# Patient Record
Sex: FEMALE | Race: WHITE | NOT HISPANIC OR LATINO | Employment: OTHER
[De-identification: names, ages, dates, MRNs, and addresses within clinical notes are randomized per-mention and may not be internally consistent; named-entity substitution may affect disease eponyms.]

---

## 2017-01-04 ENCOUNTER — RECORDS - HEALTHEAST (OUTPATIENT)
Dept: ADMINISTRATIVE | Facility: OTHER | Age: 67
End: 2017-01-04

## 2017-01-06 ENCOUNTER — COMMUNICATION - HEALTHEAST (OUTPATIENT)
Dept: INTERNAL MEDICINE | Facility: CLINIC | Age: 67
End: 2017-01-06

## 2017-01-12 ENCOUNTER — OFFICE VISIT - HEALTHEAST (OUTPATIENT)
Dept: SLEEP MEDICINE | Facility: CLINIC | Age: 67
End: 2017-01-12

## 2017-01-12 DIAGNOSIS — R29.818 SUSPECTED SLEEP APNEA: ICD-10-CM

## 2017-01-12 DIAGNOSIS — R06.83 SNORING: ICD-10-CM

## 2017-01-12 DIAGNOSIS — G47.10 HYPERSOMNIA, UNSPECIFIED: ICD-10-CM

## 2017-01-12 DIAGNOSIS — R53.83 FATIGUE, UNSPECIFIED TYPE: ICD-10-CM

## 2017-01-12 ASSESSMENT — MIFFLIN-ST. JEOR: SCORE: 1717.91

## 2017-01-17 ENCOUNTER — COMMUNICATION - HEALTHEAST (OUTPATIENT)
Dept: INTERNAL MEDICINE | Facility: CLINIC | Age: 67
End: 2017-01-17

## 2017-01-24 ENCOUNTER — RECORDS - HEALTHEAST (OUTPATIENT)
Dept: ADMINISTRATIVE | Facility: OTHER | Age: 67
End: 2017-01-24

## 2017-02-10 ENCOUNTER — RECORDS - HEALTHEAST (OUTPATIENT)
Dept: SLEEP MEDICINE | Facility: CLINIC | Age: 67
End: 2017-02-10

## 2017-02-10 DIAGNOSIS — G47.10 HYPERSOMNIA, UNSPECIFIED: ICD-10-CM

## 2017-02-10 DIAGNOSIS — R06.83 SNORING: ICD-10-CM

## 2017-02-10 DIAGNOSIS — G47.30 SLEEP APNEA, UNSPECIFIED: ICD-10-CM

## 2017-02-10 DIAGNOSIS — R53.83 OTHER FATIGUE: ICD-10-CM

## 2017-02-16 ENCOUNTER — COMMUNICATION - HEALTHEAST (OUTPATIENT)
Dept: SLEEP MEDICINE | Facility: CLINIC | Age: 67
End: 2017-02-16

## 2017-03-15 ENCOUNTER — COMMUNICATION - HEALTHEAST (OUTPATIENT)
Dept: INTERNAL MEDICINE | Facility: CLINIC | Age: 67
End: 2017-03-15

## 2017-03-15 DIAGNOSIS — F41.9 ANXIETY: ICD-10-CM

## 2017-03-20 ENCOUNTER — COMMUNICATION - HEALTHEAST (OUTPATIENT)
Dept: INTERNAL MEDICINE | Facility: CLINIC | Age: 67
End: 2017-03-20

## 2017-03-20 DIAGNOSIS — F41.9 ANXIETY: ICD-10-CM

## 2017-03-31 ENCOUNTER — OFFICE VISIT - HEALTHEAST (OUTPATIENT)
Dept: SLEEP MEDICINE | Facility: CLINIC | Age: 67
End: 2017-03-31

## 2017-03-31 ENCOUNTER — AMBULATORY - HEALTHEAST (OUTPATIENT)
Dept: SLEEP MEDICINE | Facility: CLINIC | Age: 67
End: 2017-03-31

## 2017-03-31 DIAGNOSIS — G47.33 OSA ON CPAP: ICD-10-CM

## 2017-03-31 DIAGNOSIS — G47.10 HYPERSOMNIA, UNSPECIFIED: ICD-10-CM

## 2017-03-31 DIAGNOSIS — G47.8 SLEEP DYSFUNCTION WITH SLEEP STAGE DISTURBANCE: ICD-10-CM

## 2017-03-31 ASSESSMENT — MIFFLIN-ST. JEOR: SCORE: 1718.82

## 2017-04-06 ENCOUNTER — RECORDS - HEALTHEAST (OUTPATIENT)
Dept: ADMINISTRATIVE | Facility: OTHER | Age: 67
End: 2017-04-06

## 2017-04-14 ENCOUNTER — OFFICE VISIT - HEALTHEAST (OUTPATIENT)
Dept: INTERNAL MEDICINE | Facility: CLINIC | Age: 67
End: 2017-04-14

## 2017-04-14 DIAGNOSIS — G47.33 OBSTRUCTIVE SLEEP APNEA SYNDROME: ICD-10-CM

## 2017-04-14 DIAGNOSIS — E11.9 TYPE 2 DIABETES MELLITUS WITHOUT COMPLICATION, WITH LONG-TERM CURRENT USE OF INSULIN (H): ICD-10-CM

## 2017-04-14 DIAGNOSIS — Z79.4 TYPE 2 DIABETES MELLITUS WITHOUT COMPLICATION, WITH LONG-TERM CURRENT USE OF INSULIN (H): ICD-10-CM

## 2017-04-14 DIAGNOSIS — Z01.818 PREOP EXAM FOR INTERNAL MEDICINE: ICD-10-CM

## 2017-04-14 ASSESSMENT — MIFFLIN-ST. JEOR: SCORE: 1726.99

## 2017-04-24 ENCOUNTER — COMMUNICATION - HEALTHEAST (OUTPATIENT)
Dept: SLEEP MEDICINE | Facility: CLINIC | Age: 67
End: 2017-04-24

## 2017-06-13 ENCOUNTER — COMMUNICATION - HEALTHEAST (OUTPATIENT)
Dept: INTERNAL MEDICINE | Facility: CLINIC | Age: 67
End: 2017-06-13

## 2017-06-13 ENCOUNTER — COMMUNICATION - HEALTHEAST (OUTPATIENT)
Dept: SCHEDULING | Facility: CLINIC | Age: 67
End: 2017-06-13

## 2017-06-13 DIAGNOSIS — F41.9 ANXIETY: ICD-10-CM

## 2017-06-20 ENCOUNTER — RECORDS - HEALTHEAST (OUTPATIENT)
Dept: ADMINISTRATIVE | Facility: OTHER | Age: 67
End: 2017-06-20

## 2017-06-22 ENCOUNTER — COMMUNICATION - HEALTHEAST (OUTPATIENT)
Dept: INTERNAL MEDICINE | Facility: CLINIC | Age: 67
End: 2017-06-22

## 2017-06-27 ENCOUNTER — OFFICE VISIT - HEALTHEAST (OUTPATIENT)
Dept: SLEEP MEDICINE | Facility: CLINIC | Age: 67
End: 2017-06-27

## 2017-06-27 DIAGNOSIS — G47.8 SLEEP DYSFUNCTION WITH SLEEP STAGE DISTURBANCE: ICD-10-CM

## 2017-06-27 DIAGNOSIS — G47.33 OSA ON CPAP: ICD-10-CM

## 2017-06-27 DIAGNOSIS — G47.10 HYPERSOMNIA, UNSPECIFIED: ICD-10-CM

## 2017-06-28 ENCOUNTER — MEDICAL CORRESPONDENCE (OUTPATIENT)
Dept: HEALTH INFORMATION MANAGEMENT | Facility: CLINIC | Age: 67
End: 2017-06-28

## 2017-06-28 ENCOUNTER — RECORDS - HEALTHEAST (OUTPATIENT)
Dept: ADMINISTRATIVE | Facility: OTHER | Age: 67
End: 2017-06-28

## 2017-06-29 ENCOUNTER — COMMUNICATION - HEALTHEAST (OUTPATIENT)
Dept: INTERNAL MEDICINE | Facility: CLINIC | Age: 67
End: 2017-06-29

## 2017-06-29 DIAGNOSIS — Z12.31 ENCOUNTER FOR SCREENING MAMMOGRAM FOR MALIGNANT NEOPLASM OF BREAST: ICD-10-CM

## 2017-06-29 DIAGNOSIS — Z00.00 HEALTH CARE MAINTENANCE: ICD-10-CM

## 2017-06-30 ENCOUNTER — RADIANT APPOINTMENT (OUTPATIENT)
Dept: MAMMOGRAPHY | Facility: CLINIC | Age: 67
End: 2017-06-30
Attending: INTERNAL MEDICINE
Payer: MEDICARE

## 2017-06-30 ENCOUNTER — RADIANT APPOINTMENT (OUTPATIENT)
Dept: ULTRASOUND IMAGING | Facility: CLINIC | Age: 67
End: 2017-06-30
Attending: INTERNAL MEDICINE
Payer: MEDICARE

## 2017-06-30 ENCOUNTER — RECORDS - HEALTHEAST (OUTPATIENT)
Dept: ADMINISTRATIVE | Facility: OTHER | Age: 67
End: 2017-06-30

## 2017-06-30 DIAGNOSIS — R92.8 ABNORMAL MAMMOGRAM OF LEFT BREAST: ICD-10-CM

## 2017-06-30 PROCEDURE — 76642 ULTRASOUND BREAST LIMITED: CPT | Mod: LT | Performed by: STUDENT IN AN ORGANIZED HEALTH CARE EDUCATION/TRAINING PROGRAM

## 2017-06-30 PROCEDURE — G0206 DX MAMMO INCL CAD UNI: HCPCS | Mod: LT | Performed by: STUDENT IN AN ORGANIZED HEALTH CARE EDUCATION/TRAINING PROGRAM

## 2017-07-01 ENCOUNTER — COMMUNICATION - HEALTHEAST (OUTPATIENT)
Dept: INTERNAL MEDICINE | Facility: CLINIC | Age: 67
End: 2017-07-01

## 2017-07-24 ENCOUNTER — COMMUNICATION - HEALTHEAST (OUTPATIENT)
Dept: INTERNAL MEDICINE | Facility: CLINIC | Age: 67
End: 2017-07-24

## 2017-07-24 DIAGNOSIS — F41.9 ANXIETY: ICD-10-CM

## 2017-08-03 ENCOUNTER — COMMUNICATION - HEALTHEAST (OUTPATIENT)
Dept: SLEEP MEDICINE | Facility: CLINIC | Age: 67
End: 2017-08-03

## 2017-09-02 ENCOUNTER — OFFICE VISIT - HEALTHEAST (OUTPATIENT)
Dept: FAMILY MEDICINE | Facility: CLINIC | Age: 67
End: 2017-09-02

## 2017-09-02 DIAGNOSIS — T63.441A BEE STING REACTION: ICD-10-CM

## 2017-09-30 ENCOUNTER — COMMUNICATION - HEALTHEAST (OUTPATIENT)
Dept: INTERNAL MEDICINE | Facility: CLINIC | Age: 67
End: 2017-09-30

## 2017-09-30 DIAGNOSIS — I10 HYPERTENSION: ICD-10-CM

## 2017-09-30 DIAGNOSIS — M62.838 MUSCLE SPASMS OF BOTH LOWER EXTREMITIES: ICD-10-CM

## 2017-09-30 DIAGNOSIS — E78.5 HYPERLIPIDEMIA: ICD-10-CM

## 2017-09-30 DIAGNOSIS — F41.9 ANXIETY: ICD-10-CM

## 2017-10-02 ENCOUNTER — COMMUNICATION - HEALTHEAST (OUTPATIENT)
Dept: INTERNAL MEDICINE | Facility: CLINIC | Age: 67
End: 2017-10-02

## 2017-10-03 ENCOUNTER — COMMUNICATION - HEALTHEAST (OUTPATIENT)
Dept: SCHEDULING | Facility: CLINIC | Age: 67
End: 2017-10-03

## 2017-10-03 DIAGNOSIS — E11.9 TYPE 2 DIABETES MELLITUS WITHOUT COMPLICATION, WITH LONG-TERM CURRENT USE OF INSULIN (H): ICD-10-CM

## 2017-10-03 DIAGNOSIS — Z79.4 TYPE 2 DIABETES MELLITUS WITHOUT COMPLICATION, WITH LONG-TERM CURRENT USE OF INSULIN (H): ICD-10-CM

## 2017-10-03 DIAGNOSIS — F41.9 ANXIETY: ICD-10-CM

## 2017-10-25 ENCOUNTER — COMMUNICATION - HEALTHEAST (OUTPATIENT)
Dept: SLEEP MEDICINE | Facility: CLINIC | Age: 67
End: 2017-10-25

## 2017-11-08 ENCOUNTER — COMMUNICATION - HEALTHEAST (OUTPATIENT)
Dept: INTERNAL MEDICINE | Facility: CLINIC | Age: 67
End: 2017-11-08

## 2017-11-11 ENCOUNTER — COMMUNICATION - HEALTHEAST (OUTPATIENT)
Dept: INTERNAL MEDICINE | Facility: CLINIC | Age: 67
End: 2017-11-11

## 2017-11-11 DIAGNOSIS — E11.9 DIABETES (H): ICD-10-CM

## 2017-11-11 DIAGNOSIS — I10 HYPERTENSION: ICD-10-CM

## 2017-11-11 DIAGNOSIS — J44.89 ASTHMATIC BRONCHITIS , CHRONIC (H): ICD-10-CM

## 2017-11-11 DIAGNOSIS — E11.9 TYPE 2 DIABETES MELLITUS (H): ICD-10-CM

## 2017-11-13 ENCOUNTER — COMMUNICATION - HEALTHEAST (OUTPATIENT)
Dept: INTERNAL MEDICINE | Facility: CLINIC | Age: 67
End: 2017-11-13

## 2017-12-17 ENCOUNTER — COMMUNICATION - HEALTHEAST (OUTPATIENT)
Dept: SCHEDULING | Facility: CLINIC | Age: 67
End: 2017-12-17

## 2017-12-17 DIAGNOSIS — I10 HTN (HYPERTENSION): ICD-10-CM

## 2018-01-03 ENCOUNTER — OFFICE VISIT - HEALTHEAST (OUTPATIENT)
Dept: INTERNAL MEDICINE | Facility: CLINIC | Age: 68
End: 2018-01-03

## 2018-01-03 ENCOUNTER — RECORDS - HEALTHEAST (OUTPATIENT)
Dept: ADMINISTRATIVE | Facility: OTHER | Age: 68
End: 2018-01-03

## 2018-01-03 ENCOUNTER — COMMUNICATION - HEALTHEAST (OUTPATIENT)
Dept: INTERNAL MEDICINE | Facility: CLINIC | Age: 68
End: 2018-01-03

## 2018-01-03 DIAGNOSIS — E11.9 TYPE 2 DIABETES MELLITUS WITHOUT COMPLICATION, WITH LONG-TERM CURRENT USE OF INSULIN (H): ICD-10-CM

## 2018-01-03 DIAGNOSIS — E06.3 HYPOTHYROIDISM DUE TO HASHIMOTO'S THYROIDITIS: ICD-10-CM

## 2018-01-03 DIAGNOSIS — Z78.0 MENOPAUSE: ICD-10-CM

## 2018-01-03 DIAGNOSIS — I10 ESSENTIAL HYPERTENSION: ICD-10-CM

## 2018-01-03 DIAGNOSIS — Z79.4 TYPE 2 DIABETES MELLITUS WITHOUT COMPLICATION, WITH LONG-TERM CURRENT USE OF INSULIN (H): ICD-10-CM

## 2018-01-03 DIAGNOSIS — E78.5 HYPERLIPIDEMIA, UNSPECIFIED HYPERLIPIDEMIA TYPE: ICD-10-CM

## 2018-01-03 DIAGNOSIS — E11.9 TYPE 2 DIABETES MELLITUS (H): ICD-10-CM

## 2018-01-03 LAB
ALBUMIN SERPL-MCNC: 3.6 G/DL (ref 3.5–5)
ALP SERPL-CCNC: 163 U/L (ref 45–120)
ALT SERPL W P-5'-P-CCNC: 24 U/L (ref 0–45)
ANION GAP SERPL CALCULATED.3IONS-SCNC: 11 MMOL/L (ref 5–18)
AST SERPL W P-5'-P-CCNC: 23 U/L (ref 0–40)
BILIRUB DIRECT SERPL-MCNC: 0.3 MG/DL
BILIRUB SERPL-MCNC: 1 MG/DL (ref 0–1)
BUN SERPL-MCNC: 27 MG/DL (ref 8–22)
CALCIUM SERPL-MCNC: 9.6 MG/DL (ref 8.5–10.5)
CHLORIDE BLD-SCNC: 105 MMOL/L (ref 98–107)
CHOLEST SERPL-MCNC: 160 MG/DL
CO2 SERPL-SCNC: 26 MMOL/L (ref 22–31)
CREAT SERPL-MCNC: 1.25 MG/DL (ref 0.6–1.1)
ERYTHROCYTE [DISTWIDTH] IN BLOOD BY AUTOMATED COUNT: 12.1 % (ref 11–14.5)
FASTING STATUS PATIENT QL REPORTED: YES
GFR SERPL CREATININE-BSD FRML MDRD: 43 ML/MIN/1.73M2
GLUCOSE BLD-MCNC: 194 MG/DL (ref 70–125)
HBA1C MFR BLD: 8.6 % (ref 3.5–6)
HCT VFR BLD AUTO: 39.3 % (ref 35–47)
HDLC SERPL-MCNC: 49 MG/DL
HGB BLD-MCNC: 13 G/DL (ref 12–16)
LDLC SERPL CALC-MCNC: 90 MG/DL
MCH RBC QN AUTO: 28.8 PG (ref 27–34)
MCHC RBC AUTO-ENTMCNC: 33.1 G/DL (ref 32–36)
MCV RBC AUTO: 87 FL (ref 80–100)
PLATELET # BLD AUTO: 240 THOU/UL (ref 140–440)
PMV BLD AUTO: 7.8 FL (ref 7–10)
POTASSIUM BLD-SCNC: 4.4 MMOL/L (ref 3.5–5)
PROT SERPL-MCNC: 7.1 G/DL (ref 6–8)
RBC # BLD AUTO: 4.52 MILL/UL (ref 3.8–5.4)
SODIUM SERPL-SCNC: 142 MMOL/L (ref 136–145)
TRIGL SERPL-MCNC: 105 MG/DL
TSH SERPL DL<=0.005 MIU/L-ACNC: 0.48 UIU/ML (ref 0.3–5)
WBC: 6.6 THOU/UL (ref 4–11)

## 2018-01-04 ENCOUNTER — AMBULATORY - HEALTHEAST (OUTPATIENT)
Dept: SCHEDULING | Facility: CLINIC | Age: 68
End: 2018-01-04

## 2018-01-04 DIAGNOSIS — Z78.0 MENOPAUSE: ICD-10-CM

## 2018-01-09 ENCOUNTER — COMMUNICATION - HEALTHEAST (OUTPATIENT)
Dept: INTERNAL MEDICINE | Facility: CLINIC | Age: 68
End: 2018-01-09

## 2018-01-09 DIAGNOSIS — F41.9 ANXIETY: ICD-10-CM

## 2018-01-11 ENCOUNTER — COMMUNICATION - HEALTHEAST (OUTPATIENT)
Dept: INTERNAL MEDICINE | Facility: CLINIC | Age: 68
End: 2018-01-11

## 2018-01-11 DIAGNOSIS — R05.9 COUGH: ICD-10-CM

## 2018-01-11 DIAGNOSIS — J32.9 SINUSITIS: ICD-10-CM

## 2018-01-12 ENCOUNTER — COMMUNICATION - HEALTHEAST (OUTPATIENT)
Dept: INTERNAL MEDICINE | Facility: CLINIC | Age: 68
End: 2018-01-12

## 2018-01-12 ENCOUNTER — RECORDS - HEALTHEAST (OUTPATIENT)
Dept: ADMINISTRATIVE | Facility: OTHER | Age: 68
End: 2018-01-12

## 2018-01-19 ENCOUNTER — OFFICE VISIT - HEALTHEAST (OUTPATIENT)
Dept: INTERNAL MEDICINE | Facility: CLINIC | Age: 68
End: 2018-01-19

## 2018-01-19 DIAGNOSIS — I45.10 RBBB: ICD-10-CM

## 2018-01-19 DIAGNOSIS — J10.1 INFLUENZA A: ICD-10-CM

## 2018-01-19 DIAGNOSIS — Z79.4 TYPE 2 DIABETES MELLITUS WITHOUT COMPLICATION, WITH LONG-TERM CURRENT USE OF INSULIN (H): ICD-10-CM

## 2018-01-19 DIAGNOSIS — R94.31 ABNORMAL EKG: ICD-10-CM

## 2018-01-19 DIAGNOSIS — E11.9 TYPE 2 DIABETES MELLITUS WITHOUT COMPLICATION, WITH LONG-TERM CURRENT USE OF INSULIN (H): ICD-10-CM

## 2018-01-19 LAB
ATRIAL RATE - MUSE: 80 BPM
DIASTOLIC BLOOD PRESSURE - MUSE: NORMAL MMHG
INTERPRETATION ECG - MUSE: NORMAL
P AXIS - MUSE: 1 DEGREES
PR INTERVAL - MUSE: 124 MS
QRS DURATION - MUSE: 116 MS
QT - MUSE: 420 MS
QTC - MUSE: 484 MS
R AXIS - MUSE: 60 DEGREES
SYSTOLIC BLOOD PRESSURE - MUSE: NORMAL MMHG
T AXIS - MUSE: 18 DEGREES
VENTRICULAR RATE- MUSE: 80 BPM

## 2018-01-22 ENCOUNTER — COMMUNICATION - HEALTHEAST (OUTPATIENT)
Dept: INTERNAL MEDICINE | Facility: CLINIC | Age: 68
End: 2018-01-22

## 2018-01-24 ENCOUNTER — RECORDS - HEALTHEAST (OUTPATIENT)
Dept: ADMINISTRATIVE | Facility: OTHER | Age: 68
End: 2018-01-24

## 2018-01-31 ENCOUNTER — COMMUNICATION - HEALTHEAST (OUTPATIENT)
Dept: INTERNAL MEDICINE | Facility: CLINIC | Age: 68
End: 2018-01-31

## 2018-01-31 DIAGNOSIS — I10 HYPERTENSION: ICD-10-CM

## 2018-01-31 DIAGNOSIS — R60.9 EDEMA: ICD-10-CM

## 2018-01-31 DIAGNOSIS — M10.9 GOUT: ICD-10-CM

## 2018-02-23 ENCOUNTER — COMMUNICATION - HEALTHEAST (OUTPATIENT)
Dept: INTERNAL MEDICINE | Facility: CLINIC | Age: 68
End: 2018-02-23

## 2018-02-23 DIAGNOSIS — E78.5 HYPERLIPIDEMIA: ICD-10-CM

## 2018-02-23 DIAGNOSIS — R60.9 EDEMA: ICD-10-CM

## 2018-04-03 ENCOUNTER — COMMUNICATION - HEALTHEAST (OUTPATIENT)
Dept: INTERNAL MEDICINE | Facility: CLINIC | Age: 68
End: 2018-04-03

## 2018-04-03 DIAGNOSIS — F41.9 ANXIETY: ICD-10-CM

## 2018-04-11 ENCOUNTER — COMMUNICATION - HEALTHEAST (OUTPATIENT)
Dept: INTERNAL MEDICINE | Facility: CLINIC | Age: 68
End: 2018-04-11

## 2018-04-11 DIAGNOSIS — F41.9 ANXIETY: ICD-10-CM

## 2018-04-23 ENCOUNTER — COMMUNICATION - HEALTHEAST (OUTPATIENT)
Dept: INTERNAL MEDICINE | Facility: CLINIC | Age: 68
End: 2018-04-23

## 2018-04-23 DIAGNOSIS — F41.9 ANXIETY: ICD-10-CM

## 2018-05-04 ENCOUNTER — COMMUNICATION - HEALTHEAST (OUTPATIENT)
Dept: INTERNAL MEDICINE | Facility: CLINIC | Age: 68
End: 2018-05-04

## 2018-05-04 DIAGNOSIS — F41.9 ANXIETY: ICD-10-CM

## 2018-05-24 ENCOUNTER — COMMUNICATION - HEALTHEAST (OUTPATIENT)
Dept: INTERNAL MEDICINE | Facility: CLINIC | Age: 68
End: 2018-05-24

## 2018-07-02 ENCOUNTER — RECORDS - HEALTHEAST (OUTPATIENT)
Dept: ADMINISTRATIVE | Facility: OTHER | Age: 68
End: 2018-07-02

## 2018-07-02 ENCOUNTER — RADIANT APPOINTMENT (OUTPATIENT)
Dept: MAMMOGRAPHY | Facility: CLINIC | Age: 68
End: 2018-07-02
Payer: MEDICARE

## 2018-07-02 DIAGNOSIS — Z12.31 VISIT FOR SCREENING MAMMOGRAM: ICD-10-CM

## 2018-07-16 ENCOUNTER — COMMUNICATION - HEALTHEAST (OUTPATIENT)
Dept: INTERNAL MEDICINE | Facility: CLINIC | Age: 68
End: 2018-07-16

## 2018-07-16 DIAGNOSIS — F41.9 ANXIETY: ICD-10-CM

## 2018-07-23 ENCOUNTER — COMMUNICATION - HEALTHEAST (OUTPATIENT)
Dept: SCHEDULING | Facility: CLINIC | Age: 68
End: 2018-07-23

## 2018-07-23 ENCOUNTER — RECORDS - HEALTHEAST (OUTPATIENT)
Dept: ADMINISTRATIVE | Facility: OTHER | Age: 68
End: 2018-07-23

## 2018-08-14 ENCOUNTER — MEDICAL CORRESPONDENCE (OUTPATIENT)
Dept: HEALTH INFORMATION MANAGEMENT | Facility: CLINIC | Age: 68
End: 2018-08-14

## 2018-08-14 ENCOUNTER — COMMUNICATION - HEALTHEAST (OUTPATIENT)
Dept: INTERNAL MEDICINE | Facility: CLINIC | Age: 68
End: 2018-08-14

## 2018-08-14 DIAGNOSIS — M10.9 GOUT FLARE: ICD-10-CM

## 2018-08-16 ENCOUNTER — COMMUNICATION - HEALTHEAST (OUTPATIENT)
Dept: INTERNAL MEDICINE | Facility: CLINIC | Age: 68
End: 2018-08-16

## 2018-08-16 DIAGNOSIS — M62.838 MUSCLE SPASMS OF BOTH LOWER EXTREMITIES: ICD-10-CM

## 2018-08-16 DIAGNOSIS — E11.9 TYPE 2 DIABETES MELLITUS WITHOUT COMPLICATION, WITH LONG-TERM CURRENT USE OF INSULIN (H): ICD-10-CM

## 2018-08-16 DIAGNOSIS — Z79.4 TYPE 2 DIABETES MELLITUS WITHOUT COMPLICATION, WITH LONG-TERM CURRENT USE OF INSULIN (H): ICD-10-CM

## 2018-08-26 ENCOUNTER — COMMUNICATION - HEALTHEAST (OUTPATIENT)
Dept: INTERNAL MEDICINE | Facility: CLINIC | Age: 68
End: 2018-08-26

## 2018-09-03 ENCOUNTER — COMMUNICATION - HEALTHEAST (OUTPATIENT)
Dept: INTERNAL MEDICINE | Facility: CLINIC | Age: 68
End: 2018-09-03

## 2018-09-03 DIAGNOSIS — E78.5 HYPERLIPIDEMIA: ICD-10-CM

## 2018-09-03 DIAGNOSIS — E11.9 TYPE 2 DIABETES MELLITUS (H): ICD-10-CM

## 2018-09-04 ENCOUNTER — COMMUNICATION - HEALTHEAST (OUTPATIENT)
Dept: INTERNAL MEDICINE | Facility: CLINIC | Age: 68
End: 2018-09-04

## 2018-09-04 DIAGNOSIS — E11.9 DIABETES MELLITUS, TYPE 2 (H): ICD-10-CM

## 2018-09-25 ENCOUNTER — RECORDS - HEALTHEAST (OUTPATIENT)
Dept: ADMINISTRATIVE | Facility: OTHER | Age: 68
End: 2018-09-25

## 2018-09-27 ENCOUNTER — COMMUNICATION - HEALTHEAST (OUTPATIENT)
Dept: INTERNAL MEDICINE | Facility: CLINIC | Age: 68
End: 2018-09-27

## 2018-10-03 ENCOUNTER — AMBULATORY - HEALTHEAST (OUTPATIENT)
Dept: LAB | Facility: CLINIC | Age: 68
End: 2018-10-03

## 2018-10-03 DIAGNOSIS — E11.9 DIABETES MELLITUS, TYPE 2 (H): ICD-10-CM

## 2018-10-03 LAB — HBA1C MFR BLD: 7.9 % (ref 3.5–6)

## 2018-10-12 ENCOUNTER — OFFICE VISIT - HEALTHEAST (OUTPATIENT)
Dept: INTERNAL MEDICINE | Facility: CLINIC | Age: 68
End: 2018-10-12

## 2018-10-12 DIAGNOSIS — Z79.4 TYPE 2 DIABETES MELLITUS WITHOUT COMPLICATION, WITH LONG-TERM CURRENT USE OF INSULIN (H): ICD-10-CM

## 2018-10-12 DIAGNOSIS — J44.89 ASTHMATIC BRONCHITIS , CHRONIC (H): ICD-10-CM

## 2018-10-12 DIAGNOSIS — M10.9 GOUT: ICD-10-CM

## 2018-10-12 DIAGNOSIS — Z23 FLU VACCINE NEED: ICD-10-CM

## 2018-10-12 DIAGNOSIS — I10 HYPERTENSION: ICD-10-CM

## 2018-10-12 DIAGNOSIS — F41.9 ANXIETY: ICD-10-CM

## 2018-10-12 DIAGNOSIS — E11.9 TYPE 2 DIABETES MELLITUS WITHOUT COMPLICATION, WITH LONG-TERM CURRENT USE OF INSULIN (H): ICD-10-CM

## 2018-10-12 DIAGNOSIS — R60.9 EDEMA: ICD-10-CM

## 2018-10-12 LAB
ALBUMIN SERPL-MCNC: 4 G/DL (ref 3.5–5)
ALP SERPL-CCNC: 152 U/L (ref 45–120)
ALT SERPL W P-5'-P-CCNC: 26 U/L (ref 0–45)
ANION GAP SERPL CALCULATED.3IONS-SCNC: 13 MMOL/L (ref 5–18)
AST SERPL W P-5'-P-CCNC: 26 U/L (ref 0–40)
BILIRUB DIRECT SERPL-MCNC: 0.2 MG/DL
BILIRUB SERPL-MCNC: 0.7 MG/DL (ref 0–1)
BUN SERPL-MCNC: 35 MG/DL (ref 8–22)
CALCIUM SERPL-MCNC: 10.2 MG/DL (ref 8.5–10.5)
CHLORIDE BLD-SCNC: 105 MMOL/L (ref 98–107)
CHOLEST SERPL-MCNC: 141 MG/DL
CO2 SERPL-SCNC: 23 MMOL/L (ref 22–31)
CREAT SERPL-MCNC: 1.13 MG/DL (ref 0.6–1.1)
CREAT UR-MCNC: 26.5 MG/DL
ERYTHROCYTE [DISTWIDTH] IN BLOOD BY AUTOMATED COUNT: 12.1 % (ref 11–14.5)
FASTING STATUS PATIENT QL REPORTED: NO
GFR SERPL CREATININE-BSD FRML MDRD: 48 ML/MIN/1.73M2
GLUCOSE BLD-MCNC: 97 MG/DL (ref 70–125)
HCT VFR BLD AUTO: 38.9 % (ref 35–47)
HDLC SERPL-MCNC: 48 MG/DL
HGB BLD-MCNC: 13.1 G/DL (ref 12–16)
LDLC SERPL CALC-MCNC: 73 MG/DL
MCH RBC QN AUTO: 28.7 PG (ref 27–34)
MCHC RBC AUTO-ENTMCNC: 33.7 G/DL (ref 32–36)
MCV RBC AUTO: 85 FL (ref 80–100)
MICROALBUMIN UR-MCNC: <0.5 MG/DL (ref 0–1.99)
MICROALBUMIN/CREAT UR: NORMAL MG/G
PLATELET # BLD AUTO: 273 THOU/UL (ref 140–440)
PMV BLD AUTO: 8.3 FL (ref 7–10)
POTASSIUM BLD-SCNC: 4.6 MMOL/L (ref 3.5–5)
PROT SERPL-MCNC: 7.1 G/DL (ref 6–8)
RBC # BLD AUTO: 4.57 MILL/UL (ref 3.8–5.4)
SODIUM SERPL-SCNC: 141 MMOL/L (ref 136–145)
TRIGL SERPL-MCNC: 101 MG/DL
TSH SERPL DL<=0.005 MIU/L-ACNC: 0.4 UIU/ML (ref 0.3–5)
URATE SERPL-MCNC: 9.3 MG/DL (ref 2–7.5)
WBC: 6.7 THOU/UL (ref 4–11)

## 2018-10-15 ENCOUNTER — COMMUNICATION - HEALTHEAST (OUTPATIENT)
Dept: INTERNAL MEDICINE | Facility: CLINIC | Age: 68
End: 2018-10-15

## 2018-10-22 ENCOUNTER — OFFICE VISIT - HEALTHEAST (OUTPATIENT)
Dept: PHARMACY | Facility: CLINIC | Age: 68
End: 2018-10-22

## 2018-10-22 ENCOUNTER — COMMUNICATION - HEALTHEAST (OUTPATIENT)
Dept: SCHEDULING | Facility: CLINIC | Age: 68
End: 2018-10-22

## 2018-10-22 DIAGNOSIS — E79.0 HYPERURICEMIA: ICD-10-CM

## 2018-10-22 DIAGNOSIS — Z79.4 TYPE 2 DIABETES MELLITUS WITHOUT COMPLICATION, WITH LONG-TERM CURRENT USE OF INSULIN (H): ICD-10-CM

## 2018-10-22 DIAGNOSIS — E06.3 HYPOTHYROIDISM DUE TO HASHIMOTO'S THYROIDITIS: ICD-10-CM

## 2018-10-22 DIAGNOSIS — E78.00 HYPERCHOLESTEROLEMIA: ICD-10-CM

## 2018-10-22 DIAGNOSIS — I10 ESSENTIAL HYPERTENSION: ICD-10-CM

## 2018-10-22 DIAGNOSIS — M81.0 SENILE OSTEOPOROSIS: ICD-10-CM

## 2018-10-22 DIAGNOSIS — G43.909 MIGRAINE WITHOUT STATUS MIGRAINOSUS, NOT INTRACTABLE, UNSPECIFIED MIGRAINE TYPE: ICD-10-CM

## 2018-10-22 DIAGNOSIS — J45.901 EXACERBATION OF ASTHMA, UNSPECIFIED ASTHMA SEVERITY, UNSPECIFIED WHETHER PERSISTENT: ICD-10-CM

## 2018-10-22 DIAGNOSIS — E11.9 TYPE 2 DIABETES MELLITUS WITHOUT COMPLICATION, WITH LONG-TERM CURRENT USE OF INSULIN (H): ICD-10-CM

## 2018-10-22 DIAGNOSIS — M19.90 OSTEOARTHRITIS, UNSPECIFIED OSTEOARTHRITIS TYPE, UNSPECIFIED SITE: ICD-10-CM

## 2018-10-29 ENCOUNTER — COMMUNICATION - HEALTHEAST (OUTPATIENT)
Dept: INTERNAL MEDICINE | Facility: CLINIC | Age: 68
End: 2018-10-29

## 2018-10-29 DIAGNOSIS — F41.9 ANXIETY: ICD-10-CM

## 2018-10-30 ENCOUNTER — OFFICE VISIT (OUTPATIENT)
Dept: RHEUMATOLOGY | Facility: CLINIC | Age: 68
End: 2018-10-30
Payer: MEDICARE

## 2018-10-30 ENCOUNTER — RADIANT APPOINTMENT (OUTPATIENT)
Dept: GENERAL RADIOLOGY | Facility: CLINIC | Age: 68
End: 2018-10-30
Attending: STUDENT IN AN ORGANIZED HEALTH CARE EDUCATION/TRAINING PROGRAM
Payer: MEDICARE

## 2018-10-30 ENCOUNTER — COMMUNICATION - HEALTHEAST (OUTPATIENT)
Dept: NURSING | Facility: CLINIC | Age: 68
End: 2018-10-30

## 2018-10-30 VITALS
HEART RATE: 82 BPM | WEIGHT: 283.1 LBS | HEIGHT: 63 IN | OXYGEN SATURATION: 96 % | SYSTOLIC BLOOD PRESSURE: 142 MMHG | BODY MASS INDEX: 50.16 KG/M2 | DIASTOLIC BLOOD PRESSURE: 74 MMHG

## 2018-10-30 DIAGNOSIS — M81.0 AGE-RELATED OSTEOPOROSIS WITHOUT CURRENT PATHOLOGICAL FRACTURE: ICD-10-CM

## 2018-10-30 DIAGNOSIS — E66.01 MORBID OBESITY (H): ICD-10-CM

## 2018-10-30 DIAGNOSIS — M25.50 ARTHRALGIA, UNSPECIFIED JOINT: Primary | ICD-10-CM

## 2018-10-30 DIAGNOSIS — M1A.09X0 CHRONIC GOUT OF MULTIPLE SITES, UNSPECIFIED CAUSE: ICD-10-CM

## 2018-10-30 LAB — URATE SERPL-MCNC: 9.3 MG/DL (ref 2.6–6)

## 2018-10-30 PROCEDURE — 84550 ASSAY OF BLOOD/URIC ACID: CPT | Performed by: STUDENT IN AN ORGANIZED HEALTH CARE EDUCATION/TRAINING PROGRAM

## 2018-10-30 PROCEDURE — 99204 OFFICE O/P NEW MOD 45 MIN: CPT | Performed by: STUDENT IN AN ORGANIZED HEALTH CARE EDUCATION/TRAINING PROGRAM

## 2018-10-30 PROCEDURE — 86200 CCP ANTIBODY: CPT | Performed by: STUDENT IN AN ORGANIZED HEALTH CARE EDUCATION/TRAINING PROGRAM

## 2018-10-30 PROCEDURE — 73130 X-RAY EXAM OF HAND: CPT | Mod: LT | Performed by: RADIOLOGY

## 2018-10-30 PROCEDURE — 36415 COLL VENOUS BLD VENIPUNCTURE: CPT | Performed by: STUDENT IN AN ORGANIZED HEALTH CARE EDUCATION/TRAINING PROGRAM

## 2018-10-30 PROCEDURE — 86431 RHEUMATOID FACTOR QUANT: CPT | Performed by: STUDENT IN AN ORGANIZED HEALTH CARE EDUCATION/TRAINING PROGRAM

## 2018-10-30 RX ORDER — COLCHICINE 0.6 MG/1
0.6 TABLET ORAL DAILY
Qty: 30 TABLET | Refills: 3 | Status: SHIPPED | OUTPATIENT
Start: 2018-10-30 | End: 2019-01-24

## 2018-10-30 RX ORDER — ALLOPURINOL 100 MG/1
100 TABLET ORAL DAILY
Qty: 30 TABLET | Refills: 3 | Status: SHIPPED | OUTPATIENT
Start: 2018-10-30 | End: 2018-11-07

## 2018-10-30 ASSESSMENT — PAIN SCALES - GENERAL: PAINLEVEL: MODERATE PAIN (4)

## 2018-10-30 NOTE — PROGRESS NOTES
Rheumatology Clinic Visit     Meggan Everett MRN# [id number]   YOB: 1950 Age: 68 year old     Date of Visit: Oct 30, 2018  Primary care provider: Stacey Kelly          Assessment and Plan:   Assessment     -- Chronic gout   -- Arthritis  -- Type 2 DM, HTN, HLD  -- CKD stage III  -- Morbid obesity   -- Osteoporosis     Ms Everett is 68-year-old female seen in clinic for evaluation of gout.    Chronic gout: She reports history of gout for the last 4 years. Happened once or twice a year.  Lately in the last few months it has become more frequent.  Having flareups in her fingers, knuckles, toes.  Lately she has some pain in her elbows.  Every time she gets a flareup it is treated with prednisone taper.  She was recommended to start urate lowering therapy by her PCP but she refused. Uric acid level has been running in the range of 8.5-9.7 mg/dL. She has occasionally noted tophaceous lesions over her fingertips which break open and a chalky white deposit comes out.    I will get serum uric acid level today.  We will get x-rays of bilateral hands to look for gouty arthritis and erosive changes.  Due to more frequent gout flareups she is a candidate for urate lowering therapy.  I recommend her to start allopurinol at a low dose of 100 mg daily.  She will also be at increased risk of gout flareups at the start of Allopurinol hence colchicine 0.6 mg tablet daily can be used for prophylaxis.    I will get repeat uric acid level along with the CBC and liver and kidney test in 2-3 weeks, based on labs will increase the dose of allopurinol further.  Her GFR is 40, she can use allopurinol higher dose of allopurinol as well.       Osteoporosis: She had DEXA scan in January 2018 which showed osteoporosis with high fracture risk.  I will give her endocrine referral for further management of osteoporosis.    Plan    1.  Blood tests: Serum uric acid level, rheumatoid factor, anti-CCP antibody.    2.  X-rays of  bilateral hands to look for gouty erosions    3.  Start Allopurinol 100 mg daily    5.  To prevent gout flareups will start colchicine 0.6 mg daily as a prophylactic agent    6.  We will repeat uric acid level, CBC and liver and kidney test in 2 weeks and based on that will increase the allopurinol dose    7.  Return to clinic in 2.5 months            Active Problem List:     Patient Active Problem List    Diagnosis Date Noted     Fibroadenoma of LEFT breast, with fibrocystic changes 05/14/2012     Priority: Medium            History of Present Illness:   Meggan Everett is 68 year old female with PMH of DM, HTN, hyperlipidemia, gout, hypothyroidism seen in the clinic in consultation at request of PCP Leticia April for management of chronic gout.     She was diagnosed with gout 4 years ago when it happened mostly in her feet once or twice a year. Now it has become more frequent and happen every couple weeks in her hands and toes. She has pain in her elbows. Even after acute gout attack settles down she can not bend her fingers. She has lumps on her fingers tips which break open and chalky white deposit comes out. Acute gout attack is treated with prednisone taper. Her PCP did discuss with her regarding the allopurinol use which she denied it due to potential side effects.  She has never been on Urate lowering treatment.     She has OA in her knees, does swimming to help with it.  She does not think the knee pain is related to gout.     No history of psoriasis, ulcerative colitis or chron's disease. No h/o iritis, enthesitis, finger or toe swelling like dactylitis, plantar fascitis or heel pain. Denies any raynauds, malar rash, photosensitivity, recurrent mouth/genital ulcers, sicca symptoms, pleuritic chest pains, recurrent sinusitis/rhinitis, swallowing difficulty, hearing or visual changes recently. No h/o arterial/venous thrombosis in the past. Denies any tick bite, recent GI/ infection.             Review  of Systems:   Review Of Systems  Constitutional: denies fever, chills, night sweats and weight loss.  Skin: No skin rash.  Eyes: No dryness or irritation in eyes. No episode of eye inflammation or redness.   Ears/Nose/Throat: no recurrent sinus infections.  Respiratory: No shortness of breath, dyspnea on exertion, cough, or hemoptysis  Cardiovascular: no chest pain or palpitations.  Gastrointestinal: no nausea, vomiting, abdominal pain.  Normal bowel movements.  Genitourinary: no dysuria, frequency  or hematuria.  Musculoskeletal: as in HPI  Neurologic: no numbness, tingling.  Psychiatric: no mood disorders.  Hematologic/Lymphatic/Immunologic: no history of easy bruising, petechia or purpura.  No abnormal bleeding.   Endocrine: + h/o thyroid disease or Diabetes.                  Past Medical History:     Past Medical History:   Diagnosis Date     Diabetes mellitus      Fibroadenoma of LEFT breast, with fibrocystic changes 5/14/2012     Hypothyroid      Mammographic microcalcification 5/2012    Left     Past Surgical History:   Procedure Laterality Date     D & C  2000, 2002     DILATE CERVIX, ABLATE ENDOMETRIUM, COMBINED  2005      BIOPSY OF BREAST, OPEN INCISIONAL  1972    Left      LAPAROSCOPY, SURGICAL; CHOLECYSTECTOMY  1998     Microdiscectomy  1998            Social History:     Social History     Occupational History     Retired      Social History Main Topics     Smoking status: Never Smoker     Smokeless tobacco: Never Used     Alcohol use No     Drug use: No     Sexual activity: Not on file            Family History:   No family history on file.         Allergies:     Allergies   Allergen Reactions     Codeine Sulfate Nausea     Tape [Adhesive Tape] Blisters     Tetanus Toxoid             Medications:     Current Outpatient Prescriptions   Medication Sig Dispense Refill     Celecoxib (CELEBREX PO) Take 200 mg by mouth       Cholecalciferol (VITAMIN D) 2000 UNIT tablet Take 2 tablets by mouth daily     "    furosemide (LASIX) 40 MG tablet Take 40 mg by mouth daily.       GLUCOSAMINE SULFATE PO Take 1,500 mg by mouth daily       IRBESARTAN PO Take 150 mg by mouth At Bedtime       LANTUS VIAL 100 UNITS/ML SC SOLN Inject 15 Units Subcutaneous 2 times daily        Levothyroxine Sodium (SYNTHROID PO) Take 125 mcg by mouth daily        LORAZEPAM PO Take 0.5 mg by mouth At Bedtime       metoprolol (TOPROL XL) 100 MG 24 hr tablet Take 100 mg by mouth daily.       NovoLOG VIAL 100 UNITS/ML SC SOLN Inject  Subcutaneous. Per sliding scale       rosuvastatin (CRESTOR) 10 MG tablet Take 10 mg by mouth daily.       tiZANidine (ZANAFLEX) 4 MG capsule Take 4 mg by mouth daily.       UNABLE TO FIND 2 tablets daily MEDICATION NAME: Uric Acid Support   For Kidney health supplement       UNABLE TO FIND 2 times daily MEDICATION NAME: Hemp Cream  Applied to knees PRN       Aliskiren-Hydrochlorothiazide 150-12.5 MG TABS Take 1 tablet by mouth daily.       ascorbic acid (VITAMIN C) 500 MG tablet Take 500 mg by mouth daily.       calcium carbonate-vitamin D (CALCIUM + D) 600-200 MG-UNIT TABS Take 1 tablet by mouth 3 times daily.              Physical Exam:   Blood pressure 142/74, pulse 82, height 1.6 m (5' 3\"), weight 128.4 kg (283 lb 1.6 oz), SpO2 96 %.  Wt Readings from Last 4 Encounters:   10/30/18 128.4 kg (283 lb 1.6 oz)       Constitutional: obese, appearing stated age; cooperative  Eyes: nl EOM, PERRLA, vision, conjunctiva, sclera  ENT: nl external ears, nose, hearing, lips, teeth, gums, throat  No mucous membrane lesions, normal saliva pool  Neck: no mass or thyroid enlargement  Resp: lungs clear to auscultation, nl to palpation  CV: RRR, no murmurs, rubs or gallops, no edema  GI: no ABD mass or tenderness, no HSM  : not tested  Lymph: no cervical, supraclavicular, inguinal or epitrochlear nodes    MS: All TMJ, neck, shoulder, elbow, wrist, MCP/PIP/DIP, spine, hip, knee, ankle, and foot MTP/IP joints were examined.     -- No " synovitis and tenderness present over MCP, PIP, DIP joints, wrists, elbows, shoulders, ankles and MTP joints.     -- She has tiny tophaceous lesions over left index finger tip, right middle finger tip. Small nodule present over right 4th PIP joints.     -- Right 2nd toe is red, swollen with possible tophaceous lesion.     -- No dactylitis,  tenosynovitis, enthesopathy.    Skin: no nail pitting, alopecia, rash, nodules or lesions  Neuro: nl cranial nerves, strength, sensation, DTRs.   Psych: nl judgement, orientation, memory, affect.         Data:     Results for orders placed or performed in visit on 07/02/18   MA Screen Bilateral w/Darian    Narrative    Examination: Bilateral digital screening mammography and bilateral  digital tomosynthesis with computer aided detection.    Comparison: Diagnostic mammogram dated 6/30/2017. Screening mammograms  dated 6/20/2017, 6/6/2016 and 5/29/2015, 10/7/2011    History/family history: No symptoms, routine screening.    TECHNIQUE:  Tomosynthesis    BREAST DENSITY: Scattered fibroglandular densities.    COMMENTS: There has been no significant change.       Impression    IMPRESSION: BI-RADS CATEGORY: 1 -  NEGATIVE.    RECOMMENDED FOLLOW-UP: Annual Mammography    Results to be sent to the patient.     I have personally reviewed the examination and initial interpretation  and I agree with the findings.    HILARIO KEN MD       Reviewed Rheumatology lab flowsheet    Adin Valerio MD  [facility]  Department of Rheumatology & Autoimmune Disorders  [facility]: [phone]   Pager - [phone]

## 2018-10-30 NOTE — NURSING NOTE
"Meggan Everett's goals for this visit include:   She requests these members of her care team be copied on today's visit information:    PCP: Stacey Kelly    Referring Provider:  Stacey Kelly  [facility]  [address] [place] [address]    /74  Pulse 82  Ht 1.6 m (5' 3\")  Wt 128.4 kg (283 lb 1.6 oz)  SpO2 96%  BMI 50.15 kg/m2    "

## 2018-10-30 NOTE — PATIENT INSTRUCTIONS
-- Will start you on Allopurinol 100 mg daily     -- Colchicine 0.6 mg daily as prophylaxis     -- Repeat Uric acid level in 2 weeks     -- Endocrine referral for Osteoporosis     -- RTC in 2.5 months   Allopurinol tablets  Brand Name: Zyloprim  What is this medicine?  ALLOPURINOL (al oh PURE i nole) reduces the amount of uric acid the body makes. It is used to treat the symptoms of gout. It is also used to treat or prevent high uric acid levels that occur as a result of certain types of chemotherapy. This medicine may also help patients who frequently have kidney stones.  How should I use this medicine?  Take this medicine by mouth with a glass of water. Follow the directions on the prescription label. If this medicine upsets your stomach, take it with food or milk. Take your doses at regular intervals. Do not take your medicine more often than directed.  Talk to your pediatrician regarding the use of this medicine in children. Special care may be needed. While this drug may be prescribed for children as young as 6 years for selected conditions, precautions do apply.  What side effects may I notice from receiving this medicine?  Side effects that you should report to your doctor or health care professional as soon as possible:    allergic reactions like skin rash, itching or hives, swelling of the face, lips, or tongue    breathing problems    muscle aches or pains    redness, blistering, peeling or loosening of the skin, including inside the mouth  Side effects that usually do not require medical attention (report to your doctor or health care professional if they continue or are bothersome):    changes in taste    diarrhea    indigestion    stomach pain or cramps  What may interact with this medicine?  Do not take this medicine with the following medication:    didanosine, ddI  This medicine may also interact with the following medications:    amoxicillin or ampicillin    azathioprine    certain medicines used to  treat gout    certain types of diuretics    chlorpropamide    cyclosporine    dicumarol    mercaptopurine    tolbutamide    warfarin  What if I miss a dose?  If you miss a dose, take it as soon as you can. If it is almost time for your next dose, take only that dose. Do not take double or extra doses.  Where should I keep my medicine?  Keep out of the reach of children.  Store at room temperature between 15 and 25 degrees C (59 and 77 degrees F). Protect from light and moisture. Throw away any unused medicine after the expiration date.  What should I tell my health care provider before I take this medicine?  They need to know if you have any of these conditions:    kidney or liver disease    an unusual or allergic reaction to allopurinol, other medicines, foods, dyes, or preservatives    pregnant or trying to get pregnant    breast feeding  What should I watch for while using this medicine?  Visit your doctor or health care professional for regular checks on your progress. If you are taking this medicine to treat gout, you may not have less frequent attacks at first. Keep taking your medicine regularly and the attacks should get better within 2 to 6 weeks. Drink plenty of water (10 to 12 full glasses a day) while you are taking this medicine. This will help to reduce stomach upset and reduce the risk of getting gout or kidney stones.  Call your doctor or health care professional at once if you get a skin rash together with chills, fever, sore throat, or nausea and vomiting, if you have blood in your urine, or difficulty passing urine.  Do not take vitamin C without asking your doctor or health care professional. Too much vitamin C can increase the chance of getting kidney stones.  You may get drowsy or dizzy. Do not drive, use machinery, or do anything that needs mental alertness until you know how this drug affects you. Do not stand or sit up quickly, especially if you are an older patient. This reduces the risk of  dizzy or fainting spells. Alcohol can make you more drowsy and dizzy. Alcohol can also increase the chance of stomach problems and increase the amount of uric acid in your blood. Avoid alcoholic drinks.  NOTE:This sheet is a summary. It may not cover all possible information. If you have questions about this medicine, talk to your doctor, pharmacist, or health care provider. Copyright  2018 [employer]        Gout    Gout is an inflammation of a joint due to a build-up of gout crystals in the joint fluid. This occurs when there is an excess of uric acid (a normal waste product) in the body. Uric acid builds up in the body when the kidneys are unable to filter enough of it from the blood. This may occur with age. It is also associated with kidney disease. Gout occurs more often in people with obesity, diabetes, high blood pressure, or high levels of fats in the blood. It may run in families. Gout tends to come and go. A flare up of gout is called an attack. Drinking alcohol or eating certain foods (such as shellfish or foods with additives such as high-fructose corn syrup) may increase uric acid levels in the blood and cause a gout attack.  During a gout attack, the affected joint may become a hot, red, swollen and painful. If you have had one attack of gout, you are likely to have another. An attack of gout can be treated with medicine. If these attacks become frequent, a daily medicine may be prescribed to help the kidneys remove uric acid from the body.  Home care  During a gout attack:    Rest painful joints. If gout affects the joints of your foot or leg, you may want to use crutches for the first few days to keep from bearing weight on the affected joint.    When sitting or lying down, raise the painful joint to a level higher than your heart.    Apply an ice pack (ice cubes in a plastic bag wrapped in a thin towel) over the injured area for 20 minutes every 1 to 2 hours the first day for pain relief. Continue  this 3 to 4 times a day for swelling and pain.    Avoid alcohol and foods listed below (see Preventing attacks) during a gout attack. Drink extra fluid to help flush the uric acid through your kidneys.    If you were prescribed a medicine to treat gout, take it as your healthcare provider has instructed. Don't skip doses.    Take anti-inflammatory medicine as directed.     If pain medicines have been prescribed, take them exactly as directed.    Preventing attacks    Minimize or avoid alcohol use. Excess alcohol intake can cause a gout attack.    Limit these foods and beverages:  ? Organ meats, such as kidneys and liver  ? Certain seafoods (anchovies, sardines, shrimp, scallops, herring, mackerel)  ? Wild game, meat extracts and meat gravies  ? Foods and beverages sweetened with high-fructose corn syrup, such as sodas    Eat a healthy diet including low-fat and nonfat dairy, whole grains, and vegetables.    If you are overweight, talk to your healthcare provider about a weight reduction plan. Avoid fasting or extreme low calorie diets (less than 900 calories per day). This will increase uric acid levels in the body.    If you have diabetes or high blood pressure, work with your doctor to manage these conditions.    Protect the joint from injury. Trauma can trigger a gout attack.  Follow-up care  Follow up with your healthcare provider, or as advised.   When to seek medical advice  Call your healthcare provider if you have any of the following:    Fever over 100.4 F (38. C) with worsening joint pain    Increasing redness around the joint    Pain developing in another joint    Repeated vomiting, abdominal pain, or blood in the vomit or stool (black or red color)  Date Last Reviewed: 3/1/2017    9859-7847 [employer]. [address] [place] [id number]. All rights reserved. This information is not intended as a substitute for professional medical care. Always follow your healthcare professional's  instructions.        Gout Diet  Gout is a painful condition caused by an excess of uric acid, a waste product made by the body. Uric acid forms crystals that collect in the joints. The immune response to these crystals brings on symptoms of joint pain and swelling. This is called a gout attack. Often, medications and diet changes are combined to manage gout. Below are some guidelines for changing your diet to help you manage gout and prevent attacks. Your health care provider will help you determine the best eating plan for you.     Eating to manage gout  Weight loss for those who are overweight may help reduce gout attacks.  Eat less of these foods  Eating too many foods containing purines may raise the levels of uric acid in your body. This raises your risk for a gout attack. Try to limit these foods and drinks:    Alcohol, such as beer and red wine. You may be told to avoid alcohol completely.    Soft drinks that contain sugar or high fructose corn syrup    Certain fish, including anchovies, sardines, fish eggs, and herring    Shellfish    Certain meats, such as red meat, hot dogs, luncheon meats, and turkey    Organ meats, such as liver, kidneys, and sweetbreads    Legumes, such as dried beans and peas    Other high fat foods such as gravy, whole milk, and high fat cheeses    Vegetables such as asparagus, cauliflower, spinach, and mushrooms used to be thought to contribute to an increased risk for a gout attack, but recent studies show that high purine vegetables don't increase the risk for a gout attack.  Eat more of these foods  Other foods may be helpful for people with gout. Add some of these foods to your diet:    Cherries contain chemicals that may lower uric acid.    Omega fatty acids. These are found in some fatty fish such as salmon, certain oils (flax, olive, or nut), and nuts themselves. Omega fatty acids may help prevent inflammation due to gout.    Dairy products that are low-fat or fat-free, such  as cheese and yogurt    Complex carbohydrate foods, including whole grains, brown rice, oats, and beans    Coffee, in moderation    Water, approximately 64 ounces per day  Follow-up care  Follow up with your healthcare provider as advised.  When to seek medical advice  Call your healthcare provider right away if any of these occur:    Return of gout symptoms, usually at night:    Severe pain, swelling, and heat in a joint, especially the base of the big toe    Affected joint is hard to move    Skin of the affected joint is purple or red    Fever of 100.4 F (38 C) or higher    Pain that doesn't get better even with prescribed medicine   Date Last Reviewed: 1/12/2016 2000-2017 [employer]. [address] [place] [address], [place] [address]. All rights reserved. This information is not intended as a substitute for professional medical care. Always follow your healthcare professional's instructions.        Treating Gout Attacks     Raising the joint above the level of your heart can help reduce gout symptoms.     Gout is a disease that affects the joints. It is caused by excess uric acid in your blood stream that may lead to crystals forming in your joints. Left untreated, it can lead to painful foot and joint deformities and even kidney problems. But, by treating gout early, you can relieve pain and help prevent future problems. Gout can usually be treated with medication and proper diet. In severe cases, surgery may be needed.  Gout attacks are painful and often happen more than once. Taking medications may reduce pain and prevent attacks in the future. There are also some things you can do at home to relieve symptoms.  Medications for gout  Your healthcare provider may prescribe a daily medication to reduce levels of uric acid. Reducing your uric acid levels may help prevent gout attacks. Allopurinol is one commonly used medication taken daily to reduce uric acid levels. Other medications can help relieve  pain and swelling during an acute attack. Medicines such as NSAIDs (nonsteroidal anti-inflammatory medicines), steroids, and colchicine may be prescribed for intermittent use to relieve an acute gout attack. Be sure to take your medication as directed.  What you can do  Below are some things you can do at home to relieve gout symptoms. Your healthcare provider may have other tips.    Rest the painful joint as much as you can.    Raise the painful joint so it is at a level higher than your heart.    Use ice for 10 minutes every 1-2 hours as possible.  How can I prevent gout?  With a little effort, you may be able to prevent gout attacks in the future. Here are some things you can do:    Avoid foods high in purines  ? Certain meats (red meat, processed meat, turkey)  ? Organ meats (kidney, liver, sweetbread)  ? Shellfish (lobster, crab, shrimp, scallop, mussel)  ? Certain fish (anchovy, sardine, herring, mackerel)    Take any medications prescribed by your healthcare provider.    Lose weight if you need to.    Reduce high fructose corn syrup in meals and drinks.    Reduce or eliminate consumption of alcohol, particularly beer, but also red wine and spirits.    Control blood pressure, diabetes, and cholesterol.    Drink plenty of water to help flush uric acid from your body.  Date Last Reviewed: 2/1/2016 2000-2017 [employer]. [address] [place] [address], [place] [id number]. All rights reserved. This information is not intended as a substitute for professional medical care. Always follow your healthcare professional's instructions.        Uric Acid (Blood)  Does this test have other names?  Serum uric acid  What is this test?  This test measures the amount of uric acid in your blood.  Uric acid is a normal bodily waste product. It forms when chemicals called purines break down. Purines are a natural substance found in the body and are also found in many foods such as liver, shellfish, and alcohol. They can  also be formed in the body when DNA is broken down.   When purines are broken down to uric acid in the blood, the body gets rid of it when you urinate or have a bowel movement. But if your body makes too much uric acid, or if your kidneys aren't working properly, uric acid can build up in the blood. Uric acid levels can also increase when you eat too many high-purine foods or take certain medicines like diuretics, aspirin, and niacin. Then crystals of uric acid can form and collect in the joints, causing painful inflammation. This condition is called gout.  Why do I need this test?  You might need this test if your healthcare provider wants to see whether you have high levels of uric acid in your blood. Your provider may recommend this test if you have symptoms of gout, although most people with hyperuricemia don't develop gout. Symptoms of gout include:    Joint pain or tenderness    Swelling in a joint or reddened skin around a joint    Swelling and pain in the big toe, ankle, or knee    Joints that are hot to the touch    Swelling and pain that affects only one joint in the body    Skin that looks shiny and is red or purple  You may also need this test if you have symptoms of kidney stones. Symptoms include:    Severe pain along your lower back. This may repeatedly get worse and then ease up. The pain may also travel to your genitals.    Nausea    Vomiting    Urgent need to urinate    Blood in your urine  What other tests might I have along with this test?  Your healthcare provider may also order other tests to diagnose gout, include looking at a sample of joint fluid drawn out with a needle.  Your provider may also order a urinalysis if he or she suspects that you have a kidney stone. The urinalysis looks for blood, white blood cells, and crystals.  Your provider may also order tests of your blood and urine to find out what's causing the high levels uric acid.  What do my test results mean?  Many things may  affect your lab test results. These include the method each lab uses to do the test. Even if your test results are different from the normal value, you may not have a problem. To learn what the results mean for you, talk with your healthcare provider.  Results are given in milligrams per deciliter (mg/dL). Here are results that may mean you have hyperuricemia:    For females: higher than 6 mg/dL    For males: higher than 7 mg/dL  Many health conditions can cause high levels of uric acid. These include cancer, kidney disease, hypothyroidism, hyperparathyroidism, and sarcoidosis.  Your uric acid levels may be high if you eat foods high in purines, such as organ meats, dried beans and peas, and certain fish - anchovies, herring, sardines, and mackerel. High levels can also be caused by a low-salt diet.  How is this test done?  The test requires a blood sample, which is drawn through a needle from a vein in your arm.  Does this test pose any risks?  Taking a blood sample with a needle carries risks that include bleeding, infection, bruising, or feeling dizzy. When the needle pricks your arm, you may feel a slight stinging sensation or pain. Afterward, the site may be slightly sore.  What might affect my test results?  Certain medicines may affect your test results. These include:    Aspirin and other medicines that contain salicylate    Cyclosporine, a medicine sometimes used for autoimmune diseases    Levodopa, a medicine used to treat Parkinson disease    Certain diuretic medicines such as hydrochlorothiazide    Vitamin B-3 (niacin)  Other things that may affect your test results include:    Vigorous exercise    Chemotherapy or radiation therapy to treat cancer    Foods high in purines, including organ meats, mushrooms, some types of fish and seafood, and dried peas and beans   How do I get ready for this test?  Ask your healthcare provider if you should avoid any foods, beverages, or medications before the test.  Be  sure your provider knows about all medicines, herbs, vitamins, and supplements you are taking. This includes medicines that don't need a prescription and any illicit drugs you may use.        5390-2212 [employer]. [address] [place] [address], [place] [address]. All rights reserved. This information is not intended as a substitute for professional medical care. Always follow your healthcare professional's instructions.        What Is Gout?  Gout is a disease that affects the joints. Left untreated, it can lead to painful foot and joint deformities and even kidney problems. But, by treating gout early, you can relieve pain and help prevent future problems. Gout can usually be treated with medication and proper diet. In severe cases, surgery may be needed.  What causes gout?  Gout is caused by an excess of uric acid (a waste product made by the body). Uric acid is excreted by the kidneys. If the uric acid level in your blood rises too high, the uric acid may form crystals that collect in the joints, bringing on a gout attack. If you have many gout attacks, crystals may form large deposits called tophi. Tophi can damage joints and cause deformity.  Who is at risk for gout?  Men are more likely to have gout than women. But women can also be affected, mostly after menopause. Some health problems, such as obesity and high cholesterol, make gout more likely. And some medications, such as diuretics ( water pills ), can trigger a gout attack. People who drink a lot of alcohol are at high risk for gout. Certain foods can also trigger a gout attack.  Substances that may trigger a gout attack  To help prevent a gout attack, avoid these foods:    Alcohol (particularly beer, but also red wine and spirits)    Certain meats (red meat, processed meat, turkey)    Organ meats (kidney, liver, sweetbread)    Shellfish (lobster, crab, shrimp, scallop, mussel)    Certain fish (anchovy, sardine, herring,  "mackerel)   Treatment    Lifestyle changes, including weight loss, exercise, and quitting tobacco use    Reducing consumption of the food groups above as well as high fructose corn syrup, found in many foods including sodas and energy drinks    Changing non-essential medications that may contribute to gout (such as thiazide diuretics--\"water pills\")    Medications to reduce the amount of uric acid in the blood, such as allopurinol or others    Medications to treat acute gout attacks, including NSAIDs (nonsteroidal anti-inflammatory medicines), steroids, and colchicine  Date Last Reviewed: 2/1/2016 2000-2017 [employer]. [address] [place] [id number]. All rights reserved. This information is not intended as a substitute for professional medical care. Always follow your healthcare professional's instructions.        "

## 2018-10-30 NOTE — MR AVS SNAPSHOT
After Visit Summary   10/30/2018    Meggan Everett    MRN: [id number]           Patient Information     Date Of Birth          1950        Visit Information        Provider Department      10/30/2018 11:00 AM Adin Valerio MD [facility]        Today's Diagnoses     Arthralgia, unspecified joint    -  1    Morbid obesity (H)        Chronic gout of multiple sites, unspecified cause        Age-related osteoporosis without current pathological fracture          Care Instructions    -- Will start you on Allopurinol 100 mg daily     -- Colchicine 0.6 mg daily as prophylaxis     -- Repeat Uric acid level in 2 weeks     -- Endocrine referral for Osteoporosis     -- RTC in 2.5 months   Allopurinol tablets  Brand Name: Zyloprim  What is this medicine?  ALLOPURINOL (al oh PURE i nole) reduces the amount of uric acid the body makes. It is used to treat the symptoms of gout. It is also used to treat or prevent high uric acid levels that occur as a result of certain types of chemotherapy. This medicine may also help patients who frequently have kidney stones.  How should I use this medicine?  Take this medicine by mouth with a glass of water. Follow the directions on the prescription label. If this medicine upsets your stomach, take it with food or milk. Take your doses at regular intervals. Do not take your medicine more often than directed.  Talk to your pediatrician regarding the use of this medicine in children. Special care may be needed. While this drug may be prescribed for children as young as 6 years for selected conditions, precautions do apply.  What side effects may I notice from receiving this medicine?  Side effects that you should report to your doctor or health care professional as soon as possible:    allergic reactions like skin rash, itching or hives, swelling of the face, lips, or tongue    breathing problems    muscle aches or pains    redness, blistering, peeling or  loosening of the skin, including inside the mouth  Side effects that usually do not require medical attention (report to your doctor or health care professional if they continue or are bothersome):    changes in taste    diarrhea    indigestion    stomach pain or cramps  What may interact with this medicine?  Do not take this medicine with the following medication:    didanosine, ddI  This medicine may also interact with the following medications:    amoxicillin or ampicillin    azathioprine    certain medicines used to treat gout    certain types of diuretics    chlorpropamide    cyclosporine    dicumarol    mercaptopurine    tolbutamide    warfarin  What if I miss a dose?  If you miss a dose, take it as soon as you can. If it is almost time for your next dose, take only that dose. Do not take double or extra doses.  Where should I keep my medicine?  Keep out of the reach of children.  Store at room temperature between 15 and 25 degrees C (59 and 77 degrees F). Protect from light and moisture. Throw away any unused medicine after the expiration date.  What should I tell my health care provider before I take this medicine?  They need to know if you have any of these conditions:    kidney or liver disease    an unusual or allergic reaction to allopurinol, other medicines, foods, dyes, or preservatives    pregnant or trying to get pregnant    breast feeding  What should I watch for while using this medicine?  Visit your doctor or health care professional for regular checks on your progress. If you are taking this medicine to treat gout, you may not have less frequent attacks at first. Keep taking your medicine regularly and the attacks should get better within 2 to 6 weeks. Drink plenty of water (10 to 12 full glasses a day) while you are taking this medicine. This will help to reduce stomach upset and reduce the risk of getting gout or kidney stones.  Call your doctor or health care professional at once if you get a  skin rash together with chills, fever, sore throat, or nausea and vomiting, if you have blood in your urine, or difficulty passing urine.  Do not take vitamin C without asking your doctor or health care professional. Too much vitamin C can increase the chance of getting kidney stones.  You may get drowsy or dizzy. Do not drive, use machinery, or do anything that needs mental alertness until you know how this drug affects you. Do not stand or sit up quickly, especially if you are an older patient. This reduces the risk of dizzy or fainting spells. Alcohol can make you more drowsy and dizzy. Alcohol can also increase the chance of stomach problems and increase the amount of uric acid in your blood. Avoid alcoholic drinks.  NOTE:This sheet is a summary. It may not cover all possible information. If you have questions about this medicine, talk to your doctor, pharmacist, or health care provider. Copyright  2018 [employer]        Gout    Gout is an inflammation of a joint due to a build-up of gout crystals in the joint fluid. This occurs when there is an excess of uric acid (a normal waste product) in the body. Uric acid builds up in the body when the kidneys are unable to filter enough of it from the blood. This may occur with age. It is also associated with kidney disease. Gout occurs more often in people with obesity, diabetes, high blood pressure, or high levels of fats in the blood. It may run in families. Gout tends to come and go. A flare up of gout is called an attack. Drinking alcohol or eating certain foods (such as shellfish or foods with additives such as high-fructose corn syrup) may increase uric acid levels in the blood and cause a gout attack.  During a gout attack, the affected joint may become a hot, red, swollen and painful. If you have had one attack of gout, you are likely to have another. An attack of gout can be treated with medicine. If these attacks become frequent, a daily medicine may be  prescribed to help the kidneys remove uric acid from the body.  Home care  During a gout attack:    Rest painful joints. If gout affects the joints of your foot or leg, you may want to use crutches for the first few days to keep from bearing weight on the affected joint.    When sitting or lying down, raise the painful joint to a level higher than your heart.    Apply an ice pack (ice cubes in a plastic bag wrapped in a thin towel) over the injured area for 20 minutes every 1 to 2 hours the first day for pain relief. Continue this 3 to 4 times a day for swelling and pain.    Avoid alcohol and foods listed below (see Preventing attacks) during a gout attack. Drink extra fluid to help flush the uric acid through your kidneys.    If you were prescribed a medicine to treat gout, take it as your healthcare provider has instructed. Don't skip doses.    Take anti-inflammatory medicine as directed.     If pain medicines have been prescribed, take them exactly as directed.    Preventing attacks    Minimize or avoid alcohol use. Excess alcohol intake can cause a gout attack.    Limit these foods and beverages:  ? Organ meats, such as kidneys and liver  ? Certain seafoods (anchovies, sardines, shrimp, scallops, herring, mackerel)  ? Wild game, meat extracts and meat gravies  ? Foods and beverages sweetened with high-fructose corn syrup, such as sodas    Eat a healthy diet including low-fat and nonfat dairy, whole grains, and vegetables.    If you are overweight, talk to your healthcare provider about a weight reduction plan. Avoid fasting or extreme low calorie diets (less than 900 calories per day). This will increase uric acid levels in the body.    If you have diabetes or high blood pressure, work with your doctor to manage these conditions.    Protect the joint from injury. Trauma can trigger a gout attack.  Follow-up care  Follow up with your healthcare provider, or as advised.   When to seek medical advice  Call your  healthcare provider if you have any of the following:    Fever over 100.4 F (38. C) with worsening joint pain    Increasing redness around the joint    Pain developing in another joint    Repeated vomiting, abdominal pain, or blood in the vomit or stool (black or red color)  Date Last Reviewed: 3/1/2017    5151-5278 [employer]. [address] [place] [address]. All rights reserved. This information is not intended as a substitute for professional medical care. Always follow your healthcare professional's instructions.        Gout Diet  Gout is a painful condition caused by an excess of uric acid, a waste product made by the body. Uric acid forms crystals that collect in the joints. The immune response to these crystals brings on symptoms of joint pain and swelling. This is called a gout attack. Often, medications and diet changes are combined to manage gout. Below are some guidelines for changing your diet to help you manage gout and prevent attacks. Your health care provider will help you determine the best eating plan for you.     Eating to manage gout  Weight loss for those who are overweight may help reduce gout attacks.  Eat less of these foods  Eating too many foods containing purines may raise the levels of uric acid in your body. This raises your risk for a gout attack. Try to limit these foods and drinks:    Alcohol, such as beer and red wine. You may be told to avoid alcohol completely.    Soft drinks that contain sugar or high fructose corn syrup    Certain fish, including anchovies, sardines, fish eggs, and herring    Shellfish    Certain meats, such as red meat, hot dogs, luncheon meats, and turkey    Organ meats, such as liver, kidneys, and sweetbreads    Legumes, such as dried beans and peas    Other high fat foods such as gravy, whole milk, and high fat cheeses    Vegetables such as asparagus, cauliflower, spinach, and mushrooms used to be thought to contribute to an increased  risk for a gout attack, but recent studies show that high purine vegetables don't increase the risk for a gout attack.  Eat more of these foods  Other foods may be helpful for people with gout. Add some of these foods to your diet:    Cherries contain chemicals that may lower uric acid.    Omega fatty acids. These are found in some fatty fish such as salmon, certain oils (flax, olive, or nut), and nuts themselves. Omega fatty acids may help prevent inflammation due to gout.    Dairy products that are low-fat or fat-free, such as cheese and yogurt    Complex carbohydrate foods, including whole grains, brown rice, oats, and beans    Coffee, in moderation    Water, approximately 64 ounces per day  Follow-up care  Follow up with your healthcare provider as advised.  When to seek medical advice  Call your healthcare provider right away if any of these occur:    Return of gout symptoms, usually at night:    Severe pain, swelling, and heat in a joint, especially the base of the big toe    Affected joint is hard to move    Skin of the affected joint is purple or red    Fever of 100.4 F (38 C) or higher    Pain that doesn't get better even with prescribed medicine   Date Last Reviewed: 1/12/2016 2000-2017 [employer]. [address] [place] [address]. All rights reserved. This information is not intended as a substitute for professional medical care. Always follow your healthcare professional's instructions.        Treating Gout Attacks     Raising the joint above the level of your heart can help reduce gout symptoms.     Gout is a disease that affects the joints. It is caused by excess uric acid in your blood stream that may lead to crystals forming in your joints. Left untreated, it can lead to painful foot and joint deformities and even kidney problems. But, by treating gout early, you can relieve pain and help prevent future problems. Gout can usually be treated with medication and proper diet.  In severe cases, surgery may be needed.  Gout attacks are painful and often happen more than once. Taking medications may reduce pain and prevent attacks in the future. There are also some things you can do at home to relieve symptoms.  Medications for gout  Your healthcare provider may prescribe a daily medication to reduce levels of uric acid. Reducing your uric acid levels may help prevent gout attacks. Allopurinol is one commonly used medication taken daily to reduce uric acid levels. Other medications can help relieve pain and swelling during an acute attack. Medicines such as NSAIDs (nonsteroidal anti-inflammatory medicines), steroids, and colchicine may be prescribed for intermittent use to relieve an acute gout attack. Be sure to take your medication as directed.  What you can do  Below are some things you can do at home to relieve gout symptoms. Your healthcare provider may have other tips.    Rest the painful joint as much as you can.    Raise the painful joint so it is at a level higher than your heart.    Use ice for 10 minutes every 1-2 hours as possible.  How can I prevent gout?  With a little effort, you may be able to prevent gout attacks in the future. Here are some things you can do:    Avoid foods high in purines  ? Certain meats (red meat, processed meat, turkey)  ? Organ meats (kidney, liver, sweetbread)  ? Shellfish (lobster, crab, shrimp, scallop, mussel)  ? Certain fish (anchovy, sardine, herring, mackerel)    Take any medications prescribed by your healthcare provider.    Lose weight if you need to.    Reduce high fructose corn syrup in meals and drinks.    Reduce or eliminate consumption of alcohol, particularly beer, but also red wine and spirits.    Control blood pressure, diabetes, and cholesterol.    Drink plenty of water to help flush uric acid from your body.  Date Last Reviewed: 2/1/2016 2000-2017 [employer]. [address] [place] [address], [place] [address]. All rights  reserved. This information is not intended as a substitute for professional medical care. Always follow your healthcare professional's instructions.        Uric Acid (Blood)  Does this test have other names?  Serum uric acid  What is this test?  This test measures the amount of uric acid in your blood.  Uric acid is a normal bodily waste product. It forms when chemicals called purines break down. Purines are a natural substance found in the body and are also found in many foods such as liver, shellfish, and alcohol. They can also be formed in the body when DNA is broken down.   When purines are broken down to uric acid in the blood, the body gets rid of it when you urinate or have a bowel movement. But if your body makes too much uric acid, or if your kidneys aren't working properly, uric acid can build up in the blood. Uric acid levels can also increase when you eat too many high-purine foods or take certain medicines like diuretics, aspirin, and niacin. Then crystals of uric acid can form and collect in the joints, causing painful inflammation. This condition is called gout.  Why do I need this test?  You might need this test if your healthcare provider wants to see whether you have high levels of uric acid in your blood. Your provider may recommend this test if you have symptoms of gout, although most people with hyperuricemia don't develop gout. Symptoms of gout include:    Joint pain or tenderness    Swelling in a joint or reddened skin around a joint    Swelling and pain in the big toe, ankle, or knee    Joints that are hot to the touch    Swelling and pain that affects only one joint in the body    Skin that looks shiny and is red or purple  You may also need this test if you have symptoms of kidney stones. Symptoms include:    Severe pain along your lower back. This may repeatedly get worse and then ease up. The pain may also travel to your genitals.    Nausea    Vomiting    Urgent need to urinate    Blood  in your urine  What other tests might I have along with this test?  Your healthcare provider may also order other tests to diagnose gout, include looking at a sample of joint fluid drawn out with a needle.  Your provider may also order a urinalysis if he or she suspects that you have a kidney stone. The urinalysis looks for blood, white blood cells, and crystals.  Your provider may also order tests of your blood and urine to find out what's causing the high levels uric acid.  What do my test results mean?  Many things may affect your lab test results. These include the method each lab uses to do the test. Even if your test results are different from the normal value, you may not have a problem. To learn what the results mean for you, talk with your healthcare provider.  Results are given in milligrams per deciliter (mg/dL). Here are results that may mean you have hyperuricemia:    For females: higher than 6 mg/dL    For males: higher than 7 mg/dL  Many health conditions can cause high levels of uric acid. These include cancer, kidney disease, hypothyroidism, hyperparathyroidism, and sarcoidosis.  Your uric acid levels may be high if you eat foods high in purines, such as organ meats, dried beans and peas, and certain fish - anchovies, herring, sardines, and mackerel. High levels can also be caused by a low-salt diet.  How is this test done?  The test requires a blood sample, which is drawn through a needle from a vein in your arm.  Does this test pose any risks?  Taking a blood sample with a needle carries risks that include bleeding, infection, bruising, or feeling dizzy. When the needle pricks your arm, you may feel a slight stinging sensation or pain. Afterward, the site may be slightly sore.  What might affect my test results?  Certain medicines may affect your test results. These include:    Aspirin and other medicines that contain salicylate    Cyclosporine, a medicine sometimes used for autoimmune  diseases    Levodopa, a medicine used to treat Parkinson disease    Certain diuretic medicines such as hydrochlorothiazide    Vitamin B-3 (niacin)  Other things that may affect your test results include:    Vigorous exercise    Chemotherapy or radiation therapy to treat cancer    Foods high in purines, including organ meats, mushrooms, some types of fish and seafood, and dried peas and beans   How do I get ready for this test?  Ask your healthcare provider if you should avoid any foods, beverages, or medications before the test.  Be sure your provider knows about all medicines, herbs, vitamins, and supplements you are taking. This includes medicines that don't need a prescription and any illicit drugs you may use.        6114-8315 [employer]. [address] [place] [address], [place] [address]. All rights reserved. This information is not intended as a substitute for professional medical care. Always follow your healthcare professional's instructions.        What Is Gout?  Gout is a disease that affects the joints. Left untreated, it can lead to painful foot and joint deformities and even kidney problems. But, by treating gout early, you can relieve pain and help prevent future problems. Gout can usually be treated with medication and proper diet. In severe cases, surgery may be needed.  What causes gout?  Gout is caused by an excess of uric acid (a waste product made by the body). Uric acid is excreted by the kidneys. If the uric acid level in your blood rises too high, the uric acid may form crystals that collect in the joints, bringing on a gout attack. If you have many gout attacks, crystals may form large deposits called tophi. Tophi can damage joints and cause deformity.  Who is at risk for gout?  Men are more likely to have gout than women. But women can also be affected, mostly after menopause. Some health problems, such as obesity and high cholesterol, make gout more likely. And some medications, such  "as diuretics ( water pills ), can trigger a gout attack. People who drink a lot of alcohol are at high risk for gout. Certain foods can also trigger a gout attack.  Substances that may trigger a gout attack  To help prevent a gout attack, avoid these foods:    Alcohol (particularly beer, but also red wine and spirits)    Certain meats (red meat, processed meat, turkey)    Organ meats (kidney, liver, sweetbread)    Shellfish (lobster, crab, shrimp, scallop, mussel)    Certain fish (anchovy, sardine, herring, mackerel)   Treatment    Lifestyle changes, including weight loss, exercise, and quitting tobacco use    Reducing consumption of the food groups above as well as high fructose corn syrup, found in many foods including sodas and energy drinks    Changing non-essential medications that may contribute to gout (such as thiazide diuretics--\"water pills\")    Medications to reduce the amount of uric acid in the blood, such as allopurinol or others    Medications to treat acute gout attacks, including NSAIDs (nonsteroidal anti-inflammatory medicines), steroids, and colchicine  Date Last Reviewed: 2/1/2016 2000-2017 [employer]. [address] [place] [address]. All rights reserved. This information is not intended as a substitute for professional medical care. Always follow your healthcare professional's instructions.                Follow-ups after your visit        Additional Services     ENDOCRINOLOGY ADULT REFERRAL       Your provider has referred you to: [facility]: [facility] [place] [phone]   [email or web address]    She has Osteoporosis.       Please be aware that coverage of these services is subject to the terms and limitations of your health insurance plan.  Call member services at your health plan with any benefit or coverage questions.      Please bring the following to your appointment:    >>   Any x-rays, CTs " or MRIs which have been performed.  Contact the facility where they were done to arrange for  prior to your scheduled appointment.    >>   List of current medications   >>   This referral request   >>   Any documents/labs given to you for this referral                  Your next 10 appointments already scheduled     Jorge 15, 2019 11:00 AM CST   Return Visit with Adin Valerio MD   [facility]    [address] [place] [address] [id number] [phone]              Future tests that were ordered for you today     Open Future Orders        Priority Expected Expires Ordered    Uric acid Routine 11/13/2018 10/30/2019 10/30/2018    CBC with platelets Routine 11/13/2018 10/30/2019 10/30/2018    Creatinine Routine 11/13/2018 10/30/2019 10/30/2018    AST Routine 11/13/2018 10/30/2019 10/30/2018    ALT Routine 11/13/2018 10/30/2019 10/30/2018            Who to contact     If you have questions or need follow up information about today's clinic visit or your schedule please contact [facility] directly at [phone].  Normal or non-critical lab and imaging results will be communicated to you by MyChart, letter or phone within 4 business days after the clinic has received the results. If you do not hear from us within 7 days, please contact the clinic through [employer] or phone. If you have a critical or abnormal lab result, we will notify you by phone as soon as possible.  Submit refill requests through [employer] or call your pharmacy and they will forward the refill request to us. Please allow 3 business days for your refill to be completed.          Additional Information About Your Visit        [employer] Information     [employer] gives you secure access to your electronic health record. If you see a primary care provider, you can also send messages to your care team and make appointments. If you have questions, please call your primary care clinic.   "If you do not have a primary care provider, please call [phone] and they will assist you.      [employer] is an electronic gateway that provides easy, online access to your medical records. With [employer], you can request a clinic appointment, read your test results, renew a prescription or communicate with your care team.     To access your existing account, please contact your [facility] or call [phone] for assistance.        Care EveryWhere ID     This is your Care EveryWhere ID. This could be used by other organizations to access your [place] medical records  [id number]        Your Vitals Were     Pulse Height Pulse Oximetry BMI (Body Mass Index)          82 1.6 m (5' 3\") 96% 50.15 kg/m2         Blood Pressure from Last 3 Encounters:   10/30/18 142/74    Weight from Last 3 Encounters:   10/30/18 128.4 kg (283 lb 1.6 oz)              We Performed the Following     Cyclic Citrullinated Peptide Antibody IgG     ENDOCRINOLOGY ADULT REFERRAL     Rheumatoid factor     Uric acid     XR Hand Bilateral G/E 3 Views          Today's Medication Changes          These changes are accurate as of 10/30/18 12:04 PM.  If you have any questions, ask your nurse or doctor.               Start taking these medicines.        Dose/Directions    allopurinol 100 MG tablet   Commonly known as:  ZYLOPRIM   Used for:  Chronic gout of multiple sites, unspecified cause, Arthralgia, unspecified joint, Morbid obesity (H), Age-related osteoporosis without current pathological fracture   Started by:  Adin Valerio MD        Dose:  100 mg   Take 1 tablet (100 mg) by mouth daily   Quantity:  30 tablet   Refills:  3       colchicine 0.6 MG tablet   Commonly known as:  COLCYRS   Used for:  Arthralgia, unspecified joint, Chronic gout of multiple sites, unspecified cause   Started by:  Adin Valerio MD        Dose:  0.6 mg   Take 1 tablet (0.6 mg) by mouth daily   Quantity:  30 tablet   Refills:  3    "         Where to get your medicines      These medications were sent to [id number] - [place], [place] - [address]  [address], [place] [address]     Phone:  [phone]     allopurinol 100 MG tablet    colchicine 0.6 MG tablet                Primary Care Provider Office Phone # Fax #    April Leticia [phone]       [address] [address]        Equal Access to Services     ADRIANNA MADSEN : Hadii aad ku hadasho Soomaali, waaxda luqadaha, qaybta kaalmada adeegyada, waxay idiin hayaan adeeg khsandra sheehan . So [facility] [phone].    ATENCIÓN: Si habla español, tiene a ayala disposición servicios gratuitos de asistencia lingüística. [facility] [phone].    We comply with applicable federal civil rights laws and [place] laws. We do not discriminate on the basis of race, color, national origin, age, disability, sex, sexual orientation, or gender identity.            Thank you!     Thank you for choosing [facility]  for your care. Our goal is always to provide you with excellent care. Hearing back from our patients is one way we can continue to improve our services. Please take a few minutes to complete the written survey that you may receive in the mail after your visit with us. Thank you!             Your Updated Medication List - Protect others around you: Learn how to safely use, store and throw away your medicines at www.disposemymeds.org.          This list is accurate as of 10/30/18 12:04 PM.  Always use your most recent med list.                   Brand Name Dispense Instructions for use Diagnosis    Aliskiren-Hydrochlorothiazide 150-12.5 MG Tabs      Take 1 tablet by mouth daily.        allopurinol 100 MG tablet    ZYLOPRIM    30 tablet    Take 1 tablet (100 mg) by mouth daily    Chronic gout of multiple sites, unspecified cause, Arthralgia, unspecified joint, Morbid obesity  (H), Age-related osteoporosis without current pathological fracture       ascorbic acid 500 MG tablet    VITAMIN C     Take 500 mg by mouth daily.        calcium + D 600-200 MG-UNIT Tabs   Generic drug:  calcium carbonate-vitamin D      Take 1 tablet by mouth 3 times daily.        CELEBREX PO      Take 200 mg by mouth    Arthralgia, unspecified joint, Morbid obesity (H), Chronic gout of multiple sites, unspecified cause, Age-related osteoporosis without current pathological fracture       colchicine 0.6 MG tablet    COLCYRS    30 tablet    Take 1 tablet (0.6 mg) by mouth daily    Arthralgia, unspecified joint, Chronic gout of multiple sites, unspecified cause       CRESTOR 10 MG tablet   Generic drug:  rosuvastatin      Take 10 mg by mouth daily.        GLUCOSAMINE SULFATE PO      Take 1,500 mg by mouth daily    Arthralgia, unspecified joint, Morbid obesity (H), Chronic gout of multiple sites, unspecified cause, Age-related osteoporosis without current pathological fracture       IRBESARTAN PO      Take 150 mg by mouth At Bedtime    Arthralgia, unspecified joint, Morbid obesity (H), Chronic gout of multiple sites, unspecified cause, Age-related osteoporosis without current pathological fracture       LANTUS VIAL 100 UNIT/ML injection   Generic drug:  insulin glargine      Inject 15 Units Subcutaneous 2 times daily        LASIX 40 MG tablet   Generic drug:  furosemide      Take 40 mg by mouth daily.        LORAZEPAM PO      Take 0.5 mg by mouth At Bedtime    Arthralgia, unspecified joint, Morbid obesity (H), Chronic gout of multiple sites, unspecified cause, Age-related osteoporosis without current pathological fracture       NovoLOG VIAL 100 UNITS/ML injection   Generic drug:  insulin aspart      Inject  Subcutaneous. Per sliding scale        SYNTHROID PO      Take 125 mcg by mouth daily        TOPROL  MG 24 hr tablet   Generic drug:  metoprolol succinate      Take 100 mg by mouth daily.        UNABLE TO FIND       2 tablets daily MEDICATION NAME: Uric Acid Support  For Kidney health supplement    Arthralgia, unspecified joint, Morbid obesity (H), Chronic gout of multiple sites, unspecified cause, Age-related osteoporosis without current pathological fracture       UNABLE TO FIND      2 times daily MEDICATION NAME: Hemp Cream Applied to knees PRN    Arthralgia, unspecified joint, Morbid obesity (H), Chronic gout of multiple sites, unspecified cause, Age-related osteoporosis without current pathological fracture       vitamin D 2000 units tablet      Take 2 tablets by mouth daily        ZANAFLEX 4 MG capsule   Generic drug:  tiZANidine      Take 4 mg by mouth daily.

## 2018-10-31 ENCOUNTER — TELEPHONE (OUTPATIENT)
Dept: RHEUMATOLOGY | Facility: CLINIC | Age: 68
End: 2018-10-31

## 2018-10-31 ENCOUNTER — COMMUNICATION - HEALTHEAST (OUTPATIENT)
Dept: NURSING | Facility: CLINIC | Age: 68
End: 2018-10-31

## 2018-10-31 ENCOUNTER — COMMUNICATION - HEALTHEAST (OUTPATIENT)
Dept: PHARMACY | Facility: CLINIC | Age: 68
End: 2018-10-31

## 2018-10-31 DIAGNOSIS — F41.9 ANXIETY: ICD-10-CM

## 2018-10-31 DIAGNOSIS — M1A.9XX1 CHRONIC TOPHACEOUS GOUT: Primary | ICD-10-CM

## 2018-10-31 LAB
CCP AB SER IA-ACNC: 2 U/ML
RHEUMATOID FACT SER NEPH-ACNC: <20 IU/ML (ref 0–20)

## 2018-10-31 NOTE — TELEPHONE ENCOUNTER
"Received fax from [facility] in [facility] stating: \"patients Colchicine is too expensive and would like it changed to Prednisone.\"    Routing message to Dr. Valerio for approval / denial of Prednisone request.    Neelam Davison LPN    Rheumatology / Pulmonology  [facility]    "

## 2018-11-05 ENCOUNTER — MYC MEDICAL ADVICE (OUTPATIENT)
Dept: RHEUMATOLOGY | Facility: CLINIC | Age: 68
End: 2018-11-05

## 2018-11-05 DIAGNOSIS — M25.50 ARTHRALGIA, UNSPECIFIED JOINT: ICD-10-CM

## 2018-11-05 DIAGNOSIS — M1A.09X0 CHRONIC GOUT OF MULTIPLE SITES, UNSPECIFIED CAUSE: ICD-10-CM

## 2018-11-05 DIAGNOSIS — E66.01 MORBID OBESITY (H): ICD-10-CM

## 2018-11-05 DIAGNOSIS — M81.0 AGE-RELATED OSTEOPOROSIS WITHOUT CURRENT PATHOLOGICAL FRACTURE: ICD-10-CM

## 2018-11-07 RX ORDER — ALLOPURINOL 100 MG/1
100 TABLET ORAL DAILY
Qty: 90 TABLET | Refills: 3 | Status: SHIPPED | OUTPATIENT
Start: 2018-11-07 | End: 2018-11-12 | Stop reason: DRUGHIGH

## 2018-11-12 RX ORDER — PREDNISONE 5 MG/1
5 TABLET ORAL DAILY
Qty: 30 TABLET | Refills: 2 | Status: SHIPPED | OUTPATIENT
Start: 2018-11-12 | End: 2019-01-11

## 2018-11-12 RX ORDER — ALLOPURINOL 100 MG/1
200 TABLET ORAL DAILY
Qty: 180 TABLET | Refills: 1 | Status: SHIPPED | OUTPATIENT
Start: 2018-11-12 | End: 2019-01-11

## 2018-11-12 NOTE — TELEPHONE ENCOUNTER
X-rays of your hands has shown tiny bone damage seen in right index finger small joint. It can be related to gout deposits. I will CT right hand to look for gout deposits and confirm the bone damage seen on the Xray. Blood tests have shown negative test results for rheumatoid arthritis but high uric acid level.     If colchicine is expensive then you can use low dose prednisone. Once you uric acid level is below 6 mg/dl then you will not need daily prednisone. You can increase Allopurinol to 2 tab daily.     If you get another flare up then will increase the dose of prednisone for the flare up.

## 2018-11-12 NOTE — TELEPHONE ENCOUNTER
Called and spoke to patient. Given Dr Valerio's message about the xray results and medications. Informed her that the prednisone was sent in today by Dr Valerio. Will send new Allopurinol directions to pharmacy. She would like to get CT done closer to home, will either schedule at a [facility] facility or call if she needs order faxed elsewhere.   ELLA Hernandez

## 2018-11-13 ENCOUNTER — HOSPITAL ENCOUNTER (OUTPATIENT)
Dept: CT IMAGING | Facility: CLINIC | Age: 68
Discharge: HOME | End: 2018-11-13
Attending: STUDENT IN AN ORGANIZED HEALTH CARE EDUCATION/TRAINING PROGRAM | Admitting: STUDENT IN AN ORGANIZED HEALTH CARE EDUCATION/TRAINING PROGRAM
Payer: MEDICARE

## 2018-11-13 DIAGNOSIS — M1A.9XX1 CHRONIC TOPHACEOUS GOUT: ICD-10-CM

## 2018-11-13 DIAGNOSIS — E66.01 MORBID OBESITY (H): ICD-10-CM

## 2018-11-13 DIAGNOSIS — M1A.09X0 CHRONIC GOUT OF MULTIPLE SITES, UNSPECIFIED CAUSE: ICD-10-CM

## 2018-11-13 DIAGNOSIS — M81.0 AGE-RELATED OSTEOPOROSIS WITHOUT CURRENT PATHOLOGICAL FRACTURE: ICD-10-CM

## 2018-11-13 DIAGNOSIS — M25.50 ARTHRALGIA, UNSPECIFIED JOINT: ICD-10-CM

## 2018-11-13 LAB
ALT SERPL W P-5'-P-CCNC: 48 U/L (ref 0–50)
AST SERPL W P-5'-P-CCNC: 36 U/L (ref 0–45)
CREAT SERPL-MCNC: 1.21 MG/DL (ref 0.52–1.04)
ERYTHROCYTE [DISTWIDTH] IN BLOOD BY AUTOMATED COUNT: 14 % (ref 10–15)
GFR SERPL CREATININE-BSD FRML MDRD: 44 ML/MIN/1.7M2
HCT VFR BLD AUTO: 40.6 % (ref 35–47)
HGB BLD-MCNC: 12.9 G/DL (ref 11.7–15.7)
MCH RBC QN AUTO: 28.6 PG (ref 26.5–33)
MCHC RBC AUTO-ENTMCNC: 31.8 G/DL (ref 31.5–36.5)
MCV RBC AUTO: 90 FL (ref 78–100)
PLATELET # BLD AUTO: 237 10E9/L (ref 150–450)
RBC # BLD AUTO: 4.51 10E12/L (ref 3.8–5.2)
URATE SERPL-MCNC: 6.9 MG/DL (ref 2.6–6)
WBC # BLD AUTO: 6.5 10E9/L (ref 4–11)

## 2018-11-13 PROCEDURE — 85027 COMPLETE CBC AUTOMATED: CPT | Performed by: STUDENT IN AN ORGANIZED HEALTH CARE EDUCATION/TRAINING PROGRAM

## 2018-11-13 PROCEDURE — 82565 ASSAY OF CREATININE: CPT | Performed by: STUDENT IN AN ORGANIZED HEALTH CARE EDUCATION/TRAINING PROGRAM

## 2018-11-13 PROCEDURE — 84550 ASSAY OF BLOOD/URIC ACID: CPT | Performed by: STUDENT IN AN ORGANIZED HEALTH CARE EDUCATION/TRAINING PROGRAM

## 2018-11-13 PROCEDURE — 73200 CT UPPER EXTREMITY W/O DYE: CPT | Mod: RT

## 2018-11-13 PROCEDURE — 84460 ALANINE AMINO (ALT) (SGPT): CPT | Performed by: STUDENT IN AN ORGANIZED HEALTH CARE EDUCATION/TRAINING PROGRAM

## 2018-11-13 PROCEDURE — 84450 TRANSFERASE (AST) (SGOT): CPT | Performed by: STUDENT IN AN ORGANIZED HEALTH CARE EDUCATION/TRAINING PROGRAM

## 2018-11-13 PROCEDURE — 36415 COLL VENOUS BLD VENIPUNCTURE: CPT | Performed by: STUDENT IN AN ORGANIZED HEALTH CARE EDUCATION/TRAINING PROGRAM

## 2018-11-15 ENCOUNTER — COMMUNICATION - HEALTHEAST (OUTPATIENT)
Dept: NURSING | Facility: CLINIC | Age: 68
End: 2018-11-15

## 2018-11-15 ENCOUNTER — COMMUNICATION - HEALTHEAST (OUTPATIENT)
Dept: PHARMACY | Facility: CLINIC | Age: 68
End: 2018-11-15

## 2018-11-15 DIAGNOSIS — I10 HYPERTENSION: ICD-10-CM

## 2018-11-16 ENCOUNTER — MYC MEDICAL ADVICE (OUTPATIENT)
Dept: RHEUMATOLOGY | Facility: CLINIC | Age: 68
End: 2018-11-16

## 2018-11-19 DIAGNOSIS — M81.0 AGE-RELATED OSTEOPOROSIS WITHOUT CURRENT PATHOLOGICAL FRACTURE: ICD-10-CM

## 2018-11-19 DIAGNOSIS — E66.01 MORBID OBESITY (H): ICD-10-CM

## 2018-11-19 DIAGNOSIS — M25.50 ARTHRALGIA, UNSPECIFIED JOINT: ICD-10-CM

## 2018-11-19 DIAGNOSIS — M1A.09X0 CHRONIC GOUT OF MULTIPLE SITES, UNSPECIFIED CAUSE: ICD-10-CM

## 2018-11-19 RX ORDER — ALLOPURINOL 100 MG/1
TABLET ORAL
Qty: 90 TABLET | Refills: 1 | OUTPATIENT
Start: 2018-11-19

## 2018-11-19 NOTE — TELEPHONE ENCOUNTER
Called and left message that Allupurinol prescription was received by [facility] on 11/12/2018 and being processed with an expected arrival of 5 -7 days from now. Left instructions to call MG Sadler if she prefer the prescription be routed elsewhere.

## 2018-11-20 DIAGNOSIS — M1A.9XX1 CHRONIC TOPHACEOUS GOUT: Primary | ICD-10-CM

## 2018-11-20 RX ORDER — ALLOPURINOL 100 MG/1
200 TABLET ORAL DAILY
Qty: 60 TABLET | Refills: 1 | Status: SHIPPED | OUTPATIENT
Start: 2018-11-20 | End: 2019-01-18

## 2018-11-20 NOTE — TELEPHONE ENCOUNTER
Allupurinol prescription sent to [facility] on 11/12, confirmed in process yesterday. Called left  for patient with confirmation.

## 2018-11-20 NOTE — TELEPHONE ENCOUNTER
Patient called to state that she is out of her allopurinol, it was ordered from the mail order pharmacy but she will not receive it for 5-7 days. She needs a 30 day prescription sent to [facility] in [place] today. Patient would like to be on 200mg tablets if possible.     Patient is also wondering about her lab work and CT scan from last week. She has not heard results.     Will route refill request to Dr. Taveras and inquire about test results as well.     Ariana Rowan RN   Pulmonary/Rheumatology Care Coordinator  [facility]

## 2018-11-20 NOTE — TELEPHONE ENCOUNTER
Per Dr. Iman santiago to fill 30 day supply of Allopurinol. Dr. Valerio will review her lab work and CT results. Notified patient of Allopurinol prescription.     Ariana Rowan RN   Pulmonary/Rheumatology Care Coordinator  [facility]

## 2018-11-20 NOTE — TELEPHONE ENCOUNTER
Meggan called and said she will be out of allopurinol (ZYLOPRIM) 100 MG tablet today. She said the [facility] pharmacy is where she wants the prescription sent.  Preferred local pharmacy is [address] ([place]).  Please advise.  Thank you.

## 2018-11-27 ENCOUNTER — TELEPHONE (OUTPATIENT)
Dept: RHEUMATOLOGY | Facility: CLINIC | Age: 68
End: 2018-11-27

## 2018-11-27 DIAGNOSIS — Z79.899 HIGH RISK MEDICATION USE: Primary | ICD-10-CM

## 2018-11-27 NOTE — TELEPHONE ENCOUNTER
Called patient to reschedule 1/18/19 appointment with Dr Santiago as she is not able to be in clinic that day.  Patient is rescheduled for 1/11/19 but states that she still has not received her results from her CT scan that was done on 11/13/18.  Please call patient as soon as possible with these results.     Carol Westholter

## 2018-11-29 ENCOUNTER — COMMUNICATION - HEALTHEAST (OUTPATIENT)
Dept: NURSING | Facility: CLINIC | Age: 68
End: 2018-11-29

## 2018-11-29 DIAGNOSIS — G43.909 MIGRAINE WITHOUT STATUS MIGRAINOSUS, NOT INTRACTABLE, UNSPECIFIED MIGRAINE TYPE: ICD-10-CM

## 2018-11-29 NOTE — TELEPHONE ENCOUNTER
Talked to Meggan and informed her about CT results. She has erosive changes over her right hand which could be related to gout.     She is taking 200 mg Allopurinol. Will continue that. Will repeat her labs in a month and then on follow up.

## 2018-11-30 ENCOUNTER — COMMUNICATION - HEALTHEAST (OUTPATIENT)
Dept: INTERNAL MEDICINE | Facility: CLINIC | Age: 68
End: 2018-11-30

## 2018-11-30 DIAGNOSIS — F41.9 ANXIETY: ICD-10-CM

## 2018-12-06 ENCOUNTER — COMMUNICATION - HEALTHEAST (OUTPATIENT)
Dept: INTERNAL MEDICINE | Facility: CLINIC | Age: 68
End: 2018-12-06

## 2018-12-14 ENCOUNTER — OFFICE VISIT - HEALTHEAST (OUTPATIENT)
Dept: INTERNAL MEDICINE | Facility: CLINIC | Age: 68
End: 2018-12-14

## 2018-12-14 DIAGNOSIS — I10 ESSENTIAL HYPERTENSION: ICD-10-CM

## 2018-12-14 DIAGNOSIS — F41.9 ANXIETY: ICD-10-CM

## 2018-12-14 DIAGNOSIS — H53.8 FLASHING LIGHTS: ICD-10-CM

## 2018-12-14 DIAGNOSIS — G43.109 MIGRAINE WITH AURA AND WITHOUT STATUS MIGRAINOSUS, NOT INTRACTABLE: ICD-10-CM

## 2018-12-14 DIAGNOSIS — Z79.899 CONTROLLED SUBSTANCE AGREEMENT SIGNED: ICD-10-CM

## 2018-12-14 LAB
AMPHETAMINES UR QL SCN: NORMAL
BARBITURATES UR QL: NORMAL
BENZODIAZ UR QL: NORMAL
CANNABINOIDS UR QL SCN: NORMAL
COCAINE UR QL: NORMAL
CREAT UR-MCNC: 22.2 MG/DL
OPIATES UR QL SCN: NORMAL
OXYCODONE UR QL: NORMAL
PCP UR QL SCN: NORMAL

## 2018-12-18 ENCOUNTER — HOSPITAL ENCOUNTER (OUTPATIENT)
Dept: ULTRASOUND IMAGING | Facility: HOSPITAL | Age: 68
Discharge: HOME | End: 2018-12-18
Attending: INTERNAL MEDICINE

## 2018-12-18 DIAGNOSIS — H53.8 FLASHING LIGHTS: ICD-10-CM

## 2018-12-21 ENCOUNTER — COMMUNICATION - HEALTHEAST (OUTPATIENT)
Dept: INTERNAL MEDICINE | Facility: CLINIC | Age: 68
End: 2018-12-21

## 2018-12-21 DIAGNOSIS — I10 HYPERTENSION: ICD-10-CM

## 2019-01-02 ENCOUNTER — COMMUNICATION - HEALTHEAST (OUTPATIENT)
Dept: PHARMACY | Facility: CLINIC | Age: 69
End: 2019-01-02

## 2019-01-10 ENCOUNTER — OFFICE VISIT - HEALTHEAST (OUTPATIENT)
Dept: PHARMACY | Facility: CLINIC | Age: 69
End: 2019-01-10

## 2019-01-10 DIAGNOSIS — Z79.4 TYPE 2 DIABETES MELLITUS WITHOUT COMPLICATION, WITH LONG-TERM CURRENT USE OF INSULIN (H): ICD-10-CM

## 2019-01-10 DIAGNOSIS — E11.9 TYPE 2 DIABETES MELLITUS WITHOUT COMPLICATION, WITH LONG-TERM CURRENT USE OF INSULIN (H): ICD-10-CM

## 2019-01-10 DIAGNOSIS — M1A.9XX1 CHRONIC GOUT WITH TOPHUS, UNSPECIFIED CAUSE, UNSPECIFIED SITE: ICD-10-CM

## 2019-01-11 ENCOUNTER — OFFICE VISIT (OUTPATIENT)
Dept: RHEUMATOLOGY | Facility: CLINIC | Age: 69
End: 2019-01-11
Payer: MEDICARE

## 2019-01-11 VITALS
WEIGHT: 269 LBS | SYSTOLIC BLOOD PRESSURE: 131 MMHG | OXYGEN SATURATION: 99 % | HEART RATE: 67 BPM | BODY MASS INDEX: 47.65 KG/M2 | DIASTOLIC BLOOD PRESSURE: 78 MMHG

## 2019-01-11 DIAGNOSIS — M1A.9XX1 CHRONIC TOPHACEOUS GOUT: Primary | ICD-10-CM

## 2019-01-11 LAB
ALBUMIN SERPL-MCNC: 3.7 G/DL (ref 3.4–5)
ALT SERPL W P-5'-P-CCNC: 54 U/L (ref 0–50)
AST SERPL W P-5'-P-CCNC: 38 U/L (ref 0–45)
CREAT SERPL-MCNC: 1.37 MG/DL (ref 0.52–1.04)
ERYTHROCYTE [DISTWIDTH] IN BLOOD BY AUTOMATED COUNT: 14.6 % (ref 10–15)
GFR SERPL CREATININE-BSD FRML MDRD: 39 ML/MIN/{1.73_M2}
HCT VFR BLD AUTO: 40.7 % (ref 35–47)
HGB BLD-MCNC: 12.8 G/DL (ref 11.7–15.7)
MCH RBC QN AUTO: 28.5 PG (ref 26.5–33)
MCHC RBC AUTO-ENTMCNC: 31.4 G/DL (ref 31.5–36.5)
MCV RBC AUTO: 91 FL (ref 78–100)
PLATELET # BLD AUTO: 261 10E9/L (ref 150–450)
RBC # BLD AUTO: 4.49 10E12/L (ref 3.8–5.2)
URATE SERPL-MCNC: 6.2 MG/DL (ref 2.6–6)
WBC # BLD AUTO: 6.5 10E9/L (ref 4–11)

## 2019-01-11 PROCEDURE — 84450 TRANSFERASE (AST) (SGOT): CPT | Performed by: STUDENT IN AN ORGANIZED HEALTH CARE EDUCATION/TRAINING PROGRAM

## 2019-01-11 PROCEDURE — 36415 COLL VENOUS BLD VENIPUNCTURE: CPT | Performed by: STUDENT IN AN ORGANIZED HEALTH CARE EDUCATION/TRAINING PROGRAM

## 2019-01-11 PROCEDURE — 99213 OFFICE O/P EST LOW 20 MIN: CPT | Performed by: STUDENT IN AN ORGANIZED HEALTH CARE EDUCATION/TRAINING PROGRAM

## 2019-01-11 PROCEDURE — 82565 ASSAY OF CREATININE: CPT | Performed by: STUDENT IN AN ORGANIZED HEALTH CARE EDUCATION/TRAINING PROGRAM

## 2019-01-11 PROCEDURE — 82040 ASSAY OF SERUM ALBUMIN: CPT | Performed by: STUDENT IN AN ORGANIZED HEALTH CARE EDUCATION/TRAINING PROGRAM

## 2019-01-11 PROCEDURE — 84460 ALANINE AMINO (ALT) (SGPT): CPT | Performed by: STUDENT IN AN ORGANIZED HEALTH CARE EDUCATION/TRAINING PROGRAM

## 2019-01-11 PROCEDURE — 84550 ASSAY OF BLOOD/URIC ACID: CPT | Performed by: STUDENT IN AN ORGANIZED HEALTH CARE EDUCATION/TRAINING PROGRAM

## 2019-01-11 PROCEDURE — 85027 COMPLETE CBC AUTOMATED: CPT | Performed by: STUDENT IN AN ORGANIZED HEALTH CARE EDUCATION/TRAINING PROGRAM

## 2019-01-11 RX ORDER — METFORMIN HCL 500 MG
500 TABLET, EXTENDED RELEASE 24 HR ORAL DAILY
COMMUNITY
Start: 2019-01-10 | End: 2021-07-22

## 2019-01-11 RX ORDER — ALLOPURINOL 100 MG/1
200 TABLET ORAL DAILY
Qty: 180 TABLET | Refills: 1 | Status: SHIPPED | OUTPATIENT
Start: 2019-01-11 | End: 2019-05-14

## 2019-01-11 ASSESSMENT — PAIN SCALES - GENERAL: PAINLEVEL: MODERATE PAIN (4)

## 2019-01-11 NOTE — PROGRESS NOTES
Rheumatology Clinic Visit     Meggan Everett MRN# [id number]   YOB: 1950 Age: 68 year old     Date of Visit: January 11, 2019  Primary care provider: Stacey Kelly          Assessment and Plan:   Assessment     -- Chronic tophaceous gout   -- Arthritis  -- Type 2 DM, HTN, HLD  -- CKD stage III  -- Morbid obesity   -- Osteoporosis     Ms Everett is 68-year-old female seen in clinic for evaluation of gout.    Chronic gout: She reports history of gout for the last 4 years. Happened once or twice a year.  Lately in the last few months it has become more frequent.  Having flareups in her fingers, knuckles, toes.   Every time she gets a flareup it is treated with prednisone taper.  She was recommended to start urate lowering therapy by her PCP but she refused. Uric acid level has been running in the range of 8.5-9.7 mg/dL. She has occasionally noted tophaceous lesions over her fingertips which break open and a chalky white deposit comes out.    X - rays of bilateral hands have shown possible erosive disease over the radial aspect of the right  second finger middle phalanx at the base. She was started on Allopurinol 100 mg which was increased to 200 mg later. Her uric acid level has decreased from 9.3 to 6.9 mg/dl. Also on Colchicine 0.6 mg tablet daily for prophylaxis.    I will get repeat uric acid level along with the CBC and liver and kidney test today. Continue Allopurinol and will discontinue colchicine once uric acid remains below 6 mg/dl.     Osteoporosis: She had DEXA scan in January 2018 which showed osteoporosis with high fracture risk.  I will give her endocrine referral for further management of osteoporosis.    Plan    1.  Blood tests: Serum uric acid level, CBC, AST,ALT, cr    2.  Continue  Allopurinol 200 mg daily    3.   Continue colchicine 0.6 mg daily as a prophylactic agent    4. RTC in 4 months with repeat labs.             Active Problem List:     Patient Active Problem List     Diagnosis Date Noted     Morbid obesity (H) 10/30/2018     Priority: Medium     Fibroadenoma of LEFT breast, with fibrocystic changes 05/14/2012     Priority: Medium            History of Present Illness:   Meggan Everett is 68 year old female with PMH of DM, HTN, hyperlipidemia, gout, hypothyroidism seen in the clinic in consultation at request of PCP Stacey Kelly for management of chronic gout.     January 11, 2019 - She is doing better, no gout flares. Denies any side effects of Allopurinol and colchicine. Uric acid level has improved.     History from initial visit : She was diagnosed with gout 4 years ago when it happened mostly in her feet once or twice a year. Now it has become more frequent and happen every couple weeks in her hands and toes. She has pain in her elbows. Even after acute gout attack settles down she can not bend her fingers. She has lumps on her fingers tips which break open and chalky white deposit comes out. Acute gout attack is treated with prednisone taper. Her PCP did discuss with her regarding the allopurinol use which she denied it due to potential side effects.  She has never been on Urate lowering treatment.     She has OA in her knees, does swimming to help with it.  She does not think the knee pain is related to gout.     No history of psoriasis, ulcerative colitis or chron's disease. No h/o iritis, enthesitis, finger or toe swelling like dactylitis, plantar fascitis or heel pain. Denies any raynauds, malar rash, photosensitivity, recurrent mouth/genital ulcers, sicca symptoms, pleuritic chest pains, recurrent sinusitis/rhinitis, swallowing difficulty, hearing or visual changes recently. No h/o arterial/venous thrombosis in the past. Denies any tick bite, recent GI/ infection.             Review of Systems:   Review Of Systems  Constitutional: denies fever, chills, night sweats and weight loss.  Skin: No skin rash.  Eyes: No dryness or irritation in eyes. No episode of eye  inflammation or redness.   Ears/Nose/Throat: no recurrent sinus infections.  Respiratory: No shortness of breath, dyspnea on exertion, cough, or hemoptysis  Cardiovascular: no chest pain or palpitations.  Gastrointestinal: no nausea, vomiting, abdominal pain.  Normal bowel movements.  Genitourinary: no dysuria, frequency  or hematuria.  Musculoskeletal: as in HPI  Neurologic: no numbness, tingling.  Psychiatric: no mood disorders.  Hematologic/Lymphatic/Immunologic: no history of easy bruising, petechia or purpura.  No abnormal bleeding.   Endocrine: + h/o thyroid disease or Diabetes.                  Past Medical History:     Past Medical History:   Diagnosis Date     Diabetes mellitus      Fibroadenoma of LEFT breast, with fibrocystic changes 5/14/2012     Hypothyroid      Mammographic microcalcification 5/2012    Left     Past Surgical History:   Procedure Laterality Date     D & C  2000, 2002     DILATE CERVIX, ABLATE ENDOMETRIUM, COMBINED  2005     HC BIOPSY OF BREAST, OPEN INCISIONAL  1972    Left     HC LAPAROSCOPY, SURGICAL; CHOLECYSTECTOMY  1998     Microdiscectomy  1998            Social History:     Social History     Occupational History     Occupation: Retired   Tobacco Use     Smoking status: Never Smoker     Smokeless tobacco: Never Used   Substance and Sexual Activity     Alcohol use: No     Drug use: No     Sexual activity: Not on file            Family History:   No family history on file.         Allergies:     Allergies   Allergen Reactions     Codeine Sulfate Nausea     Tape [Adhesive Tape] Blisters     Tetanus Toxoid             Medications:     Current Outpatient Medications   Medication Sig Dispense Refill     Aliskiren-Hydrochlorothiazide 150-12.5 MG TABS Take 1 tablet by mouth daily.       allopurinol (ZYLOPRIM) 100 MG tablet Take 2 tablets (200 mg) by mouth daily 180 tablet 1     ascorbic acid (VITAMIN C) 500 MG tablet Take 500 mg by mouth daily.       calcium carbonate-vitamin D (CALCIUM  + D) 600-200 MG-UNIT TABS Take 1 tablet by mouth 3 times daily.       Celecoxib (CELEBREX PO) Take 200 mg by mouth       Cholecalciferol (VITAMIN D) 2000 UNIT tablet Take 2 tablets by mouth daily        colchicine (COLCYRS) 0.6 MG tablet Take 1 tablet (0.6 mg) by mouth daily 30 tablet 3     furosemide (LASIX) 40 MG tablet Take 40 mg by mouth daily.       IRBESARTAN PO Take 150 mg by mouth At Bedtime       LANTUS VIAL 100 UNITS/ML SC SOLN Inject 15 Units Subcutaneous 2 times daily        Levothyroxine Sodium (SYNTHROID PO) Take 125 mcg by mouth daily        LORAZEPAM PO Take 0.5 mg by mouth At Bedtime       metFORMIN (GLUCOPHAGE XR) 500 MG 24 hr tablet Take 500 mg by mouth daily       metoprolol (TOPROL XL) 100 MG 24 hr tablet Take 100 mg by mouth daily.       NovoLOG VIAL 100 UNITS/ML SC SOLN Inject  Subcutaneous. Per sliding scale       rosuvastatin (CRESTOR) 10 MG tablet Take 10 mg by mouth daily.       tiZANidine (ZANAFLEX) 4 MG capsule Take 4 mg by mouth daily.       UNABLE TO FIND 2 times daily MEDICATION NAME: Hemp Cream  Applied to knees PRN       allopurinol (ZYLOPRIM) 100 MG tablet Take 2 tablets (200 mg) by mouth daily (Patient not taking: Reported on 1/11/2019) 60 tablet 1     GLUCOSAMINE SULFATE PO Take 1,500 mg by mouth daily       predniSONE (DELTASONE) 5 MG tablet Take 1 tablet (5 mg) by mouth daily (Patient not taking: Reported on 1/11/2019.) 30 tablet 2     UNABLE TO FIND 2 tablets daily MEDICATION NAME: Uric Acid Support   For Kidney health supplement              Physical Exam:   Blood pressure 131/78, pulse 67, weight 122 kg (269 lb), SpO2 99 %.  Wt Readings from Last 4 Encounters:   01/11/19 122 kg (269 lb)   10/30/18 128.4 kg (283 lb 1.6 oz)       Constitutional: obese, appearing stated age; cooperative  Eyes: nl EOM, PERRLA, vision, conjunctiva, sclera  ENT: nl external ears, nose, hearing, lips, teeth, gums, throat  No mucous membrane lesions, normal saliva pool  Neck: no mass or thyroid  enlargement  Resp: lungs clear to auscultation, nl to palpation  CV: RRR, no murmurs, rubs or gallops, no edema  GI: no ABD mass or tenderness, no HSM  : not tested  Lymph: no cervical, supraclavicular, inguinal or epitrochlear nodes    MS: All TMJ, neck, shoulder, elbow, wrist, MCP/PIP/DIP, spine, hip, knee, ankle, and foot MTP/IP joints were examined.     -- No synovitis and tenderness present over MCP, PIP, DIP joints, wrists, elbows, shoulders, ankles and MTP joints.     -- She has tiny tophaceous lesions over left index finger tip, right middle finger tip. Small nodule present over right 4th PIP joints.     -- Right 2nd toe is red, swollen with possible tophaceous lesion.     -- No dactylitis,  tenosynovitis, enthesopathy.    Skin: no nail pitting, alopecia, rash, nodules or lesions  Neuro: nl cranial nerves, strength, sensation, DTRs.   Psych: nl judgement, orientation, memory, affect.         Data:     Results for orders placed or performed during the hospital encounter of 11/13/18   CT Hand Right w/o Contrast- [employer]    Narrative    CT RIGHT HAND WITHOUT CONTRAST  11/13/2018 12:46 PM    HISTORY:  Chronic tophaceous gout. Erosive lesion present over right  second PIP joint.    TECHNIQUE: Helical axial scans with sagittal and coronal  reconstructions. Radiation dose for this scan was reduced using  automated exposure control, adjustment of the mA and/or kV according  to patient size, or iterative reconstruction technique.    COMPARISON: Plain film 10/30/2018.    FINDINGS: No acute bony abnormality and alignment is normal. There is  a 2.5 mm periarticular erosive change at the radial aspect of the base  of the middle phalanx of the second finger correlating with the  lucency seen on plain film. There is question of a very early much  smaller erosive change in a similar location involving the middle  phalanx of the third finger also. The findings are nonspecific but  could be related to synovial-based or  crystalline arthritis. There is  some mild capsular swelling about the second proximal interphalangeal  joint. No periarticular soft tissue mass. Incidental note of a 5 mm  benign cystic change in the third metacarpal head. A similar-sized  benign cystic change is seen in the subchondral proximal ulnar-sided  lunate. The remainder the bony and soft tissue structures are  unremarkable.      Impression    IMPRESSION:  1. One and possibly two small periarticular erosions at the base of  the second and third middle phalanges could indicate synovial-based  arthritis or crystalline arthropathy, and clinical correlation is  recommended. Mild capsular swelling may be present.  2. No other significant abnormality.    TRI AVILA MD       Reviewed Rheumatology lab flowsheet    Adin Valerio MD  [facility]  Department of Rheumatology & Autoimmune Disorders  [facility]: [phone]   Pager - [phone]

## 2019-01-11 NOTE — NURSING NOTE
Meggan Everett's goals for this visit include: return  She requests these members of her care team be copied on today's visit information:     PCP: Stacey Kelly    Referring Provider:  No referring provider defined for this encounter.    /78   Pulse 67   Wt 122 kg (269 lb)   SpO2 99%   BMI 47.65 kg/m      Do you need any medication refills at today's visit? y

## 2019-01-17 ENCOUNTER — COMMUNICATION - HEALTHEAST (OUTPATIENT)
Dept: PHARMACY | Facility: CLINIC | Age: 69
End: 2019-01-17

## 2019-01-18 ENCOUNTER — OFFICE VISIT (OUTPATIENT)
Dept: ENDOCRINOLOGY | Facility: CLINIC | Age: 69
End: 2019-01-18
Attending: STUDENT IN AN ORGANIZED HEALTH CARE EDUCATION/TRAINING PROGRAM
Payer: MEDICARE

## 2019-01-18 VITALS
SYSTOLIC BLOOD PRESSURE: 124 MMHG | WEIGHT: 268.74 LBS | BODY MASS INDEX: 50.74 KG/M2 | HEIGHT: 61 IN | OXYGEN SATURATION: 100 % | DIASTOLIC BLOOD PRESSURE: 81 MMHG | HEART RATE: 70 BPM

## 2019-01-18 DIAGNOSIS — M81.0 SENILE OSTEOPOROSIS: ICD-10-CM

## 2019-01-18 DIAGNOSIS — Z79.4 TYPE 2 DIABETES MELLITUS WITHOUT COMPLICATION, WITH LONG-TERM CURRENT USE OF INSULIN (H): ICD-10-CM

## 2019-01-18 DIAGNOSIS — Z79.899 HIGH RISK MEDICATION USE: Primary | ICD-10-CM

## 2019-01-18 DIAGNOSIS — R79.89 ELEVATED PARATHYROID HORMONE: ICD-10-CM

## 2019-01-18 DIAGNOSIS — E11.9 TYPE 2 DIABETES MELLITUS WITHOUT COMPLICATION, WITH LONG-TERM CURRENT USE OF INSULIN (H): ICD-10-CM

## 2019-01-18 DIAGNOSIS — E06.3 HASHIMOTO'S THYROIDITIS: ICD-10-CM

## 2019-01-18 LAB
HBA1C MFR BLD: 7.4 % (ref 0–5.6)
WBC # BLD AUTO: 7.6 10E9/L (ref 4–11)

## 2019-01-18 PROCEDURE — 83036 HEMOGLOBIN GLYCOSYLATED A1C: CPT | Performed by: INTERNAL MEDICINE

## 2019-01-18 PROCEDURE — 85048 AUTOMATED LEUKOCYTE COUNT: CPT | Performed by: INTERNAL MEDICINE

## 2019-01-18 PROCEDURE — 99204 OFFICE O/P NEW MOD 45 MIN: CPT | Performed by: INTERNAL MEDICINE

## 2019-01-18 PROCEDURE — 36415 COLL VENOUS BLD VENIPUNCTURE: CPT | Performed by: INTERNAL MEDICINE

## 2019-01-18 RX ORDER — GLIMEPIRIDE 2 MG/1
2 TABLET ORAL
Qty: 90 TABLET | Refills: 3 | Status: SHIPPED | OUTPATIENT
Start: 2019-01-18 | End: 2021-07-22

## 2019-01-18 ASSESSMENT — MIFFLIN-ST. JEOR: SCORE: 1685.34
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
## 2019-01-18 NOTE — PATIENT INSTRUCTIONS
Please call and schedule your dexa at [facility] in [place] at your earliest convenience.      If you are taking Calcium (including Tums), Multivitamin, or Vitamin D; Please discontinue 1 day prior and day of DEXA Scan. It is ok to resume these medications after the scan is complete unless indicated by your doctor.
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
## 2019-01-21 ENCOUNTER — TELEPHONE (OUTPATIENT)
Dept: ENDOCRINOLOGY | Facility: CLINIC | Age: 69
End: 2019-01-21

## 2019-01-21 NOTE — TELEPHONE ENCOUNTER
Patient notified of recommendation.    She will schedule lab appt at [facility] in the coming weeks.    Stefanie Valentin LPN  Clinic Coordinator   Adult Endocrinology and Pediatric Specialty Clinics  [facility]

## 2019-01-21 NOTE — TELEPHONE ENCOUNTER
Called patient, no answer. Left message for patient to call back.     Yoly Martini, [facility]  Adult Endocrinology  [facility]

## 2019-01-21 NOTE — TELEPHONE ENCOUNTER
----- Message from Stefanie Valentin LPN sent at 1/21/2019  9:38 AM CST -----  Regarding: FW: blood work      ----- Message -----  From: Ignacia De León MD  Sent: 1/18/2019   9:47 PM  To: [facility] Endocrinology Adult [place]  Subject: blood work                                       Hi    Could you tell her she needs blood work (non urgent, in a few weeks at any fairwive)    Ignacia

## 2019-01-22 ENCOUNTER — TELEPHONE (OUTPATIENT)
Dept: ENDOCRINOLOGY | Facility: CLINIC | Age: 69
End: 2019-01-22

## 2019-01-22 DIAGNOSIS — N18.30 CHRONIC KIDNEY DISEASE, STAGE III (MODERATE) (H): Primary | ICD-10-CM

## 2019-01-22 NOTE — TELEPHONE ENCOUNTER
Received message from Dr. De León as follows:    N18.3 Chronic kidney disease, stage 3 (moderate)   Is her Dx.       Could you assist?     Thank you     Ignacia Elizalde updated.       Zenaida Schwarz RN  Endocrine Care Coordinator  [facility]

## 2019-01-24 ENCOUNTER — TELEPHONE (OUTPATIENT)
Dept: RHEUMATOLOGY | Facility: CLINIC | Age: 69
End: 2019-01-24

## 2019-01-24 ENCOUNTER — TELEPHONE (OUTPATIENT)
Dept: ENDOCRINOLOGY | Facility: CLINIC | Age: 69
End: 2019-01-24

## 2019-01-24 DIAGNOSIS — M1A.09X0 CHRONIC GOUT OF MULTIPLE SITES, UNSPECIFIED CAUSE: ICD-10-CM

## 2019-01-24 DIAGNOSIS — M25.50 ARTHRALGIA, UNSPECIFIED JOINT: ICD-10-CM

## 2019-01-24 RX ORDER — COLCHICINE 0.6 MG/1
0.6 TABLET ORAL DAILY
Qty: 90 TABLET | Refills: 1 | Status: SHIPPED | OUTPATIENT
Start: 2019-01-24 | End: 2019-05-14

## 2019-01-24 NOTE — TELEPHONE ENCOUNTER
Left message for pt to call back to clinic,  Prolia injections are approved.  Please help pt schedule with infusion when she calls back.  Rosanne Enrique, CMA

## 2019-01-24 NOTE — TELEPHONE ENCOUNTER
[facility] Call Center    Phone Message    May a detailed message be left on voicemail: yes    Reason for Call: Patient requesting a call for labs done on 01/18/19 for the medication colchicine (COLCYRS) 0.6 MG tablet.  Please advise.  Thank you.     Action Taken: Message routed to:  Adult Clinics: Rheumatology p [phone]

## 2019-01-24 NOTE — TELEPHONE ENCOUNTER
Patient returned call.  Per message below patient was routed to the Infusion schedulers.  Thank you.

## 2019-01-24 NOTE — TELEPHONE ENCOUNTER
Reviewed labs, uric acid > 6. Patient will continue colchicine and requests it be sent to mail order. Re-order sent as requested.

## 2019-01-25 ENCOUNTER — TELEPHONE (OUTPATIENT)
Dept: ENDOCRINOLOGY | Facility: CLINIC | Age: 69
End: 2019-01-25

## 2019-01-25 ENCOUNTER — OFFICE VISIT - HEALTHEAST (OUTPATIENT)
Dept: INTERNAL MEDICINE | Facility: CLINIC | Age: 69
End: 2019-01-25

## 2019-01-25 DIAGNOSIS — M10.9 ACUTE GOUT, UNSPECIFIED CAUSE, UNSPECIFIED SITE: ICD-10-CM

## 2019-01-25 DIAGNOSIS — H57.9 ITCHY EYES: ICD-10-CM

## 2019-01-25 DIAGNOSIS — I10 HYPERTENSION: ICD-10-CM

## 2019-01-25 DIAGNOSIS — M81.0 AGE-RELATED OSTEOPOROSIS WITHOUT CURRENT PATHOLOGICAL FRACTURE: ICD-10-CM

## 2019-01-25 DIAGNOSIS — E11.9 TYPE 2 DIABETES MELLITUS WITHOUT COMPLICATION, WITH LONG-TERM CURRENT USE OF INSULIN (H): ICD-10-CM

## 2019-01-25 DIAGNOSIS — Z23 NEED FOR PNEUMOCOCCAL VACCINE: ICD-10-CM

## 2019-01-25 DIAGNOSIS — Z79.4 TYPE 2 DIABETES MELLITUS WITHOUT COMPLICATION, WITH LONG-TERM CURRENT USE OF INSULIN (H): ICD-10-CM

## 2019-01-25 ASSESSMENT — MIFFLIN-ST. JEOR: SCORE: 1713.38

## 2019-01-25 NOTE — TELEPHONE ENCOUNTER
Left message for pt to call back to clinic.  Please help pt schedule prolia injections - [phone].  Rosanne Enrique CMA

## 2019-02-12 ENCOUNTER — COMMUNICATION - HEALTHEAST (OUTPATIENT)
Dept: INTERNAL MEDICINE | Facility: CLINIC | Age: 69
End: 2019-02-12

## 2019-02-12 DIAGNOSIS — F41.9 ANXIETY: ICD-10-CM

## 2019-02-18 ENCOUNTER — COMMUNICATION - HEALTHEAST (OUTPATIENT)
Dept: INTERNAL MEDICINE | Facility: CLINIC | Age: 69
End: 2019-02-18

## 2019-02-18 DIAGNOSIS — F41.9 ANXIETY: ICD-10-CM

## 2019-02-19 ENCOUNTER — INFUSION THERAPY VISIT (OUTPATIENT)
Dept: INFUSION THERAPY | Facility: CLINIC | Age: 69
End: 2019-02-19
Payer: MEDICARE

## 2019-02-19 VITALS
HEART RATE: 56 BPM | OXYGEN SATURATION: 100 % | SYSTOLIC BLOOD PRESSURE: 138 MMHG | WEIGHT: 268.4 LBS | DIASTOLIC BLOOD PRESSURE: 83 MMHG | RESPIRATION RATE: 16 BRPM | TEMPERATURE: 97.6 F | BODY MASS INDEX: 50.3 KG/M2

## 2019-02-19 DIAGNOSIS — N18.30 CHRONIC KIDNEY DISEASE, STAGE III (MODERATE) (H): Primary | ICD-10-CM

## 2019-02-19 DIAGNOSIS — E11.9 TYPE 2 DIABETES MELLITUS WITHOUT COMPLICATION, WITH LONG-TERM CURRENT USE OF INSULIN (H): ICD-10-CM

## 2019-02-19 DIAGNOSIS — M81.0 SENILE OSTEOPOROSIS: ICD-10-CM

## 2019-02-19 DIAGNOSIS — M81.0 SENILE OSTEOPOROSIS: Primary | ICD-10-CM

## 2019-02-19 DIAGNOSIS — R79.89 ELEVATED PARATHYROID HORMONE: ICD-10-CM

## 2019-02-19 DIAGNOSIS — E06.3 HASHIMOTO'S THYROIDITIS: ICD-10-CM

## 2019-02-19 DIAGNOSIS — Z79.4 TYPE 2 DIABETES MELLITUS WITHOUT COMPLICATION, WITH LONG-TERM CURRENT USE OF INSULIN (H): ICD-10-CM

## 2019-02-19 DIAGNOSIS — N18.30 CHRONIC KIDNEY DISEASE, STAGE III (MODERATE) (H): ICD-10-CM

## 2019-02-19 LAB
ALBUMIN SERPL-MCNC: 3.6 G/DL (ref 3.4–5)
ANION GAP SERPL CALCULATED.3IONS-SCNC: 7 MMOL/L (ref 3–14)
BUN SERPL-MCNC: 36 MG/DL (ref 7–30)
CALCIUM SERPL-MCNC: 9.9 MG/DL (ref 8.5–10.1)
CHLORIDE SERPL-SCNC: 108 MMOL/L (ref 94–109)
CO2 SERPL-SCNC: 26 MMOL/L (ref 20–32)
CREAT SERPL-MCNC: 1.24 MG/DL (ref 0.52–1.04)
GFR SERPL CREATININE-BSD FRML MDRD: 44 ML/MIN/{1.73_M2}
GLUCOSE SERPL-MCNC: 146 MG/DL (ref 70–99)
PHOSPHATE SERPL-MCNC: 2.5 MG/DL (ref 2.5–4.5)
POTASSIUM SERPL-SCNC: 4.2 MMOL/L (ref 3.4–5.3)
PTH-INTACT SERPL-MCNC: 111 PG/ML (ref 18–80)
SODIUM SERPL-SCNC: 141 MMOL/L (ref 133–144)
T4 FREE SERPL-MCNC: 1.02 NG/DL (ref 0.76–1.46)
TSH SERPL DL<=0.005 MIU/L-ACNC: 2.12 MU/L (ref 0.4–4)

## 2019-02-19 PROCEDURE — 82306 VITAMIN D 25 HYDROXY: CPT | Performed by: INTERNAL MEDICINE

## 2019-02-19 PROCEDURE — 84443 ASSAY THYROID STIM HORMONE: CPT | Performed by: INTERNAL MEDICINE

## 2019-02-19 PROCEDURE — 96372 THER/PROPH/DIAG INJ SC/IM: CPT | Performed by: NURSE PRACTITIONER

## 2019-02-19 PROCEDURE — 80048 BASIC METABOLIC PNL TOTAL CA: CPT | Performed by: INTERNAL MEDICINE

## 2019-02-19 PROCEDURE — 84100 ASSAY OF PHOSPHORUS: CPT | Performed by: INTERNAL MEDICINE

## 2019-02-19 PROCEDURE — 83970 ASSAY OF PARATHORMONE: CPT | Performed by: INTERNAL MEDICINE

## 2019-02-19 PROCEDURE — 84439 ASSAY OF FREE THYROXINE: CPT | Performed by: INTERNAL MEDICINE

## 2019-02-19 PROCEDURE — 99207 ZZC NO CHARGE LOS: CPT

## 2019-02-19 PROCEDURE — 82040 ASSAY OF SERUM ALBUMIN: CPT | Performed by: INTERNAL MEDICINE

## 2019-02-19 PROCEDURE — 36415 COLL VENOUS BLD VENIPUNCTURE: CPT | Performed by: INTERNAL MEDICINE

## 2019-02-19 ASSESSMENT — PAIN SCALES - GENERAL: PAINLEVEL: MILD PAIN (3)

## 2019-02-19 NOTE — PROGRESS NOTES
Infusion Nursing Note:  Meggan Everett presents today for Prolia.    Patient seen by provider today: No   present during visit today: Not Applicable.    Note: N/A.    Intravenous Access:  No Intravenous access/labs at this visit.    Treatment Conditions:  Lab Results   Component Value Date     02/19/2019                   Lab Results   Component Value Date    POTASSIUM 4.2 02/19/2019           No results found for: MAG         Lab Results   Component Value Date    CR 1.24 02/19/2019                   Lab Results   Component Value Date    HIEN 9.9 02/19/2019                No results found for: BILITOTAL        Lab Results   Component Value Date    ALBUMIN 3.6 02/19/2019                    Lab Results   Component Value Date    ALT 54 01/11/2019           Lab Results   Component Value Date    AST 38 01/11/2019       Results reviewed, labs MET treatment parameters, ok to proceed with treatment.      Post Infusion Assessment:  Patient tolerated injection without incident.    Discharge Plan:   Patient will return in 6 months for next appointment.   Patient discharged in stable condition accompanied by: self.  Departure Mode: Ambulatory.    Esperanza Soto RN-BSN, PHN, OCN  [facility]

## 2019-02-20 LAB — DEPRECATED CALCIDIOL+CALCIFEROL SERPL-MC: 64 UG/L (ref 20–75)

## 2019-02-23 ENCOUNTER — COMMUNICATION - HEALTHEAST (OUTPATIENT)
Dept: NURSING | Facility: CLINIC | Age: 69
End: 2019-02-23

## 2019-02-26 ENCOUNTER — COMMUNICATION - HEALTHEAST (OUTPATIENT)
Dept: PHARMACY | Facility: CLINIC | Age: 69
End: 2019-02-26

## 2019-02-26 DIAGNOSIS — F41.9 ANXIETY: ICD-10-CM

## 2019-03-03 NOTE — RESULT ENCOUNTER NOTE
Dear Meggan     Here is your results. All normal except parathyroid hormone, which is elevated because of chronic kidney condition, no need to do for PTH hormone at this point.   Please continue the plan we discussed.  Please call nursing line at [phone] if you have any questions.    Take care  Ignacia De León MD

## 2019-03-19 ENCOUNTER — RECORDS - HEALTHEAST (OUTPATIENT)
Dept: HEALTH INFORMATION MANAGEMENT | Facility: CLINIC | Age: 69
End: 2019-03-19

## 2019-03-25 ENCOUNTER — COMMUNICATION - HEALTHEAST (OUTPATIENT)
Dept: INTERNAL MEDICINE | Facility: CLINIC | Age: 69
End: 2019-03-25

## 2019-04-30 ENCOUNTER — OFFICE VISIT - HEALTHEAST (OUTPATIENT)
Dept: INTERNAL MEDICINE | Facility: CLINIC | Age: 69
End: 2019-04-30

## 2019-04-30 ENCOUNTER — COMMUNICATION - HEALTHEAST (OUTPATIENT)
Dept: INTERNAL MEDICINE | Facility: CLINIC | Age: 69
End: 2019-04-30

## 2019-04-30 DIAGNOSIS — E06.3 CHRONIC LYMPHOCYTIC THYROIDITIS: ICD-10-CM

## 2019-04-30 DIAGNOSIS — E78.5 HYPERLIPIDEMIA: ICD-10-CM

## 2019-04-30 DIAGNOSIS — Z12.31 VISIT FOR SCREENING MAMMOGRAM: ICD-10-CM

## 2019-04-30 DIAGNOSIS — Z79.4 TYPE 2 DIABETES MELLITUS WITHOUT COMPLICATION, WITH LONG-TERM CURRENT USE OF INSULIN (H): ICD-10-CM

## 2019-04-30 DIAGNOSIS — E11.9 TYPE 2 DIABETES MELLITUS WITHOUT COMPLICATION, WITH LONG-TERM CURRENT USE OF INSULIN (H): ICD-10-CM

## 2019-04-30 DIAGNOSIS — I10 ESSENTIAL HYPERTENSION: ICD-10-CM

## 2019-04-30 DIAGNOSIS — M79.10 MYALGIA: ICD-10-CM

## 2019-04-30 DIAGNOSIS — M81.0 AGE-RELATED OSTEOPOROSIS WITHOUT CURRENT PATHOLOGICAL FRACTURE: ICD-10-CM

## 2019-04-30 LAB
ALBUMIN SERPL-MCNC: 4 G/DL (ref 3.5–5)
ALP SERPL-CCNC: 248 U/L (ref 45–120)
ALT SERPL W P-5'-P-CCNC: 67 U/L (ref 0–45)
AST SERPL W P-5'-P-CCNC: 38 U/L (ref 0–40)
BILIRUB DIRECT SERPL-MCNC: 0.3 MG/DL
BILIRUB SERPL-MCNC: 0.7 MG/DL (ref 0–1)
CK SERPL-CCNC: 74 U/L (ref 30–190)
HBA1C MFR BLD: 8.1 % (ref 3.5–6)
PROT SERPL-MCNC: 7.1 G/DL (ref 6–8)
TSH SERPL DL<=0.005 MIU/L-ACNC: 1.51 UIU/ML (ref 0.3–5)

## 2019-04-30 ASSESSMENT — MIFFLIN-ST. JEOR: SCORE: 1702.95

## 2019-05-03 ENCOUNTER — COMMUNICATION - HEALTHEAST (OUTPATIENT)
Dept: INTERNAL MEDICINE | Facility: CLINIC | Age: 69
End: 2019-05-03

## 2019-05-06 ENCOUNTER — COMMUNICATION - HEALTHEAST (OUTPATIENT)
Dept: INTERNAL MEDICINE | Facility: CLINIC | Age: 69
End: 2019-05-06

## 2019-05-14 ENCOUNTER — OFFICE VISIT (OUTPATIENT)
Dept: RHEUMATOLOGY | Facility: CLINIC | Age: 69
End: 2019-05-14
Payer: MEDICARE

## 2019-05-14 VITALS
HEIGHT: 61 IN | DIASTOLIC BLOOD PRESSURE: 77 MMHG | SYSTOLIC BLOOD PRESSURE: 141 MMHG | WEIGHT: 268 LBS | OXYGEN SATURATION: 96 % | BODY MASS INDEX: 50.6 KG/M2 | HEART RATE: 70 BPM

## 2019-05-14 DIAGNOSIS — M1A.9XX1 CHRONIC TOPHACEOUS GOUT: ICD-10-CM

## 2019-05-14 DIAGNOSIS — Z79.899 HIGH RISK MEDICATION USE: ICD-10-CM

## 2019-05-14 LAB
ALT SERPL W P-5'-P-CCNC: 58 U/L (ref 0–50)
AST SERPL W P-5'-P-CCNC: 38 U/L (ref 0–45)
CREAT SERPL-MCNC: 1.16 MG/DL (ref 0.52–1.04)
GFR SERPL CREATININE-BSD FRML MDRD: 48 ML/MIN/{1.73_M2}
URATE SERPL-MCNC: 5.5 MG/DL (ref 2.6–6)
WBC # BLD AUTO: 5.5 10E9/L (ref 4–11)

## 2019-05-14 PROCEDURE — 84550 ASSAY OF BLOOD/URIC ACID: CPT | Performed by: STUDENT IN AN ORGANIZED HEALTH CARE EDUCATION/TRAINING PROGRAM

## 2019-05-14 PROCEDURE — 99214 OFFICE O/P EST MOD 30 MIN: CPT | Performed by: STUDENT IN AN ORGANIZED HEALTH CARE EDUCATION/TRAINING PROGRAM

## 2019-05-14 PROCEDURE — 36415 COLL VENOUS BLD VENIPUNCTURE: CPT | Performed by: STUDENT IN AN ORGANIZED HEALTH CARE EDUCATION/TRAINING PROGRAM

## 2019-05-14 PROCEDURE — 84460 ALANINE AMINO (ALT) (SGPT): CPT | Performed by: STUDENT IN AN ORGANIZED HEALTH CARE EDUCATION/TRAINING PROGRAM

## 2019-05-14 PROCEDURE — 85048 AUTOMATED LEUKOCYTE COUNT: CPT | Performed by: STUDENT IN AN ORGANIZED HEALTH CARE EDUCATION/TRAINING PROGRAM

## 2019-05-14 PROCEDURE — 84450 TRANSFERASE (AST) (SGOT): CPT | Performed by: STUDENT IN AN ORGANIZED HEALTH CARE EDUCATION/TRAINING PROGRAM

## 2019-05-14 PROCEDURE — 82565 ASSAY OF CREATININE: CPT | Performed by: STUDENT IN AN ORGANIZED HEALTH CARE EDUCATION/TRAINING PROGRAM

## 2019-05-14 RX ORDER — ALLOPURINOL 100 MG/1
200 TABLET ORAL DAILY
Qty: 180 TABLET | Refills: 1 | Status: SHIPPED | OUTPATIENT
Start: 2019-05-14 | End: 2021-09-29

## 2019-05-14 ASSESSMENT — MIFFLIN-ST. JEOR: SCORE: 1681.98

## 2019-05-14 ASSESSMENT — ROUTINE ASSESSMENT OF PATIENT INDEX DATA (RAPID3)
TOTAL RAPID3 SCORE: 10.3
RAPID3 INTERPRETATION: MODERATE 6.1-12.0

## 2019-05-14 ASSESSMENT — PATIENT HEALTH QUESTIONNAIRE - PHQ9: SUM OF ALL RESPONSES TO PHQ QUESTIONS 1-9: 5

## 2019-05-14 ASSESSMENT — PAIN SCALES - GENERAL: PAINLEVEL: MODERATE PAIN (4)

## 2019-05-14 NOTE — PROGRESS NOTES
Rheumatology Clinic Visit     Meggan Everett MRN# [id number]   YOB: 1950 Age: 68 year old     Date of Visit: May 14, 2019  Primary care provider: Stacey Kelly          Assessment and Plan:   Assessment     -- Chronic tophaceous gout   -- Arthritis  -- Type 2 DM, HTN, HLD  -- CKD stage III  -- Morbid obesity   -- Osteoporosis on Prolia  -- Elevated ALT - 58     Ms Everett is 68-year-old female seen in clinic for management of gout.    Chronic gout: She reports history of gout for the last 4 years. Happened once or twice a year.  and then became more frequent.  Having flareups in her fingers, knuckles, toes.   Every time she gets a flareup it is treated with prednisone taper.  She was recommended to start urate lowering therapy by her PCP but she refused. Uric acid level has been running in the range of 8.5-9.7 mg/dL. She has occasionally noted tophaceous lesions over her fingertips which break open and a chalky white deposit comes out.    X - rays of bilateral hands have shown possible erosive disease over the radial aspect of the right  second finger middle phalanx at the base. She was started on Allopurinol 100 mg in 10/2018 which was increased to 200 mg later. Her uric acid level has decreased from 9.3 to 5.5 mg/dl. Also on Colchicine 0.6 mg tablet daily for prophylaxis.      Elevated ALT : Her uric acid level along with the CBC and liver and kidney test done today. Her labs are normal except for ALT of 58. Continue Allopurinol and will discontinue colchicine now since uric acid is below 5.5 mg/dl and has no recent flares. Hepatotoxicity can be due to Allopurinol but would like to rule out other possibilities like colchicine use, statins etc. colchicine will be discontinued.  Atorvastatin dose was reduced by primary care provider.  We will repeat liver function tests in 6 weeks.  If remains elevated or significant worsening consider decreasing the dose of allopurinol to 150 mg  daily.      Osteoporosis: She had DEXA scan in January 2018 which showed osteoporosis with high fracture risk.  She follows with endocrinology and was started on Prolia infusions.     Plan    1.  Blood tests: Serum uric acid level, CBC, AST,ALT, creatinine today.    2.  Continue  Allopurinol 200 mg daily    3.  Discontinue colchicine    4.  Repeat liver function test in 6 weeks.    5.  Return to clinic in 4 months            Active Problem List:     Patient Active Problem List    Diagnosis Date Noted     Chronic kidney disease, stage III (moderate) (H) 01/22/2019     Priority: Medium     Diabetes mellitus, type 2 (H) 01/18/2019     Priority: Medium     Senile osteoporosis 01/18/2019     Priority: Medium     Morbid obesity (H) 10/30/2018     Priority: Medium     Fibroadenoma of LEFT breast, with fibrocystic changes 05/14/2012     Priority: Medium            History of Present Illness:   Meggan Everett is 68 year old female with PMH of DM, HTN, hyperlipidemia, gout, hypothyroidism seen in the clinic in consultation at request of PCP Stacey Kelly for management of chronic gout.     May 14, 2019 - she denies any recent gout flares.  She is taking 200 mg allopurinol daily along with 1 tablet of colchicine.  Her uric acid level done today is 5.5 mg/dL.  Her last liver function tests show elevated ALT.  She is following up with her primary care and will be getting a abdominal ultrasound to look at other possibilities including fatty liver.    January 11, 2019 - She is doing better, no gout flares. Denies any side effects of Allopurinol and colchicine. Uric acid level has improved.     History from initial visit : She was diagnosed with gout 4 years ago when it happened mostly in her feet once or twice a year. Now it has become more frequent and happen every couple weeks in her hands and toes. She has pain in her elbows. Even after acute gout attack settles down she can not bend her fingers. She has lumps on her  fingers tips which break open and chalky white deposit comes out. Acute gout attack is treated with prednisone taper. Her PCP did discuss with her regarding the allopurinol use which she denied it due to potential side effects.  She has never been on Urate lowering treatment.     She has OA in her knees, does swimming to help with it.  She does not think the knee pain is related to gout.     No history of psoriasis, ulcerative colitis or chron's disease. No h/o iritis, enthesitis, finger or toe swelling like dactylitis, plantar fascitis or heel pain. Denies any raynauds, malar rash, photosensitivity, recurrent mouth/genital ulcers, sicca symptoms, pleuritic chest pains, recurrent sinusitis/rhinitis, swallowing difficulty, hearing or visual changes recently. No h/o arterial/venous thrombosis in the past. Denies any tick bite, recent GI/ infection.             Review of Systems:   Review Of Systems  Constitutional: denies fever, chills, night sweats and weight loss.  Skin: No skin rash.  Eyes: No dryness or irritation in eyes. No episode of eye inflammation or redness.   Ears/Nose/Throat: no recurrent sinus infections.  Respiratory: No shortness of breath, dyspnea on exertion, cough, or hemoptysis  Cardiovascular: no chest pain or palpitations.  Gastrointestinal: no nausea, vomiting, abdominal pain.  Normal bowel movements.  Genitourinary: no dysuria, frequency  or hematuria.  Musculoskeletal: as in HPI  Neurologic: no numbness, tingling.  Psychiatric: no mood disorders.  Hematologic/Lymphatic/Immunologic: no history of easy bruising, petechia or purpura.  No abnormal bleeding.   Endocrine: + h/o thyroid disease or Diabetes.                  Past Medical History:     Past Medical History:   Diagnosis Date     Diabetes mellitus      Fibroadenoma of LEFT breast, with fibrocystic changes 5/14/2012     Hypothyroid      Mammographic microcalcification 5/2012    Left     Past Surgical History:   Procedure Laterality Date      D & C  2000, 2002     DILATE CERVIX, ABLATE ENDOMETRIUM, COMBINED  2005     HC BIOPSY OF BREAST, OPEN INCISIONAL  1972    Left     HC LAPAROSCOPY, SURGICAL; CHOLECYSTECTOMY  1998     Microdiscectomy  1998            Social History:     Social History     Occupational History     Occupation: Retired   Tobacco Use     Smoking status: Never Smoker     Smokeless tobacco: Never Used   Substance and Sexual Activity     Alcohol use: No     Drug use: No     Sexual activity: Not on file            Family History:     Family History   Problem Relation Age of Onset     Autoimmune Disease No family hx of             Allergies:     Allergies   Allergen Reactions     Codeine Sulfate Nausea     Tape [Adhesive Tape] Blisters     Tetanus Toxoid             Medications:     Current Outpatient Medications   Medication Sig Dispense Refill     allopurinol (ZYLOPRIM) 100 MG tablet Take 2 tablets (200 mg) by mouth daily 180 tablet 1     Celecoxib (CELEBREX PO) Take 200 mg by mouth       Cholecalciferol (VITAMIN D) 2000 UNIT tablet Take 2 tablets by mouth daily        furosemide (LASIX) 40 MG tablet Take 40 mg by mouth daily.       IRBESARTAN PO Take 150 mg by mouth At Bedtime       LANTUS VIAL 100 UNITS/ML SC SOLN Inject 15 Units Subcutaneous 2 times daily        Levothyroxine Sodium (SYNTHROID PO) Take 125 mcg by mouth daily        LORAZEPAM PO Take 0.5 mg by mouth At Bedtime       metoprolol (TOPROL XL) 100 MG 24 hr tablet Take 100 mg by mouth daily.       NovoLOG VIAL 100 UNITS/ML SC SOLN Inject  Subcutaneous. Per sliding scale       rosuvastatin (CRESTOR) 10 MG tablet Take 5 mg by mouth daily        tiZANidine (ZANAFLEX) 4 MG capsule Take 4 mg by mouth daily.       UNABLE TO FIND 2 times daily MEDICATION NAME: Hemp Cream  Applied to knees PRN       Aliskiren-Hydrochlorothiazide 150-12.5 MG TABS Take 1 tablet by mouth daily.       glimepiride (AMARYL) 2 MG tablet Take 1 tablet (2 mg) by mouth every morning (before breakfast)  "(Patient not taking: Reported on 2/19/2019) 90 tablet 3     metFORMIN (GLUCOPHAGE XR) 500 MG 24 hr tablet Take 500 mg by mouth daily              Physical Exam:   Blood pressure 141/77, pulse 70, height 1.556 m (5' 1.25\"), weight 121.6 kg (268 lb), SpO2 96 %.  Wt Readings from Last 4 Encounters:   05/14/19 121.6 kg (268 lb)   02/19/19 121.7 kg (268 lb 6.4 oz)   01/18/19 121.9 kg (268 lb 11.9 oz)   01/11/19 122 kg (269 lb)       Constitutional: obese, appearing stated age; cooperative  Eyes: nl EOM, PERRLA, vision, conjunctiva, sclera  ENT: nl external ears, nose, hearing, lips, teeth, gums, throat  No mucous membrane lesions, normal saliva pool  Neck: no mass or thyroid enlargement  Resp: lungs clear to auscultation, nl to palpation  CV: RRR, no murmurs, rubs or gallops, no edema  GI: no ABD mass or tenderness, no HSM  : not tested  Lymph: no cervical, supraclavicular, inguinal or epitrochlear nodes    MS: All TMJ, neck, shoulder, elbow, wrist, MCP/PIP/DIP, spine, hip, knee, ankle, and foot MTP/IP joints were examined.     -- No synovitis and tenderness present over MCP, PIP, DIP joints, wrists, elbows, shoulders, ankles and MTP joints.     -- She has tiny tophaceous lesions over left index finger tip, right middle finger tip. Small nodule present over right 4th PIP joints.     -- Right 2nd toe is red, swollen with possible tophaceous lesion.     -- No dactylitis,  tenosynovitis, enthesopathy.    Skin: no nail pitting, alopecia, rash, nodules or lesions  Neuro: nl cranial nerves, strength, sensation, DTRs.   Psych: nl judgement, orientation, memory, affect.         Data:     Results for orders placed or performed in visit on 05/14/19   WBC count   Result Value Ref Range    WBC 5.5 4.0 - 11.0 10e9/L   Uric acid   Result Value Ref Range    Uric Acid 5.5 2.6 - 6.0 mg/dL   Creatinine   Result Value Ref Range    Creatinine 1.16 (H) 0.52 - 1.04 mg/dL    GFR Estimate 48 (L) >60 mL/min/[1.73_m2]    GFR Estimate If Black " 55 (L) >60 mL/min/[1.73_m2]   ALT   Result Value Ref Range    ALT 58 (H) 0 - 50 U/L   AST   Result Value Ref Range    AST 38 0 - 45 U/L       Reviewed Rheumatology lab flowsheet    Adin Valerio MD  [facility]  Department of Rheumatology & Autoimmune Disorders  [facility]: [phone]   Pager - [phone]

## 2019-05-14 NOTE — NURSING NOTE
"Meggan Everett's goals for this visit include:   She requests these members of her care team be copied on today's visit information:     PCP: Leticia April    Referring Provider:  No referring provider defined for this encounter.    /77   Pulse 70   Ht 1.556 m (5' 1.25\")   Wt 121.6 kg (268 lb)   SpO2 96%   BMI 50.23 kg/m     "

## 2019-05-14 NOTE — PATIENT INSTRUCTIONS
-- Will continue with Allopurinol 200 mg daily     -- Will discontinue Colchicine    -- Her Primary care will do liver Ultrasound.     -- RTC in 4 months

## 2019-05-24 ENCOUNTER — MYC MEDICAL ADVICE (OUTPATIENT)
Dept: RHEUMATOLOGY | Facility: CLINIC | Age: 69
End: 2019-05-24

## 2019-05-24 DIAGNOSIS — R79.89 ABNORMAL LIVER FUNCTION TESTS: ICD-10-CM

## 2019-05-24 DIAGNOSIS — M1A.9XX1 CHRONIC TOPHACEOUS GOUT: Primary | ICD-10-CM

## 2019-05-24 NOTE — TELEPHONE ENCOUNTER
"Called patient to follow up on her kaufDA message about her pain that she's experiencing.     She states that she is \"Hurting\". Her knees been hurting the last 3 or 4 days but she isn't sure if that is because of her osteoarthritis, On her left foot the pain and redness are in the area where she has previously had gout attacks as well as her left hand is sore.  Went off of colchicine a week and a half ago and states she is noticing a difference. She stated this is not a full blown attack, but definitely is worse than when she was on Colchicine .     Will follow up with provider for recommendations and then follow up with patient.     DYLAN MoraN, RN   Rheumatology Care Coordinator   [facility]         "

## 2019-05-28 RX ORDER — COLCHICINE 0.6 MG/1
0.6 TABLET ORAL DAILY
Qty: 30 TABLET | Refills: 0 | Status: SHIPPED | OUTPATIENT
Start: 2019-05-28 | End: 2019-07-31

## 2019-05-28 RX ORDER — PREDNISONE 5 MG/1
TABLET ORAL
Qty: 60 TABLET | Refills: 0 | Status: SHIPPED | OUTPATIENT
Start: 2019-05-28 | End: 2021-07-22

## 2019-05-28 NOTE — TELEPHONE ENCOUNTER
She can take short prednisone taper starting with 20 mg x 2 days then 15 mg x 2 days, 10 mg x 2 days , 5 mg x 2 days then off. Can restart colchicine whatever dose she was on before, she mentioned before that she takes 1 tab daily.

## 2019-05-28 NOTE — TELEPHONE ENCOUNTER
Called patient to let her know Dr. Valerio  Recommendations and medication changes. She expressed understanding and had no further questions.     DYLAN MoraN, RN   Rheumatology Care Coordinator   [facility]

## 2019-07-16 ENCOUNTER — COMMUNICATION - HEALTHEAST (OUTPATIENT)
Dept: INTERNAL MEDICINE | Facility: CLINIC | Age: 69
End: 2019-07-16

## 2019-07-16 DIAGNOSIS — Z79.4 TYPE 2 DIABETES MELLITUS WITHOUT COMPLICATION, WITH LONG-TERM CURRENT USE OF INSULIN (H): ICD-10-CM

## 2019-07-16 DIAGNOSIS — E11.9 TYPE 2 DIABETES MELLITUS WITHOUT COMPLICATION, WITH LONG-TERM CURRENT USE OF INSULIN (H): ICD-10-CM

## 2019-07-27 ENCOUNTER — MYC REFILL (OUTPATIENT)
Dept: RHEUMATOLOGY | Facility: CLINIC | Age: 69
End: 2019-07-27

## 2019-07-27 DIAGNOSIS — M1A.9XX1 CHRONIC TOPHACEOUS GOUT: ICD-10-CM

## 2019-07-29 NOTE — TELEPHONE ENCOUNTER
Medication/Dose:     colchicine (COLCYRS) 0.6 MG tablet 30 tablet 0 5/28/2019  --   Sig - Route: Take 1 tablet (0.6 mg) by mouth daily - Oral     Last Written Prescription Date: 05/28/2019  Last Fill Quantity: 30, # refills: 0  Last Office Visit:  5/14/2019  Next Enc: 09/10/2019    Next 5 appointments (look out 90 days)    Sep 10, 2019 11:30 AM CDT  Return Visit with Adin Valerio MD  [facility] ([facility] [address] [address] [place] [id number] [phone]          Standing lab orders every month.    WBC   Date Value Ref Range Status   05/14/2019 5.5 4.0 - 11.0 10e9/L Final     RBC Count   Date Value Ref Range Status   01/11/2019 4.49 3.8 - 5.2 10e12/L Final     Hemoglobin   Date Value Ref Range Status   01/11/2019 12.8 11.7 - 15.7 g/dL Final     Hematocrit   Date Value Ref Range Status   01/11/2019 40.7 35.0 - 47.0 % Final     MCV   Date Value Ref Range Status   01/11/2019 91 78 - 100 fl Final     MCH   Date Value Ref Range Status   01/11/2019 28.5 26.5 - 33.0 pg Final     MCHC   Date Value Ref Range Status   01/11/2019 31.4 (L) 31.5 - 36.5 g/dL Final     RDW   Date Value Ref Range Status   01/11/2019 14.6 10.0 - 15.0 % Final     Platelet Count   Date Value Ref Range Status   01/11/2019 261 150 - 450 10e9/L Final     AST   Date Value Ref Range Status   05/14/2019 38 0 - 45 U/L Final     ALT   Date Value Ref Range Status   05/14/2019 58 (H) 0 - 50 U/L Final     Creatinine   Date Value Ref Range Status   05/14/2019 1.16 (H) 0.52 - 1.04 mg/dL Final     Albumin   Date Value Ref Range Status   02/19/2019 3.6 3.4 - 5.0 g/dL Final     No results found for: CKTOTAL  No results found for: CRP  No results found for: SED  No results found for: COLOR, APPEARANCE, URINEGLC, URINEBILI, URINEKETONE, SG, URINEPH, PROTEIN, UROBILINOGEN, NITRITE, LEUKEST

## 2019-07-30 ENCOUNTER — RECORDS - HEALTHEAST (OUTPATIENT)
Dept: ADMINISTRATIVE | Facility: OTHER | Age: 69
End: 2019-07-30

## 2019-07-30 ENCOUNTER — ANCILLARY PROCEDURE (OUTPATIENT)
Dept: MAMMOGRAPHY | Facility: CLINIC | Age: 69
End: 2019-07-30
Attending: INTERNAL MEDICINE
Payer: MEDICARE

## 2019-07-30 DIAGNOSIS — R79.89 ABNORMAL LIVER FUNCTION TESTS: ICD-10-CM

## 2019-07-30 DIAGNOSIS — Z12.31 SCREENING MAMMOGRAM, ENCOUNTER FOR: ICD-10-CM

## 2019-07-30 DIAGNOSIS — M1A.9XX1 CHRONIC TOPHACEOUS GOUT: ICD-10-CM

## 2019-07-30 DIAGNOSIS — Z79.899 HIGH RISK MEDICATION USE: ICD-10-CM

## 2019-07-30 LAB
ALT SERPL W P-5'-P-CCNC: 145 U/L (ref 0–50)
AST SERPL W P-5'-P-CCNC: 63 U/L (ref 0–45)
CK SERPL-CCNC: 79 U/L (ref 30–225)
CREAT SERPL-MCNC: 1.14 MG/DL (ref 0.52–1.04)
GFR SERPL CREATININE-BSD FRML MDRD: 49 ML/MIN/{1.73_M2}
URATE SERPL-MCNC: 5.3 MG/DL (ref 2.6–6)

## 2019-07-30 PROCEDURE — 36415 COLL VENOUS BLD VENIPUNCTURE: CPT | Performed by: STUDENT IN AN ORGANIZED HEALTH CARE EDUCATION/TRAINING PROGRAM

## 2019-07-30 PROCEDURE — 82565 ASSAY OF CREATININE: CPT | Performed by: STUDENT IN AN ORGANIZED HEALTH CARE EDUCATION/TRAINING PROGRAM

## 2019-07-30 PROCEDURE — 82550 ASSAY OF CK (CPK): CPT | Performed by: STUDENT IN AN ORGANIZED HEALTH CARE EDUCATION/TRAINING PROGRAM

## 2019-07-30 PROCEDURE — 84460 ALANINE AMINO (ALT) (SGPT): CPT | Performed by: STUDENT IN AN ORGANIZED HEALTH CARE EDUCATION/TRAINING PROGRAM

## 2019-07-30 PROCEDURE — 84550 ASSAY OF BLOOD/URIC ACID: CPT | Performed by: STUDENT IN AN ORGANIZED HEALTH CARE EDUCATION/TRAINING PROGRAM

## 2019-07-30 PROCEDURE — 77063 BREAST TOMOSYNTHESIS BI: CPT

## 2019-07-30 PROCEDURE — 77067 SCR MAMMO BI INCL CAD: CPT

## 2019-07-30 PROCEDURE — 84450 TRANSFERASE (AST) (SGOT): CPT | Performed by: STUDENT IN AN ORGANIZED HEALTH CARE EDUCATION/TRAINING PROGRAM

## 2019-07-31 ENCOUNTER — TELEPHONE (OUTPATIENT)
Dept: RHEUMATOLOGY | Facility: CLINIC | Age: 69
End: 2019-07-31

## 2019-07-31 DIAGNOSIS — Z11.59 ENCOUNTER FOR SCREENING FOR OTHER VIRAL DISEASES: ICD-10-CM

## 2019-07-31 DIAGNOSIS — Z79.899 HIGH RISK MEDICATION USE: ICD-10-CM

## 2019-07-31 DIAGNOSIS — R79.89 ABNORMAL LIVER FUNCTION TESTS: Primary | ICD-10-CM

## 2019-07-31 RX ORDER — COLCHICINE 0.6 MG/1
0.6 TABLET ORAL DAILY
Qty: 90 TABLET | Refills: 1 | OUTPATIENT
Start: 2019-07-31

## 2019-07-31 NOTE — TELEPHONE ENCOUNTER
Called Meggan. Reviewed the below results with her. She expressed understanding and had no further questions. She will notify us if she has a flare.     DYLAN MoraN, RN   Rheumatology Care Coordinator   [facility]

## 2019-07-31 NOTE — TELEPHONE ENCOUNTER
----- Message from Adin Valerio MD sent at 7/31/2019  1:06 PM CDT -----  Dear Ms. Everett,    Your liver tests are getting worse. It might be related to Allopurinol use. I recommend to discontinue it, will repeat the labs in 2 weeks and if better will consider another medication Uloric. You do not need Colchicine. If gout flare happens in 2 weeks will treat with prednisone.     Adin Valerio MD  7/31/2019  1:00 PM

## 2019-08-04 ENCOUNTER — OFFICE VISIT - HEALTHEAST (OUTPATIENT)
Dept: FAMILY MEDICINE | Facility: CLINIC | Age: 69
End: 2019-08-04

## 2019-08-04 DIAGNOSIS — E11.9 TYPE 2 DIABETES MELLITUS WITHOUT COMPLICATION, WITH LONG-TERM CURRENT USE OF INSULIN (H): ICD-10-CM

## 2019-08-04 DIAGNOSIS — Z79.4 TYPE 2 DIABETES MELLITUS WITHOUT COMPLICATION, WITH LONG-TERM CURRENT USE OF INSULIN (H): ICD-10-CM

## 2019-08-04 DIAGNOSIS — J45.901 MODERATE ASTHMA WITH ACUTE EXACERBATION, UNSPECIFIED WHETHER PERSISTENT: ICD-10-CM

## 2019-08-09 ENCOUNTER — COMMUNICATION - HEALTHEAST (OUTPATIENT)
Dept: INTERNAL MEDICINE | Facility: CLINIC | Age: 69
End: 2019-08-09

## 2019-08-10 ENCOUNTER — COMMUNICATION - HEALTHEAST (OUTPATIENT)
Dept: SCHEDULING | Facility: CLINIC | Age: 69
End: 2019-08-10

## 2019-08-10 ENCOUNTER — OFFICE VISIT - HEALTHEAST (OUTPATIENT)
Dept: FAMILY MEDICINE | Facility: CLINIC | Age: 69
End: 2019-08-10

## 2019-08-10 DIAGNOSIS — J45.31 MILD PERSISTENT ASTHMA WITH EXACERBATION: ICD-10-CM

## 2019-08-10 DIAGNOSIS — R05.9 COUGH: ICD-10-CM

## 2019-08-10 DIAGNOSIS — R06.02 SHORTNESS OF BREATH: ICD-10-CM

## 2019-08-10 DIAGNOSIS — J01.90 ACUTE NON-RECURRENT SINUSITIS, UNSPECIFIED LOCATION: ICD-10-CM

## 2019-08-12 ENCOUNTER — AMBULATORY - HEALTHEAST (OUTPATIENT)
Dept: INTERNAL MEDICINE | Facility: CLINIC | Age: 69
End: 2019-08-12

## 2019-08-12 DIAGNOSIS — R74.8 ELEVATED ALKALINE PHOSPHATASE LEVEL: ICD-10-CM

## 2019-08-12 DIAGNOSIS — R74.01 ELEVATED ALANINE AMINOTRANSFERASE (ALT) LEVEL: ICD-10-CM

## 2019-08-22 ENCOUNTER — HOSPITAL ENCOUNTER (OUTPATIENT)
Dept: ULTRASOUND IMAGING | Facility: HOSPITAL | Age: 69
Discharge: HOME | End: 2019-08-22
Attending: INTERNAL MEDICINE

## 2019-08-22 DIAGNOSIS — R74.8 ELEVATED ALKALINE PHOSPHATASE LEVEL: ICD-10-CM

## 2019-08-22 DIAGNOSIS — R74.01 ELEVATED ALANINE AMINOTRANSFERASE (ALT) LEVEL: ICD-10-CM

## 2019-08-22 DIAGNOSIS — R74.02 ELEVATION OF LEVEL OF TRANSAMINASE AND LACTIC ACID DEHYDROGENASE (LDH): ICD-10-CM

## 2019-08-22 DIAGNOSIS — R74.01 ELEVATION OF LEVEL OF TRANSAMINASE AND LACTIC ACID DEHYDROGENASE (LDH): ICD-10-CM

## 2019-08-27 ENCOUNTER — AMBULATORY - HEALTHEAST (OUTPATIENT)
Dept: INTERNAL MEDICINE | Facility: CLINIC | Age: 69
End: 2019-08-27

## 2019-08-27 ENCOUNTER — OFFICE VISIT - HEALTHEAST (OUTPATIENT)
Dept: INTERNAL MEDICINE | Facility: CLINIC | Age: 69
End: 2019-08-27

## 2019-08-27 DIAGNOSIS — M62.838 MUSCLE SPASMS OF BOTH LOWER EXTREMITIES: ICD-10-CM

## 2019-08-27 DIAGNOSIS — E06.3 CHRONIC LYMPHOCYTIC THYROIDITIS: ICD-10-CM

## 2019-08-27 DIAGNOSIS — E11.9 TYPE 2 DIABETES MELLITUS WITHOUT COMPLICATION, WITH LONG-TERM CURRENT USE OF INSULIN (H): ICD-10-CM

## 2019-08-27 DIAGNOSIS — Z79.4 TYPE 2 DIABETES MELLITUS WITHOUT COMPLICATION, WITH LONG-TERM CURRENT USE OF INSULIN (H): ICD-10-CM

## 2019-08-27 DIAGNOSIS — Z12.11 SCREEN FOR COLON CANCER: ICD-10-CM

## 2019-08-27 DIAGNOSIS — E78.5 HYPERLIPIDEMIA: ICD-10-CM

## 2019-08-27 DIAGNOSIS — Z00.00 ROUTINE GENERAL MEDICAL EXAMINATION AT A HEALTH CARE FACILITY: ICD-10-CM

## 2019-08-27 DIAGNOSIS — M10.9 GOUT, UNSPECIFIED CAUSE, UNSPECIFIED CHRONICITY, UNSPECIFIED SITE: ICD-10-CM

## 2019-08-27 DIAGNOSIS — R74.01 ELEVATED ALT MEASUREMENT: ICD-10-CM

## 2019-08-27 DIAGNOSIS — J45.41 MODERATE PERSISTENT ASTHMA WITH EXACERBATION: ICD-10-CM

## 2019-08-27 DIAGNOSIS — Z13.220 ENCOUNTER FOR SCREENING FOR LIPOID DISORDERS: ICD-10-CM

## 2019-08-27 LAB
ALBUMIN SERPL-MCNC: 3.9 G/DL (ref 3.5–5)
ALP SERPL-CCNC: 212 U/L (ref 45–120)
ALT SERPL W P-5'-P-CCNC: 51 U/L (ref 0–45)
ANION GAP SERPL CALCULATED.3IONS-SCNC: 13 MMOL/L (ref 5–18)
AST SERPL W P-5'-P-CCNC: 34 U/L (ref 0–40)
BILIRUB DIRECT SERPL-MCNC: 0.3 MG/DL
BILIRUB SERPL-MCNC: 0.9 MG/DL (ref 0–1)
BUN SERPL-MCNC: 37 MG/DL (ref 8–22)
CALCIUM SERPL-MCNC: 10.2 MG/DL (ref 8.5–10.5)
CHLORIDE BLD-SCNC: 103 MMOL/L (ref 98–107)
CHOLEST SERPL-MCNC: 231 MG/DL
CO2 SERPL-SCNC: 25 MMOL/L (ref 22–31)
CREAT SERPL-MCNC: 1.24 MG/DL (ref 0.6–1.1)
ERYTHROCYTE [DISTWIDTH] IN BLOOD BY AUTOMATED COUNT: 11.6 % (ref 11–14.5)
FASTING STATUS PATIENT QL REPORTED: YES
GFR SERPL CREATININE-BSD FRML MDRD: 43 ML/MIN/1.73M2
GLUCOSE BLD-MCNC: 197 MG/DL (ref 70–125)
HBA1C MFR BLD: 9.4 % (ref 3.5–6)
HCT VFR BLD AUTO: 41.2 % (ref 35–47)
HDLC SERPL-MCNC: 72 MG/DL
HGB BLD-MCNC: 13.8 G/DL (ref 12–16)
LDLC SERPL CALC-MCNC: 128 MG/DL
MCH RBC QN AUTO: 30.9 PG (ref 27–34)
MCHC RBC AUTO-ENTMCNC: 33.4 G/DL (ref 32–36)
MCV RBC AUTO: 93 FL (ref 80–100)
PLATELET # BLD AUTO: 275 THOU/UL (ref 140–440)
PMV BLD AUTO: 8.2 FL (ref 7–10)
POTASSIUM BLD-SCNC: 4.5 MMOL/L (ref 3.5–5)
PROT SERPL-MCNC: 7 G/DL (ref 6–8)
RBC # BLD AUTO: 4.45 MILL/UL (ref 3.8–5.4)
SODIUM SERPL-SCNC: 141 MMOL/L (ref 136–145)
TRIGL SERPL-MCNC: 157 MG/DL
URATE SERPL-MCNC: 8.7 MG/DL (ref 2–7.5)
WBC: 7.3 THOU/UL (ref 4–11)

## 2019-08-27 ASSESSMENT — MIFFLIN-ST. JEOR: SCORE: 1744.84

## 2019-08-28 LAB
HAV IGG SER QL IA: NEGATIVE
HBV CORE AB SERPL QL IA: NEGATIVE
HBV SURFACE AG SERPL QL IA: NEGATIVE
HCV AB SERPL QL IA: NEGATIVE
HEPATITIS B SURFACE ANTIBODY LHE- HISTORICAL: NEGATIVE

## 2019-08-29 ENCOUNTER — COMMUNICATION - HEALTHEAST (OUTPATIENT)
Dept: INTERNAL MEDICINE | Facility: CLINIC | Age: 69
End: 2019-08-29

## 2019-10-01 ENCOUNTER — COMMUNICATION - HEALTHEAST (OUTPATIENT)
Dept: INTERNAL MEDICINE | Facility: CLINIC | Age: 69
End: 2019-10-01

## 2019-10-01 DIAGNOSIS — Z79.4 TYPE 2 DIABETES MELLITUS WITHOUT COMPLICATION, WITH LONG-TERM CURRENT USE OF INSULIN (H): ICD-10-CM

## 2019-10-01 DIAGNOSIS — M10.9 ACUTE GOUT, UNSPECIFIED CAUSE, UNSPECIFIED SITE: ICD-10-CM

## 2019-10-01 DIAGNOSIS — E11.9 TYPE 2 DIABETES MELLITUS WITHOUT COMPLICATION, WITH LONG-TERM CURRENT USE OF INSULIN (H): ICD-10-CM

## 2019-10-16 ENCOUNTER — COMMUNICATION - HEALTHEAST (OUTPATIENT)
Dept: INTERNAL MEDICINE | Facility: CLINIC | Age: 69
End: 2019-10-16

## 2019-10-18 ENCOUNTER — COMMUNICATION - HEALTHEAST (OUTPATIENT)
Dept: INTERNAL MEDICINE | Facility: CLINIC | Age: 69
End: 2019-10-18

## 2019-10-18 DIAGNOSIS — F41.9 ANXIETY: ICD-10-CM

## 2019-10-28 ENCOUNTER — AMBULATORY - HEALTHEAST (OUTPATIENT)
Dept: LAB | Facility: CLINIC | Age: 69
End: 2019-10-28

## 2019-10-28 ENCOUNTER — AMBULATORY - HEALTHEAST (OUTPATIENT)
Dept: NURSING | Facility: CLINIC | Age: 69
End: 2019-10-28

## 2019-10-28 DIAGNOSIS — M10.9 ACUTE GOUT, UNSPECIFIED CAUSE, UNSPECIFIED SITE: ICD-10-CM

## 2019-10-28 DIAGNOSIS — Z23 FLU VACCINE NEED: ICD-10-CM

## 2019-10-28 LAB
ALBUMIN SERPL-MCNC: 3.4 G/DL (ref 3.5–5)
ALP SERPL-CCNC: 220 U/L (ref 45–120)
ALT SERPL W P-5'-P-CCNC: 35 U/L (ref 0–45)
ANION GAP SERPL CALCULATED.3IONS-SCNC: 12 MMOL/L (ref 5–18)
AST SERPL W P-5'-P-CCNC: 23 U/L (ref 0–40)
BILIRUB SERPL-MCNC: 0.6 MG/DL (ref 0–1)
BUN SERPL-MCNC: 27 MG/DL (ref 8–28)
CALCIUM SERPL-MCNC: 10.1 MG/DL (ref 8.5–10.5)
CHLORIDE BLD-SCNC: 106 MMOL/L (ref 98–107)
CO2 SERPL-SCNC: 24 MMOL/L (ref 22–31)
CREAT SERPL-MCNC: 1.22 MG/DL (ref 0.6–1.1)
GFR SERPL CREATININE-BSD FRML MDRD: 44 ML/MIN/1.73M2
GLUCOSE BLD-MCNC: 219 MG/DL (ref 70–125)
POTASSIUM BLD-SCNC: 4.5 MMOL/L (ref 3.5–5)
PROT SERPL-MCNC: 7 G/DL (ref 6–8)
SODIUM SERPL-SCNC: 142 MMOL/L (ref 136–145)
URATE SERPL-MCNC: 6.2 MG/DL (ref 2–7.5)

## 2019-11-04 ENCOUNTER — RECORDS - HEALTHEAST (OUTPATIENT)
Dept: ADMINISTRATIVE | Facility: OTHER | Age: 69
End: 2019-11-04

## 2019-11-07 ENCOUNTER — COMMUNICATION - HEALTHEAST (OUTPATIENT)
Dept: INTERNAL MEDICINE | Facility: CLINIC | Age: 69
End: 2019-11-07

## 2019-11-07 DIAGNOSIS — Z79.4 TYPE 2 DIABETES MELLITUS WITHOUT COMPLICATION, WITH LONG-TERM CURRENT USE OF INSULIN (H): ICD-10-CM

## 2019-11-07 DIAGNOSIS — R60.9 EDEMA: ICD-10-CM

## 2019-11-07 DIAGNOSIS — M10.9 GOUT: ICD-10-CM

## 2019-11-07 DIAGNOSIS — E06.3 CHRONIC LYMPHOCYTIC THYROIDITIS: ICD-10-CM

## 2019-11-07 DIAGNOSIS — E11.9 TYPE 2 DIABETES MELLITUS WITHOUT COMPLICATION, WITH LONG-TERM CURRENT USE OF INSULIN (H): ICD-10-CM

## 2019-11-11 ENCOUNTER — RECORDS - HEALTHEAST (OUTPATIENT)
Dept: HEALTH INFORMATION MANAGEMENT | Facility: CLINIC | Age: 69
End: 2019-11-11

## 2019-12-12 ENCOUNTER — COMMUNICATION - HEALTHEAST (OUTPATIENT)
Dept: INTERNAL MEDICINE | Facility: CLINIC | Age: 69
End: 2019-12-12

## 2019-12-12 DIAGNOSIS — I10 HYPERTENSION: ICD-10-CM

## 2019-12-12 DIAGNOSIS — M10.9 GOUT, UNSPECIFIED CAUSE, UNSPECIFIED CHRONICITY, UNSPECIFIED SITE: ICD-10-CM

## 2019-12-27 ENCOUNTER — COMMUNICATION - HEALTHEAST (OUTPATIENT)
Dept: INTERNAL MEDICINE | Facility: CLINIC | Age: 69
End: 2019-12-27

## 2020-01-14 ENCOUNTER — COMMUNICATION - HEALTHEAST (OUTPATIENT)
Dept: INTERNAL MEDICINE | Facility: CLINIC | Age: 70
End: 2020-01-14

## 2020-01-14 DIAGNOSIS — M62.838 MUSCLE SPASMS OF BOTH LOWER EXTREMITIES: ICD-10-CM

## 2020-01-22 NOTE — LETTER
July 31, 2019      Meggan Everett  [address] [place] [id number]        Dear ,    We are writing to inform you of your test results.    Your liver tests are getting worse. It might be related to Allopurinol use. I recommend to discontinue it, will repeat the labs in 2 weeks and if better will consider another medication Uloric. You do not need Colchicine. If gout flare happens in 2 weeks will treat with prednisone.     Adin Valerio MD   7/31/2019   1:00 PM     Resulted Orders   Uric acid   Result Value Ref Range    Uric Acid 5.3 2.6 - 6.0 mg/dL   Creatinine   Result Value Ref Range    Creatinine 1.14 (H) 0.52 - 1.04 mg/dL    GFR Estimate 49 (L) >60 mL/min/[1.73_m2]      Comment:      Non  GFR Calc  Starting 12/18/2018, serum creatinine based estimated GFR (eGFR) will be   calculated using the Chronic Kidney Disease Epidemiology Collaboration   (CKD-EPI) equation.      GFR Estimate If Black 57 (L) >60 mL/min/[1.73_m2]      Comment:       GFR Calc  Starting 12/18/2018, serum creatinine based estimated GFR (eGFR) will be   calculated using the Chronic Kidney Disease Epidemiology Collaboration   (CKD-EPI) equation.     ALT   Result Value Ref Range     (H) 0 - 50 U/L   AST   Result Value Ref Range    AST 63 (H) 0 - 45 U/L   CK total   Result Value Ref Range    CK Total 79 30 - 225 U/L                       
81

## 2020-02-03 ENCOUNTER — RECORDS - HEALTHEAST (OUTPATIENT)
Dept: ADMINISTRATIVE | Facility: OTHER | Age: 70
End: 2020-02-03

## 2020-02-03 LAB — COLOGUARD-ABSTRACT: NEGATIVE

## 2020-02-13 ENCOUNTER — RECORDS - HEALTHEAST (OUTPATIENT)
Dept: HEALTH INFORMATION MANAGEMENT | Facility: CLINIC | Age: 70
End: 2020-02-13

## 2020-02-24 ENCOUNTER — OFFICE VISIT - HEALTHEAST (OUTPATIENT)
Dept: INTERNAL MEDICINE | Facility: CLINIC | Age: 70
End: 2020-02-24

## 2020-02-24 DIAGNOSIS — J44.89 ASTHMATIC BRONCHITIS , CHRONIC (H): ICD-10-CM

## 2020-02-24 DIAGNOSIS — E06.3 CHRONIC LYMPHOCYTIC THYROIDITIS: ICD-10-CM

## 2020-02-24 DIAGNOSIS — R60.9 EDEMA: ICD-10-CM

## 2020-02-24 DIAGNOSIS — I10 HYPERTENSION: ICD-10-CM

## 2020-02-24 DIAGNOSIS — E11.9 TYPE 2 DIABETES MELLITUS WITHOUT COMPLICATION, WITH LONG-TERM CURRENT USE OF INSULIN (H): ICD-10-CM

## 2020-02-24 DIAGNOSIS — M62.838 MUSCLE SPASMS OF BOTH LOWER EXTREMITIES: ICD-10-CM

## 2020-02-24 DIAGNOSIS — I10 ESSENTIAL HYPERTENSION: ICD-10-CM

## 2020-02-24 DIAGNOSIS — M10.9 GOUT: ICD-10-CM

## 2020-02-24 DIAGNOSIS — M17.0 PRIMARY OSTEOARTHRITIS OF BOTH KNEES: ICD-10-CM

## 2020-02-24 DIAGNOSIS — Z79.4 TYPE 2 DIABETES MELLITUS WITHOUT COMPLICATION, WITH LONG-TERM CURRENT USE OF INSULIN (H): ICD-10-CM

## 2020-02-24 LAB
HBA1C MFR BLD: 7.7 % (ref 3.5–6)
TSH SERPL DL<=0.005 MIU/L-ACNC: 0.89 UIU/ML (ref 0.3–5)

## 2020-02-24 ASSESSMENT — MIFFLIN-ST. JEOR: SCORE: 1757.88

## 2020-03-02 ENCOUNTER — HEALTH MAINTENANCE LETTER (OUTPATIENT)
Age: 70
End: 2020-03-02

## 2020-03-14 ENCOUNTER — COMMUNICATION - HEALTHEAST (OUTPATIENT)
Dept: INTERNAL MEDICINE | Facility: CLINIC | Age: 70
End: 2020-03-14

## 2020-03-14 DIAGNOSIS — F41.9 ANXIETY: ICD-10-CM

## 2020-03-20 ENCOUNTER — COMMUNICATION - HEALTHEAST (OUTPATIENT)
Dept: INTERNAL MEDICINE | Facility: CLINIC | Age: 70
End: 2020-03-20

## 2020-03-20 DIAGNOSIS — M62.838 MUSCLE SPASMS OF BOTH LOWER EXTREMITIES: ICD-10-CM

## 2020-05-08 ENCOUNTER — COMMUNICATION - HEALTHEAST (OUTPATIENT)
Dept: INTERNAL MEDICINE | Facility: CLINIC | Age: 70
End: 2020-05-08

## 2020-05-08 DIAGNOSIS — B37.9 CANDIDA INFECTION: ICD-10-CM

## 2020-06-12 ENCOUNTER — COMMUNICATION - HEALTHEAST (OUTPATIENT)
Dept: INTERNAL MEDICINE | Facility: CLINIC | Age: 70
End: 2020-06-12

## 2020-06-12 DIAGNOSIS — F41.9 ANXIETY: ICD-10-CM

## 2020-07-24 ENCOUNTER — COMMUNICATION - HEALTHEAST (OUTPATIENT)
Dept: INTERNAL MEDICINE | Facility: CLINIC | Age: 70
End: 2020-07-24

## 2020-07-24 DIAGNOSIS — E78.5 HYPERLIPIDEMIA: ICD-10-CM

## 2020-07-24 DIAGNOSIS — Z79.4 TYPE 2 DIABETES MELLITUS WITHOUT COMPLICATION, WITH LONG-TERM CURRENT USE OF INSULIN (H): ICD-10-CM

## 2020-07-24 DIAGNOSIS — E11.9 TYPE 2 DIABETES MELLITUS WITHOUT COMPLICATION, WITH LONG-TERM CURRENT USE OF INSULIN (H): ICD-10-CM

## 2020-07-27 ENCOUNTER — COMMUNICATION - HEALTHEAST (OUTPATIENT)
Dept: INTERNAL MEDICINE | Facility: CLINIC | Age: 70
End: 2020-07-27

## 2020-07-27 DIAGNOSIS — E11.9 TYPE 2 DIABETES MELLITUS WITHOUT COMPLICATION, WITH LONG-TERM CURRENT USE OF INSULIN (H): ICD-10-CM

## 2020-07-27 DIAGNOSIS — E78.5 HYPERLIPIDEMIA: ICD-10-CM

## 2020-07-27 DIAGNOSIS — Z79.4 TYPE 2 DIABETES MELLITUS WITHOUT COMPLICATION, WITH LONG-TERM CURRENT USE OF INSULIN (H): ICD-10-CM

## 2020-09-28 ENCOUNTER — OFFICE VISIT - HEALTHEAST (OUTPATIENT)
Dept: INTERNAL MEDICINE | Facility: CLINIC | Age: 70
End: 2020-09-28

## 2020-09-28 DIAGNOSIS — M17.0 PRIMARY OSTEOARTHRITIS OF BOTH KNEES: ICD-10-CM

## 2020-09-28 ASSESSMENT — PATIENT HEALTH QUESTIONNAIRE - PHQ9: SUM OF ALL RESPONSES TO PHQ QUESTIONS 1-9: 11

## 2020-10-18 ENCOUNTER — COMMUNICATION - HEALTHEAST (OUTPATIENT)
Dept: INTERNAL MEDICINE | Facility: CLINIC | Age: 70
End: 2020-10-18

## 2020-10-18 DIAGNOSIS — F41.9 ANXIETY: ICD-10-CM

## 2020-11-11 ENCOUNTER — COMMUNICATION - HEALTHEAST (OUTPATIENT)
Dept: INTERNAL MEDICINE | Facility: CLINIC | Age: 70
End: 2020-11-11

## 2020-11-11 ENCOUNTER — OFFICE VISIT - HEALTHEAST (OUTPATIENT)
Dept: INTERNAL MEDICINE | Facility: CLINIC | Age: 70
End: 2020-11-11

## 2020-11-11 DIAGNOSIS — G47.33 OSA (OBSTRUCTIVE SLEEP APNEA): ICD-10-CM

## 2020-11-11 DIAGNOSIS — E11.65 UNCONTROLLED TYPE 2 DIABETES MELLITUS WITH HYPERGLYCEMIA (H): ICD-10-CM

## 2020-11-11 DIAGNOSIS — L08.9 LOCAL INFECTION OF SKIN AND SUBCUTANEOUS TISSUE: ICD-10-CM

## 2020-11-16 ENCOUNTER — COMMUNICATION - HEALTHEAST (OUTPATIENT)
Dept: INTERNAL MEDICINE | Facility: CLINIC | Age: 70
End: 2020-11-16

## 2020-11-16 DIAGNOSIS — Z79.4 TYPE 2 DIABETES MELLITUS WITHOUT COMPLICATION, WITH LONG-TERM CURRENT USE OF INSULIN (H): ICD-10-CM

## 2020-11-16 DIAGNOSIS — E11.9 TYPE 2 DIABETES MELLITUS WITHOUT COMPLICATION, WITH LONG-TERM CURRENT USE OF INSULIN (H): ICD-10-CM

## 2020-11-20 ENCOUNTER — HOSPITAL ENCOUNTER (OUTPATIENT)
Dept: MAMMOGRAPHY | Facility: CLINIC | Age: 70
Discharge: HOME | End: 2020-11-20
Attending: INTERNAL MEDICINE | Admitting: INTERNAL MEDICINE
Payer: MEDICARE

## 2020-11-20 DIAGNOSIS — Z12.31 VISIT FOR SCREENING MAMMOGRAM: ICD-10-CM

## 2020-11-20 PROCEDURE — 77067 SCR MAMMO BI INCL CAD: CPT

## 2020-11-23 ENCOUNTER — OFFICE VISIT - HEALTHEAST (OUTPATIENT)
Dept: INTERNAL MEDICINE | Facility: CLINIC | Age: 70
End: 2020-11-23

## 2020-11-23 DIAGNOSIS — E78.5 HYPERLIPIDEMIA: ICD-10-CM

## 2020-11-23 DIAGNOSIS — I10 ESSENTIAL HYPERTENSION: ICD-10-CM

## 2020-11-23 DIAGNOSIS — R60.9 EDEMA: ICD-10-CM

## 2020-11-23 DIAGNOSIS — Z79.4 TYPE 2 DIABETES MELLITUS WITHOUT COMPLICATION, WITH LONG-TERM CURRENT USE OF INSULIN (H): ICD-10-CM

## 2020-11-23 DIAGNOSIS — E11.9 TYPE 2 DIABETES MELLITUS WITHOUT COMPLICATION, WITH LONG-TERM CURRENT USE OF INSULIN (H): ICD-10-CM

## 2020-11-23 DIAGNOSIS — M62.838 MUSCLE SPASMS OF BOTH LOWER EXTREMITIES: ICD-10-CM

## 2020-11-23 DIAGNOSIS — I10 HYPERTENSION: ICD-10-CM

## 2020-11-23 DIAGNOSIS — E06.3 CHRONIC LYMPHOCYTIC THYROIDITIS: ICD-10-CM

## 2020-11-23 DIAGNOSIS — M10.9 GOUT: ICD-10-CM

## 2020-12-10 ENCOUNTER — COMMUNICATION - HEALTHEAST (OUTPATIENT)
Dept: INTERNAL MEDICINE | Facility: CLINIC | Age: 70
End: 2020-12-10

## 2020-12-10 DIAGNOSIS — M17.0 PRIMARY OSTEOARTHRITIS OF BOTH KNEES: ICD-10-CM

## 2020-12-14 ENCOUNTER — HEALTH MAINTENANCE LETTER (OUTPATIENT)
Age: 70
End: 2020-12-14

## 2020-12-27 ENCOUNTER — COMMUNICATION - HEALTHEAST (OUTPATIENT)
Dept: INTERNAL MEDICINE | Facility: CLINIC | Age: 70
End: 2020-12-27

## 2021-04-14 ENCOUNTER — COMMUNICATION - HEALTHEAST (OUTPATIENT)
Dept: INTERNAL MEDICINE | Facility: CLINIC | Age: 71
End: 2021-04-14

## 2021-04-14 DIAGNOSIS — F41.9 ANXIETY: ICD-10-CM

## 2021-04-18 ENCOUNTER — HEALTH MAINTENANCE LETTER (OUTPATIENT)
Age: 71
End: 2021-04-18

## 2021-05-05 ENCOUNTER — NURSE TRIAGE (OUTPATIENT)
Dept: NURSING | Facility: CLINIC | Age: 71
End: 2021-05-05

## 2021-05-05 ENCOUNTER — RECORDS - HEALTHEAST (OUTPATIENT)
Dept: SCHEDULING | Facility: CLINIC | Age: 71
End: 2021-05-05

## 2021-05-17 ENCOUNTER — RECORDS - HEALTHEAST (OUTPATIENT)
Dept: ADMINISTRATIVE | Facility: OTHER | Age: 71
End: 2021-05-17

## 2021-05-17 LAB — RETINOPATHY: POSITIVE

## 2021-05-20 ENCOUNTER — RECORDS - HEALTHEAST (OUTPATIENT)
Dept: HEALTH INFORMATION MANAGEMENT | Facility: CLINIC | Age: 71
End: 2021-05-20

## 2021-05-27 ASSESSMENT — PATIENT HEALTH QUESTIONNAIRE - PHQ9: SUM OF ALL RESPONSES TO PHQ QUESTIONS 1-9: 11

## 2021-05-27 NOTE — TELEPHONE ENCOUNTER
Message           ----- Message -----   From: Meggan Everett   Sent: 3/24/2019  10:24 AM   To: Carla Support Team Pool   Subject: Letter for Federal Jury Duty                       ----- Message from Carla, Bill sent at 3/24/2019 10:24 AM CDT -----      Topic: Procedural Question      Hi Dr. Coon,      Just received a summons for Federal Jury Duty in [place].  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  It is difficult to sit for long periods of time due to the neuropathy.  Thank you!!!      Meggan Everett

## 2021-05-27 NOTE — TELEPHONE ENCOUNTER
Spoke with pt and explained that her letter was written, and that she could access it through her mychart.  Pt understanding.

## 2021-05-28 ASSESSMENT — ASTHMA QUESTIONNAIRES: ACT_TOTALSCORE: 25

## 2021-05-28 NOTE — TELEPHONE ENCOUNTER
Hi Dr. Coon,      A few weeks ago I requsted a letter in regards to federal jury duty.  I submitted your letter to the federal court and they denied my dismissal.  I just received another letter from the federal court telling me that I need to report on May 14th at 7:30 at the court house in [place].  I never drive downtown and can not get myself mobile enough to be their by 7:30 a.m.  With my back and leg problems it's just not do able.  I just checked out public transit and I would have to take the [employer] at a park n ride and then a bus to get there.  This is totally stressing me out.  Plus sitting for more than 15 minutes is impossible.  Is there anything that you can do to help me get a dismissal?  They gave me the number of a jury  that I can call.....it's [phone].   I am also concerned about having a vertigo attack......they seem to happen when I am stressed and have anxiety.  Thank you for your help.      Meggan

## 2021-05-28 NOTE — TELEPHONE ENCOUNTER
I would encourage her to call. I don't know what the medical exceptions for jury duty are, though we can only write a letter to attest to her medical problems and mobility difficulties.  I'm certainly happy to help where I can

## 2021-05-28 NOTE — PROGRESS NOTES
"[facility] Note    Meggan DANYA Everett   68 y.o. female    Date of Visit: 4/30/2019  Chief Complaint   Patient presents with     Diabetes     Generalized Body Aches     worsening over the last 2 weeks       ASSESSMENT/PLAN  1. Type 2 diabetes mellitus without complication, with long-term current use of insulin (H)  Glycosylated Hemoglobin A1c    pen needle, diabetic (COMFORT EZ PEN NEEDLES) 31 gauge x 1/4\" Ndle    insulin glargine (LANTUS; BASAGLAR) 100 unit/mL (3 mL) pen    DISCONTINUED: blood glucose test (ACCU-CHEK YARELIS) strips   2. Essential hypertension  DISCONTINUED: metoprolol succinate (TOPROL-XL) 100 MG 24 hr tablet   3. Visit for screening mammogram  Mammo Screening Bilateral   4. Age-related osteoporosis without current pathological fracture     5. Myalgia  CK Total    Thyroid Stimulating Hormone (TSH)    Hepatic Profile   6. Hashimoto's Thyroiditis     7. BMI 45.0-49.9, adult (H)     8. Hyperlipidemia  rosuvastatin (CRESTOR) 10 MG tablet     ---------------------------------------------    1.  A1c elevated at 8.1, increase Lantus from 12 units twice a day up to 14 units twice a day, return within 3 months or earlier for recheck    2.  Blood pressure at goal, no changes    3.  Update mammogram    4.  She is being managed with Prolia as a first-line agent, has not been on bisphosphonate before.  She should be bridged if ever taken off of Prolia, likely with alendronate.  This is being managed by her rheumatologist.    5.  She has myalgias, unclear cause.  This could be related to rosuvastatin Will reduce the rosuvastatin down to 5 mg daily to see if there is any improvement, could consider 3 times a week dosing up to see if there is any association.    6.  Defer to endocrinology the thyroid management    7.  Weight loss would be beneficial long-term, did not discuss today.    8.  See above    Return in about 3 months (around 7/30/2019) for Recheck, Diabetes.      SUBJECTIVE    Meggan Everett " is a 68-year-old woman with history of obesity, diabetes, gout, and osteoporosis, who is here to follow-up.    She has been working with [facility] as well as endocrinology for gout and osteoporosis/diabetes, respectively.    On February 19, she was administered her first injection of Prolia.  Since then, she has had symptoms of asthenia, myalgias, and wonders if it could be from the injection itself.  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  She also has some lateral foot/leg numbness, but this is pre-existing.    Through the rheumatologist, she was put on allopurinol and most recently when the uric acid was checked, it was around 6, and the dose of 200 mg daily was not changed as of January.  She has history of tophaceous gout, notes that some of the tophi are improving, some in her fingertips are still present.  She is also on colchicine.    She uses Celebrex, has been on it for several years, tends to avoid NSAIDs.    In regards to the management of diabetes, she is on Lantus 12 units twice a day, she thinks that she was on 25 units twice a day in the past.  She takes 6 units with each meal using the short acting insulin.  She checks her blood sugar 3-4 times a day.  It is usually in the 200s in the morning as of the last 2 weeks, but otherwise is in the low 100s before meals.      ROS:   Per HPI, all other systems negative     Medications, allergies, and problem list were reviewed and updated    Patient Active Problem List   Diagnosis     Hypothyroidism     Hyperparathyroidism     Hyperlipidemia     Serum Enzyme Levels - Alkaline Phosphatase Elevated     Hypertension     Hashimoto's Thyroiditis     Type 2 diabetes mellitus without complication, with long-term current use of insulin (H)     BMI 45.0-49.9, adult (H)     Migraine Headache     Esophageal reflux     Gait - Swing Phase Foot Drop Right     Osteopenia      RBBB     Obstructive sleep apnea syndrome     Age-related osteoporosis without current pathological fracture     Acute gout, unspecified cause, unspecified site     Itchy eyes     Past Medical History:   Diagnosis Date     Migraine with aura      Current Outpatient Medications   Medication Sig Dispense Refill     albuterol (PROAIR HFA;PROVENTIL HFA;VENTOLIN HFA) 90 mcg/actuation inhaler Inhale 2 puffs every 6 (six) hours as needed for wheezing. 1 each 0     albuterol (PROVENTIL) 2.5 mg /3 mL (0.083 %) nebulizer solution Use 1 vial 4 times daily 75 mL 3     allopurinol (ZYLOPRIM) 100 MG tablet Take 200 mg by mouth daily.             aspirin 81 MG EC tablet Take 1 tablet (81 mg total) by mouth daily. 90 tablet 3     azelastine (OPTIVAR) 0.05 % ophthalmic solution Administer 1 drop to both eyes 2 (two) times a day. 6 mL 12     celecoxib (CELEBREX) 200 MG capsule Take 1 capsule (200 mg total) by mouth daily. 90 capsule 3     colchicine 0.6 mg tablet Take 0.6 mg by mouth daily.       furosemide (LASIX) 40 MG tablet Take 1 tablet daily as directed 90 tablet 5     insulin aspart (NOVOLOG FLEXPEN U-100 INSULIN SUBQ) Inject 5-6 Units under the skin 3 (three) times a day before meals.       insulin glargine (LANTUS; BASAGLAR) 100 unit/mL (3 mL) pen Inject 14 Units under the skin 2 (two) times a day. 12 adj dose pen 5     irbesartan (AVAPRO) 150 MG tablet Take 1 tablet (150 mg total) by mouth at bedtime. 90 tablet 3     levothyroxine (SYNTHROID, LEVOTHROID) 125 MCG tablet Take 1 tablet (125 mcg total) by mouth daily. 90 tablet 3     LORazepam (ATIVAN) 0.5 MG tablet Take 1-2 tablets (0.5-1 mg total) by mouth at bedtime. 180 tablet 5     rosuvastatin (CRESTOR) 10 MG tablet Take 0.5 tablets (5 mg total) by mouth at bedtime. 90 tablet 3     tiZANidine (ZANAFLEX) 4 MG tablet Take 1 tablet (4 mg total) by mouth at bedtime. 90 tablet 3     blood glucose test (ACCU-CHEK YARELIS) strips Test 4 times daily 300 strip 3     generic lancets  "(FINGERSTIX LANCETS) Use as directed 4 times daily. Dispense brand per patient's insurance at pharmacy discretion. 200 each 3     metoprolol succinate (TOPROL-XL) 100 MG 24 hr tablet Take 1 tablet (100 mg total) by mouth daily. 90 tablet 3     pen needle, diabetic (COMFORT EZ PEN NEEDLES) 31 gauge x 1/4\" Ndle Use 4 times a day with insulin pen 300 each 3     No current facility-administered medications for this visit.      Allergies   Allergen Reactions     Adhesive Tape-Silicones Other (See Comments)     Codeine      Metformin      Stomach pain      Tetanus Toxoid        EXAM  Vitals:    04/30/19 1142   BP: 124/72   Patient Site: Left Arm   Patient Position: Sitting   Cuff Size: Adult Large   Pulse: 72   SpO2: 100%   Weight: (!) 268 lb 11.2 oz (121.9 kg)   Height: 5' 3\" (1.6 m)         General: Alert, no distress  Heart: Regular rate and rhythm, no murmurs  Lungs: Clear to auscultation bilaterally    Results reviewed:     Reviewed last rheumatology note on 1/11/19 by Dr. Valerio, was referred to endocrinology at that visit for management of osteoporosis.  Also noted that without allopurinol, uric acid level was 9.6+.  Rev note by Dr. Howell on 1/18/19 from endocrinology, noted attempt to start glimepiride.  Also noted Prolia chosen as first-line osteoporosis treatment.    Data points: 4    Federico Milian DO  Internal Medicine  [facility]    "

## 2021-05-28 NOTE — TELEPHONE ENCOUNTER
Question following Office Visit  When did you see your provider: Today  What is your question: Patient stated she just got a call from [employer]. Patient stated her prescriptions should be send to the [facility] pharmacy and not [facility]. Please re-send the test strips, lancets, and metoprolol to [facility] by mail [facility].  Okay to leave a detailed message: No

## 2021-05-30 ENCOUNTER — RECORDS - HEALTHEAST (OUTPATIENT)
Dept: ADMINISTRATIVE | Facility: CLINIC | Age: 71
End: 2021-05-30

## 2021-05-30 VITALS — WEIGHT: 274 LBS | BODY MASS INDEX: 48.55 KG/M2 | HEIGHT: 63 IN

## 2021-05-30 VITALS — BODY MASS INDEX: 48.23 KG/M2 | WEIGHT: 272.2 LBS | HEIGHT: 63 IN

## 2021-05-30 VITALS — WEIGHT: 272 LBS | HEIGHT: 63 IN | BODY MASS INDEX: 48.2 KG/M2

## 2021-05-31 ENCOUNTER — RECORDS - HEALTHEAST (OUTPATIENT)
Dept: ADMINISTRATIVE | Facility: CLINIC | Age: 71
End: 2021-05-31

## 2021-05-31 VITALS — WEIGHT: 290.1 LBS | BODY MASS INDEX: 51.39 KG/M2

## 2021-05-31 VITALS — BODY MASS INDEX: 49.4 KG/M2 | WEIGHT: 278.9 LBS

## 2021-05-31 VITALS — WEIGHT: 292 LBS | BODY MASS INDEX: 51.73 KG/M2

## 2021-05-31 VITALS — WEIGHT: 247.2 LBS | BODY MASS INDEX: 43.79 KG/M2

## 2021-05-31 NOTE — TELEPHONE ENCOUNTER
Breathing is still tight    Cough is productive    Doing her nebs about 4 times per day    Coughing is keeping her up during the night    Is shortness of breath     Is feeling fatigued    No fever    Advised that she go in and be seen    Kamille Hawley, RN   [facility] Refill and Triage Nurse  9:26 AM  8/10/2019    Reason for Disposition    [1] MILD asthma attack (e.g., no SOB at rest, mild SOB with walking, speaks normally in sentences, mild wheezing) AND [2]  persists > 24 hours on appropriate treatment    Protocols used: ASTHMA ATTACK-A-AH

## 2021-05-31 NOTE — PROGRESS NOTES
Chief Complaint   Patient presents with     Asthma     last used neb this morning.        HPI:  Meggan Everett is a 68 y.o. female past medical history of DM 2, asthma, hypothyroidism, hyperlipidemia, hypertension who presents today complaining of asthma exacerbation that started 2 days ago.  She was exposed to cigarette smoke the day before the asthma exacerbation began.  She has been using albuterol nebulizers at home, but it is not been helping significantly.  She is been coughing frequently and having some productivity to her cough.  She denies any fevers or chills.  In the past she has tolerated prednisone, but it does increase her blood sugars.  Recently her blood sugars have not been too bad.    History obtained from the patient.    Problem List:  2019-01: Acute gout, unspecified cause, unspecified site  2019-01: Itchy eyes  2019-01: Age-related osteoporosis without current pathological   fracture  2016-12: Obstructive sleep apnea syndrome  2015-11: RBBB  2015-11: Foot pain, bilateral  Hypothyroidism  Hyperparathyroidism  Hyperlipidemia  Serum Enzyme Levels - Alkaline Phosphatase Elevated  Hypertension  Limb Pain  Hashimoto's Thyroiditis  Type 2 diabetes mellitus without complication, with long-term current   use of insulin (H)  Asthma  BMI 45.0-49.9, adult (H)  Migraine Headache  Esophageal reflux  Gait - Swing Phase Foot Drop Right  Osteopenia      Past Medical History:   Diagnosis Date     Migraine with aura        Social History     Tobacco Use     Smoking status: Never Smoker     Smokeless tobacco: Never Used   Substance Use Topics     Alcohol use: No       Review of Systems   Constitutional: Negative for chills and fever.   HENT: Positive for sore throat (Irritated from coughing). Negative for congestion, ear pain and rhinorrhea.    Respiratory: Positive for cough, chest tightness, shortness of breath and wheezing.    Cardiovascular: Negative for chest pain.   Gastrointestinal: Negative for nausea and  vomiting.       Vitals:    08/04/19 1146 08/04/19 1158   BP: 155/89 146/82   Patient Site: Right Arm Right Arm   Patient Position: Sitting Sitting   Cuff Size: Adult Regular Adult Regular   Pulse: 79    Resp: 22    Temp: 97.8  F (36.6  C)    TempSrc: Oral    SpO2: 97%    Weight: (!) 277 lb 3 oz (125.7 kg)        Physical Exam   Constitutional: She appears well-developed and well-nourished. No distress.   HENT:   Head: Normocephalic and atraumatic.   Right Ear: External ear normal.   Left Ear: External ear normal.   Mouth/Throat: Uvula is midline and oropharynx is clear and moist. No oropharyngeal exudate, posterior oropharyngeal edema or posterior oropharyngeal erythema.   Eyes: Conjunctivae are normal. Right eye exhibits no discharge. Left eye exhibits no discharge.   Cardiovascular: Normal rate, regular rhythm and normal heart sounds.   Pulmonary/Chest: Effort normal. No stridor. No respiratory distress. She has wheezes. She has no rhonchi. She has no rales.   Without auscultation of the lungs.  There are coarse lungs throughout, but no areas of crackles or decreased breath sounds.   Skin: She is not diaphoretic.   Psychiatric: She has a normal mood and affect. Her behavior is normal. Judgment and thought content normal.        Clinical Decision Making:  Patient with past medical history is experiencing asthma exacerbation due to exposure to cigarette smoke.  Nebulizers have not been controlling her asthma well enough.  Patient also has a history of diabetes and loses slight control of her blood sugars when she takes prednisone.  Recommend that we start on prednisone even though she will expect an increase in her blood sugars due to the benefits outweighing the risks.  I have a low suspicion for pneumonia as the patient is not experiencing any fevers or chills and there are no crackles or decreased breath sounds on exam today.  At the end of the encounter, I discussed results, diagnosis, medications. Discussed red  flags for immediate return to clinic/ER, as well as indications for follow up if no improvement. Patient understood and agreed to plan. Patient was stable for discharge.    1. Moderate asthma with acute exacerbation, unspecified whether persistent  predniSONE (DELTASONE) 20 MG tablet   2. Type 2 diabetes mellitus without complication, with long-term current use of insulin (H)           Patient Instructions   1.  Begin taking prednisone according to bottle instructions.  Expected uptake in your blood sugars with this medication.  Call if you have concerns by your blood sugars being too high.  2.  Continue with albuterol nebulizers as you have been.  You may take them every 4-6 hours as needed.  3.  Follow-up with your primary care provider if you are not having any improvement in your symptoms over the course the next 2 to 3 days.  4.  Seek emergency medical attention if you develop worsening shortness of breath, chest pain, dizziness/lightheadedness.  5.  I have a low suspicion for pneumonia as your lungs have no crackling sounds, decreased breath sounds, and you have had no fevers.

## 2021-05-31 NOTE — PATIENT INSTRUCTIONS - HE
1.  Begin taking prednisone according to bottle instructions.  Expected uptake in your blood sugars with this medication.  Call if you have concerns by your blood sugars being too high.  2.  Continue with albuterol nebulizers as you have been.  You may take them every 4-6 hours as needed.  3.  Follow-up with your primary care provider if you are not having any improvement in your symptoms over the course the next 2 to 3 days.  4.  Seek emergency medical attention if you develop worsening shortness of breath, chest pain, dizziness/lightheadedness.  5.  I have a low suspicion for pneumonia as your lungs have no crackling sounds, decreased breath sounds, and you have had no fevers.

## 2021-05-31 NOTE — TELEPHONE ENCOUNTER
Discussed-- will start daily colchicine as prophylaxis.  I will look into this to see if allopurinol is likely a culprit or not.  Hepatitis screen was negative.    Addendum: She was started on colchicine and allopurinol 100 mg daily at the end of October 2018, had several lab checks which showed that uric acid level had lowered appropriately, at least after increasing to 200 mg, and ALT was around 54.    There is no good reason for the uric acid to jump up as it did, it is now back to normal.  I think we can cautiously add allopurinol 150 mg daily, which it appears was the plan of the rheumatologist.  After 1 week of being on allopurinol, should be able to stop colchicine.  We will check labs in 3 to 4 weeks

## 2021-05-31 NOTE — TELEPHONE ENCOUNTER
Test Results  Who is calling?:  The patient  Who ordered the test:  Federico Milian DO  Type of test: Lab  Date of test:  8/27/2019  Where was the test performed:  [facility]  What are your questions/concerns?:  The patient would like the test results. Specifically the uric acid test and the recommendations of what to do next to avoid a flare of gout. Additionally, the patient wonders if she should go back on the medications to prevent the flare based on the recent test.    Okay to leave a detailed message?:  Yes

## 2021-05-31 NOTE — PROGRESS NOTES
Assessment and Plan:       1. Routine general medical examination at a health care facility  Meggan Everett is a 68-year-old woman here for annual wellness visit.  Issues below are outlined individually.  Ideally, would advise a substantial weight loss, update of health directive, and other health maintenance items.    2. Screen for colon cancer  She is due for updated Cologuard in January 2020, order placed  - Cologuard    3. Encounter for screening for lipoid disorders  Check fasting labs  - Lipid [place], FASTING    4. Muscle spasms of both lower extremities  Renew medication by request  - tiZANidine (ZANAFLEX) 4 MG tablet; Take 1 tablet (4 mg total) by mouth at bedtime.  Dispense: 90 tablet; Refill: 3    5. Type 2 diabetes mellitus without complication, with long-term current use of insulin (H)  Check A1c, she suspects it may be elevated because of the prednisone, even just being on 5 mg prednisone daily seems to cause the blood sugars to go up to the 300s.  I had increased the Lantus insulin at the past visit.  I might advise her to increase to 20 mg twice a day while on prednisone.  - insulin aspart U-100 (NOVOLOG FLEXPEN U-100 INSULIN) 100 unit/mL (3 mL) injection pen; Inject 5-6 Units under the skin 3 (three) times a day before meals.  Dispense: 5 Pre-filled Pen Syringe; Refill: 3  - HM2(CBC w/o Differential)  - Basic Metabolic Panel  - Glycosylated Hemoglobin A1c    6. Hyperlipidemia  She has not noticed any improvement in pains when decreasing the rosuvastatin to 3 times a week.  - rosuvastatin (CRESTOR) 10 MG tablet; Take 1 tablet (10 mg total) by mouth 3 (three) times a week.  Dispense: 36 tablet; Refill: 3    7. Elevated ALT measurement  Unclear cause of elevated ALT and to the 150s, this may be related to allopurinol.  She is now off of it, we will recheck it since she has been off for 10 days.  Also check hepatitis screening.  Ultrasound was overall negative, aside from mild elevation in size  which I could not appreciate on exam.  --addendum: was on allopurinol and colchicine since Oct 2018, but elevation only came recently (now back to baseline since coming off).  Ok to continue daily colchicine.  Uloric would be >$400 per month.  I wonder if allopurinol could be cautiously tried again?  Will discuss with pharmacy.   - Hepatitis C Antibody (Anti-HCV)  - Hepatitis B Core Antibody (Anti-HBc)  - Hepatitis B Surface Antibody (Anti-HBs)  - Hepatitis B Surface Antigen (HBsAG)  - Hepatitis A Antibody, IgG (Anti-HAV, IgG)  - Hepatic Profile    8. Acute gout, unspecified cause, unspecified site  We will need to determine what the back-up plan is since she is off allopurinol.  Might consider daily colchicine versus Uloric  - Uric Acid    9. Hashimoto's Thyroiditis  TSH previously checked by endocrinology, no need for recheck.    10. Moderate persistent asthma with exacerbation  Recently seen by walk-in clinic, not optimally controlled, start controller steroid, advised her to stop prednisone as soon as possible.  - budesonide-formoterol (SYMBICORT) 160-4.5 mcg/actuation inhaler; Inhale 2 puffs 2 (two) times a day.  Dispense: 3 Inhaler; Refill: 3        The patient's current medical problems were reviewed.    The following health maintenance schedule was reviewed with the patient and provided in printed form in the after visit summary:   Health Maintenance   Topic Date Due     HEPATITIS C SCREENING  1950     ZOSTER VACCINES (1 of 2) 10/17/1999     MEDICARE ANNUAL WELLNESS VISIT  10/17/2014     DIABETES FOLLOW-UP  06/14/2019     INFLUENZA VACCINE RULE BASED (1) 08/01/2019     DIABETES OPHTHALMOLOGY EXAM  09/25/2019     COLOGUARD  01/04/2020     DIABETES FOOT EXAM  10/12/2019     DIABETES URINE MICROALBUMIN  10/12/2019     DIABETES HEMOGLOBIN A1C  10/30/2019     ASTHMA CONTROL TEST  12/14/2019     FALL RISK ASSESSMENT  12/14/2019     DXA SCAN  01/18/2020     MAMMOGRAM  07/30/2020     ADVANCE CARE PLANNING   11/16/2020     PNEUMOCOCCAL POLYSACCHARIDE VACCINE AGE 65 AND OVER  Completed     PNEUMOCOCCAL CONJUGATE VACCINE FOR ADULTS (PCV13 OR PREVNAR)  Completed     TD 18+ HE  Discontinued     FECAL OCCULT BLOOD/FIT ANNUAL COLON CANCER SCREENING  Discontinued        Subjective:   Chief Complaint: Meggan Everett is an 68 y.o. female here for an Annual Wellness visit.   HPI:      Meggan comes for annual wellness visit, also due for several other follow-ups.    Back in April, I saw her for diabetes, A1c was 8.1%, and increase the Lantus from 12 units up to 14 units twice a day, urged her to come back for recheck around now.    I recommended an update on the mammogram, done on July 30, was negative.  This was done through [facility].  She is due for updated Cologuard in January.    She has osteoporosis, is being managed by endocrinology through [facility] with [facility].  They are also managing Hashimoto's thyroiditis.    She has seen rheumatology as well, they checked the uric acid and was found to be 5.3    ALT was found to be 153, abdominal ultrasound was ordered and found to be normal aside from being borderline enlarged.  She was taken off of allopurinol, has been off for about 10 days.  She noticed that when she discontinued the colchicine, she noticed more achiness, so went back on it.  She is now off colchicine again, because the liver number was elevated.    She does not recall any diarrhea, though notably there is an outbreak of hepatitis A in [place].    She was seen in walk-in clinic on August 4 and 10 for asthma exacerbation, started prednisone 20 mg x 5 days, then came back about 6 days later when she was not feeling better, got another burst of prednisone and this time a azithromycin.  Chest x-ray was done and was negative.  Her rheumatologist recommended that she stay on low-dose prednisone 5 mg daily to help prevent gout flare while she is off of allopurinol and colchicine.    She feels better,  but still has some wheezing, not back to 100%, but she is back to her normal routine.    She is only on albuterol handheld inhaler, occasionally does the nebulizer.  About 10 years ago, she was on a controller steroid.  She notes that smoke tends to cause an exacerbation, also the season of late summer, fall seems to make the asthma bad, she is okay in the winter, however.    She was on Advair back at that time, had a sore throat, so this was discontinued.    She mentions that her sister was diagnosed with interstitial lung disease, had some sort of lung nodule outside the lung.  She will keep me informed on updates.    She does CBD oil, rubs this on her legs.  She describes the formulation being called to [employer].    We talked about weight loss    Review of Systems:    Please see above.  The rest of the review of systems are negative for all systems.    Patient Care Team:  Federico Milian DO as PCP - General (Internal Medicine)  Trevor Hastings, PharmD as Pharmacist (Pharmacist)     Patient Active Problem List   Diagnosis     Hyperparathyroidism     Hyperlipidemia     Serum Enzyme Levels - Alkaline Phosphatase Elevated     Hypertension     Hashimoto's Thyroiditis     Type 2 diabetes mellitus without complication, with long-term current use of insulin (H)     BMI 45.0-49.9, adult (H)     Migraine Headache     Esophageal reflux     Gait - Swing Phase Foot Drop Right     Osteopenia     RBBB     Obstructive sleep apnea syndrome     Age-related osteoporosis without current pathological fracture     Acute gout, unspecified cause, unspecified site     Itchy eyes     Past Medical History:   Diagnosis Date     Migraine with aura       Past Surgical History:   Procedure Laterality Date     NH BIOPSY OF BREAST, INCISIONAL      Description: Incisional Breast Biopsy;  Recorded: 11/17/2010;     NH HYSTEROSCOPY,W/ENDOMETRIAL ABLATION       NH JENSEN W/O FACETEC FORADRIAOT/DSKC 1/2 VRT SEG, CERVICAL  1998    Description:  Laminectomy Lumbar     PA LAP,CHOLECYSTECTOMY  1999      Family History   Problem Relation Age of Onset     Hypertension Mother         alive in her 90s     Atrial fibrillation Father         alive in his 90s     Heart disease Sister       Social History     Socioeconomic History     Marital status:      Spouse name: Not on file     Number of children: Not on file     Years of education: Not on file     Highest education level: Not on file   Occupational History     Not on file   Social Needs     Financial resource strain: Not on file     Food insecurity:     Worry: Not on file     Inability: Not on file     Transportation needs:     Medical: Not on file     Non-medical: Not on file   Tobacco Use     Smoking status: Never Smoker     Smokeless tobacco: Never Used   Substance and Sexual Activity     Alcohol use: No     Drug use: No     Sexual activity: Never   Lifestyle     Physical activity:     Days per week: Not on file     Minutes per session: Not on file     Stress: Not on file   Relationships     Social connections:     Talks on phone: Not on file     Gets together: Not on file     Attends Jehovah's witness service: Not on file     Active member of club or organization: Not on file     Attends meetings of clubs or organizations: Not on file     Relationship status: Not on file     Intimate partner violence:     Fear of current or ex partner: Not on file     Emotionally abused: Not on file     Physically abused: Not on file     Forced sexual activity: Not on file   Other Topics Concern     Not on file   Social History Narrative    She is  and is retired. She lives with her son and his family on the lower level of their house.  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  She has 4 grandchildren.  She does not smoke cigarettes or drink alcohol.      Current Outpatient Medications   Medication Sig Dispense Refill     albuterol (PROAIR HFA;PROVENTIL  "HFA;VENTOLIN HFA) 90 mcg/actuation inhaler Inhale 2 puffs every 6 (six) hours as needed for wheezing. 1 each 0     albuterol (PROVENTIL) 2.5 mg /3 mL (0.083 %) nebulizer solution Use 1 vial 4 times daily 75 mL 3     aspirin 81 MG EC tablet Take 1 tablet (81 mg total) by mouth daily. 90 tablet 3     azelastine (OPTIVAR) 0.05 % ophthalmic solution Administer 1 drop to both eyes 2 (two) times a day. 6 mL 12     benzonatate (TESSALON) 200 MG capsule Take 1 capsule (200 mg total) by mouth 3 (three) times a day as needed for cough. 30 capsule 0     blood glucose test (ACCU-CHEK YARELIS) strips Test 4 times daily 300 strip 3     celecoxib (CELEBREX) 200 MG capsule Take 1 capsule (200 mg total) by mouth daily. 90 capsule 3     furosemide (LASIX) 40 MG tablet Take 1 tablet daily as directed 90 tablet 5     generic lancets (FINGERSTIX LANCETS) Use as directed 4 times daily. Dispense brand per patient's insurance at pharmacy discretion. 200 each 3     insulin aspart U-100 (NOVOLOG FLEXPEN U-100 INSULIN) 100 unit/mL (3 mL) injection pen Inject 5-6 Units under the skin 3 (three) times a day before meals. 5 Pre-filled Pen Syringe 3     insulin glargine (LANTUS; BASAGLAR) 100 unit/mL (3 mL) pen Inject 14 Units under the skin 2 (two) times a day. 12 adj dose pen 5     irbesartan (AVAPRO) 150 MG tablet Take 1 tablet (150 mg total) by mouth at bedtime. 90 tablet 3     levothyroxine (SYNTHROID, LEVOTHROID) 125 MCG tablet Take 1 tablet (125 mcg total) by mouth daily. 90 tablet 3     LORazepam (ATIVAN) 0.5 MG tablet Take 1-2 tablets (0.5-1 mg total) by mouth at bedtime. 180 tablet 5     metoprolol succinate (TOPROL-XL) 100 MG 24 hr tablet Take 1 tablet (100 mg total) by mouth daily. 90 tablet 3     pen needle, diabetic (COMFORT EZ PEN NEEDLES) 31 gauge x 1/4\" Ndle Use 4 times a day with insulin pen 300 each 3     predniSONE (DELTASONE) 5 MG tablet Take 5 mg by mouth daily.       [START ON 8/28/2019] rosuvastatin (CRESTOR) 10 MG tablet " "Take 1 tablet (10 mg total) by mouth 3 (three) times a week. 36 tablet 3     tiZANidine (ZANAFLEX) 4 MG tablet Take 1 tablet (4 mg total) by mouth at bedtime. 90 tablet 3     budesonide-formoterol (SYMBICORT) 160-4.5 mcg/actuation inhaler Inhale 2 puffs 2 (two) times a day. 3 Inhaler 3     No current facility-administered medications for this visit.       Objective:   Vital Signs:   Visit Vitals  /86 (Patient Site: Left Arm, Patient Position: Sitting, Cuff Size: Adult Large)   Pulse 68   Ht 5' 1.5\" (1.562 m)   Wt (!) 283 lb 3 oz (128.5 kg)   SpO2 99%   BMI 52.64 kg/m         VisionScreening:  No exam data present     PHYSICAL EXAM    General: alert, no distress  HEENT: sclerae anicteric, moist oral mucosa  Lymphatics: No lymphadenopathy of the neck  Heart: Regular rate and rhythm, no murmurs.  No pretibial edema.  Warm extremities  Lungs: Clear to auscultation bilat  Gastrointestinal: abdomen is soft, non-tender, non-distended.  Liver not enlarged on exam  Skin: warm/dry, no rashes, there are a couple hypopigmented lesions measuring less than 5 mm round on the forearms.  No sign of actinic keratoses.  She has scattered cherry angiomas on her chest, no actinic keratoses on face or neck.  Neuro: no gross abnormalities         Assessment Results 8/27/2019   Activities of Daily Living No help needed   Instrumental Activities of Daily Living No help needed   Get Up and Go Score Less than 12 seconds   Mini Cog Total Score 5   Some recent data might be hidden     A Mini-Cog score of 0-2 suggests the possibility of dementia, score of 3-5 suggests no dementia    Identified Health Risks:     She is at risk for lack of exercise and has been provided with information to increase physical activity for the benefit of her well-being.  She is at risk for falling and has been provided with information to reduce the risk of falling at home.  Information regarding advance directives (living [place]), including where she can " download the appropriate form, was provided to the patient via the AVS.

## 2021-06-01 ENCOUNTER — RECORDS - HEALTHEAST (OUTPATIENT)
Dept: ADMINISTRATIVE | Facility: CLINIC | Age: 71
End: 2021-06-01

## 2021-06-02 VITALS — HEIGHT: 63 IN | WEIGHT: 271 LBS | BODY MASS INDEX: 48.02 KG/M2

## 2021-06-02 VITALS — WEIGHT: 269 LBS | BODY MASS INDEX: 47.65 KG/M2

## 2021-06-02 VITALS — WEIGHT: 287 LBS | BODY MASS INDEX: 50.84 KG/M2

## 2021-06-02 VITALS — WEIGHT: 287.38 LBS | BODY MASS INDEX: 50.91 KG/M2

## 2021-06-02 VITALS — WEIGHT: 270.8 LBS | BODY MASS INDEX: 47.97 KG/M2

## 2021-06-02 NOTE — TELEPHONE ENCOUNTER
Medication: Lorazepam  Last Date Filled 7/16/19   pulled: YES    Only PCP Prescribing? : YES  Taken as prescribed from physician notes? YES    CSA in last year: YES  Random Utox in last year: YES  (if any of the above answer NO - schedule with PCP)     Opioids + benzodiazepines? YES  (if the above answer YES - schedule with PCP every 6 months)     Is patient on the Executive Review Team? no      All responses suggest: Refilling prescription

## 2021-06-02 NOTE — TELEPHONE ENCOUNTER
Controlled Substance Refill Request  Medication:   Requested Prescriptions     Pending Prescriptions Disp Refills     LORazepam (ATIVAN) 0.5 MG tablet [Pharmacy Med Name: LORAZEPAM 0.5MG TABLETS] 180 tablet 0     Sig: TAKE 1 TO 2 TABLETS BY MOUTH EVERY NIGHT AT BEDTIME     Date Last Fill: 2/26/19  [facility]   Submit electronically to pharmacy  Controlled Substance Agreement on File:   Encounter-Level CSA Scan Date - 01/03/2018:    Scan on 1/3/2018: HE           Encounter-Level CSA Scan Date - 01/03/2018:    Scan on 1/8/2018 10:50 AM           Encounter-Level CSA Scan Date - 11/16/2015:    Scan on 11/18/2015  1:05 PM       Last office visit: 4/30/2019 Federico Milian DO Leslie White, RN BSBA [facility] Triage/Med Refill 10/18/2019 4:46 PM

## 2021-06-03 VITALS — BODY MASS INDEX: 51.95 KG/M2 | WEIGHT: 277.19 LBS

## 2021-06-03 VITALS — HEIGHT: 62 IN | WEIGHT: 283.19 LBS | BODY MASS INDEX: 52.11 KG/M2

## 2021-06-03 VITALS — WEIGHT: 283.4 LBS | BODY MASS INDEX: 53.11 KG/M2

## 2021-06-03 VITALS — BODY MASS INDEX: 47.61 KG/M2 | WEIGHT: 268.7 LBS | HEIGHT: 63 IN

## 2021-06-04 VITALS
HEIGHT: 62 IN | SYSTOLIC BLOOD PRESSURE: 128 MMHG | DIASTOLIC BLOOD PRESSURE: 72 MMHG | BODY MASS INDEX: 52.64 KG/M2 | HEART RATE: 72 BPM | OXYGEN SATURATION: 99 % | WEIGHT: 286.06 LBS

## 2021-06-06 NOTE — PROGRESS NOTES
"[facility] Note    Meggan Everett   69 y.o. female    Date of Visit: 2/24/2020  Chief Complaint   Patient presents with     Follow-up     CSA for Lorazepam     Diabetes       ASSESSMENT/PLAN  1. Primary osteoarthritis of both knees  XR Knees Bilateral 1 Or 2 VWS   2. Type 2 diabetes mellitus without complication, with long-term current use of insulin (H)  blood glucose test (ACCU-CHEK YARELIS) strips    insulin aspart U-100 (NOVOLOG FLEXPEN U-100 INSULIN) 100 unit/mL (3 mL) injection pen    insulin glargine (LANTUS SOLOSTAR PEN) 100 unit/mL (3 mL) pen    pen needle, diabetic (COMFORT EZ PEN NEEDLES) 31 gauge x 1/4\" Ndle    Glycosylated Hemoglobin A1c   3. Asthmatic bronchitis , chronic (H)  albuterol (PROAIR HFA;PROVENTIL HFA;VENTOLIN HFA) 90 mcg/actuation inhaler    albuterol (PROVENTIL) 2.5 mg /3 mL (0.083 %) nebulizer solution   4. Gout  allopurinoL (ZYLOPRIM) 100 MG tablet    celecoxib (CELEBREX) 200 MG capsule   5. Edema  furosemide (LASIX) 40 MG tablet   6. Hashimoto's Thyroiditis  levothyroxine (SYNTHROID, LEVOTHROID) 125 MCG tablet    Thyroid Stimulating Hormone (TSH)   7. Hypertension  irbesartan (AVAPRO) 150 MG tablet   8. Essential hypertension  metoprolol succinate (TOPROL-XL) 100 MG 24 hr tablet   9. Muscle spasms of both lower extremities  tiZANidine (ZANAFLEX) 4 MG tablet     ---------------------------------------------    1.  Previously diagnosed with arthritis in both knees over 5 years ago, knee pain has been severe, making it extremely difficult to climb stairs, and she needs to do so at her current residence.  X-rays now show severe right and moderate left arthritis.  I am recommending she see orthopedic surgery.  I recommend weight loss as well, though I think this will be difficult to achieve.    2.  A1c is 7.7%, but despite that she has been having hypoglycemic events down in the 40s at times.  I recommended that we decrease the Lantus from 20 units twice a day down to 16 units " twice a day.  At a time before she was on the prednisone, she was taking around 14 units twice a day.  She does not have any blood sugars to present to me, but I recommended that she bring them next time so we can help her with adjustments.  She might find she needs to take more mealtime insulin with this reduction in basal insulin.      3.  Refill by request, not currently an active issue    4.  Refill allopurinol, she has been tolerating a lower dose in the sense of the ALT and AST.  Alkaline phosphatase level has been persistently elevated in low 200s  Back on July 30, 2019, ALT was 145 and AST was 63.  She was previously on 200 mg daily of allopurinol, now on 150 mg.  Based on uric acid level around 6, I would not increase further.    5.  Refill furosemide prescription.  Not discussed in detail    6.  Update TSH level, refill levothyroxine    7.  Refill irbesartan, blood pressure acceptably controlled    8.  See above    9.  Refill tizanidine for use when needed for muscle spasms    Return in about 3 months (around 5/24/2020) for Recheck, Diabetes.      SUBJECTIVE    Meggan DANYA Everett is here for DM check.      DM: takes lantus 20 u two times a day with SSI novolog (6-10 units).  She does not have a meter with her, reports 120-140 with a couple lows in the 40s.  She is off prednisone as of Dec.    She worries that she is not getting the insulin in her body.      We had a liver work-up which was essentially negative for other causes of transaminase elevation.  She has been tolerating a low dose of allopurinol.    Her knees are giving her a lot of pain.   5y ago was told she had OA.  She is getting a lot of cracking and pulling.  It is challenging with steps (lives with kids).  She has tried tape, pool therappy., otc     ROS:   Per HPI, all other systems negative     Medications, allergies, and problem list were reviewed and updated    Patient Active Problem List   Diagnosis     Hyperparathyroidism      Hyperlipidemia     Serum Enzyme Levels - Alkaline Phosphatase Elevated     Hypertension     Hashimoto's Thyroiditis     Type 2 diabetes mellitus without complication, with long-term current use of insulin (H)     BMI 45.0-49.9, adult (H)     Migraine Headache     Esophageal reflux     Gait - Swing Phase Foot Drop Right     Osteopenia     RBBB     Obstructive sleep apnea syndrome     Age-related osteoporosis without current pathological fracture     Acute gout, unspecified cause, unspecified site     Itchy eyes     Past Medical History:   Diagnosis Date     Migraine with aura      Current Outpatient Medications   Medication Sig Dispense Refill     aspirin 81 MG EC tablet Take 1 tablet (81 mg total) by mouth daily. 90 tablet 3     azelastine (OPTIVAR) 0.05 % ophthalmic solution Administer 1 drop to both eyes 2 (two) times a day. 6 mL 12     benzonatate (TESSALON) 200 MG capsule Take 1 capsule (200 mg total) by mouth 3 (three) times a day as needed for cough. 30 capsule 0     budesonide-formoterol (SYMBICORT) 160-4.5 mcg/actuation inhaler Inhale 2 puffs 2 (two) times a day. 3 Inhaler 3     colchicine 0.6 mg tablet Take 1 tablet (0.6 mg total) by mouth daily. 90 tablet 3     generic lancets (ACCU-CHEK MULTICLIX LANCET) Use 1 each As Directed 4 (four) times a day. Dispense brand per patient's insurance at pharmacy discretion. 400 each 3     LORazepam (ATIVAN) 0.5 MG tablet TAKE 1 TO 2 TABLETS BY MOUTH EVERY NIGHT AT BEDTIME 180 tablet 0     predniSONE (DELTASONE) 5 MG tablet Take 5 mg by mouth daily.       rosuvastatin (CRESTOR) 10 MG tablet Take 1 tablet (10 mg total) by mouth 3 (three) times a week. 36 tablet 3     albuterol (PROAIR HFA;PROVENTIL HFA;VENTOLIN HFA) 90 mcg/actuation inhaler Inhale 2 puffs every 6 (six) hours as needed for wheezing. 1 each 11     albuterol (PROVENTIL) 2.5 mg /3 mL (0.083 %) nebulizer solution Use 1 vial 4 times daily 75 mL 3     allopurinoL (ZYLOPRIM) 100 MG tablet Take 1.5 tablets (150  "mg total) by mouth daily. 135 tablet 3     blood glucose test (ACCU-CHEK YARELIS) strips Test 4 times daily 300 strip 3     celecoxib (CELEBREX) 200 MG capsule Take 1 capsule (200 mg total) by mouth daily. 90 capsule 3     furosemide (LASIX) 40 MG tablet Take 1 tablet daily as directed 90 tablet 5     insulin aspart U-100 (NOVOLOG FLEXPEN U-100 INSULIN) 100 unit/mL (3 mL) injection pen Inject 6-10 Units under the skin 3 (three) times a day before meals. 5 Pre-filled Pen Syringe 3     insulin glargine (LANTUS SOLOSTAR PEN) 100 unit/mL (3 mL) pen Inject 16 Units under the skin 2 (two) times a day. 12 adj dose pen 5     irbesartan (AVAPRO) 150 MG tablet Take 1 tablet (150 mg total) by mouth at bedtime. 90 tablet 3     levothyroxine (SYNTHROID, LEVOTHROID) 125 MCG tablet Take 1 tablet (125 mcg total) by mouth daily. 90 tablet 3     metoprolol succinate (TOPROL-XL) 100 MG 24 hr tablet Take 1 tablet (100 mg total) by mouth daily. 90 tablet 3     pen needle, diabetic (COMFORT EZ PEN NEEDLES) 31 gauge x 1/4\" Ndle Use 4 times a day with insulin pen 300 each 3     tiZANidine (ZANAFLEX) 4 MG tablet Take 1 tablet (4 mg total) by mouth at bedtime. 30 tablet 1     No current facility-administered medications for this visit.      Allergies   Allergen Reactions     Adhesive Tape-Silicones Other (See Comments)     Codeine      Metformin      Stomach pain      Tetanus Toxoid        EXAM  Vitals:    02/24/20 1052   BP: 128/72   Patient Site: Left Arm   Patient Position: Sitting   Cuff Size: Adult Large   Pulse: 72   SpO2: 99%   Weight: (!) 286 lb 1 oz (129.8 kg)   Height: 5' 1.5\" (1.562 m)         General: Alert, pleasant, no distress  Musculoskeletal: No obvious effusion of knees, though surrounding adipose tissue does obscure exam    Results reviewed:     Recent Results (from the past 240 hour(s))   Glycosylated Hemoglobin A1c   Result Value Ref Range    Hemoglobin A1c 7.7 (H) 3.5 - 6.0 %   Thyroid Stimulating Hormone (TSH)   Result " Value Ref Range    TSH 0.89 0.30 - 5.00 uIU/mL        Data points:     Federico Milian DO  Internal Medicine  [facility]

## 2021-06-06 NOTE — TELEPHONE ENCOUNTER
Controlled Substance Refill Request  Medication Name:   Requested Prescriptions     Pending Prescriptions Disp Refills     LORazepam (ATIVAN) 0.5 MG tablet [Pharmacy Med Name: LORAZEPAM 0.5MG TABLETS] 180 tablet 0     Sig: TAKE 1 TO 2 TABLETS BY MOUTH EVERY NIGHT AT BEDTIME     Date Last Fill: 10/21/2019  [facility]  Submit electronically to pharmacy  Controlled Substance Agreement on file:   Encounter-Level CSA Scan Date - 12/14/2018:    Scan on 12/21/2018  2:56 PM           Encounter-Level CSA Scan Date - 01/03/2018:    Scan on 1/3/2018: HE           Encounter-Level CSA Scan Date - 01/03/2018:    Scan on 1/8/2018 10:50 AM           Encounter-Level CSA Scan Date - 11/16/2015:    Scan on 11/18/2015  1:05 PM        Last office visit:  2/24/2020

## 2021-06-08 NOTE — PROGRESS NOTES
Dear Dr. Stacey Kelly Md  [address] [place] [address]    Thank you for the opportunity to participate in the care of Ms. Meggan Everett.    She is a 66 y.o. female who comes to the clinic for the transfer of care of her obstructive sleep apnea.  Meggan states that she was diagnosed with obstructive sleep apnea more than 12 years ago at another sleep Center in [place].  We do not have the original sleep study here in the chart nor is it possible to be of obtain this study after all this time.  She has been using the same CPAP machine for the past 12 years as well as the same nasal pillow mask.  She is complaining that the mask is wearing out and as a result she has not been using her CPAP machine.  She would be interested in upgrading to a new CPAP machine if possible.  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  However her granddaughter has informed her that when she is not using her CPAP she still snores.  Furthermore she still feels a bit tired during the day without using CPAP.  Her review of systems is otherwise unremarkable.       Past Medical History  Past Medical History   Diagnosis Date     Migraine with aura         Past Surgical History  Past Surgical History   Procedure Laterality Date     Pr lap,cholecystectomy       Description: Cholecystectomy Laparoscopic;  Recorded: 08/19/2008;     Pr roman w/o facetec foramot/dskc 1/2 vrt seg, cervical       Description: Laminectomy Lumbar;  Recorded: 08/12/2010;  Comments: ABOUT 1998     Pr hysteroscopy,w/endometrial ablation       Description: Hysteroscopy With Endometrial Ablation;  Recorded: 11/17/2010;     Pr biopsy of breast, incisional       Description: Incisional Breast Biopsy;  Recorded: 11/17/2010;        Meds  Current Outpatient Prescriptions   Medication Sig Dispense Refill     albuterol (PROVENTIL) 2.5 mg /3 mL (0.083 %) nebulizer solution Use 1 vial 4 times daily 75 mL 3      "aspirin 81 MG EC tablet Take 1 tablet (81 mg total) by mouth daily. 90 tablet 3     blood glucose test (ACCU-CHEK YARELIS) strips Test 4 times daily 300 strip 3     celecoxib (CELEBREX) 200 MG capsule Take 1 capsule (200 mg total) by mouth daily. 90 capsule 3     escitalopram oxalate (LEXAPRO) 5 MG tablet Take 1 tablet (5 mg total) by mouth daily. 90 tablet prn     furosemide (LASIX) 40 MG tablet Take 1 tablet daily as directed 90 tablet 3     insulin aspart (NOVOLOG FLEXPEN) 100 unit/mL injection pen Inject 6-10 Units under the skin 3 (three) times a day before meals. 30 mL 3     insulin glargine (LANTUS SOLOSTAR) 100 unit/mL (3 mL) pen Inject 26 units in the evening 6 adj dose pen 5     irbesartan (AVAPRO) 150 MG tablet Take 1 tablet (150 mg total) by mouth bedtime. 90 tablet 3     levothyroxine (SYNTHROID, LEVOTHROID) 125 MCG tablet Take 1 tablet (125 mcg total) by mouth daily. 90 tablet 3     LORazepam (ATIVAN) 0.5 MG tablet Take one tablet at bedtime 90 tablet 0     methylPREDNISolone (MEDROL DOSEPACK) 4 mg tablet Follow the package directions. 21 tablet 2     metoprolol succinate (TOPROL-XL) 100 MG 24 hr tablet Take 1 tablet (100 mg total) by mouth daily. 90 tablet 3     pen needle, diabetic (COMFORT EZ PEN NEEDLES) 31 gauge x 1/4\" Ndle Use 4 times a day with insulin pen 300 each 3     rosuvastatin (CRESTOR) 10 MG tablet Take 1 tablet (10 mg total) by mouth bedtime. 90 tablet 11     tiZANidine (ZANAFLEX) 4 MG tablet Take 1 tablet (4 mg total) by mouth bedtime. 90 tablet 3     traZODone (DESYREL) 50 MG tablet 1-2 po qhs prn for sleep 60 tablet prn     No current facility-administered medications for this visit.         Allergies  Codeine and Tetanus toxoid     Social History  Social History     Social History     Marital status:      Spouse name: N/A     Number of children: N/A     Years of education: N/A     Occupational History     Not on file.     Social History Main Topics     Smoking status: Never " Smoker     Smokeless tobacco: Not on file     Alcohol use No     Drug use: No     Sexual activity: No     Other Topics Concern     Not on file     Social History Narrative    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  She has 4 grandchildren.  She does not smoke cigarettes or drink alcohol.                        Family History  Family History   Problem Relation Age of Onset     Hypertension Mother      alive in her 90s     Atrial fibrillation Father      alive in his 90s           Review of Systems:  Constitutional: Negative except as noted in HPI.   Eyes: Negative except as noted in HPI.   ENT: Negative except as noted in HPI.   Cardiovascular: Negative except as noted in HPI.   Respiratory: Negative except as noted in HPI.   Gastrointestinal: Negative except as noted in HPI.   Genitourinary: Negative except as noted in HPI.   Musculoskeletal: Negative except as noted in HPI.   Integumentary: Negative except as noted in HPI.   Neurological: Negative except as noted in HPI.   Psychiatric: Negative except as noted in HPI.   Endocrine: Negative except as noted in HPI.   Hematologic/Lymphatic: Negative except as noted in HPI.      STOP BANG 1/12/2017   Do you snore loudly (louder than talking or loud enough to be heard through closed doors)? 0   Do you often feel tired, fatigued, or sleepy during daytime? 1   Has anyone observed you stop breathing in your sleep? 1   Do you have or are you being treated for high blood pressure? 1   BMI more than 35 kg/m2 1   Age over 50 years old? 1   Neck circumference greater than 16 inches? 0   Gender male? 0   Total Score 5   Rooming 1/12/2017   Usual bedtime 11p   Sleep Latency 30 min   Awakenings 2-3   Wake Up Time 8a   Weekends 8a   Energy Drinks 0   Coffee 0   Cola 0   Difficulty falling asleep No   Difficulty staying asleep Yes   Excessive daytime tiredness No   Excessive daytime sleepiness No  "  Dozing off while driving No   Shift Worker No   Sleep Walking? No   Sleep Talking? No   Kicking or punching? No   Restless legs symptoms No       Physical Exam:  Visit Vitals     /70     Pulse 87     Resp 12     Ht 5' 3\" (1.6 m)     Wt (!) 272 lb (123.4 kg)     SpO2 98%     BMI 48.18 kg/m2     BMI:Body mass index is 48.18 kg/(m^2).   GEN: NAD, obese  Head: Normocephalic.  EYES: PERRLA, EOMI  ENT: Oropharynx is clear, mallampatti class 4+ airway.   Nasal mucosa is moist without erythema  Neck : Thyroid is within normal limits. Neck circ 15 inch  CV: Regular rate and rhythm, S1 & S2 are normal. No murmurs  LUNGS: Bilateral clear to auscultation bilaterally, no wheezes  ABDOMEN: Positive bowel sounds in all quadrants, soft, no rebound or guarding  MUSCULOSKELETAL: Bilateral trace leg swelling  SKIN: warm, dry, no rashes  Neurological: Alert, oriented to time, place, and person.  Psych: normal mood, normal affect        Labs/Studies:     Lab Results   Component Value Date    WBC 6.0 12/27/2016    HGB 13.5 12/27/2016    HCT 40.6 12/27/2016    MCV 87 12/27/2016     12/27/2016         Chemistry        Component Value Date/Time     12/27/2016 1142    K 4.6 12/27/2016 1142     (H) 12/27/2016 1142    CO2 23 12/27/2016 1142    BUN 27 (H) 12/27/2016 1142    CREATININE 1.02 12/27/2016 1142     (H) 12/27/2016 1142        Component Value Date/Time    CALCIUM 9.7 12/27/2016 1142    ALKPHOS 200 (H) 12/27/2016 1142    AST 35 12/27/2016 1142    ALT 48 (H) 12/27/2016 1142    BILITOT 0.5 12/27/2016 1142            No results found for: FERRITIN  Lab Results   Component Value Date    TSH 1.09 12/27/2016         Assessment and Plan:  In summary Meggan Everett is a 66 y.o. year old female here for transfer of care.  1.  Suspect Sleep Apnea   Ms. Meggan Everett has high risk for obstructive sleep apnea based on the history of being diagnosed with obstructive sleep apnea, snoring and a crowded airway. I " educated the patient on the underlying pathophysiology of obstructive sleep apnea using educational slides. We reviewed the risks associated with sleep apnea, including increased cardiovascular risk and overall death. We talked about treatment options in general. I recommend getting an split-night nocturnal polysomnography. The patient should return to the clinic to discuss results and treatment option in a patient-centered approach.  2.  Hypersomnia  3.  Snoring  4.  Fatigue    Patient verbalized understanding of these issues, agrees with the plan and all questions were answered today. Patient was given an opportuntity to voice any other symptoms or concerns not listed above. Patient did not have any other symptoms or concerns.      Patient told to return in one week after the sleep study is interpreted. Patient instructed to stop at  to schedule appointment before leaving today.       Duong Todd DO  Board Certified in Internal Medicine and Sleep Medicine  [facility].    (Note created with Dragon voice recognition and unintended spelling errors and word substitutions may occur)

## 2021-06-08 NOTE — TELEPHONE ENCOUNTER
Controlled Substance Refill Request  Medication Name:   Requested Prescriptions     Pending Prescriptions Disp Refills     LORazepam (ATIVAN) 0.5 MG tablet 180 tablet 0     Sig: Take 1-2 tablets (0.5-1 mg total) by mouth at bedtime.     Date Last Fill: 3/17/2020  Requested Pharmacy: [facility]  Submit electronically to pharmacy  Controlled Substance Agreement on file:   Encounter-Level CSA Scan Date - 12/14/2018:    Scan on 12/21/2018  2:56 PM           Encounter-Level CSA Scan Date - 01/03/2018:    Scan on 1/3/2018: [facility]           Encounter-Level CSA Scan Date - 01/03/2018:    Scan on 1/8/2018 10:50 AM           Encounter-Level CSA Scan Date - 11/16/2015:    Scan on 11/18/2015  1:05 PM        Last office visit:  2/24/2020

## 2021-06-09 NOTE — PROGRESS NOTES
Order for Durable Medical Equipment was processed and equipment ordered.   DME provider: [facility]  Date Faxed: 03/31/2017  Ordering Provider: Dr. Todd  Equipment ordered: Cpap

## 2021-06-09 NOTE — PROGRESS NOTES
Dear Dr. Stacey Kelly MD  [address] [place] [address],    Thank you for the opportunity to participate in the care of Meggan Everett.     She is a 66 y.o.  female patient who comes to the sleep medicine clinic for review of sleep study. The study was completed on 02/10/17 which showed the the patient had moderate obstructive sleep apnea with an apnea hypopnea index of 15.6 events per hour with the lowest O2 sat of 77%.  The patient also had sleep-related hypoxia.  The patient is eager to get set up with a new CPAP machine since her old CPAP machine is breaking down.  We discussed this the durable medical equipment company available including [employer] versus [employer] and she would prefer to work with [employer].      Past Medical History:   Diagnosis Date     Migraine with aura        Past Surgical History:   Procedure Laterality Date     OR BIOPSY OF BREAST, INCISIONAL      Description: Incisional Breast Biopsy;  Recorded: 11/17/2010;     OR HYSTEROSCOPY,W/ENDOMETRIAL ABLATION      Description: Hysteroscopy With Endometrial Ablation;  Recorded: 11/17/2010;     OR JENSEN W/O FACETEC FORAMOT/DSKC 1/2 VRT SEG, CERVICAL      Description: Laminectomy Lumbar;  Recorded: 08/12/2010;  Comments: ABOUT 1998     OR LAP,CHOLECYSTECTOMY      Description: Cholecystectomy Laparoscopic;  Recorded: 08/19/2008;       Social History     Social History     Marital status:      Spouse name: N/A     Number of children: N/A     Years of education: N/A     Occupational History     Not on file.     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  She has 4 grandchildren.  She does not smoke cigarettes or drink  "alcohol.                         Current Outpatient Prescriptions   Medication Sig Dispense Refill     albuterol (PROVENTIL) 2.5 mg /3 mL (0.083 %) nebulizer solution Use 1 vial 4 times daily 75 mL 3     aspirin 81 MG EC tablet Take 1 tablet (81 mg total) by mouth daily. 90 tablet 3     blood glucose test (ACCU-CHEK YARELIS) strips Test 4 times daily 300 strip 3     celecoxib (CELEBREX) 200 MG capsule Take 1 capsule (200 mg total) by mouth daily. 90 capsule 3     furosemide (LASIX) 40 MG tablet Take 1 tablet daily as directed 90 tablet 3     insulin aspart (NOVOLOG FLEXPEN) 100 unit/mL injection pen Inject 6-10 Units under the skin 3 (three) times a day before meals. 30 mL 3     insulin glargine (LANTUS SOLOSTAR) 100 unit/mL (3 mL) pen Inject 26 units in the evening 6 adj dose pen 5     irbesartan (AVAPRO) 150 MG tablet Take 1 tablet (150 mg total) by mouth bedtime. 90 tablet 3     levothyroxine (SYNTHROID, LEVOTHROID) 125 MCG tablet Take 1 tablet (125 mcg total) by mouth daily. 90 tablet 3     LORazepam (ATIVAN) 0.5 MG tablet Take one tablet at bedtime 90 tablet 0     methylPREDNISolone (MEDROL DOSEPACK) 4 mg tablet Follow the package directions. 21 tablet 2     metoprolol succinate (TOPROL-XL) 100 MG 24 hr tablet Take 1 tablet (100 mg total) by mouth daily. 90 tablet 3     pen needle, diabetic (COMFORT EZ PEN NEEDLES) 31 gauge x 1/4\" Ndle Use 4 times a day with insulin pen 300 each 3     rosuvastatin (CRESTOR) 10 MG tablet Take 1 tablet (10 mg total) by mouth bedtime. 90 tablet 11     tiZANidine (ZANAFLEX) 4 MG tablet Take 1 tablet (4 mg total) by mouth bedtime. 90 tablet 3     No current facility-administered medications for this visit.        Allergies   Allergen Reactions     Codeine      Tetanus Toxoid        Physical Exam:  /64  Pulse 78  Ht 5' 3\" (1.6 m)  Wt (!) 272 lb 3.2 oz (123.5 kg)  SpO2 100%  BMI 48.22 kg/m2  BMI:Body mass index is 48.22 kg/(m^2).   GEN: NAD, obese  Neurological: Alert, " oriented to time, place, and person.  Psych: normal mood, normal affect     Labs/Studies:  - We reviewed the results of the overnight PSG as described on the HPI.     Lab Results   Component Value Date    WBC 6.0 12/27/2016    HGB 13.5 12/27/2016    HCT 40.6 12/27/2016    MCV 87 12/27/2016     12/27/2016         Chemistry        Component Value Date/Time     12/27/2016 1142    K 4.6 12/27/2016 1142     (H) 12/27/2016 1142    CO2 23 12/27/2016 1142    BUN 27 (H) 12/27/2016 1142    CREATININE 1.02 12/27/2016 1142     (H) 12/27/2016 1142        Component Value Date/Time    CALCIUM 9.7 12/27/2016 1142    ALKPHOS 200 (H) 12/27/2016 1142    AST 35 12/27/2016 1142    ALT 48 (H) 12/27/2016 1142    BILITOT 0.5 12/27/2016 1142            No results found for: FERRITIN        Assessment and Plan:  In summary Meggan Everett is a 66 y.o. year old female here for follow up of her sleep study.  1. Obstructive Sleep Apnea  Patient would like to work with [employer] as her durable medical equipment company. I have my staff fax over the prescription to them ASAP.  2.  Hypersomnia  3.  Other sleep disturbance       Patient verbalized understanding of these issues, agrees with the plan and all questions were answered today. Patient was given an opportuntity to voice any other symptoms or concerns not listed above. Patient did not have any other symptoms or concerns.      Patient told to return in 4 weeks. Patient instructed to stop at  to schedule appointment before leaving today.    Duong Todd DO  Board Certified in Internal Medicine and Sleep Medicine  [facility].    We spent a total of 15 minutes of face-to-face encounter and more than 50% of the encounter was used for counseling or coordination of care.    (Note created with Dragon voice recognition and unintended spelling errors and word substitutions may occur)

## 2021-06-10 NOTE — PROGRESS NOTES
Preoperative Consultation    Meggan Everett   66 y.o.  female    Date of visit: 4/14/2017    This is a preoperative consultation requested by Dr. Early in preparation for cataract surgery on April 18 at [facility].    HPI:  Meggan is in today for preoperative consultation prior to cataract surgery.  Overall, she feels like she is doing fairly well.  Her blood sugars have been a bit out of whack recently but she thinks they are getting back on track.  She like to try to lose weight.  Overall, she has been having decrease in her vision and is going to be happy to be getting her cataracts fixed.      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     Hypertension     Hashimoto's Thyroiditis     Type 2 diabetes mellitus without complication, with long-term current use of insulin     Morbid Obesity     Migraine Headache     Esophageal Reflux     Gait - Swing Phase Foot Drop Right     Osteopenia     RBBB     Obstructive sleep apnea syndrome     Past Medical History:   Diagnosis Date     Migraine with aura       Past Surgical History:   Procedure Laterality Date     AK BIOPSY OF BREAST, INCISIONAL      Description: Incisional Breast Biopsy;  Recorded: 11/17/2010;     AK HYSTEROSCOPY,W/ENDOMETRIAL ABLATION       AK JENSEN W/O FACETEC FORAMOT/DSKC 1/2 VRT SEG, CERVICAL  1998    Description: Laminectomy Lumbar     AK LAP,CHOLECYSTECTOMY  1999     Family History   Problem Relation  "Age of Onset     Hypertension Mother      alive in her 90s     Atrial fibrillation Father      alive in his 90s     Social History   Substance Use Topics     Smoking status: Never Smoker     Smokeless tobacco: Not on file     Alcohol use No     Social History     Social History Narrative    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  She has 4 grandchildren.  She does not smoke cigarettes or drink alcohol.                     Current Medications:  Current Outpatient Prescriptions   Medication Sig     albuterol (PROVENTIL) 2.5 mg /3 mL (0.083 %) nebulizer solution Use 1 vial 4 times daily     aspirin 81 MG EC tablet Take 1 tablet (81 mg total) by mouth daily.     blood glucose test (ACCU-CHEK YARELIS) strips Test 4 times daily     celecoxib (CELEBREX) 200 MG capsule Take 1 capsule (200 mg total) by mouth daily.     furosemide (LASIX) 40 MG tablet Take 1 tablet daily as directed     insulin aspart (NOVOLOG FLEXPEN) 100 unit/mL injection pen Inject 6-10 Units under the skin 3 (three) times a day before meals.     insulin glargine (LANTUS; BASAGLAR) 100 unit/mL (3 mL) pen Inject 30 units in the evening     irbesartan (AVAPRO) 150 MG tablet Take 1 tablet (150 mg total) by mouth bedtime.     levothyroxine (SYNTHROID, LEVOTHROID) 125 MCG tablet Take 1 tablet (125 mcg total) by mouth daily.     LORazepam (ATIVAN) 0.5 MG tablet Take one tablet at bedtime     methylPREDNISolone (MEDROL DOSEPACK) 4 mg tablet Follow the package directions.     metoprolol succinate (TOPROL-XL) 100 MG 24 hr tablet Take 1 tablet (100 mg total) by mouth daily.     pen needle, diabetic (COMFORT EZ PEN NEEDLES) 31 gauge x 1/4\" Ndle Use 4 times a day with insulin pen     rosuvastatin (CRESTOR) 10 MG tablet Take 1 tablet (10 mg total) by mouth bedtime.     tiZANidine (ZANAFLEX) 4 MG tablet Take 1 tablet (4 mg total) by mouth bedtime.       Physical Exam:    General " "Appearance:   Pleasant and alert    Vitals:    04/14/17 1031   BP: 112/70   Pulse: 76   Resp: 14   Weight: (!) 274 lb (124.3 kg)   Height: 5' 3\" (1.6 m)     Body mass index is 48.54 kg/(m^2).    EYES: Eyelids, conjunctiva, and sclera were normal. Pupils were normal.   HEAD, EARS, NOSE, MOUTH, AND THROAT: Head is atraumatic, ears and canals are normal with normal tympanic membranes.  Nose mouth and throat are without lesions.  NECK: Neck appearance was normal. There were no neck masses and the thyroid was not enlarged.  RESPIRATORY: Normal respirations.  Lung sounds were equal bilaterally.  CARDIOVASCULAR: Heart rate and rhythm were normal.  S1 and S2 were normal and there were no extra sounds or murmurs. There was no peripheral edema.  GASTROINTESTINAL: The abdomen was soft and nondistended.     MUSCULOSKELETAL: Skeletal configuration was normal and muscle mass was normal for age. Joint appearance was overall normal.  LYMPHATIC: There were no enlarged nodes palpable.  SKIN/HAIR/NAILS: Skin color was normal.  No rashes.  NEUROLOGIC: The patient was alert and oriented.  Speech was normal.  There is no facial asymmetry.   PSYCHIATRIC:  Mood and affect were normal.             Results for orders placed or performed in visit on 04/14/17   HM2(CBC w/o Differential)   Result Value Ref Range    WBC 6.8 4.0 - 11.0 thou/uL    RBC 4.62 3.80 - 5.40 mill/uL    Hemoglobin 13.5 12.0 - 16.0 g/dL    Hematocrit 40.6 35.0 - 47.0 %    MCV 88 80 - 100 fL    MCH 29.2 27.0 - 34.0 pg    MCHC 33.2 32.0 - 36.0 g/dL    RDW 12.1 11.0 - 14.5 %    Platelets 287 140 - 440 thou/uL    MPV 8.5 7.0 - 10.0 fL       Assessment and Plan:  Meggan Everett was seen in preoperative consultation in preparation for direct surgery.  This is a low risk surgery and the patient has no increased risk for major cardiac complications based on exam and history and appears to be medically stable and an acceptable candidate for the proposed surgery/procedure with " appropriate anesthesia.    I have recommended the following medication adjustments preoperatively:    Patient Instructions     Give only 20 units of Lantus the night before surgery.    Take all other medications the same- hold your AM short acting insulin until you eat unless your sugar is very high.      Also discussed during this visit:    1. Preop exam for internal medicine  Labs today, very low risk for any problems.  She will take a reduced dose of insulin the night before and see if she needs any sliding scale the next morning based upon her sugar.  - Basic Metabolic Panel  - HM2(CBC w/o Differential)    2. Obstructive sleep apnea syndrome  Controlled on CPAP.    3. Type 2 diabetes mellitus without complication, with long-term current use of insulin  Insurance is going to switch her insulin.  - insulin glargine (LANTUS; BASAGLAR) 100 unit/mL (3 mL) pen; Inject 30 units in the evening  Dispense: 6 adj dose pen; Refill: 5 April D MD Leticia  Internal Medicine  [facility]  Contact me at [phone]    Much or all of the text in this note was generated through the use of Dragon Dictate voice-to-text software. Errors in spelling or words which seem out of context are unintentional. Sound alike errors, in particular, may have escaped editing.

## 2021-06-11 NOTE — PROGRESS NOTES
Dear Dr. Stacey Kelly MD  [address] [place] [address],    Thank you for the opportunity to participate in the care of Meggan Everett.     She is a 66 y.o. y/o female patient who comes to the sleep medicine clinic for follow up.  This is the patient's first clinical visit since starting CPAP therapy.  Since starting CPAP.  Meggan has noticed an improvement in her sleep quality as well as her energy level.  She also reports remembering more of her dreams compared to before.  She is very happy with her CPAP usage experience overall despite some ill fitting masks.  She will get this straightened out with her durable medical equipment company.    Compliance Download data for 30 days:  Pressure settin-16 CWP  Residual AHI: 0.5 events per hour  Leak: Minimal  Compliance: 100%  Mask Tolerance: Poor  Skin irritation: None      Past Medical History:   Diagnosis Date     Migraine with aura        Past Surgical History:   Procedure Laterality Date     MI BIOPSY OF BREAST, INCISIONAL      Description: Incisional Breast Biopsy;  Recorded: 2010;     MI HYSTEROSCOPY,W/ENDOMETRIAL ABLATION       MI JENSEN W/O FACETEC FORAMOT/DS[facility]  VRT SEG, CERVICAL      Description: Laminectomy Lumbar     MI LAP,CHOLECYSTECTOMY         Social History     Social History     Marital status:      Spouse name: N/A     Number of children: N/A     Years of education: N/A     Occupational History     Not on file.     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  She has 4 grandchildren.  She does not smoke cigarettes or drink alcohol.                         Current Outpatient Prescriptions   Medication  "Sig Dispense Refill     albuterol (PROVENTIL) 2.5 mg /3 mL (0.083 %) nebulizer solution Use 1 vial 4 times daily 75 mL 3     aspirin 81 MG EC tablet Take 1 tablet (81 mg total) by mouth daily. 90 tablet 3     blood glucose test (ACCU-CHEK YARELIS) strips Test 4 times daily 300 strip 3     celecoxib (CELEBREX) 200 MG capsule Take 1 capsule (200 mg total) by mouth daily. 90 capsule 3     furosemide (LASIX) 40 MG tablet Take 1 tablet daily as directed 90 tablet 3     insulin aspart (NOVOLOG FLEXPEN) 100 unit/mL injection pen Inject 6-10 Units under the skin 3 (three) times a day before meals. 30 mL 3     insulin glargine (LANTUS; BASAGLAR) 100 unit/mL (3 mL) pen Inject 30 units in the evening 6 adj dose pen 5     irbesartan (AVAPRO) 150 MG tablet Take 1 tablet (150 mg total) by mouth bedtime. 90 tablet 3     levothyroxine (SYNTHROID, LEVOTHROID) 125 MCG tablet Take 1 tablet (125 mcg total) by mouth daily. 90 tablet 3     LORazepam (ATIVAN) 0.5 MG tablet Take one tablet at bedtime 90 tablet 0     methylPREDNISolone (MEDROL DOSEPACK) 4 mg tablet Follow the package directions. 21 tablet 2     metoprolol succinate (TOPROL-XL) 100 MG 24 hr tablet Take 1 tablet (100 mg total) by mouth daily. 90 tablet 3     pen needle, diabetic (COMFORT EZ PEN NEEDLES) 31 gauge x 1/4\" Ndle Use 4 times a day with insulin pen 300 each 3     rosuvastatin (CRESTOR) 10 MG tablet Take 1 tablet (10 mg total) by mouth bedtime. 90 tablet 11     tiZANidine (ZANAFLEX) 4 MG tablet Take 1 tablet (4 mg total) by mouth bedtime. 90 tablet 3     No current facility-administered medications for this visit.        Allergies   Allergen Reactions     Codeine      Tetanus Toxoid        Physical Exam:  Pulse 92  Wt (!) 278 lb 14.4 oz (126.5 kg)  SpO2 95%  BMI 49.4 kg/m2  BMI:Body mass index is 49.4 kg/(m^2).   GEN: NAD, obese  Psych: normal mood, normal affect    Labs/Studies:     I reviewed the efficacy and compliance report from his device. Data summarized on " the HPI and the CPAP compliance flow sheet.     Lab Results   Component Value Date    WBC 6.8 04/14/2017    HGB 13.5 04/14/2017    HCT 40.6 04/14/2017    MCV 88 04/14/2017     04/14/2017         Chemistry        Component Value Date/Time     04/14/2017 1125    K 4.9 04/14/2017 1125     04/14/2017 1125    CO2 25 04/14/2017 1125    BUN 30 (H) 04/14/2017 1125    CREATININE 1.10 04/14/2017 1125     (H) 04/14/2017 1125        Component Value Date/Time    CALCIUM 9.9 04/14/2017 1125    ALKPHOS 200 (H) 12/27/2016 1142    AST 35 12/27/2016 1142    ALT 48 (H) 12/27/2016 1142    BILITOT 0.5 12/27/2016 1142            No results found for: FERRITIN         Assessment and Plan:  In summary Meggan Everett is a 66 y.o. year old female who is here for review of her compliance download data.    1.  Obstructive sleep apnea on CPAP  I will narrow the patient's CPAP pressure range to 9-13 CWP.  I congratulated her on her excellent CPAP usage.  2.  Hypersomnia  Resolved  3.  Other sleep disturbance      Patient verbalized understanding of these issues, agrees with the plan and all questions were answered today. Patient was given an opportuntity to voice any other symptoms or concerns not listed above. Patient did not have any other symptoms or concerns.      Patient told to return in 12 months. Patient instructed to stop at  to schedule appointment before leaving today.    Duong Todd DO  Board Certified in Internal Medicine and Sleep Medicine  [facility].    I spent a total of 10 minutes of face-to-face encounter and more than 50% of the encounter was used for counseling or coordination of care.    (Note created with Dragon voice recognition and unintended spelling errors and word substitutions may occur)

## 2021-06-11 NOTE — PROGRESS NOTES
Patient would like to be contacted via the following phone number [phone]       ASSESSMENT:    1. Primary osteoarthritis of both knees  Ongoing symptoms. She doesn't tolerate NSAID's well.  Also has CRF, mild, as well.      2. Anxiety  Mild but definite.  With elements of panic.  Will treat with escitalopram 10 mg po daily.     Follow up in 6 weeks with telephone follow up and prn.     CHIEF COMPLAINT:  Chief Complaint   Patient presents with     Establish Care     HISTORY OF PRESENT ILLNESS:  Meggan is a 69 y.o. female contacting the clinic today via phone for establish care. She is stressed by covid isolation.  She has knee osteoarthritis and gets weight bearing pain.  Has some weight gain.  Feels anxiety and has occasional migraine.  Uses prn lorazepam.  No significant depression.  No dyspnea or cough, or fever.  She does some painting and quilting.       REVIEW OF SYSTEMS:   All other systems are negative.    PFSH:  Social History     Social History Narrative    She is  and is retired. She lives with her son and his family on the lower level of their house.  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  She has 4 grandchildren.  She does not smoke cigarettes or drink alcohol.     TOBACCO USE:  Social History     Tobacco Use   Smoking Status Never Smoker   Smokeless Tobacco Never Used     VITALS:  There were no vitals filed for this visit.  Wt Readings from Last 3 Encounters:   02/24/20 (!) 286 lb 1 oz (129.8 kg)   08/27/19 (!) 283 lb 3 oz (128.5 kg)   08/10/19 (!) 283 lb 6.4 oz (128.5 kg)     PHYSICAL EXAM:  (observations via Phone)  Alert and oriented, in NAD.  Thinking is clear.      The visit lasted a total of 14 minutes     CA Intake Time: 5    I have reviewed and updated the patient's Past Medical History, Social History, Family History as appropriate.    MEDICATIONS: Reviewed and updated per CA and MD  Current Outpatient Medications  "  Medication Sig Dispense Refill     albuterol (PROAIR HFA;PROVENTIL HFA;VENTOLIN HFA) 90 mcg/actuation inhaler Inhale 2 puffs every 6 (six) hours as needed for wheezing. 1 each 11     albuterol (PROVENTIL) 2.5 mg /3 mL (0.083 %) nebulizer solution Use 1 vial 4 times daily 75 mL 3     allopurinoL (ZYLOPRIM) 100 MG tablet Take 1.5 tablets (150 mg total) by mouth daily. 135 tablet 3     aspirin 81 MG EC tablet Take 1 tablet (81 mg total) by mouth daily. 90 tablet 3     blood glucose test (ACCU-CHEK YARELIS) strips Test 4 times daily 300 strip 3     celecoxib (CELEBREX) 200 MG capsule Take 1 capsule (200 mg total) by mouth daily. 90 capsule 3     colchicine 0.6 mg tablet Take 1 tablet (0.6 mg total) by mouth daily. 90 tablet 3     furosemide (LASIX) 40 MG tablet Take 1 tablet daily as directed 90 tablet 5     generic lancets (ACCU-CHEK MULTICLIX LANCET) Use 1 each As Directed 4 (four) times a day. Dispense brand per patient's insurance at pharmacy discretion. 400 each 3     insulin aspart U-100 (NOVOLOG FLEXPEN U-100 INSULIN) 100 unit/mL (3 mL) injection pen Inject 6-10 Units under the skin 3 (three) times a day before meals. 15 mL 3     insulin glargine (LANTUS SOLOSTAR PEN) 100 unit/mL (3 mL) pen Inject 16 Units under the skin 2 (two) times a day. 12 adj dose pen 5     irbesartan (AVAPRO) 150 MG tablet Take 1 tablet (150 mg total) by mouth at bedtime. 90 tablet 3     ketoconazole (NIZORAL) 2 % cream Apply topically 2 (two) times a day. 60 g 5     levothyroxine (SYNTHROID, LEVOTHROID) 125 MCG tablet Take 1 tablet (125 mcg total) by mouth daily. 90 tablet 3     LORazepam (ATIVAN) 0.5 MG tablet Take 1-2 tablets (0.5-1 mg total) by mouth at bedtime. 180 tablet 0     metoprolol succinate (TOPROL-XL) 100 MG 24 hr tablet Take 1 tablet (100 mg total) by mouth daily. 90 tablet 3     pen needle, diabetic (COMFORT EZ PEN NEEDLES) 31 gauge x 1/4\" Ndle Use 4 times a day with insulin pen 300 each 3     predniSONE (DELTASONE) 5 MG " "tablet Take 5 mg by mouth daily PRN Gout or Asthma flares.       rosuvastatin (CRESTOR) 10 MG tablet Take 1 tablet (10 mg total) by mouth 3 (three) times a week. 36 tablet 3     tiZANidine (ZANAFLEX) 4 MG tablet Take 1 tablet (4 mg total) by mouth at bedtime. 90 tablet 3     The patient has been notified of following:     \"This telephone visit will be conducted via a call between you and your physician/provider. We have found that certain health care needs can be provided without the need for a physical exam.  This service lets us provide the care you need with a short phone conversation.  If a prescription is necessary we can send it directly to your pharmacy.  If lab work is needed we can place an order for that and you can then stop by our lab to have the test done at a later time.    Telephone visits are billed at different rates depending on your insurance coverage. During this emergency period, for some insurers they may be billed the same as an in-person visit.  Please reach out to your insurance provider with any questions.    If during the course of the call the physician/provider feels a telephone visit is not appropriate, you will not be charged for this service.\"    Patient has given verbal consent to a Telephone visit? Yes    Patient would like to receive their AVS by AVS Preference: [employer]ron.    Jairo Fraga MD       "

## 2021-06-12 NOTE — PROGRESS NOTES
Assessment/Plan:   Bee sting reaction  Bee sting, large local reaction right forearm.  Cannot rule out early cellulitis.  Has happened before.   - predniSONE (DELTASONE) 20 MG tablet; 40mg daily for 3-5 days then 20mg daily for 3-5 days  Dispense: 15 tablet; Refill: 0  - amoxicillin-clavulanate (AUGMENTIN) 875-125 mg per tablet; Take 1 tablet by mouth 2 (two) times a day for 7 days.  Dispense: 14 tablet; Refill: 0    Fluids, rest  Continue elevation and ice  Benadryl as needed  Prednisone as directed  If worse or no better, start antibiotic  Follow up as needed.     Subjective:      Meggan Everett is a 66 y.o. female who presents with a bee sting.  Yesterday she was stung by a bee or wasp on her right forearm and it swelled fairly quickly.  It is itchy and tender and red and hot.  It has gotten more red and swollen and the reaction is moving up her arm.  She has taken benadryl (new pack just bought for this) without much benefit. This has happened before and she was treated with antibiotics for cellulitis and with prednisone. She feels well otherwise, no fever, no URI or cough, no wheeze or throat tightness, no lip or tongue swelling, no shortness of breath.  In addition to her listed edications she also takes a beta blocker. No other rash. The following were reviewed by me:    Allergies   Allergen Reactions     Codeine      Tetanus Toxoid      Current Outpatient Prescriptions on File Prior to Visit   Medication Sig Dispense Refill     albuterol (PROVENTIL) 2.5 mg /3 mL (0.083 %) nebulizer solution Use 1 vial 4 times daily 75 mL 3     aspirin 81 MG EC tablet Take 1 tablet (81 mg total) by mouth daily. 90 tablet 3     blood glucose test (ACCU-CHEK YARELIS) strips Test 4 times daily 300 strip 3     celecoxib (CELEBREX) 200 MG capsule Take 1 capsule (200 mg total) by mouth daily. 90 capsule 3     furosemide (LASIX) 40 MG tablet Take 1 tablet daily as directed 90 tablet 3     insulin aspart (NOVOLOG FLEXPEN) 100 unit/mL  "injection pen Inject 6-10 Units under the skin 3 (three) times a day before meals. 30 mL 3     insulin glargine (LANTUS; BASAGLAR) 100 unit/mL (3 mL) pen Inject 30 units in the evening 6 adj dose pen 5     irbesartan (AVAPRO) 150 MG tablet Take 1 tablet (150 mg total) by mouth bedtime. 90 tablet 3     levothyroxine (SYNTHROID, LEVOTHROID) 125 MCG tablet Take 1 tablet (125 mcg total) by mouth daily. 90 tablet 3     LORazepam (ATIVAN) 0.5 MG tablet Take one tablet at bedtime 90 tablet 0     pen needle, diabetic (COMFORT EZ PEN NEEDLES) 31 gauge x 1/4\" Ndle Use 4 times a day with insulin pen 300 each 3     rosuvastatin (CRESTOR) 10 MG tablet Take 1 tablet (10 mg total) by mouth bedtime. 90 tablet 11     tiZANidine (ZANAFLEX) 4 MG tablet Take 1 tablet (4 mg total) by mouth bedtime. 90 tablet 3     methylPREDNISolone (MEDROL DOSEPACK) 4 mg tablet Follow the package directions. 21 tablet 2     metoprolol succinate (TOPROL-XL) 100 MG 24 hr tablet Take 1 tablet (100 mg total) by mouth daily. 90 tablet 3     No current facility-administered medications on file prior to visit.      Patient Active Problem List   Diagnosis     Hypothyroidism     Hyperparathyroidism     Hyperlipidemia     Serum Enzyme Levels - Alkaline Phosphatase Elevated     Hypertension     Hashimoto's Thyroiditis     Type 2 diabetes mellitus without complication, with long-term current use of insulin     Morbid Obesity     Migraine Headache     Esophageal Reflux     Gait - Swing Phase Foot Drop Right     Osteopenia     RBBB     Obstructive sleep apnea syndrome           Objective:     /64  Pulse 82  Temp 98  F (36.7  C) (Oral)   Resp 20  Wt (!) 247 lb 3.2 oz (112.1 kg)  SpO2 98%  BMI 43.79 kg/m2    Physical  General Appearance: Alert, cooperative, no distress  Head: Normocephalic, without obvious abnormality, atraumatic  Eyes: Conjunctivae are niru  Throat: Throat is normal.  No lip or tongue swelling  Lungs: Clear to auscultation bilaterally, " respirations unlabored  Heart: Regular rate and rhythm  Extremities: there is a flat red macule without residual stinger on her right lateral forearm.  Surrounding this is warm red itchy and tender swelling with demarcated border.  It extends toward the wrist and up to the elbow medially [id number] approximately.  The edges were outlined with skin marker. She feels mild swelling in her hand,  is okay, wrist and elbow motion intact without pain.  Pulses and cap refill normal.  Normal sensation.   Skin: no others rashes or lesions  Psychiatric: Patient has a normal mood and affect.

## 2021-06-12 NOTE — TELEPHONE ENCOUNTER
Controlled Substance Refill Request  Medication Name:   Requested Prescriptions     Pending Prescriptions Disp Refills     LORazepam (ATIVAN) 0.5 MG tablet [Pharmacy Med Name: LORAZEPAM 0.5MG TABLETS] 180 tablet 0     Sig: TAKE 1 TO 2 TABLETS(0.5 TO 1 MG) BY MOUTH AT BEDTIME     Date Last Fill: 6/15/20  [facility]  Controlled Substance Agreement on file:   Encounter-Level CSA Scan Date - 12/14/2018:    Scan on 12/21/2018  2:56 PM           Encounter-Level CSA Scan Date - 01/03/2018:    Scan on 1/3/2018: [facility]           Encounter-Level CSA Scan Date - 01/03/2018:    Scan on 1/8/2018 10:50 AM           Encounter-Level CSA Scan Date - 11/16/2015:    Scan on 11/18/2015  1:05 PM        Last office visit:  9/28/20

## 2021-06-13 NOTE — TELEPHONE ENCOUNTER
Refill Approved    Rx renewed per Medication Renewal Policy. Medication was last renewed on 9/28/20.    Elvira Rodas, [facility] Triage/Med Refill 12/11/2020     Requested Prescriptions   Pending Prescriptions Disp Refills     escitalopram oxalate (LEXAPRO) 10 MG tablet 30 tablet 2     Sig: Take 1 tablet (10 mg total) by mouth daily.       SSRI Refill Protocol  Passed - 12/10/2020 10:44 AM        Passed - PCP or prescribing provider visit in last year     Last office visit with prescriber/PCP: Visit date not found OR same dept: 2/24/2020 Federico Milian DO OR same specialty: 2/24/2020 Federico Milian DO  Last physical: Visit date not found Last MTM visit: Visit date not found   Next visit within 3 mo: Visit date not found  Next physical within 3 mo: Visit date not found  Prescriber OR PCP: Jairo Fraga MD  Last diagnosis associated with med order: 1. Primary osteoarthritis of both knees  - escitalopram oxalate (LEXAPRO) 10 MG tablet; Take 1 tablet (10 mg total) by mouth daily.  Dispense: 30 tablet; Refill: 2    If protocol passes may refill for 12 months if within 3 months of last provider visit (or a total of 15 months).

## 2021-06-13 NOTE — PROGRESS NOTES
Patient would like to be contacted via the following phone number [phone]     ASSESSMENT/PLAN:    1. Type 2 diabetes mellitus  Refilled.  Recent infection - skin of nose from CPAP.  Now improved. Some AM lantus.    - insulin aspart U-100 (NOVOLOG U-100 INSULIN ASPART) 100 unit/mL injection; diagnosis:70531  Dispense: 10 mL; Refill: 5  - insulin glargine (LANTUS SOLOSTAR PEN) 100 unit/mL (3 mL) pen; Inject 16 Units under the skin 2 (two) times a day.; Refill: 0    2. Gout  Refilled.   - celecoxib (CELEBREX) 200 MG capsule; Take 1 capsule (200 mg total) by mouth daily.  Dispense: 90 capsule; Refill: 3  - allopurinoL (ZYLOPRIM) 100 MG tablet; Take 1.5 tablets (150 mg total) by mouth daily.  Dispense: 135 tablet; Refill: 3    3. Essential hypertension  Refilled.   - metoprolol succinate (TOPROL-XL) 100 MG 24 hr tablet; Take 1 tablet (100 mg total) by mouth daily.  Dispense: 90 tablet; Refill: 3    4. Muscle spasms of both lower extremities  Refilled.   - tiZANidine (ZANAFLEX) 4 MG tablet; Take 1 tablet (4 mg total) by mouth at bedtime.  Dispense: 90 tablet; Refill: 3    5. Hashimoto's Thyroiditis  Refilled.   - levothyroxine (SYNTHROID, LEVOTHROID) 125 MCG tablet; Take 1 tablet (125 mcg total) by mouth daily.  Dispense: 90 tablet; Refill: 3    6. Hyperlipidemia  Refilled.   - rosuvastatin (CRESTOR) 10 MG tablet; Take 1 tablet (10 mg total) by mouth 3 (three) times a week.  Dispense: 36 tablet; Refill: 3    7. Edema  Refilled.   - furosemide (LASIX) 40 MG tablet; Take 1 tablet daily as directed  Dispense: 90 tablet; Refill: 5    8. Hypertension  Refilled.   - irbesartan (AVAPRO) 150 MG tablet; Take 1 tablet (150 mg total) by mouth at bedtime.  Dispense: 90 tablet; Refill: 3    9. Anxiety with depression as adjustment disorder  Some improvement.  Less panic, less crying.  Sleep is better.  I suggested that she increase her escitalopram but she declines for now.  Will continue at 10 mg po daily.     10.  Polyarthralgia/myalgia  Without overt synovitis. Suspect related to inactivity, poor conditioning, and lack of exercise.  We discussed a walking program and she reports she will start that.      CHIEF COMPLAINT:  Chief Complaint   Patient presents with     Follow-up     meds     HISTORY OF PRESENT ILLNESS:  Meggan is a 70 y.o. female contacting the clinic today via phone for follow up after starting escitalopram.  She feels it is helping some, less panic, better sleep.  Mainly significant musculoskeletal discomfort and muscle spasm.  Is very inactive.  No cough, or unusual dyspnea.        REVIEW OF SYSTEMS:   All other systems are negative.    PFSH:  Social History     Social History Narrative    She is  and is retired. She lives with her son and his family on the lower level of their house.  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  She has 4 grandchildren.  She does not smoke cigarettes or drink alcohol.     TOBACCO USE:  Social History     Tobacco Use   Smoking Status Never Smoker   Smokeless Tobacco Never Used     VITALS:  There were no vitals filed for this visit.  Wt Readings from Last 3 Encounters:   02/24/20 (!) 286 lb 1 oz (129.8 kg)   08/27/19 (!) 283 lb 3 oz (128.5 kg)   08/10/19 (!) 283 lb 6.4 oz (128.5 kg)     PHYSICAL EXAM:  (observations via Phone)  Alert, and oriented, in NAD. Thinking and speech are normal.     The visit lasted a total of  13  minutes     CA Intake Time: 6    I have reviewed and updated the patient's Past Medical History, Social History, Family History as appropriate.    MEDICATIONS: Reviewed and updated per CA and MD  Current Outpatient Medications   Medication Sig Dispense Refill     albuterol (PROAIR HFA;PROVENTIL HFA;VENTOLIN HFA) 90 mcg/actuation inhaler Inhale 2 puffs every 6 (six) hours as needed for wheezing. 1 each 11     albuterol (PROVENTIL) 2.5 mg /3 mL (0.083 %) nebulizer solution Use 1 vial 4 times daily 75  "mL 3     allopurinoL (ZYLOPRIM) 100 MG tablet Take 1.5 tablets (150 mg total) by mouth daily. 135 tablet 3     aspirin 81 MG EC tablet Take 1 tablet (81 mg total) by mouth daily. 90 tablet 3     blood glucose test (ACCU-CHEK YARELIS) strips Test 4 times daily 300 strip 3     celecoxib (CELEBREX) 200 MG capsule Take 1 capsule (200 mg total) by mouth daily. 90 capsule 3     colchicine 0.6 mg tablet Take 1 tablet (0.6 mg total) by mouth daily. 90 tablet 3     escitalopram oxalate (LEXAPRO) 10 MG tablet Take 1 tablet (10 mg total) by mouth daily. 30 tablet 2     furosemide (LASIX) 40 MG tablet Take 1 tablet daily as directed 90 tablet 5     generic lancets (ACCU-CHEK MULTICLIX LANCET) Use 1 each As Directed 4 (four) times a day. Dispense brand per patient's insurance at pharmacy discretion. 400 each 3     insulin aspart U-100 (NOVOLOG FLEXPEN U-100 INSULIN) 100 unit/mL (3 mL) injection pen Type 2 diabetes 15 mL 5     insulin glargine (LANTUS SOLOSTAR PEN) 100 unit/mL (3 mL) pen Inject 16 Units under the skin 2 (two) times a day. 12 adj dose pen 5     irbesartan (AVAPRO) 150 MG tablet Take 1 tablet (150 mg total) by mouth at bedtime. 90 tablet 3     ketoconazole (NIZORAL) 2 % cream Apply topically 2 (two) times a day. 60 g 5     levothyroxine (SYNTHROID, LEVOTHROID) 125 MCG tablet Take 1 tablet (125 mcg total) by mouth daily. 90 tablet 3     LORazepam (ATIVAN) 0.5 MG tablet TAKE 1 TO 2 TABLETS(0.5 TO 1 MG) BY MOUTH AT BEDTIME 180 tablet 0     metoprolol succinate (TOPROL-XL) 100 MG 24 hr tablet Take 1 tablet (100 mg total) by mouth daily. 90 tablet 3     pen needle, diabetic (COMFORT EZ PEN NEEDLES) 31 gauge x 1/4\" Ndle Use 4 times a day with insulin pen 300 each 3     predniSONE (DELTASONE) 5 MG tablet Take 5 mg by mouth daily PRN Gout or Asthma flares.       rosuvastatin (CRESTOR) 10 MG tablet Take 1 tablet (10 mg total) by mouth 3 (three) times a week. 36 tablet 3     tiZANidine (ZANAFLEX) 4 MG tablet Take 1 tablet (4 " "mg total) by mouth at bedtime. 90 tablet 3     The patient has been notified of following:     \"This telephone visit will be conducted via a call between you and your physician/provider. We have found that certain health care needs can be provided without the need for a physical exam.  This service lets us provide the care you need with a short phone conversation.  If a prescription is necessary we can send it directly to your pharmacy.  If lab work is needed we can place an order for that and you can then stop by our lab to have the test done at a later time.    Telephone visits are billed at different rates depending on your insurance coverage. During this emergency period, for some insurers they may be billed the same as an in-person visit.  Please reach out to your insurance provider with any questions.    If during the course of the call the physician/provider feels a telephone visit is not appropriate, you will not be charged for this service.\"    Patient has given verbal consent to a Telephone visit? Yes Angela Hernandez CMA    Patient would like to receive their AVS by AVS Preference: Aron.    Jairo Fraga MD  "

## 2021-06-13 NOTE — TELEPHONE ENCOUNTER
Refill Request  Did you contact pharmacy: Yes  Medication name:   Requested Prescriptions     Pending Prescriptions Disp Refills     escitalopram oxalate (LEXAPRO) 10 MG tablet 30 tablet 2     Sig: Take 1 tablet (10 mg total) by mouth daily.     Who prescribed the medication: Jairo Fraga MD   Requested Pharmacy:   Is patient out of medication: 1 week  Patient notified refills processed in 3 business days:  yes  Okay to leave a detailed message: no    FYI: Patient is requesting 90 days supply with 3 additional refills.

## 2021-06-13 NOTE — PROGRESS NOTES
Patient would like the video invitation sent by: Text to cell phone: lorena       ASSESSMENT:     1. Local infection of skin and subcutaneous tissue  Most likely mild cellulitis of the tip of the nose due to irritation from nasal piece of her CPAP.  Discussed to stop Neosporin since it can be irritating.  We will try topical Bactroban if it does not help then she will try Keflex.  She will call us if she is not better.    - mupirocin (BACTROBAN) 2 % ointment; Apply to affected area 3 times daily  Dispense: 22 g; Refill: 0  - cephalexin (KEFLEX) 500 MG capsule; Take 1 capsule (500 mg total) by mouth 4 (four) times a day for 10 days.  Dispense: 40 capsule; Refill: 0    2. ASHER (obstructive sleep apnea)  Her facial mask has not nasal peas it has been somewhat irritating and periodically causes blisters and infection.  Recommended to follow-up with sleep clinic to see if any adjustments can be made.  Currently she will put something soft like a foam to prevent driving.    3. Uncontrolled type 2 diabetes mellitus with hyperglycemia (H)  A1c several months ago was 7.7.  Sugars are going up which could be partly due to mild nasal cellulitis.  I recommended that she checks her sugars 3 times a day and schedule follow-up with his primary care to discuss adjustment in insulin.      CHIEF COMPLAINT:  Chief Complaint   Patient presents with     Nose Problem     sore below nose from CPAP machine       HISTORY OF PRESENT ILLNESS:  Meggan is a 70 y.o. female contacting the clinic today via video for acute visit due to rash and redness under her nose.    Patient has history of sleep apnea and wears CPAP.  Periodically the nasal piece of wood irritates under her nose.  Usually skin irritation and infection goes away after topical Neosporin.  Most recently she developed a blister and use Neosporin on it and currently redness is not getting better but getting worse.  She denies any drainage from it.  She has no history of cold sores.   She is unable to tolerate oral doxycycline.    She does have type 2 diabetes.  Previous A1c was 7.7 and she reports that she was most recently were on the higher side.    REVIEW OF SYSTEMS:    All other systems are negative.    PFSH:  Social History     Social History Narrative    She is  and is retired. She lives with her son and his family on the lower level of their house.  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  She has 4 grandchildren.  She does not smoke cigarettes or drink alcohol.         TOBACCO USE:  Social History     Tobacco Use   Smoking Status Never Smoker   Smokeless Tobacco Never Used       VITALS:  There were no vitals filed for this visit.  Wt Readings from Last 3 Encounters:   02/24/20 (!) 286 lb 1 oz (129.8 kg)   08/27/19 (!) 283 lb 3 oz (128.5 kg)   08/10/19 (!) 283 lb 6.4 oz (128.5 kg)       PHYSICAL EXAM:  (observations via Video)  Female in no acute distress affect is appropriate.  Mild redness under her nose noted with some crusting but no abscess is seen.      ADDITIONAL HISTORY SUMMARIZED (2): Chart reviewed  CARE EVERYWHERE/ EXTRA INFORMATION (1):   RADIOLOGY TESTS (1): No  LABS (1): Reviewed  CARDIOLOGY/MEDICINE TESTS (1): No  INDEPENDENT REVIEW (2 each):     Total data points: 3    Video Start time: 12:37 PM  Video End time: 12:45 PM    The visit lasted a total of 8 minutes     CA Intake Time: 9    I have reviewed and updated the patient's Past Medical History, Social History, Family History as appropriate.    MEDICATIONS: Reviewed and updated per CA and MD  Current Outpatient Medications   Medication Sig Dispense Refill     albuterol (PROAIR HFA;PROVENTIL HFA;VENTOLIN HFA) 90 mcg/actuation inhaler Inhale 2 puffs every 6 (six) hours as needed for wheezing. 1 each 11     albuterol (PROVENTIL) 2.5 mg /3 mL (0.083 %) nebulizer solution Use 1 vial 4 times daily 75 mL 3     allopurinoL (ZYLOPRIM) 100 MG tablet Take 1.5 tablets  "(150 mg total) by mouth daily. 135 tablet 3     aspirin 81 MG EC tablet Take 1 tablet (81 mg total) by mouth daily. 90 tablet 3     blood glucose test (ACCU-CHEK YARELIS) strips Test 4 times daily 300 strip 3     celecoxib (CELEBREX) 200 MG capsule Take 1 capsule (200 mg total) by mouth daily. 90 capsule 3     colchicine 0.6 mg tablet Take 1 tablet (0.6 mg total) by mouth daily. 90 tablet 3     escitalopram oxalate (LEXAPRO) 10 MG tablet Take 1 tablet (10 mg total) by mouth daily. 30 tablet 2     furosemide (LASIX) 40 MG tablet Take 1 tablet daily as directed 90 tablet 5     generic lancets (ACCU-CHEK MULTICLIX LANCET) Use 1 each As Directed 4 (four) times a day. Dispense brand per patient's insurance at pharmacy discretion. 400 each 3     insulin aspart U-100 (NOVOLOG FLEXPEN U-100 INSULIN) 100 unit/mL (3 mL) injection pen Inject 6-10 Units under the skin 3 (three) times a day before meals. 15 mL 3     insulin glargine (LANTUS SOLOSTAR PEN) 100 unit/mL (3 mL) pen Inject 16 Units under the skin 2 (two) times a day. 12 adj dose pen 5     irbesartan (AVAPRO) 150 MG tablet Take 1 tablet (150 mg total) by mouth at bedtime. 90 tablet 3     ketoconazole (NIZORAL) 2 % cream Apply topically 2 (two) times a day. 60 g 5     levothyroxine (SYNTHROID, LEVOTHROID) 125 MCG tablet Take 1 tablet (125 mcg total) by mouth daily. 90 tablet 3     LORazepam (ATIVAN) 0.5 MG tablet TAKE 1 TO 2 TABLETS(0.5 TO 1 MG) BY MOUTH AT BEDTIME 180 tablet 0     metoprolol succinate (TOPROL-XL) 100 MG 24 hr tablet Take 1 tablet (100 mg total) by mouth daily. 90 tablet 3     pen needle, diabetic (COMFORT EZ PEN NEEDLES) 31 gauge x 1/4\" Ndle Use 4 times a day with insulin pen 300 each 3     predniSONE (DELTASONE) 5 MG tablet Take 5 mg by mouth daily PRN Gout or Asthma flares.       rosuvastatin (CRESTOR) 10 MG tablet Take 1 tablet (10 mg total) by mouth 3 (three) times a week. 36 tablet 3     tiZANidine (ZANAFLEX) 4 MG tablet Take 1 tablet (4 mg total) " "by mouth at bedtime. 90 tablet 3     No current facility-administered medications for this visit.          The patient has been notified of following:     \"This video visit will be conducted via a call between you and your physician/provider. We have found that certain health care needs can be provided without the need for an in-person physical exam.  This service lets us provide the care you need with a video conversation.  If a prescription is necessary we can send it directly to your pharmacy.  If lab work is needed we can place an order for that and you can then stop by our lab to have the test done at a later time.    Video visits are billed at different rates depending on your insurance coverage. Please reach out to your insurance provider with any questions.    If during the course of the call the physician/provider feels a video visit is not appropriate, you will not be charged for this service.\"    Patient has given verbal consent to a Video visit? Yes  How would you like to obtain your AVS? AVS Preference: MyChart.  If dropped by the video visit, the video invitation should be sent to: Text to cell phone: [phone]  Will anyone else be joining your video visit? No     Video-Visit Details    Type of service:  Video Visit    Originating Location (pt. Location): Home    Distant Location (provider location):  [facility] INTERNAL MEDICINE     Platform used for Video Visit: Phillip Hernandez CMA      "

## 2021-06-15 NOTE — PROGRESS NOTES
"[facility] Follow Up Note    Meggan Everett   67 y.o. female    Date of Visit: 1/19/2018    Chief Complaint   Patient presents with     Follow-up     from urgent care for abnormal EKG. care everywhere in chart and pt has copy with her     Subjective  Meggan comes in today due to being told she has a \"long QT\" on an EKG that was performed at an urgent care on January 12.  She had presented there due to some chest tightness sensation and also had influenza symptoms on the same day.  This was prior to treatment of influenza which she has now completed.  Has a known right bundle branch block.  She comes in today for a recheck with me regarding this.  She brings in the EKG that they were concerned about which to my eye shows just a sinus rhythm with right bundle branch block and a QT interval of 376 with a QTC of 482.    ROS A comprehensive review of systems was performed and was otherwise negative    Social History:   Social History     Social History Narrative    She is  and is retired. She lives with her son and his family on the lower level of their house.  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  She has 4 grandchildren.  She does not smoke cigarettes or drink alcohol.       Medications were reconciled.  Allergies, social and family history, and the problem list were all reviewed and updated.    Old records reviewed: Records from the urgent care.    Exam  General Appearance: Pleasant and alert  Vitals:    01/19/18 1155   BP: 136/66   Patient Site: Left Arm   Patient Position: Sitting   Cuff Size: Adult Large   Pulse: 79   Weight: (!) 290 lb 1.6 oz (131.6 kg)      Body mass index is 51.39 kg/(m^2).  Wt Readings from Last 3 Encounters:   01/19/18 (!) 290 lb 1.6 oz (131.6 kg)   01/03/18 (!) 292 lb (132.5 kg)   09/02/17 (!) 247 lb 3.2 oz (112.1 kg)     HEENT: Sclera are clear.   Lungs: Normal respirations, minimal expiratory " wheezes  Cardiac: Regular rate and rhythm  Abdomen: Soft and nondistended  Extremities: No edema  Skin: No rashes  Neuro: Moves all extremities and has facial symmetry  Gait: Ambulates with a normal gait    EKG done today shows normal sinus rhythm with a QT interval of 420 and a QTC of 484.  This is compared to previous EKGs and the one at the urgency room.  Also shows a right bundle branch block.    Assessment/Plan  1. RBBB  She has a chronic right bundle branch block.  No evidence of long QTC syndrome.    2. Abnormal EKG  As above.  - Electrocardiogram Perform and Read    3. Type 2 diabetes mellitus without complication, with long-term current use of insulin  Recently increased her insulin due to high A1c level of 8.6 mm within the last couple of weeks and she will be following up with me in about 6 weeks to recheck.  Her sugars are already improving.    4. Influenza A  Feels like her symptoms are improving, encouraged her to use her nebulizers a bit more often.          Stacey Kelly MD  1/19/2018    Much or all of the text in this note was generated through the use of Dragon Dictate voice-to-text software. Errors in spelling or words which seem out of context are unintentional. Sound alike errors, in particular, may have escaped editing.

## 2021-06-15 NOTE — PROGRESS NOTES
[facility] Follow Up Note    Meggan Everett   67 y.o. female    Date of Visit: 1/3/2018    Chief Complaint   Patient presents with     Follow-up     DM follow up     Subjective  Meggan is in today after I have not seen her in just over a year.  She recently moved.  She has gained some weight and is felt more fatigued and wonders if her thyroid dose is off.  She has not been checking her sugars recently and she has not been eating as well as her kitchen is now upstairs and she has yet to get her own kitchen established in her lower level floor.    ROS A comprehensive review of systems was performed and was otherwise negative    Social History:   Social History     Social History Narrative    She is  and is retired. She lives with her son and his family on the lower level of their house.  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  She has 4 grandchildren.  She does not smoke cigarettes or drink alcohol.       Medications were reconciled.  Allergies, social and family history, and the problem list were all reviewed and updated.    Exam  General Appearance: Pleasant and alert  Vitals:    01/03/18 1306 01/03/18 1325   BP: 130/70 128/78   Patient Site: Left Arm    Patient Position: Sitting    Cuff Size: Adult Regular    Pulse: 80    Weight: (!) 292 lb (132.5 kg)       Body mass index is 51.73 kg/(m^2).  Wt Readings from Last 3 Encounters:   01/03/18 (!) 292 lb (132.5 kg)   09/02/17 (!) 247 lb 3.2 oz (112.1 kg)   06/27/17 (!) 278 lb 14.4 oz (126.5 kg)     HEENT: Sclera are clear.   Lungs: Normal respirations  Abdomen: Soft and nondistended  Extremities: No edema  Skin: No rashes  Neuro: Moves all extremities and has facial symmetry  Gait: Ambulates with a normal gait    Assessment/Plan  1. Type 2 diabetes mellitus without complication, with long-term current use of insulin  A1c levels not at goal.  Will change her insulin to 25 units twice daily  and increase her short-acting to 12 units with meals.  Have her return to clinic in 6 weeks for recheck.  - Glycosylated Hemoglobin A1c  - insulin glargine (LANTUS; BASAGLAR) 100 unit/mL (3 mL) pen; Inject 25 units twice daily  Dispense: 12 adj dose pen; Refill: 5    2. Hyperlipidemia, unspecified hyperlipidemia type  Needs a statin and aspirin daily.  - Hepatic Profile  - Lipid Cascade    3. Hypothyroidism due to Hashimoto's thyroiditis  Check to ensure she is on adequate dosing.  - Thyroid Stimulating Hormone (TSH)    4. Hypertension  Stable on medication.  - Basic Metabolic Panel  - HM2(CBC w/o Differential)    5. Menopause  - DXA Bone Density Scan; Future    6. Type 2 diabetes mellitus  - insulin aspart (NOVOLOG FLEXPEN) 100 unit/mL injection pen; Inject 12 Units under the skin 3 (three) times a day before meals.  Dispense: 30 mL; Refill: 3          April D MD Leticia  1/3/2018    Much or all of the text in this note was generated through the use of Dragon Dictate voice-to-text software. Errors in spelling or words which seem out of context are unintentional. Sound alike errors, in particular, may have escaped editing.

## 2021-06-16 NOTE — TELEPHONE ENCOUNTER
Prescription Monitoring Program activity reviewed with no discrepancies noted.      Last fill per : 10/19/20  Quantity/days supply: 180 tabs x 90 days     Controlled Substance Agreement on file: Yes  Date: 2/24/20    Last office visit with provider:  2/24/2020 11/23/20 Federico Milian, DO    Please advise.

## 2021-06-16 NOTE — TELEPHONE ENCOUNTER
Controlled Substance Refill Request  Medication Name:   Requested Prescriptions     Pending Prescriptions Disp Refills     LORazepam (ATIVAN) 0.5 MG tablet 180 tablet 0     Date Last Fill: 10/19/20  Requested Pharmacy: [facility]  Submit electronically to pharmacy  Controlled Substance Agreement on file:   Encounter-Level CSA Scan Date - 12/14/2018:    Scan on 12/21/2018  2:56 PM           Encounter-Level CSA Scan Date - 01/03/2018:    Scan on 1/3/2018: HE           Encounter-Level CSA Scan Date - 01/03/2018:    Scan on 1/8/2018 10:50 AM           Encounter-Level CSA Scan Date - 11/16/2015:    Scan on 11/18/2015  1:05 PM        Last office visit:  11/23/20

## 2021-06-16 NOTE — TELEPHONE ENCOUNTER
Telephone Encounter by Deidre Preciado LPN at 2/12/2019  3:07 PM     Author: Deidre Preciado LPN Service: -- Author Type: Licensed Nurse    Filed: 2/12/2019  3:16 PM Encounter Date: 2/12/2019 Status: Signed    : Deidre Preciado LPN (Licensed Nurse)       Medication: Lorazepam  Last Date Filled 12/14/18   pulled: YES         Only PCP Prescribing? : YES  Taken as prescribed from physician notes? YES OV 12/14    1. Update CSA, PM use of lorazepam to prevent vertigo which was determined to be a migraine variant by a specialist in [place]    CSA in last year: YES  Random Utox in last year: YES  (if any of the above answer NO - schedule with PCP)     Opioids + benzodiazepines? NO  (if the above answer YES - schedule with PCP every 6 months)     All responses suggest: Refilling prescription

## 2021-06-16 NOTE — TELEPHONE ENCOUNTER
Telephone Encounter by Charlette Valenzuela CMA at 3/17/2020  8:56 AM     Author: Charlette Valenzuela CMA Service: -- Author Type: Certified Medical Assistant    Filed: 3/17/2020  8:59 AM Encounter Date: 3/14/2020 Status: Signed    : Charlette Valenzuela CMA (Certified Medical Assistant)       Medication: Lorazepam   Last Date Filled 10/21/19   pulled: YES    Only PCP Prescribing? : YES  Taken as prescribed from physician notes? YES    CSA in last year: YES  Random Utox in last year: NO  (if any of the above answer NO - schedule with PCP)     Opioids + benzodiazepines? NO  (if the above answer YES - schedule with PCP every 6 months)     Is patient on the Executive Review Team? NO      All responses suggest: Refilling prescription     Return in about 3 months (around 5/24/2020) for Recheck, Diabetes.

## 2021-06-16 NOTE — TELEPHONE ENCOUNTER
Telephone Encounter by Angela Hastings CMA at 10/21/2019  2:04 PM     Author: Angela Hastings CMA Service: -- Author Type: Certified Medical Assistant    Filed: 10/21/2019  2:24 PM Encounter Date: 10/18/2019 Status: Signed    : Angela Hastings CMA (Certified Medical Assistant)

## 2021-06-17 NOTE — PATIENT INSTRUCTIONS - HE
Patient Instructions by Federico Milian DO at 8/27/2019 10:40 AM     Author: Federico Milian DO Service: -- Author Type: Physician    Filed: 8/27/2019 11:10 AM Encounter Date: 8/27/2019 Status: Signed    : Federico Milian DO (Physician)         Patient Education     Exercise for a Healthier Heart  You may wonder how you can improve the health of your heart. If youre thinking about exercise, youre on the right track. You dont need to become an athlete, but you do need a certain amount of brisk exercise to help strengthen your heart. If you have been diagnosed with a heart condition, your doctor may recommend exercise to help stabilize your condition. To help make exercise a habit, choose safe, fun activities.       Be sure to check with your health care provider before starting an exercise program.    Why exercise?  Exercising regularly offers many healthy rewards. It can help you do all of the following:    Improve your blood cholesterol levels to help prevent further heart trouble    Lower your blood pressure to help prevent a stroke or heart attack    Control diabetes, or reduce your risk of getting this disease    Improve your heart and lung function    Reach and maintain a healthy weight    Make your muscles stronger and more limber so you can stay active    Prevent falls and fractures by slowing the loss of bone mass (osteoporosis)    Manage stress better  Exercise tips  Ease into your routine. Set small goals. Then build on them.  Exercise on most days. Aim for a total of 150 or more minutes of moderate to  vigorous intensity activity each week. Consider 40 minutes, 3 to 4 times a week. For best results, activity should last for 40 minutes on average. It is OK to work up to the 40 minute period over time. Examples of moderate-intensity activity is walking one mile in 15 minutes or 30 to 45 minutes of yard work.  Step up your daily activity level. Along with your exercise program, try  being more active throughout the day. Walk instead of drive. Do more household tasks or yard work.  Choose one or more activities you enjoy. Walking is one of the easiest things you can do. You can also try swimming, riding a bike, or taking an exercise class.  Stop exercising and call your doctor if you:    Have chest pain or feel dizzy or lightheaded    Feel burning, tightness, pressure, or heaviness in your chest, neck, shoulders, back, or arms    Have unusual shortness of breath    Have increased joint or muscle pain    Have palpitations or an irregular heartbeat      2916-9466 [employer]. [address] [place] [address]. All rights reserved. This information is not intended as a substitute for professional medical care. Always follow your healthcare professional's instructions.         Patient Education   Preventing Falls in the Home  As you get older, falls are more likely. Thats because your reaction time slows. Your muscles and joints may also get stiffer, making them less flexible. Illness, medications, and vision changes can also affect your balance. A fall could leave you unable to live on your own. To make your home safer, follow these tips:    Floors    Put nonskid pads under area rugs.    Remove throw rugs.    Replace worn floor coverings.    Tack carpets firmly to each step on carpeted stairs. Put nonskid strips on the edges of uncarpeted stairs.    Keep floors and stairs free of clutter and cords.    Arrange furniture so there are clear pathways.    Clean up any spills right away.    Bathrooms    Install grab bars in the tub or shower.    Apply nonskid strips or put a nonskid rubber mat in the tub or shower.    Sit on a bath chair to bathe.    Use bathmats with nonskid backing.    Lighting    Keep a flashlight in each room.    Put a nightlight along the pathway between the bedroom and the bathroom.    1772-4325 [employer]. [address] [place] [address]  [address]. All rights reserved. This information is not intended as a substitute for professional medical care. Always follow your healthcare professional's instructions.         Patient Education   Understanding Advance Care Planning  Advance care planning is the process of deciding ones own future medical care. It helps ensure that if you cant speak for yourself, your wishes can still be carried out. The plan is a series of legal documents that note a persons wishes. The documents vary by state. Advance care planning may be done when a person has a serious illness that is expected to get worse. It may be done before major surgery. And it can help you and your family be prepared in case of a major illness or injury. Advance care planning helps with making decisions at these times.       A health care proxy is a person who acts as the voice of a patient when the patient cant speak for himself or herself. The name of this role varies by state. It may be called a Durable Medical Power of  or Durable Power of  for Healthcare. It may be called an agent, surrogate, or advocate. Or it may be called a representative or decision maker. It is an official duty that is identified by a legal document. The document also varies by state.    Why Is Advance Care Planning Important?  If a person communicates their healthcare wishes:    They will be given medical care that matches their values and goals.    Their family members will not be forced to make decisions in a crisis with no guidance.  Creating a Plan  Making an advance care plan is often done in 3 steps:    Thinking about ones wishes. To create an advance care plan, you should think about what kind of medical treatment you would want if you lose the ability to communicate. Are there any situations in which you would refuse or stop treatment? Are there therapies you would want or not want? And whom do you want to make decisions for you? There are many places to  learn more about how to plan for your care. Ask your doctor or  for resources.    Picking a health care proxy. This means choosing a trusted person to speak for you only when you cant speak for yourself. When you cannot make medical decisions, your proxy makes sure the instructions in your advance care plan are followed. A proxy does not make decisions based on his or her own opinions. They must put aside those opinions and values if needed, and carry out your wishes.    Filling out the legal documents. There are several kinds of legal documents for advance care planning. Each one tells health care providers your wishes. The documents may vary by state. They must be signed and may need to be witnessed or notarized. You can cancel or change them whenever you wish. Depending on your state, the documents may include a Healthcare Proxy form, Living Will, Durable Medical Power of , Advance Directive, or others.  The Familys Role  The best help a family can give is to support their loved ones wishes. Open and honest communication is vital. Family should express any concerns they have about the patients choices while the patient can still make decisions.    5858-2250 [employer]. [address] [place] [address]. All rights reserved. This information is not intended as a substitute for professional medical care. Always follow your healthcare professional's instructions.         Also, [facility] offers a free, downloadable health care directive that allows you to share your treatment choices and personal preferences if you cannot communicate your wishes. It also allows you to appoint another person (called a health care agent) to make health care decisions if you are unable to do so. You can download an advance directive by going here: [email or web address]     Patient Education   Personalized Prevention Plan  You are due for the  preventive services outlined below.  Your care team is available to assist you in scheduling these services.  If you have already completed any of these items, please share that information with your care team to update in your medical record.  Health Maintenance   Topic Date Due   ? HEPATITIS C SCREENING  1950   ? ZOSTER VACCINES (1 of 2) 10/17/1999   ? MEDICARE ANNUAL WELLNESS VISIT  10/17/2014   ? DIABETES FOLLOW-UP  06/14/2019   ? INFLUENZA VACCINE RULE BASED (1) 08/01/2019   ? DIABETES OPHTHALMOLOGY EXAM  09/25/2019   ? COLOGUARD  01/04/2020   ? DIABETES FOOT EXAM  10/12/2019   ? DIABETES URINE MICROALBUMIN  10/12/2019   ? DIABETES HEMOGLOBIN A1C  10/30/2019   ? ASTHMA CONTROL TEST  12/14/2019   ? FALL RISK ASSESSMENT  12/14/2019   ? DXA SCAN  01/18/2020   ? MAMMOGRAM  07/30/2020   ? ADVANCE CARE PLANNING  11/16/2020   ? PNEUMOCOCCAL POLYSACCHARIDE VACCINE AGE 65 AND OVER  Completed   ? PNEUMOCOCCAL CONJUGATE VACCINE FOR ADULTS (PCV13 OR PREVNAR)  Completed   ? TD 18+ HE  Discontinued   ? FECAL OCCULT BLOOD/FIT ANNUAL COLON CANCER SCREENING  Discontinued

## 2021-06-17 NOTE — PATIENT INSTRUCTIONS - HE
Patient Instructions by Teagan Yeboah MD at 8/10/2019 10:40 AM     Author: Teagan Yeboah MD Service: -- Author Type: Physician    Filed: 8/10/2019  2:16 PM Encounter Date: 8/10/2019 Status: Addendum    : Teagan Yeboah MD (Physician)    Related Notes: Original Note by Teagan Yeboah MD (Physician) filed at 8/10/2019 12:33 PM       1. Keep well hydrated  2. May alternate Tylenol every 6 hours with ibuprofen every 6 hours as needed for fever or pain  3. If follow up is needed, try and be seen at your primary clinic. Or you can be seen by any primary care provider at one of our other [facility] sites  4. If you have any questions, call the clinic number  - it's answered 24/7    Patient Education     Acute Bacterial Rhinosinusitis (ABRS)    Acute bacterial rhinosinusitis (ABRS) is an infection of your nasal cavity and sinuses. Its caused by bacteria. Acute means that youve had symptoms for less than 4 weeks, but possibly up to 12 weeks.  Understanding your sinuses  The nasal cavity is the large air-filled space behind your nose. The sinuses are a group of spaces formed by the bones of your face. They connect with your nasal cavity. ABRS causes the tissue lining these spaces to become inflamed. Mucus may not drain normally. This leads to facial pain and other symptoms.  What causes ABRS?  ABRS most often follows an upper respiratory infection caused by a virus. Bacteria then infect the lining of your nasal cavity and sinuses. But you can also get ABRS if you have:    Nasal allergies    Long-term nasal swelling and congestion not caused by allergies    Blockage in the nose  Symptoms of ABRS  The symptoms of ABRS may be different for each person and include:    Nasal congestion or blockage    Pain or pressure in the face    Thick, colored drainage from the nose  Other symptoms may include:    Runny nose    Fluid draining from the nose down the throat (postnasal  drip)    Headache    Cough    Pain    Fever  Diagnosing ABRS  ABRS may be diagnosed if youve had an upper respiratory infection like a cold and cough for 10 or more days without improvement or with worsening symptoms. Your healthcare provider will ask about your symptoms and your medical history. The provider will check your vital signs, including your temperature. Youll have a physical exam. The healthcare provider will check your ears, nose, and throat. You likely wont need any tests. If ABRS comes back, you may have a culture or other tests.  Treatment for ABRS  Treatment may include:    Antibiotic medicine. This is for symptoms that last for at least 10 to 14 days.    Nasal corticosteroid medicine. Drops or spray used in the nose can lessen swelling and congestion.    Over-the-counter pain medicine. This is to lessen sinus pain and pressure.    Nasal decongestant medicine. Spray or drops may help to lessen congestion. Do not use them for more than a few days.    Salt wash (saline irrigation). This can help to loosen mucus.  Possible complications of ABRS  ABRS may come back or become long-term (chronic). In rare cases, ABRS may cause complications such as:     Inflamed tissue around the brain and spinal cord (meningitis)    Inflamed tissue around the eyes (orbital cellulitis)    Inflamed bones around the sinuses (osteitis)  These problems may need to be treated in a hospital with intravenous (IV) antibiotic medicine or surgery.  When to call the healthcare provider  Call your healthcare provider if you have any of the following:    Symptoms that dont get better, or get worse    Symptoms that dont get better after 3 to 5 days on antibiotics    Trouble seeing    Swelling around your eyes    Confusion or trouble staying awake   Date Last Reviewed: 5/1/2017 2000-2017 [employer]. [address] [place] [address], [place] [address]. All rights reserved. This information is not intended as a substitute for  professional medical care. Always follow your healthcare professional's instructions.     Patient Education     Asthma (Adult)  Asthma is a disease where the medium and  small air passages within the lung go into spasm and restrict the flow of air. Inflammation and swelling of the airways cause further blockage. During an acute asthma attack, these factors cause trouble breathing, wheezing, cough and chest tightness.    An asthma attack can be triggered by many things. Common triggers include infections such as the common cold, bronchitis, and pneumonia. Irritants such as smoke or pollutants in the air, very cold air, emotional upset, and exercise can also trigger an attack. In many adults with asthma, allergies to dust, mold, pollen and animal dander can cause an asthma attack. Skipping doses of daily asthma medicine can also bring on an asthma attack.  Asthma can be controlled using the proper medicines prescribed by your healthcare provider and avoiding exposure to known triggers including allergens and irritants.  Home care    Take prescribed medicine exactly at the times advised. If you need medicine such as from a hand held inhaler or aerosol breathing machine more than every 4 hours, contact your healthcare provider or seek immediate medical attention. If prescribed an antibiotic or prednisone, take all of the medicine as prescribed, even if you are feeling better after a few days.    Don't smoke. Avoid being exposed to the smoke of others.    Some people with asthma have worsening of their symptoms when they take aspirin and non-steroidal or fever-reducing medicines like ibuprofen and naproxen. Talk to your healthcare provider if you think this may apply to you.  Follow-up care  Follow up with your healthcare provider, or as advised. Always bring all of your current medicines to any appointments with your healthcare provider. Also bring a complete list of medicines even those not taken for asthma. If you  "don't already have one, talk to your healthcare provider about developing your own \"Asthma Action Plan.\"  A pneumococcal (pneumonia) vaccine and yearly flu shot (every fall) are recommended. Ask your doctor about this.  When to seek medical advice  Call your healthcare provider right away if any of these occur:     Increased wheezing or shortness of breath    Need to use your inhalers more often than usual without relief    Fever of 100.4 F (38 C) or higher, or as directed by your healthcare provider    Coughing up lots of dark-colored or bloody sputum (mucus)    Chest pain with each breath    If you use a peak flow meter as part of an Asthma Action Plan, and you are still in the yellow zone (50% to 80%) 15 minutes after using inhaler medicine.  Call 911  Call 911 if any of the following occur    Trouble walking or talking because of shortness of breath    If you use a peak flow meter as part of an Asthma Action Plan and you are still in the red zone (less than 50%) 15 minutes after using inhaler medicine    Lips or fingernails turning gray or blue  Date Last Reviewed: 5/1/2017 2000-2017 [employer]. [address] [place] [address]. All rights reserved. This information is not intended as a substitute for professional medical care. Always follow your healthcare professional's instructions.                "

## 2021-06-17 NOTE — TELEPHONE ENCOUNTER
Telephone Encounter by Charlette Valenzuela CMA at 10/19/2020  2:09 PM     Author: Charlette Valenzuela CMA Service: -- Author Type: Certified Medical Assistant    Filed: 10/19/2020  2:11 PM Encounter Date: 10/18/2020 Status: Signed    : Charlette Valenzuela CMA (Certified Medical Assistant)       Medication: Lorazepam   Last Date Filled 6/15/2020   pulled: YES    Only PCP Prescribing? : YES  Taken as prescribed from physician notes? YES    CSA in last year: YES  Random Utox in last year: NO  (if any of the above answer NO - schedule with PCP)     Opioids + benzodiazepines? NO  (if the above answer YES - schedule with PCP every 6 months)     Is patient on the Executive Review Team? NO      All responses suggest: Refilling prescription

## 2021-06-17 NOTE — TELEPHONE ENCOUNTER
Spoke to [facility]. They couldn't answer her questions.    Meggan had a number of covid isolation questions.  She knows what the recommendations are. She was hoping to find someone who would give her permission to spend times with her grandsons.      She understands that the decision to spend time with them, wearing masks, wiping down surfaces, is her's to make.    COVID 19 Nurse Triage Plan/Patient Instructions    Please be aware that novel coronavirus (COVID-19) may be circulating in the community. If you develop symptoms such as fever, cough, or SOB or if you have concerns about the presence of another infection including coronavirus (COVID-19), please contact your health care provider or visit [email or web address].     Disposition/Instructions    Home care recommended. Follow home care protocol based instructions.    Thank you for taking steps to prevent the spread of this virus.  o Limit your contact with others.  o Wear a simple mask to cover your cough.  o Wash your hands well and often.    Resources    [facility]: About COVID-19: [email or web address]    CDC: What to Do If You're Sick: www.cdc.gov/coronavirus/2019-ncov/about/steps-when-sick.html    CDC: Ending Home Isolation: www.cdc.gov/coronavirus/2019-ncov/hcp/disposition-in-home-patients.html     CDC: Caring for Someone: www.cdc.gov/coronavirus/2019-ncov/if-you-are-sick/care-for-someone.html     [facility]: Interim Guidance for Hospital Discharge to Home: www.health.[facility].[place]/diseases/coronavirus/hcp/hospdischarge.pdf    [facility] clinical trials (COVID-19 research studies): clinicalaffairs.[facility]/n-clinical-trials     Below are the COVID-19 hotlines at the [facility] ([facility]). Interpreters are available.   o For health questions: Call 296-257-4732 or 1-915.170.5112 (7 a.m. to 7 p.m.)  o For questions about schools and childcare: Call 686-330-0999 or 1-724.124.3043 (7 a.m. to 7 p.m.)         Reason for  Disposition    Information only question and nurse able to answer    Protocols used: NO PROTOCOL AVAILABLE - INFORMATION ONLY-AHOMERO HOUSTON RN FNA

## 2021-06-17 NOTE — TELEPHONE ENCOUNTER
Telephone Encounter by Angela Hastings CMA at 6/15/2020 12:58 PM     Author: Angela Hastings CMA Service: -- Author Type: Certified Medical Assistant    Filed: 6/15/2020 12:59 PM Encounter Date: 6/12/2020 Status: Signed    : Angela Hastings CMA (Certified Medical Assistant)       Medication: Lorazepam  Last Date Filled 3/17/20   pulled: YES    Only PCP Prescribing? : YES  Taken as prescribed from physician notes? YES    CSA in last year: YES  Random Utox in last year: NO  (if any of the above answer NO - schedule with PCP)     Opioids + benzodiazepines? YES  (if the above answer YES - schedule with PCP every 6 months)     Is patient on the Executive Review Team? no      All responses suggest: Refilling prescription

## 2021-06-19 NOTE — LETTER
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 For this reason it is difficult for he to sit for long periods of time. 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     If you have any questions or concerns, please don't hesitate to call.    Sincerely,        Electronically signed by Federico Milian DO

## 2021-06-20 NOTE — LETTER
Letter by Federico Milian DO at      Author: Federico Milian DO Service: -- Author Type: --    Filed:  Encounter Date: 2/24/2020 Status: (Other)         [facility] INTERNAL MEDICINE  02/24/20    Patient: Meggan Everett  YOB: 1950  Medical Record Number: [id number]  CSN: [id number]                                                                              Non-opioid Controlled Substance Agreement    I understand that my care provider has prescribed a controlled substance to help manage my condition(s). I am taking this medicine to help me function or work. I know this is strong medicine, and that it can cause serious side effects. Controlled substances can be sedating, addicting and may cause a dependency on the drug. They can affect my ability to drive or think, and cause depression. They need to be taken exactly as prescribed. Combining controlled substances with certain medicines or chemicals (such as cocaine, sedatives and tranquilizers, sleeping pills, meth) can be dangerous or even fatal. Also, if I stop controlled substances suddenly, I may have severe withdrawal symptoms.  If not helpful, I may be asked to stop them.    The risks, benefits, and side effects of these medicine(s) were explained to me. I agree that:    1. I will take part in other treatments as advised by my care team. This may be psychiatry or counseling, physical therapy, behavioral therapy, group treatment or a referral to a pain clinic. I will reduce or stop my medicine when my care team tells me to do so.  2. I will take my medicines as prescribed. I will not change the dose or schedule unless my care team tells me to. There will be no refills if I run out early.  I may be contactedwithout warning and asked to complete a urine drug test or pill count at any time.   3. I will keep all my appointments, and understand this is part of the monitoring of controlled substances. My care team may require an office visit for EVERY  controlled substance refill. If I miss appointments or dont follow instructions, my care team may stop my medicine.  4. I will not ask other providers to prescribe controlled substances, and I will not accept controlled substances from other people. If I need another prescribed controlled substance for a new reason, I will tell my care team within 1 business day.  5. I will use one pharmacy to fill all of my controlled substance prescriptions, and it is up to me to make sure that I do not run out of my medicines on weekends or holidays. If my care team is willing to refill my controlled substance prescription without a visit, I must request refills only during office hours, refills may take up to 3 days to process, and it may take up to 5 to 7 days for my medicine to be mailed and ready at my pharmacy. Prescriptions will not be mailed anywhere except my pharmacy.    6. I am responsible for my prescriptions. If the medicine/prescription is lost or stolen, it will not be replaced. I also agree not to share controlled substance medicines with anyone.          [facility] INTERNAL MEDICINE  02/24/20  Patient:  Meggan Everett  YOB: 1950  Medical Record Number: [id number]  CSN: [id number]    7. I agree to not use ANY illegal or recreational drugs. This includes marijuana, cocaine, bath salts or other drugs. I agree not to use alcohol unless my care team says I may. I agree to give urine samples whenever asked. If I dont give a urine sample, the care team may stop my medicine.    8. If I enroll in the [place] Medical Marijuana program, I will tell my care team. I will also sign an agreement to share my medical records with my care team.    9. I will bring in my list of medicines (or my medicine bottles) each time I come to the clinic.   10. I will tell my care team right away if I become pregnant or have a new medical problem treated outside of my regular clinic.  11. I understand that this medicine can affect my  thinking and judgment. It may be unsafe for me to drive, use machinery and do dangerous tasks. I will not do any of these things until I know how the medicine affects me. If my dose changes, I will wait to see how it affects me. I will contact my care team if I have concerns about medicine side effects.    I understand that if I do not follow any of the conditions above, my prescriptions or treatment may be stopped.      I agree that my provider, clinic care team, and pharmacy may work with any city, state or federal law enforcement agency that investigates the misuse, sale, or other diversion of my controlled medicine. I will allow my provider to discuss my care with or share a copy of this agreement with any other treating provider, pharmacy or emergency room where I receive care. I agree to give up (waive) any right of privacy or confidentiality with respect to these consents.   I have read this agreement and have asked questions about anything I did not understand.    ___________________________________________________________________________  Patient signature - Date/Time  -Meggan Everett                                      ___________________________________________________________________________  Witness signature                                                                    ___________________________________________________________________________  Provider signature- Federico Milian,

## 2021-06-21 NOTE — PROGRESS NOTES
MTM Initial Encounter  Assessment & Plan                                                     1. Type 2 diabetes mellitus without complication, with long-term current use of insulin (H)  Not at goal A1c less than 7.5% per ADA guidelines, however is experiencing significant hypoglycemia now with significant changes to her diet.  I encouraged her to decrease her basal insulin, as she is now on much less of bolus insulin as well.  Decreasing insulin will also help her weight loss efforts.  Recommend to decrease Lantus dose, and monitor blood sugars fasting and postprandially.  We will follow-up via my chart in 1-2 weeks to reassess blood sugar control and resolution of hypoglycemia.  Stable on aspirin, statin, ARB.  -Decrease insulin glargine (LANTUS; BASAGLAR) 100 unit/mL (3 mL) pen; Inject 15 Units under the skin 2 (two) times a day.  Dispense: 12 adj dose pen; Refill: 5    2. Hyperuricemia  Significantly elevated uric acid.  She is trying to get off of medications and is very adamant not to start an additional medication.  Provide education about this level of uric acid in her body and impact on chronic pain and will likely result in chronic gout flares.  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  I recommended that she initiate a xanthine oxidase inhibitor such as allopurinol, now that she is already on an anti-inflammatory medication of daily Celebrex to prevent rebound flare.  She would like to wait and talk to to the rheumatologist about this.  -Recommend to initiate allopurinol, to be discussed with rheumatology    3. Hypertension  Stable. Recommended to continue current regimen.     4. Hypercholesterolemia  Stable. Recommended to continue current regimen.     5. Hypothyroidism due to Hashimoto's thyroiditis  She has had some symptoms which may suggest hyperthyroid, however I expect that these are actually hypoglycemic events.  Recommend continue current thyroid dose, as this will help her with weight loss.  Will assess  "at follow-up via my chart if she has symptom resolution of shaking with lower dose of insulin.    6. Osteoarthritis, unspecified osteoarthritis type, unspecified site  Stable at this time, largely improved due to weight loss.  Encouraged her efforts, and discussed that her pain may also improve with lowering her uric acid levels, again will reassess with rheumatology.    7. Migraine without status migrainosus, not intractable, unspecified migraine type  Stable on current regimen of lorazepam, this was initiated by neurology after many trials and failures of different regimens.  Recommend reassess at each follow-up visit.    8. Exacerbation of asthma, unspecified asthma severity, unspecified whether persistent  Stable. Recommended to continue current regimen.     9. Senile osteoporosis  Currently untreated.  Briefly discussed benefits versus risks of treating with pharmacologic agents rather than not at all.  Unfortunately her sister had a poor experience with medications resulting in jaw issues.  I have strongly encouraged her to review this with her rheumatologist as she is showing significant worsening of her bone density since last DEXA scan.    Follow Up  Return in about 8 days (around 10/30/2018) for Rheumatology consult.      Subjective & Objective                                                     Meggan Everett is a 68 y.o. female coming in for an initial visit for Medication Therapy Management. She was referred to me from Stacey Kelly MD.     Chief Complaint: Medication Management     Medication Adherence/Access: stable, no issues identified.     Diabetes: \"i'm having problems with my blood sugars.\" started on Patricia Ricardo two months ago, and is having lower sugars, had a blood sugar of 38 yesterday. Metformin has historically upset her stomach, this is why she went straight to insulin to avoid other oral medications.   Lab Results   Component Value Date    HGBA1C 7.9 (H) 10/03/2018    HGBA1C 8.6 " "(H) 01/03/2018    HGBA1C 7.2 (H) 12/27/2016     Lab Results   Component Value Date    MICROALBUR <0.50 10/12/2018    LDLCALC 73 10/12/2018    CREATININE 1.13 (H) 10/12/2018     Hyperuricemia: currently untreated with prophylactic regimen. Recently started on celebrex, however she has not wanted to start another maintenance medication, was hoping that her diet changes would help. She has \"chronic gout\" in her hands, and has had issues with his feet and knees.   Lab Results   Component Value Date    URICACID 9.3 (H) 10/12/2018     Hypertension: stable, no adverse effects noted.     Hyperlipidemia: she does have some leg weakness and pain, however has noted improvement with weight loss, unclear if this is related to arthritis or her statin. Will continue to monitor with more weight loss.   Lab Results   Component Value Date    LDLCALC 73 10/12/2018     Hypothyroid: stable, denies palpitations, diarrhea, heat intolerance, etc.   Lab Results   Component Value Date    TSH 0.40 10/12/2018     Osteoarthritis: has improved with weight loss. Now on celebrex daily. She has also been using hemp cream that does help with pain. She also has muscle \"zaps\" from her nerve damage. She has coninued on tizanidine and this does help.     Migraines: has had \"migraines all my life.\" had been getting vertigo with her migraines, at [facility] she had a specialist put her on lorazepam to these and has tried getting of this and she will get her vertigo back. This has been for years. If she does have an attack while she is on lorazepam she will take an additional pill and this will get rid of it. She believes she has had three episodes in the last month, however hard to if these are sugars. Fall is the worst time of year for her.     Asthma: last attack was in January 2018, triggered by the flu. Albuterol is very effective for her.     Osteoporosis: last DXA 1/2018, showing osteoporosis with significant decline since last DXA scan in 2014. " Unfortunately her sister is having issues with her jaw while on treatment, unclear what the problem is.     PMH: reviewed in EPIC   Allergies/ADRs: reviewed in EPIC   Alcohol: reviewed in EPIC   Tobacco:   History   Smoking Status     Never Smoker   Smokeless Tobacco     Never Used     Today's Vitals:   Vitals:    10/22/18 1230   BP: 134/72   Pulse: 70   Weight: (!) 287 lb (130.2 kg)     ----------------    Much or all of the text in this note was generated through the use of Dragon Dictate voice-to-text software. Errors in spelling or words which seem out of context are unintentional. Sound alike errors, in particular, may have escaped editing.    The patient was given a summary of these recommendations as an after visit summary    I spent 60 minutes with this patient today.   All changes were made via collaborative practice agreement with Stacey Kelly MD. A copy of the visit note was provided to the patient's provider.     Trevor Hastings, PharmD, BCACP  Medication Management (MTM) Pharmacist  [facility]    Current Outpatient Prescriptions   Medication Sig Dispense Refill     albuterol (PROAIR HFA;PROVENTIL HFA;VENTOLIN HFA) 90 mcg/actuation inhaler Inhale 2 puffs every 6 (six) hours as needed for wheezing. 1 each 0     albuterol (PROVENTIL) 2.5 mg /3 mL (0.083 %) nebulizer solution Use 1 vial 4 times daily 75 mL 3     aspirin 81 MG EC tablet Take 1 tablet (81 mg total) by mouth daily. 90 tablet 3     blood glucose test (ACCU-CHEK YARELIS) strips Test 4 times daily 300 strip 3     celecoxib (CELEBREX) 200 MG capsule Take 1 capsule (200 mg total) by mouth daily. 90 capsule 3     furosemide (LASIX) 40 MG tablet Take 1 tablet daily as directed 90 tablet 5     insulin aspart U-100 (NOVOLOG FLEXPEN U-100 INSULIN) 100 unit/mL injection pen Inject 8 Units under the skin 3 (three) times a day before meals. Plus sliding scale. 30 mL 3     insulin glargine (LANTUS; BASAGLAR) 100 unit/mL (3 mL) pen Inject 15  "Units under the skin 2 (two) times a day. 12 adj dose pen 5     irbesartan (AVAPRO) 150 MG tablet Take 1 tablet (150 mg total) by mouth at bedtime. 90 tablet 3     levothyroxine (SYNTHROID, LEVOTHROID) 125 MCG tablet Take 1 tablet (125 mcg total) by mouth daily. 90 tablet 3     LORazepam (ATIVAN) 0.5 MG tablet Take 1 tablet (0.5 mg total) by mouth at bedtime. 90 tablet 0     metoprolol succinate (TOPROL-XL) 100 MG 24 hr tablet Take 1 tablet (100 mg total) by mouth daily. 90 tablet 3     pen needle, diabetic (COMFORT EZ PEN NEEDLES) 31 gauge x 1/4\" Ndle Use 4 times a day with insulin pen 300 each 3     rosuvastatin (CRESTOR) 10 MG tablet Take 1 tablet (10 mg total) by mouth at bedtime. 90 tablet 3     tiZANidine (ZANAFLEX) 4 MG tablet Take 1 tablet (4 mg total) by mouth at bedtime. 90 tablet 3     amoxicillin-clavulanate (AUGMENTIN) 875-125 mg per tablet Take 1 tablet by mouth 2 (two) times a day for 7 days. 14 tablet 0     No current facility-administered medications for this visit.              "

## 2021-06-21 NOTE — PROGRESS NOTES
[facility] Clinic Follow Up Note    Meggan Everett   67 y.o. female    Date of Visit: 10/12/2018    Chief Complaint   Patient presents with     Diabetes     Subjective  Meggan comes in today for follow-up of diabetes.  She been on and off of prednisone over the summer ever since she had significant problems with gout while traveling to [place].  She now has been running occasional low sugars and is joined [employer] and is on their diabetic diet but would like other ways of losing weight as well.  She may either follow-up here with Dr. Seth or at a clinic closer to her home upon my departure.    ROS A comprehensive review of systems was performed and was otherwise negative.    Social History     Social History Narrative    She is  and is retired. She lives with her son and his family on the lower level of their house.  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  She has 4 grandchildren.  She does not smoke cigarettes or drink alcohol.       Medications were reconciled.  Allergies, social and family history, and the problem list were all reviewed and updated.      Exam  General Appearance: Pleasant and alert   Vitals:    10/12/18 1225   BP: 134/72   Patient Site: Left Arm   Patient Position: Sitting   Cuff Size: Adult Large   Pulse: 70   SpO2: 98%   Weight: (!) 287 lb 6 oz (130.4 kg)      Body mass index is 50.91 kg/(m^2).  Wt Readings from Last 3 Encounters:   10/12/18 (!) 287 lb 6 oz (130.4 kg)   01/19/18 (!) 290 lb 1.6 oz (131.6 kg)   01/03/18 (!) 292 lb (132.5 kg)     HEENT: Sclera are clear.   Lungs: Normal respirations  Abdomen: Soft and nondistended  Extremities: No edema  Skin: No rashes  Neuro: Moves all extremities and has facial symmetry  Gait: Ambulates with a normal gait    Assessment/Plan  1. Type 2 diabetes mellitus without complication, with long-term current use of insulin (H)  Think she would benefit from some medication  adjustments in order to help lose weight and get rid of low blood sugars.  We will have her see our Pharm.D.  - insulin glargine (LANTUS; BASAGLAR) 100 unit/mL (3 mL) pen; Inject 25 units twice daily  Dispense: 12 adj dose pen; Refill: 5  - Hepatic Profile  - Lipid Cascade  - Microalbumin, Random Urine  - Ambulatory referral to Medication Management    2. Edema  - furosemide (LASIX) 40 MG tablet; Take 1 tablet daily as directed  Dispense: 90 tablet; Refill: 5    3. Gout  - celecoxib (CELEBREX) 200 MG capsule; Take 1 capsule (200 mg total) by mouth daily.  Dispense: 90 capsule; Refill: 3  - Uric Acid    4. Hypertension  - irbesartan (AVAPRO) 150 MG tablet; Take 1 tablet (150 mg total) by mouth at bedtime.  Dispense: 90 tablet; Refill: 3  - Basic Metabolic Panel  - HM2(CBC w/o Differential)  - Thyroid Stimulating Hormone (TSH)    5. Asthmatic bronchitis , chronic (H)  - albuterol (PROVENTIL) 2.5 mg /3 mL (0.083 %) nebulizer solution; Use 1 vial 4 times daily  Dispense: 75 mL; Refill: 3    6. Anxiety  - LORazepam (ATIVAN) 0.5 MG tablet; Take 1 tablet (0.5 mg total) by mouth at bedtime.  Dispense: 90 tablet; Refill: 0    7. Flu vaccine need  - Influenza High Dose, Seasonal          April D MD Leticia  Internal and Geriatric Medicine  [facility]    Much or all of the text in this note was generated through the use of Dragon Dictate voice-to-text software. Errors in spelling or words which seem out of context are unintentional. Sound alike errors, in particular, may have escaped editing.

## 2021-06-22 NOTE — PROGRESS NOTES
[facility] Clinic Note    Meggan Everett   68 y.o. female    Date of Visit: 12/14/2018  Chief Complaint   Patient presents with     Migraine     Medication Management     [place] pended for pcp & CSA signed today       ASSESSMENT/PLAN  1. Controlled substance agreement signed  Drugs of Abuse 1,Urine   2. Flashing lights  US Carotid Bilateral   3. Migraine with aura and without status migrainosus, not intractable     4. Hypertension     5. Anxiety  LORazepam (ATIVAN) 0.5 MG tablet     ---------------------------------------------    1. Update CSA, PM use of lorazepam to prevent vertigo which was determined to be a migraine variant by a specialist in [place]    2-3.  She has a history of classic migraine with aura but having episodes of flashing lights out of the periphery of the left eye, normal retinal exam.  Check carotids for plaques.  Unclear what is causing this.  May be related to the poor sleep and stress-- ok to take 2 lorazepam at bed since she has been anxious  --doubt any connection with allopurinol after review of micromedix    4. BP at goal    5. See #3    2.     Return in about 6 weeks (around 1/25/2019) for Blood pressure.      SUBJECTIVE    Meggan Everett is here to establish care.      She has had migraines since age 18.  About 12 years ago she started having vertigo with migrines, had vascular and structural workup at [facility].  The lorazepam was found to be the treatment for migraine variant  The last vertigo attack was several years ago. She remembered a kaliedescope.      She gets a scintillating scotoma for aura then a mild headache.  Over the last 6 weeks, she had a few times of getting an aura 10 minutes three times in one week without headache.  None in the last week.      The sleep pattern is way off.  She either has trouble falling asleep or getting back to sleep.  She is adherent to CPAP.  AHI not changed.      Several years ago she had flashing in temporal fields (2 years  ago), ended up going away, but now returned on the left side of the vision.      About 1.5 years ago she had cataract surgery.  She is now farsighted.  She gets a lot of floaters.  She recently had a normal dilated eye exam.      She has been having some pain in the back of the left side of the neck.  She wonders if this is from the mask.      Allopurinol and colchicine recently started, she has gout and tophus formation.  She thinks that the increased headaches started around the time that allopurinol began.        ROS:   Per HPI, all other systems negative     Medications, allergies, and problem list were reviewed and updated    Patient Active Problem List   Diagnosis     Hypothyroidism     Hyperparathyroidism     Hyperlipidemia     Serum Enzyme Levels - Alkaline Phosphatase Elevated     Hypertension     Hashimoto's Thyroiditis     Type 2 diabetes mellitus without complication, with long-term current use of insulin (H)     Morbid obesity (H)     Migraine Headache     Esophageal reflux     Gait - Swing Phase Foot Drop Right     Osteopenia     RBBB     Obstructive sleep apnea syndrome     Past Medical History:   Diagnosis Date     Migraine with aura      Current Outpatient Medications   Medication Sig Dispense Refill     albuterol (PROAIR HFA;PROVENTIL HFA;VENTOLIN HFA) 90 mcg/actuation inhaler Inhale 2 puffs every 6 (six) hours as needed for wheezing. 1 each 0     albuterol (PROVENTIL) 2.5 mg /3 mL (0.083 %) nebulizer solution Use 1 vial 4 times daily 75 mL 3     allopurinol (ZYLOPRIM) 100 MG tablet Take 100 mg by mouth daily.       aspirin 81 MG EC tablet Take 1 tablet (81 mg total) by mouth daily. 90 tablet 3     blood glucose test (ACCU-CHEK YARELIS) strips Test 4 times daily 300 strip 3     celecoxib (CELEBREX) 200 MG capsule Take 1 capsule (200 mg total) by mouth daily. 90 capsule 3     colchicine 0.6 mg tablet Take 0.6 mg by mouth daily.       furosemide (LASIX) 40 MG tablet Take 1 tablet daily as directed 90  "tablet 5     insulin aspart U-100 (NOVOLOG FLEXPEN U-100 INSULIN) 100 unit/mL injection pen Inject 8 Units under the skin 3 (three) times a day before meals. Plus sliding scale. 30 mL 3     insulin glargine (LANTUS; BASAGLAR) 100 unit/mL (3 mL) pen Inject 15 Units under the skin 2 (two) times a day. 12 adj dose pen 5     irbesartan (AVAPRO) 150 MG tablet Take 1 tablet (150 mg total) by mouth at bedtime. 90 tablet 3     levothyroxine (SYNTHROID, LEVOTHROID) 125 MCG tablet Take 1 tablet (125 mcg total) by mouth daily. 90 tablet 3     LORazepam (ATIVAN) 0.5 MG tablet Take 1-2 tablets (0.5-1 mg total) by mouth at bedtime. 60 tablet 0     metoprolol succinate (TOPROL-XL) 100 MG 24 hr tablet Take 1 tablet (100 mg total) by mouth daily. 90 tablet 3     pen needle, diabetic (COMFORT EZ PEN NEEDLES) 31 gauge x 1/4\" Ndle Use 4 times a day with insulin pen 300 each 3     rosuvastatin (CRESTOR) 10 MG tablet Take 1 tablet (10 mg total) by mouth at bedtime. 90 tablet 3     tiZANidine (ZANAFLEX) 4 MG tablet Take 1 tablet (4 mg total) by mouth at bedtime. 90 tablet 3     No current facility-administered medications for this visit.      Allergies   Allergen Reactions     Adhesive Tape-Silicones Other (See Comments)     Codeine      Tetanus Toxoid        EXAM  Vitals:    12/14/18 1411   BP: 134/62   Patient Site: Left Arm   Patient Position: Sitting   Cuff Size: Adult Large   Pulse: 66   SpO2: 98%   Weight: (!) 270 lb 12.8 oz (122.8 kg)         GEN: alert, no distress  Neuro; CN II-XII intact  Neck: no carotid bruits  Heart: RRR  Lungs: CTA    Results reviewed: reviewed last note by Dr. PAEZ Carotid US ordered.   Lab Results   Component Value Date    WBC 6.7 10/12/2018    HGB 13.1 10/12/2018    HCT 38.9 10/12/2018    MCV 85 10/12/2018     10/12/2018      Lab Results   Component Value Date    TSH 0.40 10/12/2018        Data points: 4    Federico Milian,   Internal Medicine  [facility]    "

## 2021-06-23 NOTE — PROGRESS NOTES
MTM Follow Up Encounter  Assessment & Plan                                                     1. Type 2 diabetes mellitus without complication, with long-term current use of insulin (H)  Not at goal A1c less than 7% per ADA guidelines, however continues to work very hard on losing weight.  She continues to experience hypoglycemia postprandially, discussed that decreasing insulin will allow for more readily losing weight.  Trial of discontinuing NovoLog to stop hypoglycemic episodes, also to help facilitate weight loss.  She will continue to monitor postprandial blood sugars, and see how much they are affected by lack of bolus insulin.  Discussed elevated fasting sugars will initiate metformin in the long-acting formulation to assess if she can tolerate any dose of metformin.  She has previously tried this many years ago and may not have ever utilized extended release formulation.  Close follow-up via my chart to assess for tolerance.  Also discussed GLP-1 agonists, which may help facilitate weight loss as well as decrease insulin needs as long as she continues to make insulin.  Recommend continue current dose of Lantus at this time until each adjustments effects are realized.  -Discontinue NovoLog  -Initiate metFORMIN (GLUCOPHAGE XR) 500 MG 24 hr tablet; Take 1 tablet (500 mg total) by mouth daily with supper.  Dispense: 30 tablet; Refill: 0  -Consider GLP-1 agonist at follow-up    2. Chronic gout with tophus, unspecified cause, unspecified site  Continues on allopurinol 200 mg daily in addition to colchicine, continues to have tophi present regardless of decrease in uric acid.  Follow up with rheumatology tomorrow.    Follow Up  Return in about 1 week (around 1/17/2019) for Medication Management Pharmacist, via [facility].    Subjective & Objective                                                       Meggan Everett is a 68 y.o. female coming in for a follow up visit for Medication Therapy Management. She was  referred to me from Federico Milian DO.  was a disabled vet.     Chief Complaint: Follow-up    Medication Adherence/Access: Stable, no issues identified.    Type 2 diabetes: Slow weight loss, using Patricia Ricardo. Had 15 low sugars between October 1 - now. Usually between lunch and dinner, a few after dinner. Poorly tolerated metformin in the past.   Fastings: low 100's - 165   Lab Results   Component Value Date    HGBA1C 7.9 (H) 10/03/2018    HGBA1C 8.6 (H) 01/03/2018    HGBA1C 7.2 (H) 12/27/2016     Lab Results   Component Value Date    MICROALBUR <0.50 10/12/2018    LDLCALC 73 10/12/2018    CREATININE 1.13 (H) 10/12/2018     Hyperuricemia: follow up with rheumatology tomorrow. She has had joint injections for this as well which help temporarily. Significant improvement in uric acid from 9.3 down to 6.9 as of November 2018. On 200mg allopurinol and colchicine daily.     PMH: reviewed in EPIC   Allergies/ADRs: reviewed in EPIC   Alcohol: reviewed in EPIC   Tobacco:   Social History     Tobacco Use   Smoking Status Never Smoker   Smokeless Tobacco Never Used     Today's Vitals:   Vitals:    01/10/19 1059   BP: 129/61   Pulse: 64   Weight: (!) 269 lb (122 kg)     ----------------    Much or all of the text in this note was generated through the use of Dragon Dictate voice-to-text software. Errors in spelling or words which seem out of context are unintentional. Sound alike errors, in particular, may have escaped editing.    The patient was given a summary of these recommendations as an after visit summary    I spent 30 minutes with this patient today;   All changes were made via collaborative practice agreement with Federico Milian DO. A copy of the visit note was provided to the patient's provider.     Trevor Hastings, PharmD, BCACP  Medication Management (MTM) Pharmacist  [facility]    Current Outpatient Medications   Medication Sig Dispense Refill     allopurinol  "(ZYLOPRIM) 100 MG tablet Take 200 mg by mouth daily.             albuterol (PROAIR HFA;PROVENTIL HFA;VENTOLIN HFA) 90 mcg/actuation inhaler Inhale 2 puffs every 6 (six) hours as needed for wheezing. 1 each 0     albuterol (PROVENTIL) 2.5 mg /3 mL (0.083 %) nebulizer solution Use 1 vial 4 times daily 75 mL 3     aspirin 81 MG EC tablet Take 1 tablet (81 mg total) by mouth daily. 90 tablet 3     blood glucose test (ACCU-CHEK YARELIS) strips Test 4 times daily 300 strip 3     celecoxib (CELEBREX) 200 MG capsule Take 1 capsule (200 mg total) by mouth daily. 90 capsule 3     colchicine 0.6 mg tablet Take 0.6 mg by mouth daily.       furosemide (LASIX) 40 MG tablet Take 1 tablet daily as directed 90 tablet 5     insulin glargine (LANTUS; BASAGLAR) 100 unit/mL (3 mL) pen Inject 12 Units under the skin 2 (two) times a day. 12 adj dose pen 5     irbesartan (AVAPRO) 150 MG tablet Take 1 tablet (150 mg total) by mouth at bedtime. 90 tablet 3     levothyroxine (SYNTHROID, LEVOTHROID) 125 MCG tablet Take 1 tablet (125 mcg total) by mouth daily. 90 tablet 3     LORazepam (ATIVAN) 0.5 MG tablet Take 1-2 tablets (0.5-1 mg total) by mouth at bedtime. 60 tablet 0     metFORMIN (GLUCOPHAGE XR) 500 MG 24 hr tablet Take 1 tablet (500 mg total) by mouth daily with supper. 30 tablet 0     metoprolol succinate (TOPROL-XL) 100 MG 24 hr tablet Take 1 tablet (100 mg total) by mouth daily. 90 tablet 3     pen needle, diabetic (COMFORT EZ PEN NEEDLES) 31 gauge x 1/4\" Ndle Use 4 times a day with insulin pen 300 each 3     rosuvastatin (CRESTOR) 10 MG tablet Take 1 tablet (10 mg total) by mouth at bedtime. 90 tablet 3     tiZANidine (ZANAFLEX) 4 MG tablet Take 1 tablet (4 mg total) by mouth at bedtime. 90 tablet 3     No current facility-administered medications for this visit.                            "

## 2021-06-23 NOTE — PATIENT INSTRUCTIONS - HE
Stop novolog    Monitor sugars two hours after meals (vary the meals) goal is to keep these under 180   If sugars are consistently higher than this, resume novolog at 4 units with meals   If sugars are higher than 140 before meals add 1 unit     Continue current dose of lantus     Follow up with [facility] in one week via Mychart, and we may consider new class of drug: Trulicity (free month coupon) or Victoza (covered by [facility])

## 2021-06-23 NOTE — PROGRESS NOTES
[facility] Note    Meggan Everett   68 y.o. female    Date of Visit: 1/25/2019  Chief Complaint   Patient presents with     Follow-up     6 wk f/u     Itchy Eye     Routine Health Maintenance     Due for Pneumo 23       ASSESSMENT/PLAN  1. Need for pneumococcal vaccine  Pneumococcal polysaccharide vaccine 23-valent greater than or equal to 1yo subcutaneous/IM   2. Hypertension  irbesartan (AVAPRO) 150 MG tablet   3. Type 2 diabetes mellitus without complication, with long-term current use of insulin (H)     4. Age-related osteoporosis without current pathological fracture     5. Acute gout, unspecified cause, unspecified site       ---------------------------------------------    1.  Pneumonia shot given today    2.  Blood pressure well controlled, and initially accidentally discontinued irbesartan, but this was my error.  I initially thought this was ibandronate.  This was fixed before she left the clinic.    3.  Last A1c was 7.9 back in October, checked at [facility] earlier this month, found to be 7.4.  I think a reasonable goal is around 7.5 or below.  Diabetes was recently discussed with endocrinology, she was started on glimepiride, had her insulin cut in half.  She has found that she still needs NovoLog, intends to stop glimepiride.    4.  She was recently diagnosed with osteoporosis, recommended that she start Prolia by endocrinology.  Prolia was being recommended as a first-line medication above alendronate.  She has some additional lab work that she is being called back for including vitamin D level, PTH, etc.  Since the [facility] and [facility] labs do not communicate seamlessly, I told her that she would have to have these done at [facility].  She may still be a candidate for alendronate    5.  She has been seen by rheumatology, started on allopurinol, she had good reduction in the uric acid level.  Goal is around 6.0 or less.    6.  She has symptoms consistent with allergic conjunctivitis, I  prescribed antihistamine eyedrops for her to try.  If not improving, next step is ophthalmology.    Once some of her issues such as the osteoporosis and the gout are optimized and stable, we can probably take over the management of these conditions.    Return in about 3 months (around 4/25/2019) for Diabetes.      MAGED Everett is here for 6 wk follow-up.      She has been dealing with itchy eyes.  Desribed as puffy, watery, itchy, lids drooping, sense of film over eyes.  She is using thera-eye, has tried artifcial tears.  She gets some blurred vision and gets better after blinking.  She also reminded me of the floaters.  She has tried w/ ophthalmology other eye glasses.    She recently saw a rheumatology for gout, was started on allopurinol, she is tolerating it and the uric acid level is come down.  The rheumatologist sent her to endocrinology because she has osteoporosis, T score -2.5.  They are recommending Prolia as a first treatment for osteoporosis.  She has an additional lab work she needs to return back for.  She will get the Prolia shots there.    She expressed some concern about the number of doctors she needs to see.    I last saw her and first met her December 14 of 2018.  We discussed her use of lorazepam was to prevent vertigo which was a migraine equivalent as diagnosed by a migraine specialist in [place].  We also did an ultrasound of the carotids because of the flashing lights she has been experiencing, this was negative.    In regards to diabetes, she is on metformin extended release, takes Lantus 12 units twice a day, NovoLog 5-6 units 3 times daily.  She was prescribed glyburide and told to stop the mealtime insulin, but this was not a successful attempt to get her off of insulin.    She has been trying to lose weight, doing Patricia Silva for 1200-calorie diet, has been able to lose 30 pounds, averaging about 1 pound per week.    She is due for pneumococcal vaccination, which she  would like to get done today.      ROS:   Per HPI, all other systems negative     Medications, allergies, and problem list were reviewed and updated    Patient Active Problem List   Diagnosis     Hypothyroidism     Hyperparathyroidism     Hyperlipidemia     Serum Enzyme Levels - Alkaline Phosphatase Elevated     Hypertension     Hashimoto's Thyroiditis     Type 2 diabetes mellitus without complication, with long-term current use of insulin (H)     Morbid obesity (H)     Migraine Headache     Esophageal reflux     Gait - Swing Phase Foot Drop Right     Osteopenia     RBBB     Obstructive sleep apnea syndrome     Age-related osteoporosis without current pathological fracture     Acute gout, unspecified cause, unspecified site     Past Medical History:   Diagnosis Date     Migraine with aura      Current Outpatient Medications   Medication Sig Dispense Refill     insulin aspart (NOVOLOG FLEXPEN U-100 INSULIN SUBQ) Inject 5-6 Units under the skin 3 (three) times a day before meals.       albuterol (PROAIR HFA;PROVENTIL HFA;VENTOLIN HFA) 90 mcg/actuation inhaler Inhale 2 puffs every 6 (six) hours as needed for wheezing. 1 each 0     albuterol (PROVENTIL) 2.5 mg /3 mL (0.083 %) nebulizer solution Use 1 vial 4 times daily 75 mL 3     allopurinol (ZYLOPRIM) 100 MG tablet Take 200 mg by mouth daily.             aspirin 81 MG EC tablet Take 1 tablet (81 mg total) by mouth daily. 90 tablet 3     azelastine (OPTIVAR) 0.05 % ophthalmic solution Administer 1 drop to both eyes 2 (two) times a day. 6 mL 12     blood glucose test (ACCU-CHEK YARELIS) strips Test 4 times daily 300 strip 3     celecoxib (CELEBREX) 200 MG capsule Take 1 capsule (200 mg total) by mouth daily. 90 capsule 3     colchicine 0.6 mg tablet Take 0.6 mg by mouth daily.       furosemide (LASIX) 40 MG tablet Take 1 tablet daily as directed 90 tablet 5     insulin glargine (LANTUS; BASAGLAR) 100 unit/mL (3 mL) pen Inject 12 Units under the skin 2 (two) times a day.  "12 adj dose pen 5     irbesartan (AVAPRO) 150 MG tablet Take 1 tablet (150 mg total) by mouth at bedtime. 90 tablet 3     levothyroxine (SYNTHROID, LEVOTHROID) 125 MCG tablet Take 1 tablet (125 mcg total) by mouth daily. 90 tablet 3     LORazepam (ATIVAN) 0.5 MG tablet Take 1-2 tablets (0.5-1 mg total) by mouth at bedtime. 60 tablet 0     metoprolol succinate (TOPROL-XL) 100 MG 24 hr tablet Take 1 tablet (100 mg total) by mouth daily. 90 tablet 3     pen needle, diabetic (COMFORT EZ PEN NEEDLES) 31 gauge x 1/4\" Ndle Use 4 times a day with insulin pen 300 each 3     rosuvastatin (CRESTOR) 10 MG tablet Take 1 tablet (10 mg total) by mouth at bedtime. 90 tablet 3     tiZANidine (ZANAFLEX) 4 MG tablet Take 1 tablet (4 mg total) by mouth at bedtime. 90 tablet 3     No current facility-administered medications for this visit.      Allergies   Allergen Reactions     Adhesive Tape-Silicones Other (See Comments)     Codeine      Metformin      Stomach pain      Tetanus Toxoid        EXAM  Vitals:    01/25/19 1206   BP: 128/70   Patient Site: Left Arm   Patient Position: Sitting   Cuff Size: Adult Large   Pulse: 70   Resp: 16   SpO2: 99%   Weight: (!) 271 lb (122.9 kg)   Height: 5' 3\" (1.6 m)         General: Alert, no distress  Psychiatric: Pleasant affect    Results reviewed:     Results for orders placed or performed in visit on 10/12/18   Basic Metabolic Panel   Result Value Ref Range    Sodium 141 136 - 145 mmol/L    Potassium 4.6 3.5 - 5.0 mmol/L    Chloride 105 98 - 107 mmol/L    CO2 23 22 - 31 mmol/L    Anion Gap, Calculation 13 5 - 18 mmol/L    Glucose 97 70 - 125 mg/dL    Calcium 10.2 8.5 - 10.5 mg/dL    BUN 35 (H) 8 - 22 mg/dL    Creatinine 1.13 (H) 0.60 - 1.10 mg/dL    GFR MDRD Af Amer 58 (L) >60 mL/min/1.73m2    GFR MDRD Non Af Amer 48 (L) >60 mL/min/1.73m2     Reviewed endocrinology, rheumatology notes, care everywhere accessed, last A1c 7.4 in the [address] system, ultrasound carotids negative.    Data points: " 4    Federico Milian,   Internal Medicine  [facility]

## 2021-06-24 NOTE — TELEPHONE ENCOUNTER
As discussed yesterday, this is pended to go to her mail order pharmacy which will take a while for her to get.

## 2021-06-27 NOTE — PROGRESS NOTES
Progress Notes by Teagan Yeboah MD at 8/10/2019 10:40 AM     Author: Teagan Yeboah MD Service: -- Author Type: Physician    Filed: 8/10/2019  2:16 PM Encounter Date: 8/10/2019 Status: Signed    : Teagan Yeboah MD (Physician)       Provider wore a mask during this visit.   Subjective:   Meggan Everett is a 68 y.o. female  No question data found.  Chief Complaint   Patient presents with   ? Cough     green mucus, not getting better since last visit x 1 week   Patient was seen at walk-in clinic on 8/4 and treated for an asthma exacerbation with 5 days of prednisone 40 mg. Says she woke up on 8/2 with coughing. Had been exposed to cigarette smoke and initially thought is was a response to that. Says currently her chest feels heavy. Has been taking albuterol nebs. Has been coughing up green phlegm. Denies any CP, but says chest feels tight and feeling shortness of breath. Says her BGs have increased to the 300-400's with the prednisone. Admits a lot of fatigue. Had a headache this morning. Denies dizziness.   Review of Systems  See HPI for ROS, otherwise balance of other systems negative    Allergies   Allergen Reactions   ? Adhesive Tape-Silicones Other (See Comments)   ? Codeine    ? Metformin      Stomach pain    ? Tetanus Toxoid        Current Outpatient Medications:   ?  albuterol (PROAIR HFA;PROVENTIL HFA;VENTOLIN HFA) 90 mcg/actuation inhaler, Inhale 2 puffs every 6 (six) hours as needed for wheezing., Disp: 1 each, Rfl: 0  ?  albuterol (PROVENTIL) 2.5 mg /3 mL (0.083 %) nebulizer solution, Use 1 vial 4 times daily, Disp: 75 mL, Rfl: 3  ?  allopurinol (ZYLOPRIM) 100 MG tablet, Take 200 mg by mouth daily.   , Disp: , Rfl:   ?  aspirin 81 MG EC tablet, Take 1 tablet (81 mg total) by mouth daily., Disp: 90 tablet, Rfl: 3  ?  blood glucose test (ACCU-CHEK YARELIS) strips, Test 4 times daily, Disp: 300 strip, Rfl: 3  ?  celecoxib (CELEBREX) 200 MG capsule, Take 1 capsule (200 mg total) by mouth  "daily., Disp: 90 capsule, Rfl: 3  ?  colchicine 0.6 mg tablet, Take 0.6 mg by mouth daily., Disp: , Rfl:   ?  furosemide (LASIX) 40 MG tablet, Take 1 tablet daily as directed, Disp: 90 tablet, Rfl: 5  ?  insulin aspart U-100 (NOVOLOG FLEXPEN U-100 INSULIN) 100 unit/mL (3 mL) injection pen, Inject 5-6 Units under the skin 3 (three) times a day before meals., Disp: 5 Pre-filled Pen Syringe, Rfl: 3  ?  insulin glargine (LANTUS; BASAGLAR) 100 unit/mL (3 mL) pen, Inject 14 Units under the skin 2 (two) times a day., Disp: 12 adj dose pen, Rfl: 5  ?  irbesartan (AVAPRO) 150 MG tablet, Take 1 tablet (150 mg total) by mouth at bedtime., Disp: 90 tablet, Rfl: 3  ?  levothyroxine (SYNTHROID, LEVOTHROID) 125 MCG tablet, Take 1 tablet (125 mcg total) by mouth daily., Disp: 90 tablet, Rfl: 3  ?  LORazepam (ATIVAN) 0.5 MG tablet, Take 1-2 tablets (0.5-1 mg total) by mouth at bedtime., Disp: 180 tablet, Rfl: 5  ?  metoprolol succinate (TOPROL-XL) 100 MG 24 hr tablet, Take 1 tablet (100 mg total) by mouth daily., Disp: 90 tablet, Rfl: 3  ?  pen needle, diabetic (COMFORT EZ PEN NEEDLES) 31 gauge x 1/4\" Ndle, Use 4 times a day with insulin pen, Disp: 300 each, Rfl: 3  ?  rosuvastatin (CRESTOR) 10 MG tablet, Take 0.5 tablets (5 mg total) by mouth at bedtime., Disp: 90 tablet, Rfl: 3  ?  tiZANidine (ZANAFLEX) 4 MG tablet, Take 1 tablet (4 mg total) by mouth at bedtime., Disp: 90 tablet, Rfl: 3  ?  azelastine (OPTIVAR) 0.05 % ophthalmic solution, Administer 1 drop to both eyes 2 (two) times a day., Disp: 6 mL, Rfl: 12  ?  generic lancets (FINGERSTIX LANCETS), Use as directed 4 times daily. Dispense brand per patient's insurance at pharmacy discretion., Disp: 200 each, Rfl: 3  Patient Active Problem List   Diagnosis   ? Hypothyroidism   ? Hyperparathyroidism   ? Hyperlipidemia   ? Serum Enzyme Levels - Alkaline Phosphatase Elevated   ? Hypertension   ? Hashimoto's Thyroiditis   ? Type 2 diabetes mellitus without complication, with " long-term current use of insulin (H)   ? BMI 45.0-49.9, adult (H)   ? Migraine Headache   ? Esophageal reflux   ? Gait - Swing Phase Foot Drop Right   ? Osteopenia   ? RBBB   ? Obstructive sleep apnea syndrome   ? Age-related osteoporosis without current pathological fracture   ? Acute gout, unspecified cause, unspecified site   ? Itchy eyes     Past Medical History:   Diagnosis Date   ? Migraine with aura          Objective:     Vitals:    08/10/19 1102   BP: 140/78   Pulse: 75   Resp: 20   Temp: 97.8  F (36.6  C)   TempSrc: Oral   SpO2: 96%   Weight: (!) 283 lb 6.4 oz (128.5 kg)   Gen - Pt in NAD  Eyes - Conjunctiva non injected, no drainage  Face - non TTP over frontal sinus areas; non TTP over maxillary areas  Ears - external canals - no induration, Right TM - not injected, Left TM - not injected   Nose - not congested, no nasal drainage  Pharynx - not injected, tonsils 1+ size  Neck - supple, no cervical adenopathy, no masses  Cor - RRR w/o murmur  Lungs - Good air entry; no wheezes or crackles noted on auscultation - no coughing noted  Skin - no lesions, no rashes noted    Xr Chest 2 Views    Result Date: 8/10/2019  EXAM DATE:         08/10/2019 EXAM: X-RAY CHEST, 2 VIEWS, FRONTAL AND LATERAL LOCATION: [facility] DATE/TIME: 8/10/2019 12:15 PM INDICATION: Shortness breath. Cough. Fatigue. Continuing despite treatment for asthma flare. COMPARISON: 1/12/2018. FINDINGS: Large lung volumes. Lungs are clear. Normal heart size. Hypertrophic degenerative changes spine. Old right rib fractures. No significant change.   Radiologist's report discussed with patient on day of visit.     Assessment - Plan   Medical Decision Making -68-year-old woman with a history of recent history of acute asthma flare presents 6 days after being seen at walk-in for and treated with 5 days of prednisone.  Patient states that her symptoms have not improved and she is still fatigued and coughing.  She is afebrile and her  vital signs are stable.  Her exam was essentially negative.  Chest x-ray was negative for pneumonia, pneumothorax or pleural effusion.  Because of the duration of her symptoms even and even though they are not for a total of 10 days, we will treat this as an acute sinusitis with azithromycin.  Patient says that the only antibiotic that she can tolerate as of the azithromycin.  We will also give her another 5 days of prednisone for her asthma symptoms.  See patient instructions.    1. Acute non-recurrent sinusitis, unspecified location  - azithromycin (ZITHROMAX) 250 MG tablet; Take 2 tabs today, then 1 tab daily for the next 4 days  Dispense: 6 tablet; Refill: 0    2. Mild persistent asthma with exacerbation  - predniSONE (DELTASONE) 20 MG tablet; Take 40 mg by mouth daily for 5 days.  Dispense: 10 tablet; Refill: 0    3. Shortness of breath  - XR Chest 2 Views; Future  - XR Chest 2 Views    4. Cough  - benzonatate (TESSALON) 200 MG capsule; Take 1 capsule (200 mg total) by mouth 3 (three) times a day as needed for cough.  Dispense: 30 capsule; Refill: 0    At the conclusion of the encounter, assessment and plan were discussed.   All questions were answered.   The patient or guardian acknowledged understanding and was involved in the decision making regarding the overall care plan.    Patient Instructions     1. Keep well hydrated  2. May alternate Tylenol every 6 hours with ibuprofen every 6 hours as needed for fever or pain  3. If follow up is needed, try and be seen at your primary clinic. Or you can be seen by any primary care provider at one of our other [facility] sites  4. If you have any questions, call the clinic number  - it's answered 24/7                Patient Education     Acute Bacterial Rhinosinusitis (ABRS)    Acute bacterial rhinosinusitis (ABRS) is an infection of your nasal cavity and sinuses. Its caused by bacteria. Acute means that youve had symptoms for less than 4 weeks, but possibly up to 12  weeks.  Understanding your sinuses  The nasal cavity is the large air-filled space behind your nose. The sinuses are a group of spaces formed by the bones of your face. They connect with your nasal cavity. ABRS causes the tissue lining these spaces to become inflamed. Mucus may not drain normally. This leads to facial pain and other symptoms.  What causes ABRS?  ABRS most often follows an upper respiratory infection caused by a virus. Bacteria then infect the lining of your nasal cavity and sinuses. But you can also get ABRS if you have:    Nasal allergies    Long-term nasal swelling and congestion not caused by allergies    Blockage in the nose  Symptoms of ABRS  The symptoms of ABRS may be different for each person and include:    Nasal congestion or blockage    Pain or pressure in the face    Thick, colored drainage from the nose  Other symptoms may include:    Runny nose    Fluid draining from the nose down the throat (postnasal drip)    Headache    Cough    Pain    Fever  Diagnosing ABRS  ABRS may be diagnosed if youve had an upper respiratory infection like a cold and cough for 10 or more days without improvement or with worsening symptoms. Your healthcare provider will ask about your symptoms and your medical history. The provider will check your vital signs, including your temperature. Youll have a physical exam. The healthcare provider will check your ears, nose, and throat. You likely wont need any tests. If ABRS comes back, you may have a culture or other tests.  Treatment for ABRS  Treatment may include:    Antibiotic medicine. This is for symptoms that last for at least 10 to 14 days.    Nasal corticosteroid medicine. Drops or spray used in the nose can lessen swelling and congestion.    Over-the-counter pain medicine. This is to lessen sinus pain and pressure.    Nasal decongestant medicine. Spray or drops may help to lessen congestion. Do not use them for more than a few days.    Salt wash (saline  irrigation). This can help to loosen mucus.  Possible complications of ABRS  ABRS may come back or become long-term (chronic). In rare cases, ABRS may cause complications such as:     Inflamed tissue around the brain and spinal cord (meningitis)    Inflamed tissue around the eyes (orbital cellulitis)    Inflamed bones around the sinuses (osteitis)  These problems may need to be treated in a hospital with intravenous (IV) antibiotic medicine or surgery.  When to call the healthcare provider  Call your healthcare provider if you have any of the following:    Symptoms that dont get better, or get worse    Symptoms that dont get better after 3 to 5 days on antibiotics    Trouble seeing    Swelling around your eyes    Confusion or trouble staying awake   Date Last Reviewed: 5/1/2017 2000-2017 [employer]. [address] [place] [id number]. All rights reserved. This information is not intended as a substitute for professional medical care. Always follow your healthcare professional's instructions.           Patient Education     Asthma (Adult)  Asthma is a disease where the medium and  small air passages within the lung go into spasm and restrict the flow of air. Inflammation and swelling of the airways cause further blockage. During an acute asthma attack, these factors cause trouble breathing, wheezing, cough and chest tightness.    An asthma attack can be triggered by many things. Common triggers include infections such as the common cold, bronchitis, and pneumonia. Irritants such as smoke or pollutants in the air, very cold air, emotional upset, and exercise can also trigger an attack. In many adults with asthma, allergies to dust, mold, pollen and animal dander can cause an asthma attack. Skipping doses of daily asthma medicine can also bring on an asthma attack.  Asthma can be controlled using the proper medicines prescribed by your healthcare provider and avoiding exposure to known triggers  "including allergens and irritants.  Home care    Take prescribed medicine exactly at the times advised. If you need medicine such as from a hand held inhaler or aerosol breathing machine more than every 4 hours, contact your healthcare provider or seek immediate medical attention. If prescribed an antibiotic or prednisone, take all of the medicine as prescribed, even if you are feeling better after a few days.    Don't smoke. Avoid being exposed to the smoke of others.    Some people with asthma have worsening of their symptoms when they take aspirin and non-steroidal or fever-reducing medicines like ibuprofen and naproxen. Talk to your healthcare provider if you think this may apply to you.  Follow-up care  Follow up with your healthcare provider, or as advised. Always bring all of your current medicines to any appointments with your healthcare provider. Also bring a complete list of medicines even those not taken for asthma. If you don't already have one, talk to your healthcare provider about developing your own \"Asthma Action Plan.\"  A pneumococcal (pneumonia) vaccine and yearly flu shot (every fall) are recommended. Ask your doctor about this.  When to seek medical advice  Call your healthcare provider right away if any of these occur:     Increased wheezing or shortness of breath    Need to use your inhalers more often than usual without relief    Fever of 100.4 F (38 C) or higher, or as directed by your healthcare provider    Coughing up lots of dark-colored or bloody sputum (mucus)    Chest pain with each breath    If you use a peak flow meter as part of an Asthma Action Plan, and you are still in the yellow zone (50% to 80%) 15 minutes after using inhaler medicine.  Call 911  Call 911 if any of the following occur    Trouble walking or talking because of shortness of breath    If you use a peak flow meter as part of an Asthma Action Plan and you are still in the red zone (less than 50%) 15 minutes after " using inhaler medicine    Lips or fingernails turning gray or blue  Date Last Reviewed: 5/1/2017 2000-2017 [employer]. [address] [place] [address], [place] [address]. All rights reserved. This information is not intended as a substitute for professional medical care. Always follow your healthcare professional's instructions.

## 2021-07-03 NOTE — ADDENDUM NOTE
Addendum Note by Dinorah Valenzuela CMA at 5/4/2018  2:38 PM     Author: Dinorah Valenzuela CMA Service: -- Author Type: Certified Medical Assistant    Filed: 5/4/2018  2:38 PM Encounter Date: 2/23/2018 Status: Signed    : Dinorah Valenzuela CMA (Certified Medical Assistant)    Addended by: DINORAH VALENZUELA on: 5/4/2018 02:38 PM        Modules accepted: Orders

## 2021-07-03 NOTE — ADDENDUM NOTE
Addendum Note by Amaya Milian DO at 8/27/2019 10:40 AM     Author: Amaya Milian DO Service: -- Author Type: Physician    Filed: 8/27/2019  2:34 PM Encounter Date: 8/27/2019 Status: Signed    : Amaya Milian DO (Physician)    Addended by: AMAYA MILIAN on: 8/27/2019 02:34 PM        Modules accepted: Orders

## 2021-07-03 NOTE — ADDENDUM NOTE
Addendum Note by Judy Martinez MD at 11/11/2017  6:15 PM     Author: Judy Martinez MD Service: -- Author Type: Physician    Filed: 11/11/2017  6:15 PM Encounter Date: 11/11/2017 Status: Signed    : Judy Martinez MD (Physician)    Addended by: JUDY MARTINEZ on: 11/11/2017 06:15 PM        Modules accepted: Orders

## 2021-07-03 NOTE — ADDENDUM NOTE
Addendum Note by Amaya Milian DO at 3/30/2020 11:29 AM     Author: Amaya Milian DO Service: -- Author Type: Physician    Filed: 3/30/2020 11:29 AM Encounter Date: 3/20/2020 Status: Signed    : Amaya Milian DO (Physician)    Addended by: AMAYA MILIAN on: 3/30/2020 11:29 AM        Modules accepted: Orders

## 2021-07-03 NOTE — ADDENDUM NOTE
Addendum Note by Valerie Wagner LPN at 5/3/2018 12:01 PM     Author: Valerie Wagner LPN Service: -- Author Type: Licensed Nurse    Filed: 5/3/2018 12:01 PM Encounter Date: 2/23/2018 Status: Signed    : Valerie Wagner LPN (Licensed Nurse)    Addended by: VALERIE WAGNER on: 5/3/2018 12:01 PM        Modules accepted: Orders

## 2021-07-03 NOTE — ADDENDUM NOTE
Addendum Note by Dinorah Valenzuela CMA at 11/13/2017  3:22 PM     Author: Dinorah Valenzuela CMA Service: -- Author Type: Certified Medical Assistant    Filed: 11/13/2017  3:22 PM Encounter Date: 11/11/2017 Status: Signed    : Dinorah Valenzuela CMA (Certified Medical Assistant)    Addended by: DINORAH VALENZUELA on: 11/13/2017 03:22 PM        Modules accepted: Orders

## 2021-07-03 NOTE — ADDENDUM NOTE
Addendum Note by Amaya Milian DO at 8/27/2019 10:40 AM     Author: Amaya Milian DO Service: -- Author Type: Physician    Filed: 8/29/2019  4:26 PM Encounter Date: 8/27/2019 Status: Signed    : Amaya Milian DO (Physician)    Addended by: AMAYA MILIAN on: 8/29/2019 04:26 PM        Modules accepted: Orders

## 2021-07-13 ENCOUNTER — RECORDS - HEALTHEAST (OUTPATIENT)
Dept: ADMINISTRATIVE | Facility: CLINIC | Age: 71
End: 2021-07-13

## 2021-07-14 PROBLEM — I10 HYPERTENSION: Status: RESOLVED | Noted: 2019-05-31 | Resolved: 2020-09-28

## 2021-07-21 ENCOUNTER — RECORDS - HEALTHEAST (OUTPATIENT)
Dept: ADMINISTRATIVE | Facility: CLINIC | Age: 71
End: 2021-07-21

## 2021-07-22 ENCOUNTER — VIRTUAL VISIT (OUTPATIENT)
Dept: INTERNAL MEDICINE | Facility: CLINIC | Age: 71
End: 2021-07-22
Payer: MEDICARE

## 2021-07-22 ENCOUNTER — TELEPHONE (OUTPATIENT)
Dept: INTERNAL MEDICINE | Facility: CLINIC | Age: 71
End: 2021-07-22

## 2021-07-22 DIAGNOSIS — E11.65 UNCONTROLLED TYPE 2 DIABETES MELLITUS WITH HYPERGLYCEMIA (H): ICD-10-CM

## 2021-07-22 DIAGNOSIS — M54.50 ACUTE MIDLINE LOW BACK PAIN WITHOUT SCIATICA: Primary | ICD-10-CM

## 2021-07-22 DIAGNOSIS — N28.9 RENAL INSUFFICIENCY: ICD-10-CM

## 2021-07-22 PROCEDURE — 99213 OFFICE O/P EST LOW 20 MIN: CPT | Mod: 95 | Performed by: INTERNAL MEDICINE

## 2021-07-22 RX ORDER — ALBUTEROL SULFATE 90 UG/1
2 AEROSOL, METERED RESPIRATORY (INHALATION)
COMMUNITY
Start: 2020-02-24 | End: 2021-09-29

## 2021-07-22 RX ORDER — TIZANIDINE HYDROCHLORIDE 4 MG/1
4 CAPSULE, GELATIN COATED ORAL 3 TIMES DAILY PRN
Qty: 30 CAPSULE | Refills: 0 | Status: SHIPPED | OUTPATIENT
Start: 2021-07-22 | End: 2021-09-29

## 2021-07-22 ASSESSMENT — PATIENT HEALTH QUESTIONNAIRE - PHQ9: SUM OF ALL RESPONSES TO PHQ QUESTIONS 1-9: 2

## 2021-07-22 ASSESSMENT — ASTHMA QUESTIONNAIRES
QUESTION_4 LAST FOUR WEEKS HOW OFTEN HAVE YOU USED YOUR RESCUE INHALER OR NEBULIZER MEDICATION (SUCH AS ALBUTEROL): NOT AT ALL
QUESTION_3 LAST FOUR WEEKS HOW OFTEN DID YOUR ASTHMA SYMPTOMS (WHEEZING, COUGHING, SHORTNESS OF BREATH, CHEST TIGHTNESS OR PAIN) WAKE YOU UP AT NIGHT OR EARLIER THAN USUAL IN THE MORNING: NOT AT ALL
QUESTION_1 LAST FOUR WEEKS HOW MUCH OF THE TIME DID YOUR ASTHMA KEEP YOU FROM GETTING AS MUCH DONE AT WORK, SCHOOL OR AT HOME: A LITTLE OF THE TIME
QUESTION_2 LAST FOUR WEEKS HOW OFTEN HAVE YOU HAD SHORTNESS OF BREATH: NOT AT ALL
QUESTION_5 LAST FOUR WEEKS HOW WOULD YOU RATE YOUR ASTHMA CONTROL: WELL CONTROLLED
ACT_TOTALSCORE: 23

## 2021-07-22 NOTE — PROGRESS NOTES
Meggan is a 70 year old female contacting the clinic today via video, who will use the platform: Knozen for the visit.  Phone # for Doximity, or if Amwell drops:   Telephone Information:   Mobile [phone]        Advised to have pill bottles ready and pen for instructions?  Yes      ASSESSMENT:  1. Acute midline low back pain without sciatica  Currently appears to have worsening back pain due to spasm and possible arthritis exacerbation.  No acute radiculopathy.  We'll increase her tizanidine to 1 tablet 3 times a day, I asked her to add Tylenol 650-1000 mg 3 times a day.  She is already on Celebrex and I do not want to increase that.  She will use heating pad for comfort and I recommended doing her gentle stretches and walking throughout the day to prevent stiffness.    - tiZANidine (ZANAFLEX) 4 MG capsule; Take 1 capsule (4 mg) by mouth 3 times daily as needed for muscle spasms  Dispense: 30 capsule; Refill: 0    2. Uncontrolled type 2 diabetes mellitus with hyperglycemia (H)  Previous A1c was 7.7.  Morning sugars currently are in 200s.  Would avoid prednisone for now.    3. Renal insufficiency  She is already on Celebrex 200 mg a day.  Due to renal insufficiency would avoid increasing the dose.    She has follow-up scheduled with his PCP for an annual checkup next week.    CHIEF COMPLAINT:  Chief Complaint   Patient presents with     Back Pain     3rd day          HISTORY OF PRESENT ILLNESS:  Meggan is a 70 year old female contacting the clinic today via video for acute back pain.    Meggan has history of off uncontrolled type 2 diabetes on insulin, gout, hypertension, muscle spasm, hypothyroidism, edema, hypertension, anxiety and depression.    She does report history of chronic low back pain due to previous surgery for disc bulge.  She has chronic DJD in her lumbar line.  Chronically she takes Celebrex 200 mg a day and tizanidine at bedtime.  Most recently she had her grandson visiting and she was  picking him up and also was walking more due to the dog sitting responsibilities and her back pain flared up several days ago.  Pain is primarily across her lower back.  She denies any shooting pain or radiculopathy into her legs.  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    She has tried heating pad yesterday which helped slightly.    She is currently on insulin for diabetes, she is unable to tolerate Metformin.  Fasting sugars in the morning are 1 .    She denies any urinary problems.    REVIEW OF SYSTEMS:   As above    PFSH:  Social History     Social History Narrative    She is  and is retired. She lives with her son and his family on the lower level of their house.  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  She has 4 grandchildren.  She does not smoke cigaret melva or drink alcohol.       TOBACCO USE:  History   Smoking Status     Never Smoker   Smokeless Tobacco     Never Used       VITALS:  There were no vitals filed for this visit.  Wt Readings from Last 3 Encounters:   02/24/20 129.8 kg (286 lb 1 oz)   08/27/19 128.5 kg (283 lb 3 oz)   08/10/19 128.5 kg (283 lb 6.4 oz)       PHYSICAL EXAM:  (observations via Video)  Pleasant female, awake alert and oriented, conjunctiva is pink, no scleral icterus.  She appears to be in mild discomfort as she lying on the couch, appears to be slightly stiff.  No shortness of breath, cough or respiratory difficulty noted.    MEDICATIONS:   Current Outpatient Medications   Medication Sig Dispense Refill     albuterol (PROAIR HFA/PROVENTIL HFA/VENTOLIN HFA) 108 (90 Base) MCG/ACT inhaler Inhale 2 puffs into the lungs       Aliskiren-Hydrochlorothiazide 150-12.5 MG TABS Take 1 tablet by mouth daily.       allopurinol (ZYLOPRIM) 100 MG tablet Take 2 tablets (200 mg) by mouth daily 180 tablet 1     Celecoxib (CELEBREX PO) Take 200 mg by mouth        Cholecalciferol (VITAMIN D) 2000 UNIT tablet Take 2 tablets by mouth daily        furosemide (LASIX) 40 MG tablet Take 40 mg by mouth daily.       IRBESARTAN PO Take 150 mg by mouth At Bedtime       LANTUS VIAL 100 UNITS/ML SC SOLN Inject 24 Units Subcutaneous 2 times daily        Levothyroxine Sodium (SYNTHROID PO) Take 125 mcg by mouth daily        LORAZEPAM PO Take 0.5 mg by mouth At Bedtime       metoprolol (TOPROL XL) 100 MG 24 hr tablet Take 100 mg by mouth daily.       NovoLOG VIAL 100 UNITS/ML SC SOLN Inject  Subcutaneous. Per sliding scale       Probiotic Product (PROBIOTIC-10 PO)        rosuvastatin (CRESTOR) 10 MG tablet Take 5 mg by mouth three times a week        tiZANidine (ZANAFLEX) 4 MG capsule Take 4 mg by mouth daily.       UNABLE TO FIND 2 times daily MEDICATION NAME: Hemp Cream  Applied to knees PRN       glimepiride (AMARYL) 2 MG tablet Take 1 tablet (2 mg) by mouth every morning (before breakfast) (Patient not taking: Reported on 7/22/2021) 90 tablet 3     metFORMIN (GLUCOPHAGE XR) 500 MG 24 hr tablet Take 500 mg by mouth daily (Patient not taking: Reported on 7/22/2021)       predniSONE (DELTASONE) 5 MG tablet 4tab=20 mg qd x 2 days, 3tab=15 mg qd x 2 days, 2tab=10 mg qd x 2 days, 1 tab=5 mg qd x 2 days (Patient not taking: Reported on 7/22/2021) 60 tablet 0       Outside Notes summarized:   Labs, x-rays, cardiology, GI tests reviewed: Chart reviewed, previous blood work reviewed  Recent Labs   Lab Test 02/24/20  1201 10/28/19  1024 08/27/19  1133   HGB  --   --  13.8   PLT  --   --  275   NA  --  142 141   POTASSIUM  --  4.5 4.5   CR  --  1.22* 1.24*   A1C 7.7*  --  9.4*     New orders: No orders of the defined types were placed in this encounter.      Independent review of:    Supplemental history by:      Video Start Time: 12:55 PM  Video End time: 1:07 PM  Face to face plus orders: 12 minutes  Documentation time:  3 minutes    The visit lasted a total of 15 minutes     Patient has given  verbal consent to a Video visit?  Yes  How would you like to obtain your AVS?  MyChart  Will anyone else be joining your video visit? No       Video-Visit Details  Type of service:  Video Visit  Originating Location (pt. Location): Home  Distant Location (provider location):   [facility]   Doximity used for this virtual visit  Charlette CRISTINA Valenzuela  [facility]

## 2021-07-23 ASSESSMENT — ASTHMA QUESTIONNAIRES: ACT_TOTALSCORE: 23

## 2021-07-28 ENCOUNTER — OFFICE VISIT (OUTPATIENT)
Dept: INTERNAL MEDICINE | Facility: CLINIC | Age: 71
End: 2021-07-28
Payer: MEDICARE

## 2021-07-28 VITALS
HEART RATE: 98 BPM | OXYGEN SATURATION: 98 % | BODY MASS INDEX: 57.38 KG/M2 | SYSTOLIC BLOOD PRESSURE: 140 MMHG | DIASTOLIC BLOOD PRESSURE: 58 MMHG | WEIGHT: 293 LBS

## 2021-07-28 DIAGNOSIS — Z79.899 CHRONIC USE OF BENZODIAZEPINE FOR THERAPEUTIC PURPOSE: ICD-10-CM

## 2021-07-28 DIAGNOSIS — E78.5 HYPERLIPIDEMIA LDL GOAL <100: ICD-10-CM

## 2021-07-28 DIAGNOSIS — Z79.4 TYPE 2 DIABETES MELLITUS WITHOUT COMPLICATION, WITH LONG-TERM CURRENT USE OF INSULIN (H): ICD-10-CM

## 2021-07-28 DIAGNOSIS — N18.31 STAGE 3A CHRONIC KIDNEY DISEASE (H): ICD-10-CM

## 2021-07-28 DIAGNOSIS — M17.0 PRIMARY OSTEOARTHRITIS OF BOTH KNEES: ICD-10-CM

## 2021-07-28 DIAGNOSIS — I10 ESSENTIAL HYPERTENSION: ICD-10-CM

## 2021-07-28 DIAGNOSIS — G47.33 OBSTRUCTIVE SLEEP APNEA SYNDROME: ICD-10-CM

## 2021-07-28 DIAGNOSIS — Z13.220 SCREENING FOR HYPERLIPIDEMIA: Primary | ICD-10-CM

## 2021-07-28 DIAGNOSIS — E11.9 TYPE 2 DIABETES MELLITUS WITHOUT COMPLICATION, WITH LONG-TERM CURRENT USE OF INSULIN (H): ICD-10-CM

## 2021-07-28 PROBLEM — E66.01 MORBID OBESITY (H): Status: RESOLVED | Noted: 2018-10-30 | Resolved: 2021-07-28

## 2021-07-28 LAB
ALBUMIN SERPL-MCNC: 3.7 G/DL (ref 3.5–5)
ALP SERPL-CCNC: 292 U/L (ref 45–120)
ALT SERPL W P-5'-P-CCNC: 51 U/L (ref 0–45)
ANION GAP SERPL CALCULATED.3IONS-SCNC: 15 MMOL/L (ref 5–18)
AST SERPL W P-5'-P-CCNC: 30 U/L (ref 0–40)
BILIRUB SERPL-MCNC: 1.1 MG/DL (ref 0–1)
BUN SERPL-MCNC: 40 MG/DL (ref 8–28)
CALCIUM SERPL-MCNC: 10.4 MG/DL (ref 8.5–10.5)
CHLORIDE BLD-SCNC: 106 MMOL/L (ref 98–107)
CHOLEST SERPL-MCNC: 197 MG/DL
CO2 SERPL-SCNC: 22 MMOL/L (ref 22–31)
CREAT SERPL-MCNC: 1.37 MG/DL (ref 0.6–1.1)
ERYTHROCYTE [DISTWIDTH] IN BLOOD BY AUTOMATED COUNT: 14.2 % (ref 10–15)
FASTING STATUS PATIENT QL REPORTED: YES
GFR SERPL CREATININE-BSD FRML MDRD: 39 ML/MIN/1.73M2
GLUCOSE BLD-MCNC: 157 MG/DL (ref 70–125)
HBA1C MFR BLD: 8.5 % (ref 0–5.6)
HCT VFR BLD AUTO: 43 % (ref 35–47)
HDLC SERPL-MCNC: 51 MG/DL
HGB BLD-MCNC: 13.7 G/DL (ref 11.7–15.7)
LDLC SERPL CALC-MCNC: 113 MG/DL
MCH RBC QN AUTO: 29.5 PG (ref 26.5–33)
MCHC RBC AUTO-ENTMCNC: 31.9 G/DL (ref 31.5–36.5)
MCV RBC AUTO: 93 FL (ref 78–100)
PLATELET # BLD AUTO: 264 10E3/UL (ref 150–450)
POTASSIUM BLD-SCNC: 4.2 MMOL/L (ref 3.5–5)
PROT SERPL-MCNC: 7.8 G/DL (ref 6–8)
RBC # BLD AUTO: 4.65 10E6/UL (ref 3.8–5.2)
SODIUM SERPL-SCNC: 143 MMOL/L (ref 136–145)
TRIGL SERPL-MCNC: 163 MG/DL
WBC # BLD AUTO: 9.2 10E3/UL (ref 4–11)

## 2021-07-28 PROCEDURE — 85027 COMPLETE CBC AUTOMATED: CPT | Performed by: INTERNAL MEDICINE

## 2021-07-28 PROCEDURE — 99214 OFFICE O/P EST MOD 30 MIN: CPT | Performed by: INTERNAL MEDICINE

## 2021-07-28 PROCEDURE — 80061 LIPID PANEL: CPT | Performed by: INTERNAL MEDICINE

## 2021-07-28 PROCEDURE — 83036 HEMOGLOBIN GLYCOSYLATED A1C: CPT | Performed by: INTERNAL MEDICINE

## 2021-07-28 PROCEDURE — 36415 COLL VENOUS BLD VENIPUNCTURE: CPT | Performed by: INTERNAL MEDICINE

## 2021-07-28 PROCEDURE — 80053 COMPREHEN METABOLIC PANEL: CPT | Performed by: INTERNAL MEDICINE

## 2021-07-28 NOTE — PATIENT INSTRUCTIONS
1. Discussed knee osteoarthritis.  Can refer to orthopedic surgery for consideration of knee replacement.     2. Referral to diabetic educator for help with possible GLP 1 agonist therapy.     3. Lab testing today.     4. Follow up in three months.

## 2021-07-28 NOTE — PROGRESS NOTES
ASSESSMENT AND PLAN:    1. Screening for hyperlipidemia  Lipid profile    2. Obstructive sleep apnea syndrome  Ongoing use of CPAP    3. BMI 45.0-49.9, adult   She has gained this past year. We discussed methods of weight loss.     4. Essential hypertension  Satisfactory control.     5. Stage 3a chronic kidney disease  Need to monitor.     6. Primary osteoarthritis of both knees  Right is greater than left.  TKA would be an option, to enable increased activity.  Will refer to orthopedic surgery. She understands that they may require weight loss.     7. Type 2 diabetes mellitus   Will refer to diabetic educator for consideration of GLP 1 agonist, for the added benefit of weight loss.     8. Chronic use of benzodiazepine for therapeutic purpose  Ongoing use of lorazepam for sleep without escalation.     Patient Instructions   1. Discussed knee osteoarthritis.  Can refer to orthopedic surgery for consideration of knee replacement.     2. Referral to diabetic educator for help with possible GLP 1 agonist therapy.     3. Lab testing today.     4. Follow up in three months.     CHIEF COMPLAINT:  Follow up.     HISTORY OF PRESENT ILLNESS:  Meggan Everett is a 70 year old female here in follow up.  Weight is up 11 pounds from one year ago.  More knee pain with weight bearing.  No acute swelling.  No unusual dyspnea other than her chronic MAN. Uses her CPAP.  No voiding symptoms or bowel symptoms.     REVIEW OF SYSTEMS:   See HPI, all other systems on review are negative.    Past Medical History:   Diagnosis Date     Asthma      Chronic kidney failure, stage 3 (moderate)      Chronic use of benzodiazepine for therapeutic purpose      Diabetes mellitus      Esophageal reflux      Fibroadenoma of LEFT breast, with fibrocystic changes 5/14/2012     Gout      Hyperlipidemia      Hypothyroid      Mammographic microcalcification 5/2012    Left     Migraine with aura      Obesity      ASHER (obstructive sleep apnea)     uses CPAP      Osteoporosis      Primary osteoarthritis of both knees 09/28/2020     RBBB      Type 2 diabetes mellitus (H)      History   Smoking Status     Never Smoker   Smokeless Tobacco     Never Used     Family History   Problem Relation Age of Onset     Autoimmune Disease No family hx of      Hypertension Mother         alive in her 90s     Atrial fibrillation Father         alive in his 90s     LUNG DISEASE Sister         interstitial lung disease     Heart Disease Sister      Past Surgical History:   Procedure Laterality Date     BIOPSY BREAST       D & C  2000, 2002     DILATE CERVIX, ABLATE ENDOMETRIUM, COMBINED  2005     ENDOMETRIAL ABLATION       HC BIOPSY OF BREAST, OPEN INCISIONAL  1972    Left     HC LAPAROSCOPY, SURGICAL; CHOLECYSTECTOMY  1998     LAPAROSCOPIC CHOLECYSTECTOMY  1999     LUMBAR LAMINECTOMY  1998    Description: Laminectomy Lumbar     Microdiscectomy  1998     VITALS:  Vitals:    07/28/21 1110   BP: (!) 140/58   BP Location: Left arm   Patient Position: Sitting   Cuff Size: Adult Regular   Pulse: 98   SpO2: 98%   Weight: 140 kg (308 lb 11.2 oz)     Wt Readings from Last 3 Encounters:   07/28/21 140 kg (308 lb 11.2 oz)   02/24/20 129.8 kg (286 lb 1 oz)   08/27/19 128.5 kg (283 lb 3 oz)     PHYSICAL EXAM:  Constitutional:  In NAD, alert and oriented  Neck: no cervical or axillary adenopathy  Cardiac:  S1 S2   Lungs: Clear  Abdomen:   Soft, flat and non-tender  Extremities: both knees are dystrophic without acute effusion  Neurologic:  Speech clear, no arm or leg weakness, gait normal     Psychiatric:  Mood is subdued, thinking is clear.     DECISION TO OBTAIN OLD RECORDS AND/OR OBTAIN HISTORY FROM SOMEONE OTHER THAN PATIENT, AND/OR ACCESSING CARE EVERYWHERE):  1  0     REVIEW AND SUMMARIZATION OF OLD RECORDS, AND/OR OBTAINING HISTORY FROM SOMEONE OTHER THAN PATIENT, AND/OR DISCUSSION OF CASE WITH ANOTHER HEALTH CARE PROVIDER:  2 reviewed last health evaluation    REVIEW AND/OR ORDER OF OF CLINICAL  LAB TESTS: 1  ordered.    REVIEW AND/OR ORDER OF RADIOLOGY TESTS: 1 reviewed knee xrays one year ago.    REVIEW AND/OR ORDER OF MEDICAL TESTS (EKG/ECHO/COLONOSCOPY/EGD): 1  0    INDEPENDENT  VISUALIZATION OF IMAGE, TRACING, OR SPECIMEN ITSELF (2 EACH):  0     TOTAL: 4    Current Outpatient Medications   Medication Sig Dispense Refill     albuterol (PROAIR HFA/PROVENTIL HFA/VENTOLIN HFA) 108 (90 Base) MCG/ACT inhaler Inhale 2 puffs into the lungs       Aliskiren-Hydrochlorothiazide 150-12.5 MG TABS Take 1 tablet by mouth daily.       allopurinol (ZYLOPRIM) 100 MG tablet Take 2 tablets (200 mg) by mouth daily 180 tablet 1     Celecoxib (CELEBREX PO) Take 200 mg by mouth       Cholecalciferol (VITAMIN D) 2000 UNIT tablet Take 2 tablets by mouth daily        furosemide (LASIX) 40 MG tablet Take 40 mg by mouth daily.       IRBESARTAN PO Take 150 mg by mouth At Bedtime       LANTUS VIAL 100 UNITS/ML SC SOLN Inject 24 Units Subcutaneous 2 times daily        Levothyroxine Sodium (SYNTHROID PO) Take 125 mcg by mouth daily        LORAZEPAM PO Take 0.5 mg by mouth At Bedtime       metoprolol (TOPROL XL) 100 MG 24 hr tablet Take 100 mg by mouth daily.       NovoLOG VIAL 100 UNITS/ML SC SOLN Inject  Subcutaneous. Per sliding scale       Probiotic Product (PROBIOTIC-10 PO)        rosuvastatin (CRESTOR) 10 MG tablet Take 5 mg by mouth three times a week        tiZANidine (ZANAFLEX) 4 MG capsule Take 1 capsule (4 mg) by mouth 3 times daily as needed for muscle spasms 30 capsule 0     UNABLE TO FIND 2 times daily MEDICATION NAME: Hemp Cream  Applied to knees PRN       Jairo Fraga MD  Internal Medicine  [facility]

## 2021-08-03 ENCOUNTER — VIRTUAL VISIT (OUTPATIENT)
Dept: EDUCATION SERVICES | Facility: CLINIC | Age: 71
End: 2021-08-03
Payer: MEDICARE

## 2021-08-03 DIAGNOSIS — Z79.4 TYPE 2 DIABETES MELLITUS WITHOUT COMPLICATION, WITH LONG-TERM CURRENT USE OF INSULIN (H): ICD-10-CM

## 2021-08-03 DIAGNOSIS — E11.9 TYPE 2 DIABETES MELLITUS WITHOUT COMPLICATION, WITH LONG-TERM CURRENT USE OF INSULIN (H): ICD-10-CM

## 2021-08-03 PROBLEM — E21.3 HYPERPARATHYROIDISM (H): Status: RESOLVED | Noted: 2019-05-31 | Resolved: 2020-09-28

## 2021-08-03 PROCEDURE — G0108 DIAB MANAGE TRN  PER INDIV: HCPCS | Mod: 95 | Performed by: REGISTERED NURSE

## 2021-08-03 RX ORDER — INSULIN ASPART 100 [IU]/ML
INJECTION, SOLUTION INTRAVENOUS; SUBCUTANEOUS
Qty: 45 ML | Refills: 4 | Status: SHIPPED | OUTPATIENT
Start: 2021-08-03 | End: 2021-08-19

## 2021-08-03 NOTE — PROGRESS NOTES
Type of service:  Video Visit    If the video visit is dropped, the video visit invitation should be resent by: Send to e-mail at: [email or web address]    Originating Location (pt. Location): Home  Distant Location (provider location): [facility]  Mode of Communication:  Video Conference via [employer]    Video Start Time: 10:25 AM  Video End Time (time video stopped): 11:30 AM    How would patient like to obtain AVS? [facility]    Diabetes Self-Management Education & Support    ASSESSMENT  Initial video visit for diabetes education, insulin adjustment and discussion regarding adding a GLP-1. Meggan is checking her blood sugar twice per day with an Accu-Chek YARELIS meter. The date and time were off (14 hours). Explained how to re-set the date and time. The next blood sugar readings will be easier to analyze. Patient mentioned during COVID she felt bored and isolated. She was baking and eating cookies and chips and snacking too much. Recently she started eating a low carbohydrate diet. An eating challenge is late night eating as she gets hungry. She watches movies and tends to snack. Activity is limited due to knee pain. Reviewed how she is taking her insulin. Currently she takes the Novolog with the meal. Novolog works best when taken 15 minutes before each meal. Due to lack of data insulin doses were not changed today. Discussed the benefits of adding a GLP-1. Demonstrated the ease of use for the Trulicity pen. She will review the handouts to learn about GLP-1 and decide if she would be willing to take it. Briefly discussed using a continuous glucose sensor. Meggan mentioned she is sensitive to adhesives. She cannot even keep a Band-Aid on very long.    PLAN  See Patient Instructions for co-developed, patient-stated behavior change goals.  See Follow-Up section for recommended follow-up.    SUBJECTIVE/OBJECTIVE:  Presents for: Individual review  Diabetes education in the past 24mo:  "No  Focus of Visit: Healthy Eating, Assistance w/ making life changes  Diabetes type: Type 2  Date of diagnosis: 20 years ago  Disease course: Worsening  How confident are you filling out medical forms by yourself:: Extremely  Diabetes management related comments/concerns: Weight loss  Difficulty affording diabetes medication?: No  Difficulty affording diabetes testing supplies?: No  Other concerns:: None  Cultural Influences/Ethnic Background:  Not  or   Last diabetes education when diagnosed with diabetes over 20 years ago    Diabetes Symptoms & Complications:  Fatigue: Yes  Neuropathy: Yes  Polydipsia: No  Polyphagia: Yes  Polyuria: No  Visual change: Yes  Slow healing wounds: No  Symptom course: Worsening  Weight trend: Increasing. Gained 22 pounds when Lexapro started     Patient Problem List and Family Medical History reviewed for relevant medical history, current medical status, and diabetes risk factors.    Vitals:  There were no vitals taken for this visit.  Estimated body mass index is 57.38 kg/m  as calculated from the following:    Height as of 2/24/20: 1.562 m (5' 1.5\").    Weight as of 7/28/21: 140 kg (308 lb 11.2 oz).   Last 3 BP:   BP Readings from Last 3 Encounters:   07/28/21 (!) 140/58   02/24/20 128/72   05/14/19 141/77       History   Smoking Status     Never Smoker   Smokeless Tobacco     Never Used       Labs:  Lab Results   Component Value Date    A1C 8.5 07/28/2021    A1C 7.4 01/18/2019     Lab Results   Component Value Date     07/28/2021     02/19/2019     Lab Results   Component Value Date     07/28/2021    LDL 70.0 09/17/2012     Direct Measure HDL   Date Value Ref Range Status   07/28/2021 51 >=50 mg/dL Final     Comment:     HDL Cholesterol Reference Range:     0-2 years:   No reference ranges established for patients under 2 years old  at [facility] for lipid analytes.    2-8 years:  Greater than 45 mg/dL     18 years and older: "   Female: Greater than or equal to 50 mg/dL   Male:   Greater than or equal to 40 mg/dL   ]  GFR Estimate   Date Value Ref Range Status   07/28/2021 39 (L) >60 mL/min/1.73m2 Final     Comment:     As of July 11, 2021, eGFR is calculated by the CKD-EPI creatinine equation, without race adjustment. eGFR can be influenced by muscle mass, exercise, and diet. The reported eGFR is an estimation only and is only applicable if the renal function is stable.   10/28/2019 44 (L) >60 mL/min/1.73m2 Final   07/30/2019 49 (L) >60 mL/min/[1.73_m2] Final     Comment:     Non  GFR Calc  Starting 12/18/2018, serum creatinine based estimated GFR (eGFR) will be   calculated using the Chronic Kidney Disease Epidemiology Collaboration   (CKD-EPI) equation.       GFR Estimate If Black   Date Value Ref Range Status   10/28/2019 53 (L) >60 mL/min/1.73m2 Final   07/30/2019 57 (L) >60 mL/min/[1.73_m2] Final     Comment:      GFR Calc  Starting 12/18/2018, serum creatinine based estimated GFR (eGFR) will be   calculated using the Chronic Kidney Disease Epidemiology Collaboration   (CKD-EPI) equation.       Lab Results   Component Value Date    CR 1.37 07/28/2021    CR 1.14 07/30/2019     No results found for: MICROALBUMIN    Healthy Eating:  Healthy Eating Assessed Today: Yes  Meal planning/habits: Smaller portions, Low carb  Meals include: Breakfast, Lunch, Dinner, Evening Snack  Breakfast: Diabetic slim fast (13 gm carb with milk 12 gm( with frozen blueberries (1/2 cup)=25 gm carb with 12 units novolog Or 1 slice of toast with peanut butter and sugar-free jelly takes 12 Novolog.  Lunch: Cottage cheese and blueberries. 12 units novologSalad with protein, berries or 2 cuties. 8 units Novolog  Dinner: Protein and  vegetables, occasionally sweet potato or salad with lots of vegetables  Snacks: vegetables and hummus, cubes of cheese, popcorn 100 calorie bag,  Other: Crystal light  Beverages: Water, Other  Has  patient met with a dietitian in the past?: Yes (at diagnosis)    Being Active:  Being Active Assessed Today: Yes  Exercise:: Unable to exercise (back pain and shoulder pain limit activity)  Barrier to exercise: Physical limitation    Monitoring:  Monitoring Assessed Today: Yes  Blood Glucose Meter: Accu-chek  Times checking blood sugar at home (number): 2  Blood glucose trend: Increasing    FBG elevated. Per patient rises overnight. Gets sweaty overnight. Not verifying if blood sugar is low  Tried not eating after dinner or just protein snack  Hypoglycemia at night occasionally (2 episodes past 2 months). She lives in a basement apartment of her son's house. Patient gets nervous because sometimes her blood sugar does not go up quickly after treating with milk or glucose tablets    UNSURE IF BLOOD SUGARS ARE IN THE RIGHT TIME SLOT. METER TIME WAS OFF 14 HOURS    Date Breakfast  Lunch  Dinner  Bedtime    Before After Before After Before After    8/3 180           8/2 145       174  144   8/1 190              7/31           142   7/30        151      7/29            111   7/28 182         182 134         Taking Medications: Using insulin pens not vials. [facility]  Diabetes Medication(s)     Insulin       LANTUS VIAL 100 UNITS/ML SC SOLN    Inject 24 Units Subcutaneous 2 times daily      NovoLOG VIAL 100 UNITS/ML SC SOLN    Inject  Subcutaneous. Per sliding scale        Taking Medication Assessed Today: Yes  Current Treatments: Diet, Insulin Injections  Dose schedule: Pre-breakfast, Pre-lunch, Pre-dinner, At bedtime (Lantus twice per day AM/PM)  Given by: Patient  Injection/Infusion sites: Abdomen  Problems taking diabetes medications regularly?: No  Diabetes medication side effects?: No    Problem Solving:  Problem Solving Assessed Today: Yes  Is the patient at risk for hypoglycemia?: Yes  Hypoglycemia Frequency: Monthly  Hypoglycemia Treatment: Glucose (tablets or gel), Other food (milk)    Hypoglycemia  symptoms  Confusion: Yes  Dizziness or Light-Headedness: No  Headaches: No  Hunger: No  Mood changes: No  Nervousness/Anxiety: Yes  Sleepiness: No  Speech difficulty: No  Sweats: Yes  Feeling shaky: Yes    Hypoglycemia Complications  Blackouts: No  Hospitalization: No  Nocturnal hypoglycemia: Yes  Required assistance: No    Reducing Risks:  Reducing Risks Assessed Today: Yes  Diabetes Risks: Age over 45 years, Sedentary Lifestyle, Hyperlipidemia  CAD Risks: Diabetes Mellitus, Dyslipidemia, Hypertension, Obesity, Sedentary lifestyle, Post-menopausal  Has dilated eye exam at least once a year?: Yes  Sees dentist every 6 months?: Yes  Feet checked by healthcare provider in the last year?: Yes    Healthy Coping:  Healthy Coping Assessed Today: Yes  Emotional response to diabetes: Acceptance, Concern for health and well-being  Informal Support system:: Family  Stage of change: ACTION (Actively working towards change)  Support resources: Websites  Patient Activation Measure Survey Score:  No flowsheet data found.    Diabetes knowledge and skills assessment:   Patient is knowledgeable in diabetes management concepts related to: Healthy Eating and Monitoring    Patient needs further education on the following diabetes management concepts: Taking Medication, Problem Solving, Reducing Risks, Healthy Coping and behavior change strategies    Based on learning assessment above, most appropriate setting for further diabetes education would be: Individual setting.      INTERVENTIONS:    Education provided today on:  AADE Self-Care Behaviors:  Diabetes Pathophysiology  Healthy Eating: weight reduction  Being Active: relationship to blood glucose  Monitoring: individual blood glucose targets  Taking Medication: action of prescribed medication, proper site selection and rotation for injections and when to take medications  Problem Solving: high blood glucose - causes, signs/symptoms, treatment and prevention and low blood glucose -  causes, signs/symptoms, treatment and prevention    Opportunities for ongoing education and support in diabetes-self management were discussed.    Pt verbalized understanding of concepts discussed and recommendations provided today.       Education Materials Provided:  [facility] [employer] Understanding Diabetes Booklet   [facility] Healthy Snacks  American Association of Certified Diabetes Educators GLP-1 fact sheet  American Diabetes Association Factors that raise blood sugar  Food and Blood sugar record    Patient's most recent   Lab Results   Component Value Date    A1C 8.5 07/28/2021    A1C 7.4 01/18/2019    is not meeting goal of <7.0    Diabetes Self-Management Training ()  Time Spent: 60 minutes  Encounter Type: Individual  Previous education: No, first on Medicare  Medicare DSMT hours available: 9 and Medical Nutrition Therapy 3 until 8/2/2022    Any diabetes medication dose changes were made via the CDE Protocol and Collaborative Practice Agreement with the patient's referring provider. A copy of this encounter was shared with the provider.      Mattie Ortega RD, LD, [facility]  Certified Diabetes Care and

## 2021-08-03 NOTE — PATIENT INSTRUCTIONS
1. Check blood sugar twice per day (before breakfast and bedtime).  2. Inject Novolog 15 minutes before each meal.   3. Call [facility] insurance about which GLP-1 medication is preferred in your formulary. Choices may be Victoza, Trulicity, Ozempic or Bydureon.  4. Read the information about GLP-1 medications and decide if you want to try it.  5. Keep a food and blood sugar record for one week prior to the next video visit. Note Novolog doses taken. BG=blood glucose in the squares in each box.   6. Continue to eat a low carbohydrate diet.  7. Next video visit on Thursday, 8/19/21 at 2:00 PM.    Blood sugar goals:  Before breakfast:   Bedtime:

## 2021-08-03 NOTE — LETTER
8/3/2021         RE: Meggan Everett  [address] [facility] [address]        Dear Colleague,    Thank you for referring your patient, Meggan Everett, to the [facility]. Please see a copy of my visit note below.    Type of service:  Video Visit    If the video visit is dropped, the video visit invitation should be resent by: Send to e-mail at: [email or web address]    Originating Location (pt. Location): Home  Distant Location (provider location): [facility]  Mode of Communication:  Video Conference via [employer]    Video Start Time: 10:25 AM  Video End Time (time video stopped): 11:30 AM    How would patient [facility] to [facility]? [facility]    Diabetes Self-Management Education & Support    ASSESSMENT  Initial video visit for diabetes education, insulin adjustment and discussion regarding adding a GLP-1. Meggan is checking her blood sugar twice per day with an Accu-Chek YARELIS meter. The date and time were off (14 hours). Explained how to re-set the date and time. The next blood sugar readings will be easier to analyze. Patient mentioned during COVID she felt bored and isolated. She was baking and eating cookies and chips and snacking too much. Recently she started eating a low carbohydrate diet. An eating challenge is late night eating as she gets hungry. She watches movies and tends to snack. Activity is limited due to knee pain. Reviewed how she is taking her insulin. Currently she takes the Novolog with the meal. Novolog works best when taken 15 minutes before each meal. Due to lack of data insulin doses were not changed today. Discussed the benefits of adding a GLP-1. Demonstrated the ease of use for the Trulicity pen. She will review the handouts to learn about GLP-1 and decide if she would be willing to take it. Briefly discussed using a continuous glucose sensor. Meggan mentioned she is sensitive to adhesives. She cannot even keep a  "Band-Aid on very long.    PLAN  See Patient Instructions for co-developed, patient-stated behavior change goals.  See Follow-Up section for recommended follow-up.    SUBJECTIVE/OBJECTIVE:  Presents for: Individual review  Diabetes education in the past 24mo: No  Focus of Visit: Healthy Eating, Assistance w/ making life changes  Diabetes type: Type 2  Date of diagnosis: 20 years ago  Disease course: Worsening  How confident are you filling out medical forms by yourself:: Extremely  Diabetes management related comments/concerns: Weight loss  Difficulty affording diabetes medication?: No  Difficulty affording diabetes testing supplies?: No  Other concerns:: None  Cultural Influences/Ethnic Background:  Not  or   Last diabetes education when diagnosed with diabetes over 20 years ago    Diabetes Symptoms & Complications:  Fatigue: Yes  Neuropathy: Yes  Polydipsia: No  Polyphagia: Yes  Polyuria: No  Visual change: Yes  Slow healing wounds: No  Symptom course: Worsening  Weight trend: Increasing. Gained 22 pounds when Lexapro started     Patient Problem List and Family Medical History reviewed for relevant medical history, current medical status, and diabetes risk factors.    Vitals:  There were no vitals taken for this visit.  Estimated body mass index is 57.38 kg/m  as calculated from the following:    Height as of 2/24/20: 1.562 m (5' 1.5\").    Weight as of 7/28/21: 140 kg (308 lb 11.2 oz).   Last 3 BP:   BP Readings from Last 3 Encounters:   07/28/21 (!) 140/58   02/24/20 128/72   05/14/19 141/77       History   Smoking Status     Never Smoker   Smokeless Tobacco     Never Used       Labs:  Lab Results   Component Value Date    A1C 8.5 07/28/2021    A1C 7.4 01/18/2019     Lab Results   Component Value Date     07/28/2021     02/19/2019     Lab Results   Component Value Date     07/28/2021    LDL 70.0 09/17/2012     Direct Measure HDL   Date Value Ref Range Status   07/28/2021 51 >=50 " mg/dL Final     Comment:     HDL Cholesterol Reference Range:     0-2 years:   No reference ranges established for patients under 2 years old  [facility] lipid analytes.    2-8 years:  Greater than 45 mg/dL     18 years and older:   Female: Greater than or equal to 50 mg/dL   Male:   Greater than or equal to 40 mg/dL   ]  GFR Estimate   Date Value Ref Range Status   07/28/2021 39 (L) >60 mL/min/1.73m2 Final     Comment:     As of July 11, 2021, eGFR is calculated by the CKD-EPI creatinine equation, without race adjustment. eGFR can be influenced by muscle mass, exercise, and diet. The reported eGFR is an estimation only and is only applicable if the renal function is stable.   10/28/2019 44 (L) >60 mL/min/1.73m2 Final   07/30/2019 49 (L) >60 mL/min/[1.73_m2] Final     Comment:     Non  GFR Calc  Starting 12/18/2018, serum creatinine based estimated GFR (eGFR) will be   calculated using the Chronic Kidney Disease Epidemiology Collaboration   (CKD-EPI) equation.       GFR Estimate If Black   Date Value Ref Range Status   10/28/2019 53 (L) >60 mL/min/1.73m2 Final   07/30/2019 57 (L) >60 mL/min/[1.73_m2] Final     Comment:      GFR Calc  Starting 12/18/2018, serum creatinine based estimated GFR (eGFR) will be   calculated using the Chronic Kidney Disease Epidemiology Collaboration   (CKD-EPI) equation.       Lab Results   Component Value Date    CR 1.37 07/28/2021    CR 1.14 07/30/2019     No results found for: MICROALBUMIN    Healthy Eating:  Healthy Eating Assessed Today: Yes  Meal planning/habits: Smaller portions, Low carb  Meals include: Breakfast, Lunch, Dinner, Evening Snack  Breakfast: Diabetic slim fast (13 gm carb with milk 12 gm( with frozen blueberries (1/2 cup)=25 gm carb with 12 units novolog Or 1 slice of toast with peanut butter and sugar-free jelly takes 12 Novolog.  Lunch: Cottage cheese and blueberries. 12 units novologSalad with protein, berries or  2 cuties. 8 units Novolog  Dinner: Protein and  vegetables, occasionally sweet potato or salad with lots of vegetables  Snacks: vegetables and hummus, cubes of cheese, popcorn 100 calorie bag,  Other: Crystal light  Beverages: Water, Other  Has patient met with a dietitian in the past?: Yes (at diagnosis)    Being Active:  Being Active Assessed Today: Yes  Exercise:: Unable to exercise (back pain and shoulder pain limit activity)  Barrier to exercise: Physical limitation    Monitoring:  Monitoring Assessed Today: Yes  Blood Glucose Meter: Accu-chek  Times checking blood sugar at home (number): 2  Blood glucose trend: Increasing    FBG elevated. Per patient rises overnight. Gets sweaty overnight. Not verifying if blood sugar is low  Tried not eating after dinner or just protein snack  Hypoglycemia at night occasionally (2 episodes past 2 months). She lives in a basement apartment of her son's house. Patient gets nervous because sometimes her blood sugar does not go up quickly after treating with milk or glucose tablets    UNSURE IF BLOOD SUGARS ARE IN THE RIGHT TIME SLOT. METER TIME WAS OFF 14 HOURS    Date Breakfast  Lunch  Dinner  Bedtime    Before After Before After Before After    8/3 180           8/2 145       174  144   8/1 190              7/31           142   7/30        151      7/29            111   7/28 182         182 134         Taking Medications: Using insulin pens not vials. [facility]  Diabetes Medication(s)     Insulin       LANTUS VIAL 100 UNITS/ML SC SOLN    Inject 24 Units Subcutaneous 2 times daily      NovoLOG VIAL 100 UNITS/ML SC SOLN    Inject  Subcutaneous. Per sliding scale        Taking Medication Assessed Today: Yes  Current Treatments: Diet, Insulin Injections  Dose schedule: Pre-breakfast, Pre-lunch, Pre-dinner, At bedtime (Lantus twice per day AM/PM)  Given by: Patient  Injection/Infusion sites: Abdomen  Problems taking diabetes medications regularly?: No  Diabetes  medication side effects?: No    Problem Solving:  Problem Solving Assessed Today: Yes  Is the patient at risk for hypoglycemia?: Yes  Hypoglycemia Frequency: Monthly  Hypoglycemia Treatment: Glucose (tablets or gel), Other food (milk)    Hypoglycemia symptoms  Confusion: Yes  Dizziness or Light-Headedness: No  Headaches: No  Hunger: No  Mood changes: No  Nervousness/Anxiety: Yes  Sleepiness: No  Speech difficulty: No  Sweats: Yes  Feeling shaky: Yes    Hypoglycemia Complications  Blackouts: No  Hospitalization: No  Nocturnal hypoglycemia: Yes  Required assistance: No    Reducing Risks:  Reducing Risks Assessed Today: Yes  Diabetes Risks: Age over 45 years, Sedentary Lifestyle, Hyperlipidemia  CAD Risks: Diabetes Mellitus, Dyslipidemia, Hypertension, Obesity, Sedentary lifestyle, Post-menopausal  Has dilated eye exam at least once a year?: Yes  Sees dentist every 6 months?: Yes  Feet checked by healthcare provider in the last year?: Yes    Healthy Coping:  Healthy Coping Assessed Today: Yes  Emotional response to diabetes: Acceptance, Concern for health and well-being  Informal Support system:: Family  Stage of change: ACTION (Actively working towards change)  Support resources: Websites  Patient Activation Measure Survey Score:  No flowsheet data found.    Diabetes knowledge and skills assessment:   Patient is knowledgeable in diabetes management concepts related to: Healthy Eating and Monitoring    Patient needs further education on the following diabetes management concepts: Taking Medication, Problem Solving, Reducing Risks, Healthy Coping and behavior change strategies    Based on learning assessment above, most appropriate setting for further diabetes education would be: Individual setting.      INTERVENTIONS:    Education provided today on:  AADE Self-Care Behaviors:  Diabetes Pathophysiology  Healthy Eating: weight reduction  Being Active: relationship to blood glucose  Monitoring: individual blood glucose  targets  Taking Medication: action of prescribed medication, proper site selection and rotation for injections and when to take medications  Problem Solving: high blood glucose - causes, signs/symptoms, treatment and prevention and low blood glucose - causes, signs/symptoms, treatment and prevention    Opportunities for ongoing education and support in diabetes-self management were discussed.    Pt verbalized understanding of concepts discussed and recommendations provided today.       Education Materials Provided:  [facility] Understanding Diabetes Booklet   [facility] Healthy Snacks  American Association of Certified Diabetes Educators GLP-1 fact sheet  American Diabetes Association Factors that raise blood sugar  Food and Blood sugar record    Patient's most recent   Lab Results   Component Value Date    A1C 8.5 07/28/2021    A1C 7.4 01/18/2019    is not meeting goal of <7.0    Diabetes Self-Management Training ()  Time Spent: 60 minutes  Encounter Type: Individual  Previous education: No, first on Medicare  Medicare DSMT hours available: 9 and Medical Nutrition Therapy 3 until 8/2/2022    Any diabetes medication dose changes were made via the CDE Protocol and Collaborative Practice Agreement with the patient's referring provider. A copy of this encounter was shared with the provider.      Mattie Ortega RD, LD, [facility]  Certified Diabetes Care and

## 2021-08-19 ENCOUNTER — VIRTUAL VISIT (OUTPATIENT)
Dept: EDUCATION SERVICES | Facility: CLINIC | Age: 71
End: 2021-08-19
Payer: MEDICARE

## 2021-08-19 DIAGNOSIS — E11.9 TYPE 2 DIABETES MELLITUS WITHOUT COMPLICATION, WITH LONG-TERM CURRENT USE OF INSULIN (H): Primary | ICD-10-CM

## 2021-08-19 DIAGNOSIS — Z79.4 TYPE 2 DIABETES MELLITUS WITHOUT COMPLICATION, WITH LONG-TERM CURRENT USE OF INSULIN (H): Primary | ICD-10-CM

## 2021-08-19 PROCEDURE — G0108 DIAB MANAGE TRN  PER INDIV: HCPCS | Mod: 95 | Performed by: REGISTERED NURSE

## 2021-08-19 RX ORDER — INSULIN ASPART 100 [IU]/ML
INJECTION, SOLUTION INTRAVENOUS; SUBCUTANEOUS
Qty: 45 ML | Refills: 4 | Status: SHIPPED | OUTPATIENT
Start: 2021-08-19 | End: 2021-11-29

## 2021-08-19 NOTE — PATIENT INSTRUCTIONS
1. Check blood sugar twice per day (before breakfast and bedtime).  2. Increase Lantus Solostar to 26 units twice per day.  3. Continue Novolog FlexPen 6-8 units before meals. Wait 15 minutes to eat.  4. Continue to drink a lot of water to stay hydrated and protect your kidneys.  5. Next diabetes education video visit in one month.    Blood sugar targets:  Before meals:   2 hours after meals: less 180  Bedtime:     A1C less than 7.0%

## 2021-08-19 NOTE — LETTER
8/19/2021         RE: Meggan Everett  [address] [facility] [address]        Dear Colleague,    Thank you for referring your patient, Meggan Everett, to the [facility]. Please see a copy of my visit note below.    Type of service:  Video Visit    If the video visit is dropped, the video visit invitation should be resent by: Send to e-mail at: [email or web address]    Originating Location (pt. Location): Home  Distant Location (provider location): [facility]  Mode of Communication:  Video Conference via [employer]    Video Start Time: 1:53 PM  Video End Time (time video stopped): 2:28 PM    How would patient like to obtain AVS? [facility]    Diabetes Self-Management Education & Support    ASSESSMENT  Two week follow-up video visit for diabetes education and insulin adjustment. Meggan is checking her blood sugar twice per day. The blood sugars are improving but above target in the morning. Per medication protocol increased her Lantus by 4 units total. The Novolog dose is working well. Taking 1 unit for every 8 grams carbohydrate plus 1-2 additional units if before meal blood sugar is above target. No episodes of low blood sugar. Patient mentioned she lost about 4 pounds. [facility]. She would like to hold off on adding another medication.    PLAN:   1. Check blood sugar twice per day (before breakfast and bedtime).  2. Increase Lantus Solostar to 26 units twice per day.  3. Continue Novolog FlexPen 6-8 units before meals. Wait 15 minutes to eat.  4. Next video visit on 9/21/21 at 2:00 PM.    SUBJECTIVE/OBJECTIVE:  Accompanied by: Self  Diabetes education in the past 24mo: Yes  Focus of Visit: Healthy Eating, Assistance w/ making life changes, Taking Medication  Diabetes type: Type 2  Date of diagnosis: 20 years ago  Disease course: Improving  How confident are you filling out medical forms by yourself::  "Extremely  Diabetes management related comments/concerns: Weight loss  Difficulty affording diabetes medication?: No  Difficulty affording diabetes testing supplies?: No  Other concerns:: None  Cultural Influences/Ethnic Background:  Not  or     Diabetes Symptoms & Complications:  Fatigue: Yes  Neuropathy: Yes  Polydipsia: No  Polyphagia: Yes  Polyuria: No  Visual change: Yes  Slow healing wounds: No  Symptom course: Improving  Weight trend: Decreasing       Patient Problem List and Family Medical History reviewed for relevant medical history, current medical status, and diabetes risk factors.    Vitals:  There were no vitals taken for this visit.  Estimated body mass index is 57.38 kg/m  as calculated from the following:    Height as of 2/24/20: 1.562 m (5' 1.5\").    Weight as of 7/28/21: 140 kg (308 lb 11.2 oz).   Last 3 BP:   BP Readings from Last 3 Encounters:   07/28/21 (!) 140/58   02/24/20 128/72   05/14/19 141/77       History   Smoking Status     Never Smoker   Smokeless Tobacco     Never Used       Labs:  Lab Results   Component Value Date    A1C 8.5 07/28/2021    A1C 7.4 01/18/2019     Lab Results   Component Value Date     07/28/2021     02/19/2019     Lab Results   Component Value Date     07/28/2021    LDL 70.0 09/17/2012     Direct Measure HDL   Date Value Ref Range Status   07/28/2021 51 >=50 mg/dL Final     Comment:     HDL Cholesterol Reference Range:     0-2 years:   No reference ranges established for patients under 2 years old  at [facility] for lipid analytes.    2-8 years:  Greater than 45 mg/dL     18 years and older:   Female: Greater than or equal to 50 mg/dL   Male:   Greater than or equal to 40 mg/dL   ]  GFR Estimate   Date Value Ref Range Status   07/28/2021 39 (L) >60 mL/min/1.73m2 Final     Comment:     As of July 11, 2021, eGFR is calculated by the CKD-EPI creatinine equation, without race adjustment. eGFR can be influenced by muscle " mass, exercise, and diet. The reported eGFR is an estimation only and is only applicable if the renal function is stable.   10/28/2019 44 (L) >60 mL/min/1.73m2 Final   07/30/2019 49 (L) >60 mL/min/[1.73_m2] Final     Comment:     Non  GFR Calc  Starting 12/18/2018, serum creatinine based estimated GFR (eGFR) will be   calculated using the Chronic Kidney Disease Epidemiology Collaboration   (CKD-EPI) equation.       GFR Estimate If Black   Date Value Ref Range Status   10/28/2019 53 (L) >60 mL/min/1.73m2 Final   07/30/2019 57 (L) >60 mL/min/[1.73_m2] Final     Comment:      GFR Calc  Starting 12/18/2018, serum creatinine based estimated GFR (eGFR) will be   calculated using the Chronic Kidney Disease Epidemiology Collaboration   (CKD-EPI) equation.       Lab Results   Component Value Date    CR 1.37 07/28/2021    CR 1.14 07/30/2019     No results found for: MICROALBUMIN    Healthy Eating:  Healthy Eating Assessed Today: Yes  Meal planning/habits: Smaller portions, Low carb  Meals include: Breakfast, Lunch, Dinner, Evening Snack  Breakfast: Diabetic slim fast (13 gm carb with milk 12 gm( with frozen blueberries (1/2 cup)=25 gm carb with 12 units novolog Or 1 slice of toast with peanut butter and sugar-free jelly takes 12 Novolog.  Lunch: Cottage cheese and blueberries. 8-10 units novolog. Salad with protein, berries or 2 cuties. 6 units Novolog  Dinner: Mullin, small potato, roasted cauliflower. Takes 10 units of Novolog.  Snacks: vegetables and hummus, cubes of cheese, popcorn 100 calorie bag,  Other: Crystal light  Beverages: Water, Other  Has patient met with a dietitian in the past?: Yes (at diagnosis)    Being Active:  Being Active Assessed Today: Yes  Exercise:: Unable to exercise (back pain and shoulder pain limit activity. Uses a walker inside the house. Completes household chores (laundry). Likes to sew and quilt.)  Barrier to exercise: Physical  limitation    Monitoring:  Monitoring Assessed Today: Yes  Blood Glucose Meter: Accu-chek  Times checking blood sugar at home (number): 2  Times checking blood sugar at home (per): Day  Blood glucose trend: Decreasing    Date Breakfast  Lunch  Dinner  Bedtime    Before After Before After Before After    8/19 153               8/18 134            117   8/17 161           119   8/16 138           185   8/15 160           126   8/14 160           168   8/13 151      Migraine   8/12 172      177   8/11 120      138       Taking Medications:  Diabetes Medication(s)     Insulin       insulin aspart (NOVOLOG FLEXPEN) 100 UNIT/ML pen    Take 12 units before meals +2 u prime before each dose-total of  42 units/day     insulin glargine (LANTUS SOLOSTAR) 100 UNIT/ML pen    Inject 24 units twice per day plus 2 unit prime per dose=52 units per day.        Taking Medication Assessed Today: Yes  Current Treatments: Diet, Insulin Injections  Dose schedule: Pre-breakfast, Pre-lunch, Pre-dinner, At bedtime (Lantus twice per day AM/PM)  Given by: Patient  Injection/Infusion sites: Abdomen  Problems taking diabetes medications regularly?: No  Diabetes medication side effects?: No    Problem Solving:  Problem Solving Assessed Today: Yes  Is the patient at risk for hypoglycemia?: Yes  Hypoglycemia Frequency: Rarely  Hypoglycemia Treatment: Glucose (tablets or gel), Other food (milk)    Hypoglycemia symptoms  Confusion: Yes  Dizziness or Light-Headedness: No  Headaches: No  Hunger: No  Mood changes: No  Nervousness/Anxiety: Yes  Sleepiness: No  Speech difficulty: No  Sweats: Yes  Feeling shaky: Yes    Hypoglycemia Complications  Blackouts: No  Hospitalization: No  Nocturnal hypoglycemia: Yes  Required assistance: No    Reducing Risks:  Reducing Risks Assessed Today: Yes  Diabetes Risks: Age over 45 years, Sedentary Lifestyle, Hyperlipidemia  CAD Risks: Diabetes Mellitus, Dyslipidemia, Hypertension, Obesity, Sedentary lifestyle,  Post-menopausal  Has dilated eye exam at least once a year?: Yes  Sees dentist every 6 months?: Yes  Feet checked by healthcare provider in the last year?: Yes    Healthy Coping:  Healthy Coping Assessed Today: Yes  Emotional response to diabetes: Acceptance, Concern for health and well-being  Informal Support system:: Family  Stage of change: ACTION (Actively working towards change)  Support resources: WebsitePatient Activation Measure Survey Score:  No flowsheet data found.    Diabetes knowledge and skills assessment:   Patient is knowledgeable in diabetes management concepts related to: Healthy Eating, Monitoring, Taking Medication and Problem Solving    Patient needs further education on the following diabetes management concepts: Medication management and interpreting blood sugar data.    Based on learning assessment above, most appropriate setting for further diabetes education would be: Individual setting.      INTERVENTIONS:    Education provided today on:  AADE Self-Care Behaviors:  Monitoring: log and interpret results and individual blood glucose targets  Taking Medication: insulin doses    Opportunities for ongoing education and support in diabetes-self management were discussed.    Pt verbalized understanding of concepts discussed and recommendations provided today.       Education Materials Provided:  No new materials provided today      Goals Addressed as of 8/19/2021 at 2:49 PM                    Today       Medication: Take Novolog 15 minutes before meals. (pt-stated)   On track    Added 8/3/21 by Mattie Ortega RD       Monitoring: check blood sugar twice per day. (pt-stated)   On track    Added 8/3/21 by Mattie Ortega RD          Patient's most recent   Lab Results   Component Value Date    A1C 8.5 07/28/2021    A1C 7.4 01/18/2019    is not meeting goal of <7.0    PLAN  See Patient Instructions for co-developed, patient-stated behavior change goals.  AVS printed and provided to patient today.  See Follow-Up section for recommended follow-up.    Diabetes Self-Management Training ()  Time Spent: 30 minutes  Encounter Type: Individual    Any diabetes medication dose changes were made via the CDE Protocol and Collaborative Practice Agreement with the patient's primary care provider. A copy of this encounter was shared with the provider.      Date Breakfast  Lunch  Dinner  Bedtime

## 2021-08-19 NOTE — PROGRESS NOTES
Type of service:  Video Visit    If the video visit is dropped, the video visit invitation should be resent by: Send to e-mail at: [email or web address]    Originating Location (pt. Location): Home  Distant Location (provider location): [facility]  Mode of Communication:  Video Conference via [employer]    Video Start Time: 1:53 PM  Video End Time (time video stopped): 2:28 PM    How would patient like to obtain AVS? [facility]    Diabetes Self-Management Education & Support    ASSESSMENT  Two week follow-up video visit for diabetes education and insulin adjustment. Meggan is checking her blood sugar twice per day. The blood sugars are improving but above target in the morning. Per medication protocol increased her Lantus by 4 units total. The Novolog dose is working well. Taking 1 unit for every 8 grams carbohydrate plus 1-2 additional units if before meal blood sugar is above target. No episodes of low blood sugar. Patient mentioned she lost about 4 pounds. [facility] Trulicity. She would like to hold off on adding another medication.    PLAN:   1. Check blood sugar twice per day (before breakfast and bedtime).  2. Increase Lantus Solostar to 26 units twice per day.  3. Continue Novolog FlexPen 6-8 units before meals. Wait 15 minutes to eat.  4. Next video visit on 9/21/21 at 2:00 PM.    SUBJECTIVE/OBJECTIVE:  Accompanied by: Self  Diabetes education in the past 24mo: Yes  Focus of Visit: Healthy Eating, Assistance w/ making life changes, Taking Medication  Diabetes type: Type 2  Date of diagnosis: 20 years ago  Disease course: Improving  How confident are you filling out medical forms by yourself:: Extremely  Diabetes management related comments/concerns: Weight loss  Difficulty affording diabetes medication?: No  Difficulty affording diabetes testing supplies?: No  Other concerns:: None  Cultural Influences/Ethnic Background:  Not  or  "    Diabetes Symptoms & Complications:  Fatigue: Yes  Neuropathy: Yes  Polydipsia: No  Polyphagia: Yes  Polyuria: No  Visual change: Yes  Slow healing wounds: No  Symptom course: Improving  Weight trend: Decreasing       Patient Problem List and Family Medical History reviewed for relevant medical history, current medical status, and diabetes risk factors.    Vitals:  There were no vitals taken for this visit.  Estimated body mass index is 57.38 kg/m  as calculated from the following:    Height as of 2/24/20: 1.562 m (5' 1.5\").    Weight as of 7/28/21: 140 kg (308 lb 11.2 oz).   Last 3 BP:   BP Readings from Last 3 Encounters:   07/28/21 (!) 140/58   02/24/20 128/72   05/14/19 141/77       History   Smoking Status     Never Smoker   Smokeless Tobacco     Never Used       Labs:  Lab Results   Component Value Date    A1C 8.5 07/28/2021    A1C 7.4 01/18/2019     Lab Results   Component Value Date     07/28/2021     02/19/2019     Lab Results   Component Value Date     07/28/2021    LDL 70.0 09/17/2012     Direct Measure HDL   Date Value Ref Range Status   07/28/2021 51 >=50 mg/dL Final     Comment:     HDL Cholesterol Reference Range:     0-2 years:   No reference ranges established for patients under 2 years old  at [facility] for lipid analytes.    2-8 years:  Greater than 45 mg/dL     18 years and older:   Female: Greater than or equal to 50 mg/dL   Male:   Greater than or equal to 40 mg/dL   ]  GFR Estimate   Date Value Ref Range Status   07/28/2021 39 (L) >60 mL/min/1.73m2 Final     Comment:     As of July 11, 2021, eGFR is calculated by the CKD-EPI creatinine equation, without race adjustment. eGFR can be influenced by muscle mass, exercise, and diet. The reported eGFR is an estimation only and is only applicable if the renal function is stable.   10/28/2019 44 (L) >60 mL/min/1.73m2 Final   07/30/2019 49 (L) >60 mL/min/[1.73_m2] Final     Comment:     Non  " GFR Calc  Starting 12/18/2018, serum creatinine based estimated GFR (eGFR) will be   calculated using the Chronic Kidney Disease Epidemiology Collaboration   (CKD-EPI) equation.       GFR Estimate If Black   Date Value Ref Range Status   10/28/2019 53 (L) >60 mL/min/1.73m2 Final   07/30/2019 57 (L) >60 mL/min/[1.73_m2] Final     Comment:      GFR Calc  Starting 12/18/2018, serum creatinine based estimated GFR (eGFR) will be   calculated using the Chronic Kidney Disease Epidemiology Collaboration   (CKD-EPI) equation.       Lab Results   Component Value Date    CR 1.37 07/28/2021    CR 1.14 07/30/2019     No results found for: MICROALBUMIN    Healthy Eating:  Healthy Eating Assessed Today: Yes  Meal planning/habits: Smaller portions, Low carb  Meals include: Breakfast, Lunch, Dinner, Evening Snack  Breakfast: Diabetic slim fast (13 gm carb with milk 12 gm( with frozen blueberries (1/2 cup)=25 gm carb with 12 units novolog Or 1 slice of toast with peanut butter and sugar-free jelly takes 12 Novolog.  Lunch: Cottage cheese and blueberries. 8-10 units novolog. Salad with protein, berries or 2 cuties. 6 units Novolog  Dinner: [place], small potato, roasted cauliflower. Takes 10 units of Novolog.  Snacks: vegetables and hummus, cubes of cheese, popcorn 100 calorie bag,  Other: Crystal light  Beverages: Water, Other  Has patient met with a dietitian in the past?: Yes (at diagnosis)    Being Active:  Being Active Assessed Today: Yes  Exercise:: Unable to exercise (back pain and shoulder pain limit activity. Uses a walker inside the house. Completes household chores (laundry). Likes to sew and quilt.)  Barrier to exercise: Physical limitation    Monitoring:  Monitoring Assessed Today: Yes  Blood Glucose Meter: Accu-chek  Times checking blood sugar at home (number): 2  Times checking blood sugar at home (per): Day  Blood glucose trend: Decreasing    Date Breakfast  Lunch  Dinner  Bedtime    Before After Before  After Before After    8/19 153               8/18 134            117   8/17 161           119   8/16 138           185   8/15 160           126   8/14 160           168   8/13 151      Migraine   8/12 172      177   8/11 120      138       Taking Medications:  Diabetes Medication(s)     Insulin       insulin aspart (NOVOLOG FLEXPEN) 100 UNIT/ML pen    Take 12 units before meals +2 u prime before each dose-total of  42 units/day     insulin glargine (LANTUS SOLOSTAR) 100 UNIT/ML pen    Inject 24 units twice per day plus 2 unit prime per dose=52 units per day.        Taking Medication Assessed Today: Yes  Current Treatments: Diet, Insulin Injections  Dose schedule: Pre-breakfast, Pre-lunch, Pre-dinner, At bedtime (Lantus twice per day AM/PM)  Given by: Patient  Injection/Infusion sites: Abdomen  Problems taking diabetes medications regularly?: No  Diabetes medication side effects?: No    Problem Solving:  Problem Solving Assessed Today: Yes  Is the patient at risk for hypoglycemia?: Yes  Hypoglycemia Frequency: Rarely  Hypoglycemia Treatment: Glucose (tablets or gel), Other food (milk)    Hypoglycemia symptoms  Confusion: Yes  Dizziness or Light-Headedness: No  Headaches: No  Hunger: No  Mood changes: No  Nervousness/Anxiety: Yes  Sleepiness: No  Speech difficulty: No  Sweats: Yes  Feeling shaky: Yes    Hypoglycemia Complications  Blackouts: No  Hospitalization: No  Nocturnal hypoglycemia: Yes  Required assistance: No    Reducing Risks:  Reducing Risks Assessed Today: Yes  Diabetes Risks: Age over 45 years, Sedentary Lifestyle, Hyperlipidemia  CAD Risks: Diabetes Mellitus, Dyslipidemia, Hypertension, Obesity, Sedentary lifestyle, Post-menopausal  Has dilated eye exam at least once a year?: Yes  Sees dentist every 6 months?: Yes  Feet checked by healthcare provider in the last year?: Yes    Healthy Coping:  Healthy Coping Assessed Today: Yes  Emotional response to diabetes: Acceptance, Concern for health and  well-being  Informal Support system:: Family  Stage of change: ACTION (Actively working towards change)  Support resources: WebsitePatient Activation Measure Survey Score:  No flowsheet data found.    Diabetes knowledge and skills assessment:   Patient is knowledgeable in diabetes management concepts related to: Healthy Eating, Monitoring, Taking Medication and Problem Solving    Patient needs further education on the following diabetes management concepts: Medication management and interpreting blood sugar data.    Based on learning assessment above, most appropriate setting for further diabetes education would be: Individual setting.      INTERVENTIONS:    Education provided today on:  AADE Self-Care Behaviors:  Monitoring: log and interpret results and individual blood glucose targets  Taking Medication: insulin doses    Opportunities for ongoing education and support in diabetes-self management were discussed.    Pt verbalized understanding of concepts discussed and recommendations provided today.       Education Materials Provided:  No new materials provided today      Goals Addressed as of 8/19/2021 at 2:49 PM                    Today       Medication: Take Novolog 15 minutes before meals. (pt-stated)   On track    Added 8/3/21 by Mattie Ortega RD       Monitoring: check blood sugar twice per day. (pt-stated)   On track    Added 8/3/21 by Mattie Ortega RD          Patient's most recent   Lab Results   Component Value Date    A1C 8.5 07/28/2021    A1C 7.4 01/18/2019    is not meeting goal of <7.0    PLAN  See Patient Instructions for co-developed, patient-stated behavior change goals.  AVS printed and provided to patient today. See Follow-Up section for recommended follow-up.    Diabetes Self-Management Training ()  Time Spent: 30 minutes  Encounter Type: Individual    Any diabetes medication dose changes were made via the CDE Protocol and Collaborative Practice Agreement with the patient's primary  care provider. A copy of this encounter was shared with the provider.      Date Breakfast  Lunch  Dinner  Bedtime

## 2021-09-21 ENCOUNTER — VIRTUAL VISIT (OUTPATIENT)
Dept: EDUCATION SERVICES | Facility: CLINIC | Age: 71
End: 2021-09-21
Payer: MEDICARE

## 2021-09-21 DIAGNOSIS — E11.9 TYPE 2 DIABETES MELLITUS WITHOUT COMPLICATION, WITH LONG-TERM CURRENT USE OF INSULIN (H): Primary | ICD-10-CM

## 2021-09-21 DIAGNOSIS — Z79.4 TYPE 2 DIABETES MELLITUS WITHOUT COMPLICATION, WITH LONG-TERM CURRENT USE OF INSULIN (H): Primary | ICD-10-CM

## 2021-09-21 PROCEDURE — G0108 DIAB MANAGE TRN  PER INDIV: HCPCS | Mod: 95 | Performed by: REGISTERED NURSE

## 2021-09-21 NOTE — PROGRESS NOTES
Diabetes Self-Management Education & Support    Type of Visit: Video Visit    How would patient like to obtain AVS? [facility]    ASSESSMENT:  6 week follow-up visit for diabetes education and insulin adjustment. At the last visit I increased the Lantus. Blood sugar levels are decreasing and close to goal. Meggan had one episode of low blood sugar before bedtime due to taking too much Novolog. She treated the low blood sugar appropriately. Meggan continues to eat a reduced carbohydrate diet. She is keeping a routine meal schedule and eating minimal snacks after dinner. Instructed on sick day management.     Meggan mentioned she wants to get an updated meter. [facility] Suggested she call them for a new prescription and request checking blood sugar 3 times per day.    8/28/21 BG 56. Drank a glass of milk to correct.  *Elmo shaky at BG 87. Feels shaky in the morning.    BG Data using an Accu-Chek YARELIS meter  Date Breakfast  Lunch  Dinner  Bedtime    Before After Before After Before After    9/21 141               9/20 149           155   9/19 147           103   9/18 164           128   9/17 97           149   9/16 87*           135   9/15 162           135       PLAN:  1. Continue to check blood sugar twice per day (before breakfast and bedtime).  2. Continue current insulin doses.  3. Schedule a diabetes check with Dr. Fraga for after 10/28/21.   4. Next diabetes education video visit in 2 months. The plan will be to review blood sugars and discuss preventing diabetes complications.    See Goals Section for co-developed, patient-stated behavior change goals.    Patient's most recent   Lab Results   Component Value Date    A1C 8.5 07/28/2021    A1C 7.4 01/18/2019    is not meeting goal of <7.0    Diabetes knowledge and skills assessment:     Patient is knowledgeable in diabetes management concepts related to: Healthy Eating, Being Active, Monitoring, Taking  Medication, Problem Solving, Reducing Risks and Healthy Coping    Continue education with the following diabetes management concepts: interpreting blood sugar data, adjusting insulin and preventing diabetes complications.    Based on learning assessment above, most appropriate setting for further diabetes education would be: Individual setting.    INTERVENTIONS:    Education provided today on:  AADE Self-Care Behaviors:  Healthy Coping: Lifestyle changes, interpreting blood sugar results    Opportunities for ongoing education and support in diabetes-self management were discussed. Pt verbalized understanding of concepts discussed and recommendations provided today.       Education Materials Provided:  No new materials provided today    Goals Addressed as of 9/21/2021 at 5:33 PM                    Today    8/19/21       Medication: Take Novolog 15 minutes before meals. (pt-stated)   On track  On track    Added 8/3/21 by Mattie Ortega RD       Monitoring: check blood sugar twice per day. (pt-stated)   On track  On track    Added 8/3/21 by Mattie Ortega RD        SUBJECTIVE / OBJECTIVE:  Accompanied by: Self  Diabetes education in the past 24mo: Yes  Focus of Visit: Healthy Eating, Assistance w/ making life changes, Taking Medication  Diabetes type: Type 2  Date of diagnosis: 20 years ago  Disease course: Improving  How confident are you filling out medical forms by yourself:: Extremely  Diabetes management related comments/concerns: Weight loss  Transportation concerns: No  Difficulty affording diabetes medication?: No  Difficulty affording diabetes testing supplies?: No  Other concerns:: None  Cultural Influences/Ethnic Background:  Not  or     Diabetes Symptoms & Complications:  Fatigue: Yes  Neuropathy: Yes  Polydipsia: No  Polyphagia: Yes  Polyuria: No  Visual change: Yes  Slow healing wounds: No  Symptom course: Improving  Weight trend: Decreasing  Complications assessed today?: No    Patient  "Problem List and Family Medical History reviewed for relevant medical history, current medical status, and diabetes risk factors.    Vitals:  There were no vitals taken for this visit.  Estimated body mass index is 57.38 kg/m  as calculated from the following:    Height as of 2/24/20: 1.562 m (5' 1.5\").    Weight as of 7/28/21: 140 kg (308 lb 11.2 oz).     Wt Readings from Last 3 Encounters:   07/28/21 140 kg (308 lb 11.2 oz)   02/24/20 129.8 kg (286 lb 1 oz)   08/27/19 128.5 kg (283 lb 3 oz)     Last 3 BP:   BP Readings from Last 3 Encounters:   07/28/21 (!) 140/58   02/24/20 128/72   05/14/19 141/77       History   Smoking Status     Never Smoker   Smokeless Tobacco     Never Used       Labs:  Lab Results   Component Value Date    A1C 8.5 07/28/2021    A1C 7.4 01/18/2019     Lab Results   Component Value Date     07/28/2021     02/19/2019     Lab Results   Component Value Date     07/28/2021    LDL 70.0 09/17/2012     Direct Measure HDL   Date Value Ref Range Status   07/28/2021 51 >=50 mg/dL Final     Comment:     HDL Cholesterol Reference Range:     0-2 years:   No reference ranges established for patients under 2 years old  at [facility] for lipid analytes.    2-8 years:  Greater than 45 mg/dL     18 years and older:   Female: Greater than or equal to 50 mg/dL   Male:   Greater than or equal to 40 mg/dL   ]  GFR Estimate   Date Value Ref Range Status   07/28/2021 39 (L) >60 mL/min/1.73m2 Final     Comment:     As of July 11, 2021, eGFR is calculated by the CKD-EPI creatinine equation, without race adjustment. eGFR can be influenced by muscle mass, exercise, and diet. The reported eGFR is an estimation only and is only applicable if the renal function is stable.   10/28/2019 44 (L) >60 mL/min/1.73m2 Final   07/30/2019 49 (L) >60 mL/min/[1.73_m2] Final     Comment:     Non  GFR Calc  Starting 12/18/2018, serum creatinine based estimated GFR (eGFR) will be "   calculated using the Chronic Kidney Disease Epidemiology Collaboration   (CKD-EPI) equation.       GFR Estimate If Black   Date Value Ref Range Status   10/28/2019 53 (L) >60 mL/min/1.73m2 Final   07/30/2019 57 (L) >60 mL/min/[1.73_m2] Final     Comment:      GFR Calc  Starting 12/18/2018, serum creatinine based estimated GFR (eGFR) will be   calculated using the Chronic Kidney Disease Epidemiology Collaboration   (CKD-EPI) equation.       Lab Results   Component Value Date    CR 1.37 07/28/2021    CR 1.14 07/30/2019     No results found for: MICROALBUMIN    Healthy Eating:  Healthy Eating Assessed Today: Yes  Cultural/Oriental orthodox diet restrictions?: No  Meal planning/habits: Smaller portions, Low carb  Meals include: Breakfast, Lunch, Dinner, Evening Snack  Breakfast: Diabetic slim fast (13 gm carb with milk 12 gm( with frozen blueberries (1/2 cup)=25 gm carb with 12 units novolog Or 1 slice of toast with peanut butter and sugar-free jelly takes 12 Novolog.  Lunch: Cottage cheese and blueberries. 8-10 units novolog. Salad with protein, berries or 2 cuties. 6 units Novolog  Dinner: [place], small potato, roasted cauliflower. Takes 10 units of Novolog.  Snacks: Ricotta cheese or almonds, vegetables and hummus, cubes of cheese, popcorn 100 calorie bag,  Other: Crystal light  Beverages: Water, Other  Has patient met with a dietitian in the past?: Yes    Being Active:  Being Active Assessed Today: Yes  Exercise:: Unable to exercise (back pain and shoulder pain limit activity. Uses a walker inside the house. Completes household chores (laundry). Likes to sew and quilt.)  Barrier to exercise: Physical limitation    Monitoring:  Monitoring Assessed Today: Yes  Blood Glucose Meter: Accu-chek (YARELIS)  Times checking blood sugar at home (number): 2  Times checking blood sugar at home (per): Day  Blood glucose trend: Decreasing    Taking Medications:  Diabetes Medication(s)     Insulin       insulin aspart  (NOVOLOG FLEXPEN) 100 UNIT/ML pen    Take 6-10 units before meals +2 u prime before each dose-total of 36 units/day     insulin glargine (LANTUS SOLOSTAR) 100 UNIT/ML pen    Inject 26 units twice per day plus 2 unit prime per dose=56 units per day.        Taking Medication Assessed Today: Yes  Current Treatments: Diet, Insulin Injections  Dose schedule: Pre-breakfast, Pre-lunch, Pre-dinner, At bedtime  Given by: Patient  Injection/Infusion sites: Abdomen  Problems taking diabetes medications regularly?: No  Diabetes medication side effects?: No    Problem Solving:  Problem Solving Assessed Today: Yes  Is the patient at risk for hypoglycemia?: Yes  Hypoglycemia Frequency: Rarely  Hypoglycemia Treatment: Glucose (tablets or gel), Other food (milk)    Hypoglycemia symptoms  Confusion: Yes  Headaches: No  Hunger: No  Mood changes: No  Nervousness/Anxiety: Yes  Sleepiness: No  Speech difficulty: No  Sweats: Yes  Feeling shaky: Yes    Hypoglycemia Complications  Blackouts: No  Hospitalization: No  Nocturnal hypoglycemia: Yes  Required assistance: No    Reducing Risks:  Reducing Risks Assessed Today: Yes  Diabetes Risks: Age over 45 years, Sedentary Lifestyle, Hyperlipidemia  CAD Risks: Diabetes Mellitus, Dyslipidemia, Hypertension, Obesity, Sedentary lifestyle, Post-menopausal  Has dilated eye exam at least once a year?: Yes  Sees dentist every 6 months?: Yes  Feet checked by healthcare provider in the last year?: Yes    Healthy Coping:  Healthy Coping Assessed Today: Yes  Emotional response to diabetes: Acceptance, Confidence diabetes can be controlled  Informal Support system:: Family  Stage of change: ACTION (Actively working towards change)  Support resources: Websites    Diabetes Self-Management Training ()  Time Spent: 30 minutes  Encounter Type: Individual    Mattie Ortega RD, LAURA, CDCES  Certified Diabetes Care and      Any diabetes medication dose changes were made via the Certified  Diabetes Care & Education Protocol in collaboration with the patient's primary care provider. A copy of this encounter was shared with the provider.

## 2021-09-21 NOTE — PATIENT INSTRUCTIONS
1. Continue to check blood sugar twice per day (before breakfast and bedtime).  2. Continue current insulin doses.  3. Schedule a diabetes check with Dr. Fraga for after 10/28/21.   4. Next diabetes education video visit in 2 months. The plan will be to review blood sugars and discuss preventing diabetes complications.    Blood sugar goals:  Before meals   1-2 hours after meals: less 180  Bedtime:     A1c: less than 7.0% (estimated average blood sugar of 154 mg/dl)

## 2021-09-21 NOTE — LETTER
9/21/2021         RE: Meggan Everett  [address] [facility] [address]        Dear Colleague,    Thank you for referring your patient, Meggan Everett, to the [facility]. Please see a copy of my visit note below.    Diabetes Self-Management Education & Support    Type of Visit: Video Visit    How would patient like to obtain AVS? MyChart    ASSESSMENT:  6 week follow-up visit for diabetes education and insulin adjustment. At the last visit I increased the Lantus. Blood sugar levels are decreasing and close to goal. Meggan had one episode of low blood sugar before bedtime due to taking too much Novolog. She treated the low blood sugar appropriately. Meggan continues to eat a reduced carbohydrate diet. She is keeping a routine meal schedule and eating minimal snacks after dinner. Instructed on sick day management.     Meggan mentioned she wants to get an updated meter. [facility]uggested she call them for a new prescription and request checking blood sugar 3 times per day.    8/28/21 BG 56. Drank a glass of milk to correct.  *[place] shaky at BG 87. Feels shaky in the morning.    BG Data using an Accu-Chek YARELIS meter  Date Breakfast  Lunch  Dinner  Bedtime    Before After Before After Before After    9/21 141               9/20 149           155   9/19 147           103   9/18 164           128   9/17 97           149   9/16 87*           135   9/15 162           135       PLAN:  1. Continue to check blood sugar twice per day (before breakfast and bedtime).  2. Continue current insulin doses.  3. Schedule a diabetes check with Dr. Fraga for after 10/28/21.   4. Next diabetes education video visit in 2 months. The plan will be to review blood sugars and discuss preventing diabetes complications.    See Goals Section for co-developed, patient-stated behavior change goals.    Patient's most recent   Lab Results   Component  Value Date    A1C 8.5 07/28/2021    A1C 7.4 01/18/2019    is not meeting goal of <7.0    Diabetes knowledge and skills assessment:     Patient is knowledgeable in diabetes management concepts related to: Healthy Eating, Being Active, Monitoring, Taking Medication, Problem Solving, Reducing Risks and Healthy Coping    Continue education with the following diabetes management concepts: interpreting blood sugar data, adjusting insulin and preventing diabetes complications.    Based on learning assessment above, most appropriate setting for further diabetes education would be: Individual setting.    INTERVENTIONS:    Education provided today on:  AADE Self-Care Behaviors:  Healthy Coping: Lifestyle changes, interpreting blood sugar results    Opportunities for ongoing education and support in diabetes-self management were discussed. Pt verbalized understanding of concepts discussed and recommendations provided today.       Education Materials Provided:  No new materials provided today    Goals Addressed as of 9/21/2021 at 5:33 PM                    Today    8/19/21       Medication: Take Novolog 15 minutes before meals. (pt-stated)   On track  On track    Added 8/3/21 by Mattie Ortega RD       Monitoring: check blood sugar twice per day. (pt-stated)   On track  On track    Added 8/3/21 by Mattie Ortega RD        SUBJECTIVE / OBJECTIVE:  Accompanied by: Self  Diabetes education in the past 24mo: Yes  Focus of Visit: Healthy Eating, Assistance w/ making life changes, Taking Medication  Diabetes type: Type 2  Date of diagnosis: 20 years ago  Disease course: Improving  How confident are you filling out medical forms by yourself:: Extremely  Diabetes management related comments/concerns: Weight loss  Transportation concerns: No  Difficulty affording diabetes medication?: No  Difficulty affording diabetes testing supplies?: No  Other concerns:: None  Cultural Influences/Ethnic Background:  Not  or  "    Diabetes Symptoms & Complications:  Fatigue: Yes  Neuropathy: Yes  Polydipsia: No  Polyphagia: Yes  Polyuria: No  Visual change: Yes  Slow healing wounds: No  Symptom course: Improving  Weight trend: Decreasing  Complications assessed today?: No    Patient Problem List and Family Medical History reviewed for relevant medical history, current medical status, and diabetes risk factors.    Vitals:  There were no vitals taken for this visit.  Estimated body mass index is 57.38 kg/m  as calculated from the following:    Height as of 2/24/20: 1.562 m (5' 1.5\").    Weight as of 7/28/21: 140 kg (308 lb 11.2 oz).     Wt Readings from Last 3 Encounters:   07/28/21 140 kg (308 lb 11.2 oz)   02/24/20 129.8 kg (286 lb 1 oz)   08/27/19 128.5 kg (283 lb 3 oz)     Last 3 BP:   BP Readings from Last 3 Encounters:   07/28/21 (!) 140/58   02/24/20 128/72   05/14/19 141/77       History   Smoking Status     Never Smoker   Smokeless Tobacco     Never Used       Labs:  Lab Results   Component Value Date    A1C 8.5 07/28/2021    A1C 7.4 01/18/2019     Lab Results   Component Value Date     07/28/2021     02/19/2019     Lab Results   Component Value Date     07/28/2021    LDL 70.0 09/17/2012     Direct Measure HDL   Date Value Ref Range Status   07/28/2021 51 >=50 mg/dL Final     Comment:     HDL Cholesterol Reference Range:     0-2 years:   No reference ranges established for patients under 2 years old  at [facility] for lipid analytes.    2-8 years:  Greater than 45 mg/dL     18 years and older:   Female: Greater than or equal to 50 mg/dL   Male:   Greater than or equal to 40 mg/dL   ]  GFR Estimate   Date Value Ref Range Status   07/28/2021 39 (L) >60 mL/min/1.73m2 Final     Comment:     As of July 11, 2021, eGFR is calculated by the CKD-EPI creatinine equation, without race adjustment. eGFR can be influenced by muscle mass, exercise, and diet. The reported eGFR is an estimation only and is " only applicable if the renal function is stable.   10/28/2019 44 (L) >60 mL/min/1.73m2 Final   07/30/2019 49 (L) >60 mL/min/[1.73_m2] Final     Comment:     Non  GFR Calc  Starting 12/18/2018, serum creatinine based estimated GFR (eGFR) will be   calculated using the Chronic Kidney Disease Epidemiology Collaboration   (CKD-EPI) equation.       GFR Estimate If Black   Date Value Ref Range Status   10/28/2019 53 (L) >60 mL/min/1.73m2 Final   07/30/2019 57 (L) >60 mL/min/[1.73_m2] Final     Comment:      GFR Calc  Starting 12/18/2018, serum creatinine based estimated GFR (eGFR) will be   calculated using the Chronic Kidney Disease Epidemiology Collaboration   (CKD-EPI) equation.       Lab Results   Component Value Date    CR 1.37 07/28/2021    CR 1.14 07/30/2019     No results found for: MICROALBUMIN    Healthy Eating:  Healthy Eating Assessed Today: Yes  Cultural/Presybeterian diet restrictions?: No  Meal planning/habits: Smaller portions, Low carb  Meals include: Breakfast, Lunch, Dinner, Evening Snack  Breakfast: Diabetic slim fast (13 gm carb with milk 12 gm( with frozen blueberries (1/2 cup)=25 gm carb with 12 units novolog Or 1 slice of toast with peanut butter and sugar-free jelly takes 12 Novolog.  Lunch: Cottage cheese and blueberries. 8-10 units novolog. Salad with protein, berries or 2 cuties. 6 units Novolog  Dinner: [place], small potato, roasted cauliflower. Takes 10 units of Novolog.  Snacks: Ricotta cheese or almonds, vegetables and hummus, cubes of cheese, popcorn 100 calorie bag,  Other: Crystal light  Beverages: Water, Other  Has patient met with a dietitian in the past?: Yes    Being Active:  Being Active Assessed Today: Yes  Exercise:: Unable to exercise (back pain and shoulder pain limit activity. Uses a walker inside the house. Completes household chores (laundry). Likes to sew and quilt.)  Barrier to exercise: Physical limitation    Monitoring:  Monitoring Assessed  Today: Yes  Blood Glucose Meter: Accu-chek (YARELIS)  Times checking blood sugar at home (number): 2  Times checking blood sugar at home (per): Day  Blood glucose trend: Decreasing    Taking Medications:  Diabetes Medication(s)     Insulin       insulin aspart (NOVOLOG FLEXPEN) 100 UNIT/ML pen    Take 6-10 units before meals +2 u prime before each dose-total of 36 units/day     insulin glargine (LANTUS SOLOSTAR) 100 UNIT/ML pen    Inject 26 units twice per day plus 2 unit prime per dose=56 units per day.        Taking Medication Assessed Today: Yes  Current Treatments: Diet, Insulin Injections  Dose schedule: Pre-breakfast, Pre-lunch, Pre-dinner, At bedtime  Given by: Patient  Injection/Infusion sites: Abdomen  Problems taking diabetes medications regularly?: No  Diabetes medication side effects?: No    Problem Solving:  Problem Solving Assessed Today: Yes  Is the patient at risk for hypoglycemia?: Yes  Hypoglycemia Frequency: Rarely  Hypoglycemia Treatment: Glucose (tablets or gel), Other food (milk)    Hypoglycemia symptoms  Confusion: Yes  Headaches: No  Hunger: No  Mood changes: No  Nervousness/Anxiety: Yes  Sleepiness: No  Speech difficulty: No  Sweats: Yes  Feeling shaky: Yes    Hypoglycemia Complications  Blackouts: No  Hospitalization: No  Nocturnal hypoglycemia: Yes  Required assistance: No    Reducing Risks:  Reducing Risks Assessed Today: Yes  Diabetes Risks: Age over 45 years, Sedentary Lifestyle, Hyperlipidemia  CAD Risks: Diabetes Mellitus, Dyslipidemia, Hypertension, Obesity, Sedentary lifestyle, Post-menopausal  Has dilated eye exam at least once a year?: Yes  Sees dentist every 6 months?: Yes  Feet checked by healthcare provider in the last year?: Yes    Healthy Coping:  Healthy Coping Assessed Today: Yes  Emotional response to diabetes: Acceptance, Confidence diabetes can be controlled  Informal Support system:: Family  Stage of change: ACTION (Actively working towards change)  Support resources:  Websites    Diabetes Self-Management Training ()  Time Spent: {cde time spent:314261} minutes  Encounter Type: Individual    Mattie Ortega RD, LAURA, CDCES  Certified Diabetes Care and      Any diabetes medication dose changes were made via the Certified Diabetes Care & Education Protocol in collaboration with the patient's {diabetes education provider list:096461}. A copy of this encounter was shared with the provider.

## 2021-09-21 NOTE — LETTER
9/21/2021         RE: Meggan Everett  [address] [facility] [address]        Dear Colleague,    Thank you for referring your patient, Meggan Everett, to the [facility]. Please see a copy of my visit note below.    Diabetes Self-Management Education & Support    Type of Visit: Video Visit    How would patient like to obtain AVS? MyChart    ASSESSMENT:  6 week follow-up visit for diabetes education and insulin adjustment. At the last visit I increased the Lantus. Blood sugar levels are decreasing and close to goal. Meggan had one episode of low blood sugar before bedtime due to taking too much Novolog. She treated the low blood sugar appropriately. Meggan continues to eat a reduced carbohydrate diet. She is keeping a routine meal schedule and eating minimal snacks after dinner. Instructed on sick day management.     Meggan mentioned she wants to get an updated meter. [facility] Suggested she call them for a new prescription and request checking blood sugar 3 times per day.    8/28/21 BG 56. Drank a glass of milk to correct.  *[place] shaky at BG 87. Feels shaky in the morning.    BG Data using an Accu-Chek YARELIS meter  Date Breakfast  Lunch  Dinner  Bedtime    Before After Before After Before After    9/21 141               9/20 149           155   9/19 147           103   9/18 164           128   9/17 97           149   9/16 87*           135   9/15 162           135       PLAN:  1. Continue to check blood sugar twice per day (before breakfast and bedtime).  2. Continue current insulin doses.  3. Schedule a diabetes check with Dr. Fraga for after 10/28/21.   4. Next diabetes education video visit in 2 months. The plan will be to review blood sugars and discuss preventing diabetes complications.    See Goals Section for co-developed, patient-stated behavior change goals.    Patient's most recent   Lab Results   Component  Value Date    A1C 8.5 07/28/2021    A1C 7.4 01/18/2019    is not meeting goal of <7.0    Diabetes knowledge and skills assessment:     Patient is knowledgeable in diabetes management concepts related to: Healthy Eating, Being Active, Monitoring, Taking Medication, Problem Solving, Reducing Risks and Healthy Coping    Continue education with the following diabetes management concepts: interpreting blood sugar data, adjusting insulin and preventing diabetes complications.    Based on learning assessment above, most appropriate setting for further diabetes education would be: Individual setting.    INTERVENTIONS:    Education provided today on:  AADE Self-Care Behaviors:  Healthy Coping: Lifestyle changes, interpreting blood sugar results    Opportunities for ongoing education and support in diabetes-self management were discussed. Pt verbalized understanding of concepts discussed and recommendations provided today.       Education Materials Provided:  No new materials provided today    Goals Addressed as of 9/21/2021 at 5:33 PM                    Today    8/19/21       Medication: Take Novolog 15 minutes before meals. (pt-stated)   On track  On track    Added 8/3/21 by Mattie Ortega RD       Monitoring: check blood sugar twice per day. (pt-stated)   On track  On track    Added 8/3/21 by Mattie Ortega RD        SUBJECTIVE / OBJECTIVE:  Accompanied by: Self  Diabetes education in the past 24mo: Yes  Focus of Visit: Healthy Eating, Assistance w/ making life changes, Taking Medication  Diabetes type: Type 2  Date of diagnosis: 20 years ago  Disease course: Improving  How confident are you filling out medical forms by yourself:: Extremely  Diabetes management related comments/concerns: Weight loss  Transportation concerns: No  Difficulty affording diabetes medication?: No  Difficulty affording diabetes testing supplies?: No  Other concerns:: None  Cultural Influences/Ethnic Background:  Not  or  "    Diabetes Symptoms & Complications:  Fatigue: Yes  Neuropathy: Yes  Polydipsia: No  Polyphagia: Yes  Polyuria: No  Visual change: Yes  Slow healing wounds: No  Symptom course: Improving  Weight trend: Decreasing  Complications assessed today?: No    Patient Problem List and Family Medical History reviewed for relevant medical history, current medical status, and diabetes risk factors.    Vitals:  There were no vitals taken for this visit.  Estimated body mass index is 57.38 kg/m  as calculated from the following:    Height as of 2/24/20: 1.562 m (5' 1.5\").    Weight as of 7/28/21: 140 kg (308 lb 11.2 oz).     Wt Readings from Last 3 Encounters:   07/28/21 140 kg (308 lb 11.2 oz)   02/24/20 129.8 kg (286 lb 1 oz)   08/27/19 128.5 kg (283 lb 3 oz)     Last 3 BP:   BP Readings from Last 3 Encounters:   07/28/21 (!) 140/58   02/24/20 128/72   05/14/19 141/77       History   Smoking Status     Never Smoker   Smokeless Tobacco     Never Used       Labs:  Lab Results   Component Value Date    A1C 8.5 07/28/2021    A1C 7.4 01/18/2019     Lab Results   Component Value Date     07/28/2021     02/19/2019     Lab Results   Component Value Date     07/28/2021    LDL 70.0 09/17/2012     Direct Measure HDL   Date Value Ref Range Status   07/28/2021 51 >=50 mg/dL Final     Comment:     HDL Cholesterol Reference Range:     0-2 years:   No reference ranges established for patients under 2 years old  at [facility] for lipid analytes.    2-8 years:  Greater than 45 mg/dL     18 years and older:   Female: Greater than or equal to 50 mg/dL   Male:   Greater than or equal to 40 mg/dL   ]  GFR Estimate   Date Value Ref Range Status   07/28/2021 39 (L) >60 mL/min/1.73m2 Final     Comment:     As of July 11, 2021, eGFR is calculated by the CKD-EPI creatinine equation, without race adjustment. eGFR can be influenced by muscle mass, exercise, and diet. The reported eGFR is an estimation only and is " only applicable if the renal function is stable.   10/28/2019 44 (L) >60 mL/min/1.73m2 Final   07/30/2019 49 (L) >60 mL/min/[1.73_m2] Final     Comment:     Non  GFR Calc  Starting 12/18/2018, serum creatinine based estimated GFR (eGFR) will be   calculated using the Chronic Kidney Disease Epidemiology Collaboration   (CKD-EPI) equation.       GFR Estimate If Black   Date Value Ref Range Status   10/28/2019 53 (L) >60 mL/min/1.73m2 Final   07/30/2019 57 (L) >60 mL/min/[1.73_m2] Final     Comment:      GFR Calc  Starting 12/18/2018, serum creatinine based estimated GFR (eGFR) will be   calculated using the Chronic Kidney Disease Epidemiology Collaboration   (CKD-EPI) equation.       Lab Results   Component Value Date    CR 1.37 07/28/2021    CR 1.14 07/30/2019     No results found for: MICROALBUMIN    Healthy Eating:  Healthy Eating Assessed Today: Yes  Cultural/Voodoo diet restrictions?: No  Meal planning/habits: Smaller portions, Low carb  Meals include: Breakfast, Lunch, Dinner, Evening Snack  Breakfast: Diabetic slim fast (13 gm carb with milk 12 gm( with frozen blueberries (1/2 cup)=25 gm carb with 12 units novolog Or 1 slice of toast with peanut butter and sugar-free jelly takes 12 Novolog.  Lunch: Cottage cheese and blueberries. 8-10 units novolog. Salad with protein, berries or 2 cuties. 6 units Novolog  Dinner: [place], small potato, roasted cauliflower. Takes 10 units of Novolog.  Snacks: Ricotta cheese or almonds, vegetables and hummus, cubes of cheese, popcorn 100 calorie bag,  Other: Crystal light  Beverages: Water, Other  Has patient met with a dietitian in the past?: Yes    Being Active:  Being Active Assessed Today: Yes  Exercise:: Unable to exercise (back pain and shoulder pain limit activity. Uses a walker inside the house. Completes household chores (laundry). Likes to sew and quilt.)  Barrier to exercise: Physical limitation    Monitoring:  Monitoring Assessed  Today: Yes  Blood Glucose Meter: Accu-chek (YARELIS)  Times checking blood sugar at home (number): 2  Times checking blood sugar at home (per): Day  Blood glucose trend: Decreasing    Taking Medications:  Diabetes Medication(s)     Insulin       insulin aspart (NOVOLOG FLEXPEN) 100 UNIT/ML pen    Take 6-10 units before meals +2 u prime before each dose-total of 36 units/day     insulin glargine (LANTUS SOLOSTAR) 100 UNIT/ML pen    Inject 26 units twice per day plus 2 unit prime per dose=56 units per day.        Taking Medication Assessed Today: Yes  Current Treatments: Diet, Insulin Injections  Dose schedule: Pre-breakfast, Pre-lunch, Pre-dinner, At bedtime  Given by: Patient  Injection/Infusion sites: Abdomen  Problems taking diabetes medications regularly?: No  Diabetes medication side effects?: No    Problem Solving:  Problem Solving Assessed Today: Yes  Is the patient at risk for hypoglycemia?: Yes  Hypoglycemia Frequency: Rarely  Hypoglycemia Treatment: Glucose (tablets or gel), Other food (milk)    Hypoglycemia symptoms  Confusion: Yes  Headaches: No  Hunger: No  Mood changes: No  Nervousness/Anxiety: Yes  Sleepiness: No  Speech difficulty: No  Sweats: Yes  Feeling shaky: Yes    Hypoglycemia Complications  Blackouts: No  Hospitalization: No  Nocturnal hypoglycemia: Yes  Required assistance: No    Reducing Risks:  Reducing Risks Assessed Today: Yes  Diabetes Risks: Age over 45 years, Sedentary Lifestyle, Hyperlipidemia  CAD Risks: Diabetes Mellitus, Dyslipidemia, Hypertension, Obesity, Sedentary lifestyle, Post-menopausal  Has dilated eye exam at least once a year?: Yes  Sees dentist every 6 months?: Yes  Feet checked by healthcare provider in the last year?: Yes    Healthy Coping:  Healthy Coping Assessed Today: Yes  Emotional response to diabetes: Acceptance, Confidence diabetes can be controlled  Informal Support system:: Family  Stage of change: ACTION (Actively working towards change)  Support resources:  Websites    Diabetes Self-Management Training ()  Time Spent: 30 minutes  Encounter Type: Individual    Mattie Ortega RD, LAURA, [facility]  Certified Diabetes Care and      Any diabetes medication dose changes were made via the Certified Diabetes Care & Education Protocol in collaboration with the patient's primary care provider. A copy of this encounter was shared with the provider.

## 2021-09-22 ENCOUNTER — TELEPHONE (OUTPATIENT)
Dept: INTERNAL MEDICINE | Facility: CLINIC | Age: 71
End: 2021-09-22

## 2021-09-22 NOTE — TELEPHONE ENCOUNTER
Reason for Call:  Other call back    Detailed comments: Pt stating she received from her pharmacy saying that her Lorazepam was denied,    Please advise    Pharm:  [facility] - [address]    Phone Number Patient can be reached at: Cell number on file:    Telephone Information:   Mobile [phone]       Best Time: anytime    Can we leave a detailed message on this number? YES    Call taken on 9/22/2021 at 10:55 AM by Sunshine Crawford

## 2021-09-27 ENCOUNTER — MYC MEDICAL ADVICE (OUTPATIENT)
Dept: INTERNAL MEDICINE | Facility: CLINIC | Age: 71
End: 2021-09-27

## 2021-09-27 DIAGNOSIS — I10 ESSENTIAL HYPERTENSION: ICD-10-CM

## 2021-09-27 DIAGNOSIS — E11.9 TYPE 2 DIABETES MELLITUS WITHOUT COMPLICATION, WITH LONG-TERM CURRENT USE OF INSULIN (H): ICD-10-CM

## 2021-09-27 DIAGNOSIS — M81.0 AGE-RELATED OSTEOPOROSIS WITHOUT CURRENT PATHOLOGICAL FRACTURE: ICD-10-CM

## 2021-09-27 DIAGNOSIS — M54.50 ACUTE MIDLINE LOW BACK PAIN WITHOUT SCIATICA: ICD-10-CM

## 2021-09-27 DIAGNOSIS — J44.89 ASTHMATIC BRONCHITIS , CHRONIC (H): Primary | ICD-10-CM

## 2021-09-27 DIAGNOSIS — M1A.9XX1 CHRONIC TOPHACEOUS GOUT: ICD-10-CM

## 2021-09-27 DIAGNOSIS — M1A.09X0 CHRONIC GOUT OF MULTIPLE SITES, UNSPECIFIED CAUSE: ICD-10-CM

## 2021-09-27 DIAGNOSIS — E66.01 MORBID OBESITY (H): ICD-10-CM

## 2021-09-27 DIAGNOSIS — M25.50 ARTHRALGIA, UNSPECIFIED JOINT: ICD-10-CM

## 2021-09-27 DIAGNOSIS — N18.31 STAGE 3A CHRONIC KIDNEY DISEASE (H): ICD-10-CM

## 2021-09-27 DIAGNOSIS — Z79.4 TYPE 2 DIABETES MELLITUS WITHOUT COMPLICATION, WITH LONG-TERM CURRENT USE OF INSULIN (H): ICD-10-CM

## 2021-09-29 RX ORDER — FUROSEMIDE 40 MG
40 TABLET ORAL DAILY
Qty: 90 TABLET | Refills: 3 | Status: SHIPPED | OUTPATIENT
Start: 2021-09-29 | End: 2023-03-07

## 2021-09-29 RX ORDER — ALLOPURINOL 100 MG/1
200 TABLET ORAL DAILY
Qty: 180 TABLET | Refills: 1 | Status: SHIPPED | OUTPATIENT
Start: 2021-09-29 | End: 2021-11-29

## 2021-09-29 RX ORDER — ROSUVASTATIN CALCIUM 10 MG/1
5 TABLET, COATED ORAL
Qty: 90 TABLET | Refills: 0 | Status: SHIPPED | OUTPATIENT
Start: 2021-09-29 | End: 2022-03-09

## 2021-09-29 RX ORDER — TIZANIDINE HYDROCHLORIDE 4 MG/1
4 CAPSULE, GELATIN COATED ORAL 3 TIMES DAILY PRN
Qty: 90 CAPSULE | Refills: 0 | Status: SHIPPED | OUTPATIENT
Start: 2021-09-29 | End: 2021-11-29

## 2021-09-29 RX ORDER — ALBUTEROL SULFATE 90 UG/1
2 AEROSOL, METERED RESPIRATORY (INHALATION) EVERY 6 HOURS PRN
Qty: 18 G | Refills: 3 | Status: SHIPPED | OUTPATIENT
Start: 2021-09-29 | End: 2022-08-19

## 2021-09-29 RX ORDER — METOPROLOL SUCCINATE 100 MG/1
100 TABLET, EXTENDED RELEASE ORAL DAILY
Qty: 90 TABLET | Refills: 3 | Status: SHIPPED | OUTPATIENT
Start: 2021-09-29 | End: 2022-08-19

## 2021-09-29 RX ORDER — IRBESARTAN 150 MG/1
150 TABLET ORAL AT BEDTIME
Qty: 90 TABLET | Refills: 3 | Status: SHIPPED | OUTPATIENT
Start: 2021-09-29 | End: 2022-07-15

## 2021-09-29 RX ORDER — CELECOXIB 200 MG/1
200 CAPSULE ORAL DAILY
Qty: 90 CAPSULE | Refills: 3 | Status: SHIPPED | OUTPATIENT
Start: 2021-09-29 | End: 2022-06-23

## 2021-09-29 RX ORDER — ALBUTEROL SULFATE 0.83 MG/ML
2.5 SOLUTION RESPIRATORY (INHALATION) EVERY 6 HOURS PRN
Qty: 75 ML | Refills: 3 | Status: SHIPPED | OUTPATIENT
Start: 2021-09-29 | End: 2024-01-25

## 2021-11-29 ENCOUNTER — OFFICE VISIT (OUTPATIENT)
Dept: INTERNAL MEDICINE | Facility: CLINIC | Age: 71
End: 2021-11-29
Payer: MEDICARE

## 2021-11-29 VITALS
DIASTOLIC BLOOD PRESSURE: 60 MMHG | HEART RATE: 71 BPM | WEIGHT: 293 LBS | BODY MASS INDEX: 55.54 KG/M2 | SYSTOLIC BLOOD PRESSURE: 138 MMHG | OXYGEN SATURATION: 99 %

## 2021-11-29 DIAGNOSIS — G89.29 CHRONIC BILATERAL LOW BACK PAIN WITHOUT SCIATICA: ICD-10-CM

## 2021-11-29 DIAGNOSIS — N18.31 STAGE 3A CHRONIC KIDNEY DISEASE (H): ICD-10-CM

## 2021-11-29 DIAGNOSIS — M54.50 CHRONIC BILATERAL LOW BACK PAIN WITHOUT SCIATICA: ICD-10-CM

## 2021-11-29 DIAGNOSIS — G47.33 OBSTRUCTIVE SLEEP APNEA SYNDROME: ICD-10-CM

## 2021-11-29 DIAGNOSIS — E11.9 TYPE 2 DIABETES, HBA1C GOAL < 8% (H): ICD-10-CM

## 2021-11-29 DIAGNOSIS — M1A.9XX1 CHRONIC TOPHACEOUS GOUT: ICD-10-CM

## 2021-11-29 DIAGNOSIS — M17.0 PRIMARY OSTEOARTHRITIS OF BOTH KNEES: Primary | ICD-10-CM

## 2021-11-29 DIAGNOSIS — Z79.899 CHRONIC USE OF BENZODIAZEPINE FOR THERAPEUTIC PURPOSE: ICD-10-CM

## 2021-11-29 DIAGNOSIS — E03.9 HYPOTHYROIDISM: ICD-10-CM

## 2021-11-29 DIAGNOSIS — Z12.11 SCREENING FOR COLON CANCER: ICD-10-CM

## 2021-11-29 PROBLEM — Z79.4 TYPE 2 DIABETES MELLITUS WITHOUT COMPLICATION, WITH LONG-TERM CURRENT USE OF INSULIN (H): Status: RESOLVED | Noted: 2019-01-18 | Resolved: 2021-11-29

## 2021-11-29 LAB
ALBUMIN SERPL-MCNC: 3.9 G/DL (ref 3.5–5)
ALP SERPL-CCNC: 211 U/L (ref 45–120)
ALT SERPL W P-5'-P-CCNC: 43 U/L (ref 0–45)
ANION GAP SERPL CALCULATED.3IONS-SCNC: 11 MMOL/L (ref 5–18)
AST SERPL W P-5'-P-CCNC: 36 U/L (ref 0–40)
BILIRUB SERPL-MCNC: 0.7 MG/DL (ref 0–1)
BUN SERPL-MCNC: 50 MG/DL (ref 8–28)
CALCIUM SERPL-MCNC: 10.5 MG/DL (ref 8.5–10.5)
CHLORIDE BLD-SCNC: 108 MMOL/L (ref 98–107)
CO2 SERPL-SCNC: 23 MMOL/L (ref 22–31)
CREAT SERPL-MCNC: 1.31 MG/DL (ref 0.6–1.1)
CREAT UR-MCNC: 17 MG/DL
GFR SERPL CREATININE-BSD FRML MDRD: 41 ML/MIN/1.73M2
GLUCOSE BLD-MCNC: 179 MG/DL (ref 70–125)
HBA1C MFR BLD: 7.4 % (ref 0–5.6)
MICROALBUMIN UR-MCNC: <0.5 MG/DL (ref 0–1.99)
MICROALBUMIN/CREAT UR: NORMAL MG/G{CREAT}
POTASSIUM BLD-SCNC: 4.7 MMOL/L (ref 3.5–5)
PROT SERPL-MCNC: 7.1 G/DL (ref 6–8)
SODIUM SERPL-SCNC: 142 MMOL/L (ref 136–145)
URATE SERPL-MCNC: 6.4 MG/DL (ref 2–7.5)

## 2021-11-29 PROCEDURE — 80053 COMPREHEN METABOLIC PANEL: CPT | Performed by: INTERNAL MEDICINE

## 2021-11-29 PROCEDURE — 83036 HEMOGLOBIN GLYCOSYLATED A1C: CPT | Performed by: INTERNAL MEDICINE

## 2021-11-29 PROCEDURE — 84443 ASSAY THYROID STIM HORMONE: CPT | Performed by: INTERNAL MEDICINE

## 2021-11-29 PROCEDURE — 36415 COLL VENOUS BLD VENIPUNCTURE: CPT | Performed by: INTERNAL MEDICINE

## 2021-11-29 PROCEDURE — 99214 OFFICE O/P EST MOD 30 MIN: CPT | Performed by: INTERNAL MEDICINE

## 2021-11-29 PROCEDURE — 82043 UR ALBUMIN QUANTITATIVE: CPT | Performed by: INTERNAL MEDICINE

## 2021-11-29 PROCEDURE — 84550 ASSAY OF BLOOD/URIC ACID: CPT | Performed by: INTERNAL MEDICINE

## 2021-11-29 RX ORDER — LANCETS
EACH MISCELLANEOUS
Qty: 200 EACH | Refills: 6 | Status: SHIPPED | OUTPATIENT
Start: 2021-11-29

## 2021-11-29 RX ORDER — INSULIN ASPART 100 [IU]/ML
INJECTION, SOLUTION INTRAVENOUS; SUBCUTANEOUS
Qty: 45 ML | Refills: 4 | Status: SHIPPED | OUTPATIENT
Start: 2021-11-29 | End: 2022-07-20

## 2021-11-29 RX ORDER — GLUCOSAMINE HCL/CHONDROITIN SU 500-400 MG
CAPSULE ORAL
Qty: 100 EACH | Refills: 3 | Status: SHIPPED | OUTPATIENT
Start: 2021-11-29

## 2021-11-29 RX ORDER — ALLOPURINOL 100 MG/1
200 TABLET ORAL DAILY
Qty: 180 TABLET | Refills: 1 | Status: SHIPPED | OUTPATIENT
Start: 2021-11-29 | End: 2022-03-02

## 2021-11-29 RX ORDER — LORAZEPAM 0.5 MG/1
0.5 TABLET ORAL
Qty: 60 TABLET | Refills: 0 | Status: SHIPPED | OUTPATIENT
Start: 2021-11-29 | End: 2021-12-06

## 2021-11-29 RX ORDER — TIZANIDINE HYDROCHLORIDE 4 MG/1
4 CAPSULE, GELATIN COATED ORAL 3 TIMES DAILY PRN
Qty: 90 CAPSULE | Refills: 0 | Status: SHIPPED | OUTPATIENT
Start: 2021-11-29 | End: 2022-03-02

## 2021-11-30 LAB — TSH SERPL DL<=0.005 MIU/L-ACNC: 0.33 UIU/ML (ref 0.3–5)

## 2021-12-03 ENCOUNTER — MYC MEDICAL ADVICE (OUTPATIENT)
Dept: INTERNAL MEDICINE | Facility: CLINIC | Age: 71
End: 2021-12-03
Payer: MEDICARE

## 2021-12-03 DIAGNOSIS — Z79.899 CHRONIC USE OF BENZODIAZEPINE FOR THERAPEUTIC PURPOSE: ICD-10-CM

## 2021-12-03 NOTE — TELEPHONE ENCOUNTER
Medication: Lorazepam 0.5 MG tablet  Last Date Filled 11/29/2021   pulled: PROVIDER TO PULL FROM Epic.     Only PCP Prescribing? PROVIDER TO PULL  FROM Epic.  Taken as prescribed from physician notes? YES    CSA in last year: NO  Random Utox in last year: NO  (if any of the above answer NO - schedule with PCP)     Opioids + benzodiazepines? NO  (if the above answer YES - schedule with PCP every 6 months)     Is patient on the Executive Review Team? NO      All responses suggest: Refilling prescription

## 2021-12-06 RX ORDER — LORAZEPAM 0.5 MG/1
0.5 TABLET ORAL
Qty: 60 TABLET | Refills: 0 | Status: SHIPPED | OUTPATIENT
Start: 2021-12-06 | End: 2022-03-01

## 2022-01-22 ENCOUNTER — HEALTH MAINTENANCE LETTER (OUTPATIENT)
Age: 72
End: 2022-01-22

## 2022-02-10 ENCOUNTER — HOSPITAL ENCOUNTER (OUTPATIENT)
Dept: MAMMOGRAPHY | Facility: CLINIC | Age: 72
Discharge: HOME | End: 2022-02-10
Attending: INTERNAL MEDICINE | Admitting: INTERNAL MEDICINE
Payer: MEDICARE

## 2022-02-10 DIAGNOSIS — Z12.31 VISIT FOR SCREENING MAMMOGRAM: ICD-10-CM

## 2022-02-10 PROCEDURE — 77067 SCR MAMMO BI INCL CAD: CPT

## 2022-02-28 ENCOUNTER — MYC MEDICAL ADVICE (OUTPATIENT)
Dept: INTERNAL MEDICINE | Facility: CLINIC | Age: 72
End: 2022-02-28
Payer: MEDICARE

## 2022-02-28 DIAGNOSIS — M1A.9XX1 CHRONIC TOPHACEOUS GOUT: ICD-10-CM

## 2022-02-28 DIAGNOSIS — G89.29 CHRONIC BILATERAL LOW BACK PAIN WITHOUT SCIATICA: ICD-10-CM

## 2022-02-28 DIAGNOSIS — M54.50 CHRONIC BILATERAL LOW BACK PAIN WITHOUT SCIATICA: ICD-10-CM

## 2022-02-28 DIAGNOSIS — Z79.899 CHRONIC USE OF BENZODIAZEPINE FOR THERAPEUTIC PURPOSE: ICD-10-CM

## 2022-03-02 RX ORDER — ALLOPURINOL 100 MG/1
200 TABLET ORAL DAILY
Qty: 180 TABLET | Refills: 1 | Status: SHIPPED | OUTPATIENT
Start: 2022-03-02 | End: 2022-04-07

## 2022-03-02 RX ORDER — COLCHICINE 0.6 MG/1
0.6 TABLET ORAL DAILY
Qty: 90 TABLET | Refills: 3 | Status: SHIPPED | OUTPATIENT
Start: 2022-03-02 | End: 2023-03-07

## 2022-03-02 RX ORDER — TIZANIDINE HYDROCHLORIDE 4 MG/1
4 CAPSULE, GELATIN COATED ORAL 3 TIMES DAILY PRN
Qty: 90 CAPSULE | Refills: 0 | Status: SHIPPED | OUTPATIENT
Start: 2022-03-02 | End: 2022-09-06

## 2022-03-02 RX ORDER — LORAZEPAM 0.5 MG/1
0.5 TABLET ORAL
Qty: 60 TABLET | Refills: 0 | Status: SHIPPED | OUTPATIENT
Start: 2022-03-02 | End: 2022-04-26

## 2022-03-09 ENCOUNTER — MYC MEDICAL ADVICE (OUTPATIENT)
Dept: INTERNAL MEDICINE | Facility: CLINIC | Age: 72
End: 2022-03-09
Payer: MEDICARE

## 2022-03-09 DIAGNOSIS — I10 ESSENTIAL HYPERTENSION: ICD-10-CM

## 2022-03-09 RX ORDER — ROSUVASTATIN CALCIUM 10 MG/1
10 TABLET, COATED ORAL
Qty: 90 TABLET | Refills: 3 | Status: SHIPPED | OUTPATIENT
Start: 2022-03-09 | End: 2023-03-07

## 2022-03-21 ENCOUNTER — NURSE TRIAGE (OUTPATIENT)
Dept: NURSING | Facility: CLINIC | Age: 72
End: 2022-03-21
Payer: MEDICARE

## 2022-03-21 ENCOUNTER — E-VISIT (OUTPATIENT)
Dept: FAMILY MEDICINE | Facility: CLINIC | Age: 72
End: 2022-03-21
Payer: OTHER GOVERNMENT

## 2022-03-21 DIAGNOSIS — Z20.822 SUSPECTED COVID-19 VIRUS INFECTION: Primary | ICD-10-CM

## 2022-03-21 PROCEDURE — 99207 PR NO CHARGE LOS: CPT | Performed by: PHYSICIAN ASSISTANT

## 2022-03-22 ENCOUNTER — TELEPHONE (OUTPATIENT)
Dept: NURSING | Facility: CLINIC | Age: 72
End: 2022-03-22
Payer: MEDICARE

## 2022-03-22 ENCOUNTER — NURSE TRIAGE (OUTPATIENT)
Dept: NURSING | Facility: CLINIC | Age: 72
End: 2022-03-22
Payer: MEDICARE

## 2022-03-22 DIAGNOSIS — J98.8 VIRAL RESPIRATORY INFECTION: Primary | ICD-10-CM

## 2022-03-22 DIAGNOSIS — B97.89 VIRAL RESPIRATORY INFECTION: Primary | ICD-10-CM

## 2022-03-22 DIAGNOSIS — U07.1 COVID-19 VIRUS INFECTION: ICD-10-CM

## 2022-03-22 RX ORDER — BENZONATATE 200 MG/1
200 CAPSULE ORAL 3 TIMES DAILY PRN
Qty: 20 CAPSULE | Refills: 1 | Status: SHIPPED | OUTPATIENT
Start: 2022-03-22 | End: 2022-06-23

## 2022-03-22 RX ORDER — PREDNISONE 20 MG/1
20 TABLET ORAL DAILY
Qty: 7 TABLET | Refills: 0 | Status: SHIPPED | OUTPATIENT
Start: 2022-03-22 | End: 2022-04-22

## 2022-03-22 RX ORDER — GUAIFENESIN 600 MG/1
600 TABLET, EXTENDED RELEASE ORAL 2 TIMES DAILY
Qty: 60 TABLET | Refills: 2 | Status: SHIPPED | OUTPATIENT
Start: 2022-03-22 | End: 2022-04-07

## 2022-03-22 NOTE — TELEPHONE ENCOUNTER
As I understand the message, she has been sick for 48 hours.  Symptoms the first 5 to 6 days are inevitably viral and inflammatory, and not bacterial    Treating the inflammation with prednisone, Medrol, anti-inflammatories, guaifenesin and cough medicine would be reasonable    I would not add an antibiotic until she has been sick with similar symptoms for greater than 6 days    I agree with Covid testing and the high probability of Covid makes antibiotics even less advisable    I will send in a prescription for prednisone, guaifenesin, and benzonatate to local [facility]

## 2022-03-22 NOTE — TELEPHONE ENCOUNTER
Nurse Triage SBAR    Is this a 2nd Level Triage? YES, LICENSED PRACTITIONER REVIEW IS REQUIRED    Situation:   EDUARDO started 3/20/2022    Background:   Pt had an e-visit last night and was recommended to test for COVID.  Had 2 at home COVID tests done but shows a faint line. COVID PCR done today at [facility], result pending  Pt has DM and asthma     Assessment:   Coughs yellow and green mucus.  Sinus congestion. Right eye feels mildly sore. No swelling in any part of her face.  Temperature 100.1F  O2 sats 95-98% on room air. No SOB.  Body aches  Chills last night  Sneezing  Mild sore throat, improved from last night  Some chest tightness, similar to what she has when she has asthma exacerbation. Uses PRN inhalers and nebulizers which seem to help.  Blood sugar this AM 200s.  Poor appetite.   Mild headache      Protocol Recommended Disposition:   Call PCP Now    Recommendation:     Patient asks if PCP okay to call in Rx for Zpak and prednisone.   Pharmacy: [facility]    Protocol recommends to have pt seen today. Since pt just had an e-visit, would prefer to send message to PCP first. Please call her at [phone]    Routed to provider    Does the patient meet one of the following criteria for ADS visit consideration? 16+ years old, with an [facility] PCP     TIP  Providers, please consider if this condition is appropriate for management at one of our Acute and Diagnostic Services sites.     If patient is a good candidate, please use dotphrase <dot>triageresponse and select Refer to ADS to document.      Marie Neumann RN/[place] Nurse Advisor          Reason for Disposition    HIGH RISK for severe COVID complications (e.g., weak immune system, age > 64 years, obesity with BMI > 25, pregnant, chronic lung disease or other chronic medical condition)  (Exception: Already seen by PCP and no new or worsening symptoms.)    Additional Information    Negative: SEVERE difficulty breathing (e.g., struggling for each breath,  speaks in single words)    Negative: Difficult to awaken or acting confused (e.g., disoriented, slurred speech)    Negative: Bluish (or gray) lips or face now    Negative: Shock suspected (e.g., cold/pale/clammy skin, too weak to stand, low BP, rapid pulse)    Negative: Sounds like a life-threatening emergency to the triager    Negative: SEVERE or constant chest pain or pressure  (Exception: Mild central chest pain, present only when coughing.)    Negative: MODERATE difficulty breathing (e.g., speaks in phrases, SOB even at rest, pulse 100-120)    Negative: [1] Headache AND [2] stiff neck (can't touch chin to chest)    Negative: Oxygen level (e.g., pulse oximetry) 90 percent or lower    Negative: Chest pain or pressure    Negative: Patient sounds very sick or weak to the triager    Negative: MILD difficulty breathing (e.g., minimal/no SOB at rest, SOB with walking, pulse <100)    Negative: Fever > 103 F (39.4 C)    Negative: [1] Fever > 101 F (38.3 C) AND [2] age > 60 years    Negative: [1] Fever > 100.0 F (37.8 C) AND [2] bedridden (e.g., nursing home patient, CVA, chronic illness, recovering from surgery)    Negative: Sounds like a life-threatening emergency to the triager    Negative: Difficulty breathing, and not from stuffy nose (e.g., not relieved by cleaning out the nose)    Negative: SEVERE headache and has fever    Negative: Patient sounds very sick or weak to the triager    Negative: SEVERE sinus pain    Negative: Severe headache    Negative: Redness or swelling on the cheek, forehead, or around the eye    Negative: Fever > 103 F (39.4 C)    Negative: Fever > 101 F (38.3 C) and over 60 years of age    Fever > 100.0 F (37.8 C) and has diabetes mellitus or a weak immune system (e.g., HIV positive, cancer chemotherapy, organ transplant, splenectomy, chronic steroids)    Protocols used: CORONAVIRUS (COVID-19) DIAGNOSED OR [id number] 1.18.2022, SINUS PAIN AND CONGESTION-A-OH

## 2022-03-22 NOTE — TELEPHONE ENCOUNTER
FNA phoned pt at 11:35 AM. FNA read Dr. Barcenas's recommendation and she verbalized understanding. She will  medications today.    Pt asks possibility of getting the COVID antiviral treatment.   At home COVID tests done twice, shows a faint line at the bottom. PCR test pending. She prefers to start antiviral COVID pill sooner instead of waiting for PCR test result.  FNA advised she would need to be seen for this (virtual visit or e-visit).   Pt asks if covering provider can give prescribe COVID antiviral pill.    Marie Neuamnn RN/[place] Nurse Advisor

## 2022-03-22 NOTE — TELEPHONE ENCOUNTER
Patient returning call from a nurse who she was disconnected with. Shortly into phone conversation nurse calls patient back. Closing encounter.    Sheryl Shanks RN  [facility] Nurse Advisors

## 2022-03-22 NOTE — PATIENT INSTRUCTIONS
Deajuaquin Broderick,      Based on your responses, you may have coronavirus (COVID-19).     Will I be tested for COVID-19?  We would like to test you for COVID. I have placed orders for this test.     To schedule: go to your [employer] home page and scroll down to the section that says  You have an appointment that needs to be scheduled  and click the large green button that says  Schedule Now  and follow the steps to find the next available openings.    If you are unable to complete these [employer] scheduling steps, please call [phone] to schedule your testing.     These guidelines are for isolating before returning to work, school or .     For employers, schools and day cares: This is an official notice for this person s medical guidelines for returning in-person.     For health care sites: The CDC gives different isolation and quarantine guidelines for healthcare sites, please check with these sites before arriving.     How do I self-isolate?  You isolate when you have symptoms of COVID or a test shows you have COVID, even if you don t have symptoms.     If you DO have symptoms:  o Stay home and away from others  - For at least 5 days after your symptoms started, AND   - You are fever free for 24 hours (with no medicine that reduces fever), AND  - Your other symptoms are better.  o Wear a mask for 10 full days any time you are around others.    If you DON T have symptoms:  o Stay at home and away from others for at least 5 days after your positive test.  o Wear a mask for 10 full days any time you are around others.    How can I take care of myself?  Over the counter medications may help with your symptoms such as runny or stuffy nose, cough, chills, or fever.  Talk to your care team about your options.     Some people are at high risk of severe illness (for example, you have a weak immune system, you re 65 years or older, or you have certain medical problems). If your risk is high and your symptoms started in  the last 5 to 7 days, we strongly recommend for you to get COVID treatment as soon as possible. Paxlovid, Molnupiravir and the monoclonal antibody treatments are proven safe and effective, make you feel better faster, and prevent hospitalization and death.       To schedule an appointment to discuss COVID treatment, request an appointment on MyChart (select  COVID-19 Treatment ) or call [phone] ([phone]), press 7.      Get lots of rest. Drink extra fluids (unless a doctor has told you not to)    Take Tylenol (acetaminophen) or ibuprofen for fever or pain. If you have liver or kidney problems, ask your family doctor if it's okay to take Tylenol or ibuprofen    Take over the counter medications for your symptoms, as directed by your doctor. You may also talk to your pharmacist.      If you have other health problems (like cancer, heart failure, an organ transplant or severe kidney disease): Call your specialty clinic if you don't feel better in the next 2 days.    Know when to call 911. Emergency warning signs include:  o Trouble breathing or shortness of breath  o Pain or pressure in the chest that doesn't go away  o Feeling confused like you haven't felt before, or not being able to wake up  o Bluish-colored lips or face    Where can I get more information?    [facility] - About COVID-19: [email or web address]     CDC - What to Do If You're Sick: https://www.cdc.gov/coronavirus/2019-ncov/if-you-are-sick/index.html     CDC - Quarantine & Isolation: https://www.cdc.gov/coronavirus/2019-ncov/your-health/quarantine-isolation.html     [facility] clinical trials (COVID-19 research studies): clinicalaffairs.[facility]/umn-clinical-trials    Below are the COVID-19 hotlines at the [facility] of Health ([facility]). Interpreters are available.  o For health questions: Call 630-059-8396 or [phone] (7 a.m. to 7 p.m.)  o For questions about schools and childcare: Call  [phone] or [phone] (7 a.m. to 7 p.m.)

## 2022-03-22 NOTE — TELEPHONE ENCOUNTER
Caller: [facility] [address] [place].  Pharmacist states that Paxlovid not available at [facility].     This medication not available at [facility]. This is available at [facility].    FNA phoned patient and states that her son already picked up other meds from [facility].   Pt agreed to  Paxlovid from [facility].    FNA phoned [facility], pharmacist will initiate transfer of Paxlovid from [facility].     Marie Neumann RN/[place] Nurse Advisor

## 2022-03-22 NOTE — TELEPHONE ENCOUNTER
Patient requests antiviral for Covid    At increased risk due to age 71, diabetes, and chronic kidney disease with GFR 41    Immunized x3    Will provide prescription for Paxlovid, adjusted for renal function    Nirmatrelvir 150 mg twice daily for 5 days and  Ritonavir 100 mg twice daily for 5 days    Prescription sent to [facility] notified with request to review prescription   negative...

## 2022-03-22 NOTE — TELEPHONE ENCOUNTER
"Pt calling for triage, reports symptoms since yesterday.  She took two at-home COVID tests today, both show a very faint second \"positive\" line.      Pt currently has the following symptoms:     Dry cough  Sore throat  Runny nose  Raspy voice  Temp 99.3F       She can stand and walk without trouble.  Her baseline is SOB with exertion, no worse than normal.     Triage disposition: Call PCP Now/Virtual Visit per protocol care advice.  RN advised submitting an E-visit tonight and sending it to the \"next available provider\" so it's addressed within approximately one hour.  Patient verbalized understanding and had no further questions.      COVID 19 Nurse Triage Plan/Patient Instructions    Please be aware that novel coronavirus (COVID-19) may be circulating in the community. If you develop symptoms such as fever, cough, or SOB or if you have concerns about the presence of another infection including coronavirus (COVID-19), please contact your health care provider or visit [email or web address].     Disposition/Instructions    Virtual Visit with provider recommended. Reference Visit Selection Guide.    Thank you for taking steps to prevent the spread of this virus.  o Limit your contact with others.  o Wear a simple mask to cover your cough.  o Wash your hands well and often.    Resources    [facility]: About COVID-19: [email or web address]    CDC: What to Do If You're Sick: www.cdc.gov/coronavirus/2019-ncov/about/steps-when-sick.html    CDC: Ending Home Isolation: www.cdc.gov/coronavirus/2019-ncov/hcp/disposition-in-home-patients.html     CDC: Caring for Someone: www.cdc.gov/coronavirus/2019-ncov/if-you-are-sick/care-for-someone.html     [facility]: Interim Guidance for Hospital Discharge to Home: www.health.[facility].mn.us/diseases/coronavirus/hcp/hospdischarge.pdf    [facility] clinical trials (COVID-19 research studies): clinicalaffairs.[facility]/umn-clinical-trials     Below are the COVID-19 " hotlines at the [place] [facility] of [facility] ([facility]). Interpreters are available.   o For health questions: Call 511-747-4072 or 1-767.338.7527 (7 a.m. to 7 p.m.)  o For questions about schools and childcare: Call 536-893-6472 or [phone] (7 a.m. to 7 p.m.)             Lyn Harper RN  [facility] Nurse Advisor            Reason for Disposition    HIGH RISK for severe COVID complications (e.g., weak immune system, age > 64 years, obesity with BMI > 25, pregnant, chronic lung disease or other chronic medical condition)  (Exception: Already seen by PCP and no new or worsening symptoms.)    Additional Information    Negative: SEVERE difficulty breathing (e.g., struggling for each breath, speaks in single words)    Negative: Difficult to awaken or acting confused (e.g., disoriented, slurred speech)    Negative: Shock suspected (e.g., cold/pale/clammy skin, too weak to stand, low BP, rapid pulse)    Negative: Sounds like a life-threatening emergency to the triager    Negative: Bluish (or gray) lips or face now    Negative: SEVERE or constant chest pain or pressure  (Exception: Mild central chest pain, present only when coughing.)    Negative: MODERATE difficulty breathing (e.g., speaks in phrases, SOB even at rest, pulse 100-120)    Negative: Chest pain or pressure    Negative: Patient sounds very sick or weak to the triager    Negative: [1] Headache AND [2] stiff neck (can't touch chin to chest)    Negative: MILD difficulty breathing (e.g., minimal/no SOB at rest, SOB with walking, pulse <100)    Negative: Fever > 103 F (39.4 C)    Negative: [1] Fever > 101 F (38.3 C) AND [2] age > 60 years    Negative: [1] Fever > 100.0 F (37.8 C) AND [2] bedridden (e.g., nursing home patient, CVA, chronic illness, recovering from surgery)    Protocols used: CORONAVIRUS (COVID-19) DIAGNOSED OR [id number] 1.18.2022

## 2022-03-24 ENCOUNTER — NURSE TRIAGE (OUTPATIENT)
Dept: NURSING | Facility: CLINIC | Age: 72
End: 2022-03-24
Payer: MEDICARE

## 2022-03-24 NOTE — TELEPHONE ENCOUNTER
Patient has been diagnosed with COVID .March 20th  Started on Paxlovid and prednisone, per Dr. Barcenas  On a reduced dose of Paxlovid due to Diabetes.  Calling today with new symptoms.  Swelling of feet today.    On Lasix.40mg  Urinating regularly.  Drinking adequate amounts of water.    Swelling of feet started yesterday, and worse   Today.  Is usingh  light compression socks.  Cannot fit shoes on over feet.  Right foot seems a little bit more swollen   Than the left foot.    Please advise.    Radha Acosta RN   [facility] Nurse Advisor    Reason for Disposition    Diabetes - foot problems and questions    Additional Information    Negative: Thickened, long, or mis-shapen toenails    Negative: Foot callouses (i.e., corns, thickened skin on foot)    Negative: Last foot examination > 1 year ago    Negative: [1] Ulcer, wound, blister or sore AND [2] unchanged since last examination    Negative: [1] Purple or black skin on foot or toe AND [2] unchanged since last examination    Negative: [1] Numbness or tingling in both feet AND [2] new or increased    Negative: [1] Ulcer, wound, blister, sore, or red area AND [2] new or increasing    Negative: [1] Drainage from foot AND [2] unchanged since last physician examination    Negative: [1] Foul-smelling odor from foot AND [2] unchanged since last physician examination    Negative: Foot or toe pain    Negative: Itchy foot rash    Negative: White moist peeling skin between toes    Negative: Yellow pus seen in skin around toenail (cuticle area), or pus seen under toenail    Negative: Blood glucose > 400 mg/dl (22 mmol/l)    Negative: Fever > 100.4 F (38.0 C)    Negative: [1] Drainage from foot AND [2] new or increased    Negative: [1] Foul-smelling odor from foot AND [2] new or increased    Negative: [1] Spreading redness or red streak AND [2] area > 2 in. (5 cm)    Negative: [1] Purple or black skin on foot or toe AND [2] new or increased    Negative: Looks like a  boil or deep ulcer    Negative: Patient sounds very sick or weak to the triager    Negative: SEVERE pain    Negative: Foot is cool or blue in comparison to other side    Negative: [1] Looks infected (e.g., spreading redness, red streak, or pus) AND [2] fever    Negative: Caused by an animal bite    Negative: Caused by a human bite    Negative: Foreign body in skin (e.g., splinter, sliver)    Negative: Injury is a puncture wound    Negative: Followed a foot or ankle injury    Negative: Sounds like a life-threatening emergency to the triager    Protocols used: DIABETES - FOOT PROBLEMS AND [id number]

## 2022-03-25 NOTE — TELEPHONE ENCOUNTER
I am not sure what to advise.  I am not familiar with the side effects of this medication.  This might be a side effect of medicine.  Advise leg elevation and monitor    Intercepted [employer] message and responded via [employer]

## 2022-04-07 ENCOUNTER — VIRTUAL VISIT (OUTPATIENT)
Dept: INTERNAL MEDICINE | Facility: CLINIC | Age: 72
End: 2022-04-07
Payer: MEDICARE

## 2022-04-07 ENCOUNTER — NURSE TRIAGE (OUTPATIENT)
Dept: NURSING | Facility: CLINIC | Age: 72
End: 2022-04-07

## 2022-04-07 DIAGNOSIS — U07.1 COVID-19 VIRUS INFECTION: ICD-10-CM

## 2022-04-07 DIAGNOSIS — R11.0 NAUSEA: ICD-10-CM

## 2022-04-07 DIAGNOSIS — M10.9 URIC ACID ARTHROPATHY: ICD-10-CM

## 2022-04-07 DIAGNOSIS — Z79.4 TYPE 2 DIABETES MELLITUS WITH STAGE 3B CHRONIC KIDNEY DISEASE, WITH LONG-TERM CURRENT USE OF INSULIN (H): ICD-10-CM

## 2022-04-07 DIAGNOSIS — N18.31 STAGE 3A CHRONIC KIDNEY DISEASE (H): ICD-10-CM

## 2022-04-07 DIAGNOSIS — N18.32 TYPE 2 DIABETES MELLITUS WITH STAGE 3B CHRONIC KIDNEY DISEASE, WITH LONG-TERM CURRENT USE OF INSULIN (H): ICD-10-CM

## 2022-04-07 DIAGNOSIS — G62.9 NEUROPATHY: ICD-10-CM

## 2022-04-07 DIAGNOSIS — J44.89 ASTHMATIC BRONCHITIS , CHRONIC (H): ICD-10-CM

## 2022-04-07 DIAGNOSIS — J01.01 ACUTE RECURRENT MAXILLARY SINUSITIS: Primary | ICD-10-CM

## 2022-04-07 DIAGNOSIS — E11.22 TYPE 2 DIABETES MELLITUS WITH STAGE 3B CHRONIC KIDNEY DISEASE, WITH LONG-TERM CURRENT USE OF INSULIN (H): ICD-10-CM

## 2022-04-07 PROBLEM — R80.9 TYPE 2 DIABETES MELLITUS WITH MICROALBUMINURIA, WITHOUT LONG-TERM CURRENT USE OF INSULIN (H): Status: ACTIVE | Noted: 2021-11-29

## 2022-04-07 PROBLEM — E11.29 TYPE 2 DIABETES MELLITUS WITH MICROALBUMINURIA, WITHOUT LONG-TERM CURRENT USE OF INSULIN (H): Status: ACTIVE | Noted: 2021-11-29

## 2022-04-07 PROBLEM — E11.65 UNCONTROLLED TYPE 2 DIABETES MELLITUS WITH HYPERGLYCEMIA (H): Status: ACTIVE | Noted: 2022-04-07

## 2022-04-07 PROBLEM — E11.65 UNCONTROLLED TYPE 2 DIABETES MELLITUS WITH HYPERGLYCEMIA (H): Status: RESOLVED | Noted: 2022-04-07 | Resolved: 2022-04-07

## 2022-04-07 PROCEDURE — 99215 OFFICE O/P EST HI 40 MIN: CPT | Mod: 95 | Performed by: INTERNAL MEDICINE

## 2022-04-07 RX ORDER — ALLOPURINOL 300 MG/1
300 TABLET ORAL DAILY
Qty: 90 TABLET | Refills: 3 | Status: SHIPPED | OUTPATIENT
Start: 2022-04-07 | End: 2022-04-07

## 2022-04-07 RX ORDER — ALLOPURINOL 300 MG/1
300 TABLET ORAL DAILY
Qty: 90 TABLET | Refills: 3 | Status: SHIPPED | OUTPATIENT
Start: 2022-04-07 | End: 2022-05-05

## 2022-04-07 RX ORDER — AZITHROMYCIN 500 MG/1
500 TABLET, FILM COATED ORAL DAILY
Qty: 7 TABLET | Refills: 1 | Status: SHIPPED | OUTPATIENT
Start: 2022-04-07 | End: 2022-04-14

## 2022-04-07 NOTE — TELEPHONE ENCOUNTER
Triage:   Positive for COVID March 21    States she had COVID and still has a lot of nasal congestion  States like her chest feels like she had an asthma attack  When she went off the prednisone that some of her chest congestion came back  History of Asthma   Upper airway irritation   O2 has been %  Has been using her nebulizer and that helps   Coughing up yellow phlegm  Nasal drainage down her throat and congestion  States yesterday she had a bronchospasm thought she was going to vomit with cough ing but did not vomit    Advised that patient should be seen within the next three days- patient prefers to be seen virtually. Reviewed additional care advice with patient and she verbalizes understanding. Assisted in transferring to scheduling.     Angela Ragland RN BSN 4/7/2022 9:53 AM  COVID 19 Nurse Triage Plan/Patient Instructions    Please be aware that novel coronavirus (COVID-19) may be circulating in the community. If you develop symptoms such as fever, cough, or SOB or if you have concerns about the presence of another infection including coronavirus (COVID-19), please contact your health care provider or visit [email or web address].     Disposition/Instructions    Virtual Visit with provider recommended. Reference Visit Selection Guide.    Thank you for taking steps to prevent the spread of this virus.  o Limit your contact with others.  o Wear a simple mask to cover your cough.  o Wash your hands well and often.    Resources    [facility]: About COVID-19: [email or web address]    CDC: What to Do If You're Sick: www.cdc.gov/coronavirus/2019-ncov/about/steps-when-sick.html    CDC: Ending Home Isolation: www.cdc.gov/coronavirus/2019-ncov/hcp/disposition-in-home-patients.html     CDC: Caring for Someone: www.cdc.gov/coronavirus/2019-ncov/if-you-are-sick/care-for-someone.html     [facility]: Interim Guidance for Hospital Discharge to Home:  www.health.[facility].mn.us/diseases/coronavirus/hcp/hospdischarge.pdf    [facility] clinical trials (COVID-19 research studies): clinicalaffairs.[facility]/umn-clinical-trials     Below are the COVID-19 hotlines at the [facility] of Health ([facility]). Interpreters are available.   o For health questions: Call 374-552-2713 or 1-438.609.7163 (7 a.m. to 7 p.m.)  o For questions about schools and childcare: Call 648-495-3926 or 1-494.635.1787 (7 a.m. to 7 p.m.)                         Reason for Disposition    Nasal discharge present > 10 days    Additional Information    Negative: Bluish (or gray) lips or face    Negative: Severe difficulty breathing (e.g., struggling for each breath, speaks in single words)    Negative: Rapid onset of cough and has hives    Negative: Coughing started suddenly after medicine, an allergic food or bee sting    Negative: Difficulty breathing after exposure to flames, smoke, or fumes    Negative: Sounds like a life-threatening emergency to the triager    Negative: Previous asthma attacks and this feels like asthma attack    Negative: Chest pain present when not coughing    Negative: Difficulty breathing    Negative: Passed out (i.e., fainted, collapsed and was not responding)    Negative: Patient sounds very sick or weak to the triager    Negative: Coughed up > 1 tablespoon (15 ml) blood (Exception: blood-tinged sputum)    Negative: Fever > 103 F (39.4 C)    Negative: Fever > 101 F (38.3 C) and over 60 years of age    Negative: Fever > 100.0 F (37.8 C) and has diabetes mellitus or a weak immune system (e.g., HIV positive, cancer chemotherapy, organ transplant, splenectomy, chronic steroids)    Negative: Fever > 100.0 F (37.8 C) and bedridden (e.g., nursing home patient, stroke, chronic illness, recovering from surgery)    Negative: Increasing ankle swelling    Negative: Wheezing is present    Negative: SEVERE coughing spells (e.g., whooping sound after coughing, vomiting after  coughing)    Negative: Coughing up ingrid-colored (reddish-brown) or blood-tinged sputum    Negative: Fever present > 3 days (72 hours)    Negative: Fever returns after gone for over 24 hours and symptoms worse or not improved    Negative: Using nasal washes and pain medicine > 24 hours and sinus pain persists    Negative: Known COPD or other severe lung disease (i.e., bronchiectasis, cystic fibrosis, lung surgery) and worsening symptoms (i.e., increased sputum purulence or amount, increased breathing difficulty)    Negative: Continuous (nonstop) coughing interferes with work or school and no improvement using cough treatment per Care Advice    Negative: Patient wants to be seen    Negative: Cough has been present for > 3 weeks    Negative: Allergy symptoms are also present (e.g., itchy eyes, clear nasal discharge, postnasal drip)    Protocols used: COUGH-A-OH

## 2022-04-07 NOTE — PATIENT INSTRUCTIONS
Omeprazole 20 mg daily for 1 month trial    Increase allopurinol to 300 mg daily    Azithromycin 500 g daily for 7 days with 1 refill if needed    Discontinue Jardiance due to nausea-done    Update labs    Consider trial off irbesartan

## 2022-04-07 NOTE — PROGRESS NOTES
Cata is a 71 year old female contacting the clinic today via video, who will use the platform: [employer] for the visit.  Phone # for D[employer]im[employer], or if Amwell drops:   Telephone Information:   Mobile [phone]          ASSESSMENT and PLAN:  1. Acute recurrent maxillary sinusitis  Status post Covid after initial improvement.  Treat with antibiotics.  Could repeat steroids and guaifenesin but will hold for now.  Consider contribution of irbesartan  - azithromycin (ZITHROMAX) 500 MG tablet; Take 1 tablet (500 mg) by mouth daily for 7 days  Dispense: 7 tablet; Refill: 1  - allopurinol (ZYLOPRIM) 300 MG tablet; Take 1 tablet (300 mg) by mouth daily  Dispense: 90 tablet; Refill: 3    2. COVID-19 virus infection  Noted on March 21st, treated with Paxlovid with partial improvement.  Prednisone decreased by pharmacy    3. Type 2 diabetes mellitus with stage 3b chronic kidney disease, with long-term current use of insulin (H)  Stable.  Update labs.  Nausea on Jardiance  - Vitamin D Deficiency; Future  - Hemoglobin A1c; Future  - Comprehensive metabolic panel; Future  - Magnesium; Future    4. Asthmatic bronchitis , chronic (H)  Stable and worsened.  Consider component of irbesartan, acid reflux, and current infection.  Trial of omeprazole    5. BMI 45.0-49.9, adult (H)    6. Stage 3a chronic kidney disease (H)  Reviewed labs.  Covid treatment adjusted for same    7. Uric acid arthropathy  Last uric acid greater than 6 with dose increase.  Would push further to determine component of uric acid arthropathy  - Vitamin D Deficiency; Future  - Uric acid; Future    8. Nausea  Frequent with many medications including many that do not normally cause nausea.  Trial of acid suppression for this and asthmatic bronchitis  - omeprazole (PRILOSEC) 20 MG DR capsule; Take 1 capsule (20 mg) by mouth daily  Dispense: 30 capsule; Refill: 11    9. Neuropathy  Status post back surgery.  Optimize D, B12 and thyroid.  Consider trial of B12  shot  - Vitamin B12; Future     Preventive Care Assessed: Reviewed as above     Patient Instructions   Omeprazole 20 mg daily for 1 month trial    Increase allopurinol to 300 mg daily    Azithromycin 500 g daily for 7 days with 1 refill if needed    Discontinue Jardiance due to nausea-done    Update labs    Consider trial off irbesartan     Medication list carefully reviewed and corrected  Epic Merger Problem list addressed and updated     Return in about 4 months (around 8/7/2022) for using a video visit.    CHIEF COMPLAINT:  Chief Complaint   Patient presents with     Follow Up     Covid Positive      Cough     Asthma     Nasal Congestion       HISTORY OF PRESENT ILLNESS:  Meggan is a 71 year old female contacting the clinic today via video for complaints of continuing cough.  She was diagnosed with Covid on March 21.  She was prescribed Paxlovid for 5 days and doses adjusted for renal insufficiency.  She reported some edema however this resolved spontaneously.  She was treated with prednisone at the same time however her pharmacist advised her to cut that in half since antiviral treatment can also cause hyperglycemia.  We discussed that this can be very hard to sort out since the Covid infection can cause hyperglycemia also    After a period of improvement she has felt worse with sinus congestion, nasal purulence, posterior nasal drainage, sore throat, and worsening cough with wheezing.  She has a history of chronic asthmatic bronchitis for which she takes inhalers    Many medications make her nauseated including guaifenesin and Jardiance which she stopped.  She has never had an ulcer and is never attempted medication for acid reflux    Uric acid level was 6.4 in November and allopurinol dosage was increased from 100 to 200 mg.  After initial worsening of her gout she has noticed improvement.  She does still have arthritis of her hands and knees.  We discussed more significant component of uric acid.  Her dose  "was increased from 150 mg to 200 mg    REVIEW OF SYSTEMS:   Frequent sinus infections.  Hypoplastic frontal sinuses.  No chronic cough    PFSH:  Social History     Social History Narrative    She is  and is retired. She lives with her son and his family on the lower level of their house.  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  She has 5 grandchildren.  She does not smoke cigarettes or drink alcohol.       TOBACCO USE:  History   Smoking Status     Never Smoker   Smokeless Tobacco     Never Used       VITALS:  There were no vitals filed for this visit.  There were no vitals taken for this visit. Estimated body mass index is 55.54 kg/m  as calculated from the following:    Height as of 2/24/20: 1.562 m (5' 1.5\").    Weight as of 11/29/21: 135.5 kg (298 lb 12.8 oz).    PHYSICAL EXAM:  (observations via Video)  Alert and oriented    MEDICATIONS:   Current Outpatient Medications   Medication Sig Dispense Refill     albuterol (PROAIR HFA/PROVENTIL HFA/VENTOLIN HFA) 108 (90 Base) MCG/ACT inhaler Inhale 2 puffs into the lungs every 6 hours as needed for shortness of breath / dyspnea or wheezing 18 g 3     albuterol (PROVENTIL) (2.5 MG/3ML) 0.083% neb solution Take 1 vial (2.5 mg) by nebulization every 6 hours as needed for shortness of breath / dyspnea or wheezing 75 mL 3     alcohol swab prep pads Use to swab area of injection/vicki as directed. 100 each 3     allopurinol (ZYLOPRIM) 300 MG tablet Take 1 tablet (300 mg) by mouth daily 90 tablet 3     azithromycin (ZITHROMAX) 500 MG tablet Take 1 tablet (500 mg) by mouth daily for 7 days 7 tablet 1     blood glucose (NO BRAND SPECIFIED) test strip Use to test blood sugar 4 times daily or as directed. To accompany: Blood Glucose Monitor Brands: per insurance. 100 strip 6     blood glucose monitoring (NO BRAND SPECIFIED) meter device kit Use to test blood sugar 4 times daily or as directed. Preferred blood " glucose meter OR supplies to accompany: Blood Glucose Monitor Brands: per insurance. 1 kit 0     celecoxib (CELEBREX) 200 MG capsule Take 1 capsule (200 mg) by mouth daily 90 capsule 3     Cholecalciferol (VITAMIN D) 2000 UNIT tablet Take 2 tablets by mouth daily        furosemide (LASIX) 40 MG tablet Take 1 tablet (40 mg) by mouth daily 90 tablet 3     insulin aspart (NOVOLOG FLEXPEN) 100 UNIT/ML pen Take 6-10 units before meals +2 u prime before each dose-total of 36 units/day 45 mL 4     insulin glargine (LANTUS SOLOSTAR) 100 UNIT/ML pen Inject 26 units twice per day plus 2 unit prime per dose=56 units per day. 60 mL 4     irbesartan (AVAPRO) 150 MG tablet Take 1 tablet (150 mg) by mouth At Bedtime 90 tablet 3     levothyroxine (SYNTHROID) 125 MCG tablet Take 1 tablet (125 mcg) by mouth daily 90 tablet 3     LORazepam (ATIVAN) 0.5 MG tablet Take 1 tablet (0.5 mg) by mouth nightly as needed for anxiety 60 tablet 0     metoprolol succinate ER (TOPROL XL) 100 MG 24 hr tablet Take 1 tablet (100 mg) by mouth daily 90 tablet 3     omeprazole (PRILOSEC) 20 MG DR capsule Take 1 capsule (20 mg) by mouth daily 30 capsule 11     Probiotic Product (PROBIOTIC-10 PO)        rosuvastatin (CRESTOR) 10 MG tablet Take 1 tablet (10 mg) by mouth three times a week 90 tablet 3     thin (NO BRAND SPECIFIED) lancets Use with lanceting device. To accompany: Blood Glucose Monitor Brands: per insurance. 200 each 6     tiZANidine (ZANAFLEX) 4 MG capsule Take 1 capsule (4 mg) by mouth 3 times daily as needed for muscle spasms 90 capsule 0     benzonatate (TESSALON) 200 MG capsule Take 1 capsule (200 mg) by mouth 3 times daily as needed for cough (Patient not taking: Reported on 4/7/2022) 20 capsule 1     colchicine (COLCYRS) 0.6 MG tablet Take 1 tablet (0.6 mg) by mouth daily (Patient not taking: Reported on 4/7/2022) 90 tablet 3       Outside Notes summarized: Email exchange and phone notes  Labs, x-rays, cardiology, GI tests reviewed:  Renal insufficiency.  Controlled diabetes.  Uric acid 6.4  Recent Labs   Lab Test 11/29/21  1251 07/28/21  1147   HGB  --  13.7    143   POTASSIUM 4.7 4.2   CR 1.31* 1.37*   A1C 7.4* 8.5*   URIC 6.4  --    TSH 0.33  --      No results found for: [id number]  Lab Results   Component Value Date    VITDT 64 02/19/2019     Lab Results   Component Value Date    CHOL 197 07/28/2021     New orders:   Orders Placed This Encounter   Procedures     Vitamin D Deficiency     Uric acid     Hemoglobin A1c     Comprehensive metabolic panel     Vitamin B12     Magnesium       Independent review of:    Supplemental history by:      Patient has given verbal consent to a Video visit?  Yes  How would you like to obtain your AVS?  MyChart  Will anyone else be joining your video visit? No       Video-Visit Details  Type of service:  Video Visit  Originating Location (pt. Location): Home  Distant Location (provider location):   [facility]    Nate Barcenas MD  [facility]    Video Start Time: 11:40 AM  Video End time:  12:29 PM  Face to face plus orders: 49 minutes  Documentation time:  3 minutes     The visit lasted a total of 52 minutes       HPI

## 2022-04-08 DIAGNOSIS — R11.0 NAUSEA: ICD-10-CM

## 2022-04-18 DIAGNOSIS — J98.8 VIRAL RESPIRATORY INFECTION: ICD-10-CM

## 2022-04-18 DIAGNOSIS — B97.89 VIRAL RESPIRATORY INFECTION: ICD-10-CM

## 2022-04-19 NOTE — TELEPHONE ENCOUNTER
Routing refill request to provider for review/approval because:  Drug not on the [facility] refill protocol     Last Written Prescription Date:  3/22/2022  Last Fill Quantity: 7,  # refills: 0   Last office visit provider:  4/7/2022 virtual visit Dr. Barcenas     Requested Prescriptions   Pending Prescriptions Disp Refills     predniSONE (DELTASONE) 20 MG tablet [Pharmacy Med Name: PREDNISONE 20MG TABLETS] 7 tablet 0     Sig: TAKE 1 TABLET(20 MG) BY MOUTH DAILY FOR 7 DAYS       There is no refill protocol information for this order          Adelaide Monaco RN 04/19/22 4:24 PM

## 2022-04-22 RX ORDER — PREDNISONE 20 MG/1
TABLET ORAL
Qty: 7 TABLET | Refills: 0 | Status: SHIPPED | OUTPATIENT
Start: 2022-04-22 | End: 2022-06-23

## 2022-04-25 ENCOUNTER — MYC MEDICAL ADVICE (OUTPATIENT)
Dept: INTERNAL MEDICINE | Facility: CLINIC | Age: 72
End: 2022-04-25
Payer: MEDICARE

## 2022-04-25 DIAGNOSIS — R11.0 NAUSEA: ICD-10-CM

## 2022-04-25 DIAGNOSIS — F41.9 ANXIETY: ICD-10-CM

## 2022-04-25 DIAGNOSIS — J44.89 ASTHMATIC BRONCHITIS , CHRONIC (H): Primary | ICD-10-CM

## 2022-04-25 DIAGNOSIS — Z79.899 CHRONIC USE OF BENZODIAZEPINE FOR THERAPEUTIC PURPOSE: ICD-10-CM

## 2022-04-26 RX ORDER — LORAZEPAM 0.5 MG/1
0.5 TABLET ORAL
Qty: 60 TABLET | Refills: 1 | Status: SHIPPED | OUTPATIENT
Start: 2022-04-26 | End: 2022-09-06

## 2022-04-29 ENCOUNTER — LAB (OUTPATIENT)
Dept: LAB | Facility: CLINIC | Age: 72
End: 2022-04-29
Payer: MEDICARE

## 2022-04-29 DIAGNOSIS — N18.32 TYPE 2 DIABETES MELLITUS WITH STAGE 3B CHRONIC KIDNEY DISEASE, WITH LONG-TERM CURRENT USE OF INSULIN (H): ICD-10-CM

## 2022-04-29 DIAGNOSIS — E11.22 TYPE 2 DIABETES MELLITUS WITH STAGE 3B CHRONIC KIDNEY DISEASE, WITH LONG-TERM CURRENT USE OF INSULIN (H): ICD-10-CM

## 2022-04-29 DIAGNOSIS — Z79.4 TYPE 2 DIABETES MELLITUS WITH STAGE 3B CHRONIC KIDNEY DISEASE, WITH LONG-TERM CURRENT USE OF INSULIN (H): ICD-10-CM

## 2022-04-29 DIAGNOSIS — M10.9 URIC ACID ARTHROPATHY: ICD-10-CM

## 2022-04-29 DIAGNOSIS — G62.9 NEUROPATHY: ICD-10-CM

## 2022-04-29 LAB
ALBUMIN SERPL-MCNC: 3.7 G/DL (ref 3.5–5)
ALP SERPL-CCNC: 295 U/L (ref 45–120)
ALT SERPL W P-5'-P-CCNC: 55 U/L (ref 0–45)
ANION GAP SERPL CALCULATED.3IONS-SCNC: 18 MMOL/L (ref 5–18)
AST SERPL W P-5'-P-CCNC: 35 U/L (ref 0–40)
BILIRUB SERPL-MCNC: 1 MG/DL (ref 0–1)
BUN SERPL-MCNC: 52 MG/DL (ref 8–28)
CALCIUM SERPL-MCNC: 10 MG/DL (ref 8.5–10.5)
CHLORIDE BLD-SCNC: 102 MMOL/L (ref 98–107)
CO2 SERPL-SCNC: 22 MMOL/L (ref 22–31)
CREAT SERPL-MCNC: 1.47 MG/DL (ref 0.6–1.1)
GFR SERPL CREATININE-BSD FRML MDRD: 38 ML/MIN/1.73M2
GLUCOSE BLD-MCNC: 248 MG/DL (ref 70–125)
HBA1C MFR BLD: 8.7 % (ref 0–5.6)
MAGNESIUM SERPL-MCNC: 1.9 MG/DL (ref 1.8–2.6)
POTASSIUM BLD-SCNC: 4.5 MMOL/L (ref 3.5–5)
PROT SERPL-MCNC: 6.9 G/DL (ref 6–8)
SODIUM SERPL-SCNC: 142 MMOL/L (ref 136–145)
URATE SERPL-MCNC: 6.7 MG/DL (ref 2–7.5)
VIT B12 SERPL-MCNC: 787 PG/ML (ref 213–816)

## 2022-04-29 PROCEDURE — 84550 ASSAY OF BLOOD/URIC ACID: CPT

## 2022-04-29 PROCEDURE — 82607 VITAMIN B-12: CPT

## 2022-04-29 PROCEDURE — 82306 VITAMIN D 25 HYDROXY: CPT

## 2022-04-29 PROCEDURE — 83735 ASSAY OF MAGNESIUM: CPT

## 2022-04-29 PROCEDURE — 80053 COMPREHEN METABOLIC PANEL: CPT

## 2022-04-29 PROCEDURE — 36415 COLL VENOUS BLD VENIPUNCTURE: CPT

## 2022-04-29 PROCEDURE — 83036 HEMOGLOBIN GLYCOSYLATED A1C: CPT

## 2022-04-30 LAB — DEPRECATED CALCIDIOL+CALCIFEROL SERPL-MC: 70 UG/L (ref 20–75)

## 2022-05-05 DIAGNOSIS — E11.9 TYPE 2 DIABETES MELLITUS WITHOUT COMPLICATION, WITH LONG-TERM CURRENT USE OF INSULIN (H): ICD-10-CM

## 2022-05-05 DIAGNOSIS — J01.01 ACUTE RECURRENT MAXILLARY SINUSITIS: ICD-10-CM

## 2022-05-05 DIAGNOSIS — Z79.4 TYPE 2 DIABETES MELLITUS WITHOUT COMPLICATION, WITH LONG-TERM CURRENT USE OF INSULIN (H): ICD-10-CM

## 2022-05-05 DIAGNOSIS — M10.9 URIC ACID ARTHROPATHY: Primary | ICD-10-CM

## 2022-05-05 RX ORDER — INSULIN GLARGINE 100 [IU]/ML
INJECTION, SOLUTION SUBCUTANEOUS
Qty: 60 ML | Refills: 4 | Status: SHIPPED | OUTPATIENT
Start: 2022-05-05 | End: 2022-07-20

## 2022-05-05 RX ORDER — NABUMETONE 500 MG/1
500 TABLET, FILM COATED ORAL 2 TIMES DAILY
Qty: 180 TABLET | Refills: 3 | Status: SHIPPED | OUTPATIENT
Start: 2022-05-05 | End: 2022-06-23

## 2022-05-05 RX ORDER — ALLOPURINOL 300 MG/1
300 TABLET ORAL DAILY
Qty: 90 TABLET | Refills: 3 | Status: SHIPPED | OUTPATIENT
Start: 2022-05-05 | End: 2023-07-21

## 2022-05-14 ENCOUNTER — HEALTH MAINTENANCE LETTER (OUTPATIENT)
Age: 72
End: 2022-05-14

## 2022-06-23 ENCOUNTER — VIRTUAL VISIT (OUTPATIENT)
Dept: PHARMACY | Facility: CLINIC | Age: 72
End: 2022-06-23
Attending: INTERNAL MEDICINE
Payer: OTHER GOVERNMENT

## 2022-06-23 DIAGNOSIS — M1A.09X0 CHRONIC GOUT OF MULTIPLE SITES, UNSPECIFIED CAUSE: ICD-10-CM

## 2022-06-23 DIAGNOSIS — E78.00 HYPERCHOLESTEROLEMIA: ICD-10-CM

## 2022-06-23 DIAGNOSIS — M81.0 AGE-RELATED OSTEOPOROSIS WITHOUT CURRENT PATHOLOGICAL FRACTURE: ICD-10-CM

## 2022-06-23 DIAGNOSIS — I10 BENIGN ESSENTIAL HYPERTENSION: ICD-10-CM

## 2022-06-23 DIAGNOSIS — N18.32 TYPE 2 DIABETES MELLITUS WITH STAGE 3B CHRONIC KIDNEY DISEASE, WITH LONG-TERM CURRENT USE OF INSULIN (H): Primary | ICD-10-CM

## 2022-06-23 DIAGNOSIS — M62.838 MUSCLE SPASM: ICD-10-CM

## 2022-06-23 DIAGNOSIS — Z79.4 TYPE 2 DIABETES MELLITUS WITH STAGE 3B CHRONIC KIDNEY DISEASE, WITH LONG-TERM CURRENT USE OF INSULIN (H): Primary | ICD-10-CM

## 2022-06-23 DIAGNOSIS — E03.9 HYPOTHYROIDISM, UNSPECIFIED TYPE: ICD-10-CM

## 2022-06-23 DIAGNOSIS — J44.89 ASTHMATIC BRONCHITIS , CHRONIC (H): ICD-10-CM

## 2022-06-23 DIAGNOSIS — E11.22 TYPE 2 DIABETES MELLITUS WITH STAGE 3B CHRONIC KIDNEY DISEASE, WITH LONG-TERM CURRENT USE OF INSULIN (H): Primary | ICD-10-CM

## 2022-06-23 DIAGNOSIS — M25.50 ARTHRALGIA, UNSPECIFIED JOINT: ICD-10-CM

## 2022-06-23 DIAGNOSIS — K21.00 GASTROESOPHAGEAL REFLUX DISEASE WITH ESOPHAGITIS, UNSPECIFIED WHETHER HEMORRHAGE: ICD-10-CM

## 2022-06-23 DIAGNOSIS — G43.909 MIGRAINE: ICD-10-CM

## 2022-06-23 PROCEDURE — 99607 MTMS BY PHARM ADDL 15 MIN: CPT | Performed by: PHARMACIST

## 2022-06-23 PROCEDURE — 99605 MTMS BY PHARM NP 15 MIN: CPT | Performed by: PHARMACIST

## 2022-06-23 RX ORDER — CELECOXIB 200 MG/1
200 CAPSULE ORAL DAILY
Qty: 90 CAPSULE | Refills: 3 | Status: SHIPPED | OUTPATIENT
Start: 2022-06-23 | End: 2022-12-11

## 2022-06-23 RX ORDER — TIRZEPATIDE 2.5 MG/.5ML
2.5 INJECTION, SOLUTION SUBCUTANEOUS WEEKLY
Qty: 2 ML | Refills: 1 | Status: SHIPPED | OUTPATIENT
Start: 2022-06-23 | End: 2022-06-29

## 2022-06-23 RX ORDER — FAMOTIDINE 20 MG/1
20 TABLET, FILM COATED ORAL 2 TIMES DAILY PRN
COMMUNITY
End: 2022-12-12

## 2022-06-23 NOTE — PROGRESS NOTES
Medication Therapy Management (MTM) Encounter    ASSESSMENT:                            Medication Adherence/Access: No issues identified    1. Type 2 diabetes mellitus with stage 3b chronic kidney disease, with long-term current use of insulin (H)  Not at goal A1c less than 8% per ADA guidelines.  Tolerating current regimen of basal/bolus insulin, however the majority of the visit was spent providing education on a new novel mechanism of Tirzepatide.  Ideally this medication will be covered by her insurance, we will start at low-dose and evaluate efficacy and tolerance if covered.  Additionally due to frequent insulin dosing she would benefit from a continuous glucose monitor.  She had previously looked into coverage through her insurance therefore hopefully she will be able to get the freestyle collin device.  She does not have any contraindications due to his appetite, ideally this medication will help contribute to significant weight loss and improvement in blood sugars, ideally resulting in an insulin sparing effect to further help with weight loss.  Recommend to continue current insulin regimen until coverage for tirzepatide is identified.  Stable on ARB for renal protection, statin, evaluate for aspirin use at follow-up.    2. Benign essential hypertension  Not at goal blood pressure less than 130/80 per ACC/AHA hypertension guidelines.  She has not been physically in the clinic for updated blood pressure.  On ARB for renal protection, recommend reassess at follow-up.    3. Hypercholesterolemia  Stable on highest tolerated dose of rosuvastatin with alternate dosing 3 times weekly.  Recommend to continue current regimen.    4. Asthmatic bronchitis , chronic (H)  Recent exacerbation likely due to hot and humid weather.  Provided education on how to improve inhaler technique to ensure efficacy with albuterol use.    5. Chronic gout of multiple sites, unspecified cause  Previous dose increase in allopurinol may be  contributing to daily headache, however lower dose had not been sufficient to decrease uric acid level less than 6 and prevent recurrent gout flare.  Recommend to continue current doses of allopurinol and colchicine, and reevaluate at follow-up.    6. Hypothyroidism, unspecified type  Stable, recommend to continue current regimen.    7. Arthralgia, unspecified joint  Poorly controlled, per patient report end-stage osteoarthritis in her knees bilaterally.  We will focus on weight loss as discussed above and continue on daily Mckeon 2 selective NSAID, Celebrex as this was more effective than nabumetone.    8. Age-related osteoporosis without current pathological fracture  Currently untreated due to previous intolerance to Prolia.  Due for updated DEXA scan, history of secondary hyperparathyroidism due to chronic kidney disease.  Reviewed previous evaluation by endocrinology several years ago.  She may benefit from further evaluation and discussion for osteoporosis consult with Dr. Najera, appointment scheduled for early July.  Vitamin D level therapeutic, however not currently on goal dose of daily intake of calcium 1200 mg daily.  Will provide additional resources to evaluate daily dietary intake, if needed recommend to to 1 Tums to supplement diet.    9. Gastroesophageal reflux disease with esophagitis, unspecified whether hemorrhage  Currently untreated, previous use of omeprazole resulted in diarrhea.  Recommend against as needed use of omeprazole, recommend trial of H2 blocker such as famotidine up to twice daily as needed for acid reflux.  This may also be helpful for potential indigestion or nausea is related to use of Mounjaro as discussed above.    10. Migraine  Stable with nightly use of low-dose lorazepam to help prevent vertigo and migraine.  Recommend to continue current regimen.    11. Muscle spasm  Stable with as needed use of tizanidine, tolerating without adverse effects, recommend to continue current  regimen.    PLAN:                            1.  Initiate mounjaro 2.5 mg weekly if covered by insurance  2.  Initiate freestyle collin 2 continuous glucose monitor if covered by insurance   3.  Educate on proper inhaler technique for albuterol HFA to improve efficacy   4.  For breakthrough acid reflux recommend to use famotidine 20 mg twice daily as needed to replace omeprazole  5.  Educate on importance of obtaining adequate calcium daily for bone support, recommend 1200 mg calcium daily between diet and supplements.  If needed recommended to chew 500mg     Follow-up: Return in about 3 months (around 9/23/2022) for Follow up, with me, using a phone visit.    SUBJECTIVE/OBJECTIVE:                          Meggan Everett is a 71 year old female called for an initial visit. She was referred to me from Dr. Barcenas.    Reason for visit: Medication management initial.    Allergies/ADRs: Reviewed in chart  Past Medical History: Reviewed in chart  Tobacco: She reports that she has never smoked. She has never used smokeless tobacco.  Alcohol: none    Medication Adherence/Access: no issues reported    Type 2 diabetes: She is very frustrated by her difficulties with losing weight.  She had previously called in with questions about the medication she had heard about called Mounjaro.  Today's visit was largely to discuss this medication and potential benefits for her.  She is frustrated that the more insulin that she takes to bring her blood sugars down to more weight that she gains.  She has never been on a GLP-1 agonist in the past however was familiar with some of the names.  She does check her blood sugars about 5 times daily, in the morning, evening, and before meals.  Notes that this time on Lantus 36 units twice daily her fasting blood sugars range between .  She is taking on average 8 to 12 units of NovoLog 3 times daily before meals.  She has only had about 3 episodes of hypoglycemia over the last several  months.  She has heard about a continuous glucose monitor and would be interested in learning more about this.  She has had sensitivities to adhesives in the past.  Hemoglobin A1C   Date Value Ref Range Status   04/29/2022 8.7 (H) 0.0 - 5.6 % Final   11/29/2021 7.4 (H) 0.0 - 5.6 % Final   07/28/2021 8.5 (H) 0.0 - 5.6 % Final      Microalbumin Urine mg/dL   Date Value Ref Range Status   11/29/2021 <0.50 0.00 - 1.99 mg/dL Final      Creatinine Urine mg/dL   Date Value Ref Range Status   11/29/2021 17 mg/dL Final     Creatinine   Date Value Ref Range Status   04/29/2022 1.47 (H) 0.60 - 1.10 mg/dL Final   07/30/2019 1.14 (H) 0.52 - 1.04 mg/dL Final     Hypertension: Despite previous recommendation to hold irbesartan as this may have been contributing to sinusitis, she continues on irbesartan daily in addition to metoprolol and furosemide.  Tolerating without adverse effects.  BP Readings from Last 3 Encounters:   11/29/21 138/60   07/28/21 (!) 140/58   02/24/20 128/72     Hypercholesterolemia: Continues on rosuvastatin 10 mg 3 times weekly.  She does have ongoing aches and pains, however has been on rosuvastatin for some time and does not believe this is contributing to any of her difficulties.  LDL Cholesterol Calculated   Date Value Ref Range Status   07/28/2021 113 <=129 mg/dL Final     Asthmatic bronchitis, chronic: Notes that she has had ongoing breathing difficulties after COVID.  She did not discontinue irbesartan as previously discussed, but feels particularly in the last few days with the weather being very hot and humid this contributes to her worsening breathing.  She did use her inhaler this morning, notes that if her breathing is very poor she does have access to her albuterol nebulizer.  Of note when she uses her albuterol inhaler she inhales quickly.  It is effective when she uses this medication.    Uric acid arthropathy: Due to previous gout flares her allopurinol dose was increased to 3 mg daily.   "Notes that she does wake up with a headache daily since this dose was increased.  She has also had more discomfort in her feet and legs, however this does improve throughout the day.  She continues on colchicine 0.6 mg daily as well.  She does have diarrhea on and off, notes that she does have some irritable bowel symptoms.  She has been on colchicine daily for about 1 month at this point.  Uric Acid   Date Value Ref Range Status   04/29/2022 6.7 2.0 - 7.5 mg/dL Final   07/30/2019 5.3 2.6 - 6.0 mg/dL Final     Hypothyroidism: Continues on daily levothyroxine.  TSH   Date Value Ref Range Status   11/29/2021 0.33 0.30 - 5.00 uIU/mL Final   02/19/2019 2.12 0.40 - 4.00 mU/L Final     Osteoarthritis: Notes that she has severe osteoarthritis in her knees bilaterally.  She had previously been to [facility] orthopedics that it is \"bone-on-bone.\"  She is needing knee replacements but was told that she is not a good surgical candidate due to her weight.  She is frustrated with this cycle as she is having a hard time losing weight without being able to be very active due to knee pain.  As discussed above and diabetes she is eager to have assistance with weight loss.  Continues on Celebrex which is somewhat poorly effective for her, however more effective than nabumetone which she has since discontinued.    Osteoporosis: Previous DEXA scan in 2018, and had been working with endocrinology, Dr. De León.  She had been started on Prolia however she experienced muscle pain after her injection and had not continued on this medication.  She does have a history of secondary hyperparathyroidism felt to be due to her chronic kidney disease.  She does take vitamin D daily however does not take a calcium supplement.  She does have intake of calcium through milk and yogurt however she does not believe that she has consistent daily intake sufficient to reach 2000 mg daily.  She is willing to have further work-up and ongoing evaluation " and monitoring of this condition.  Lab Results   Component Value Date    PTHI 111 (H) 02/19/2019      Lab Results   Component Value Date    HIEN 10.0 04/29/2022    PHOS 2.5 02/19/2019      Lab Results   Component Value Date    VITDT 70 04/29/2022    VITDT 64 02/19/2019     GERD: She had been taking omeprazole daily for 1 month but then resulted in diarrhea, she had since stopped this medication.  In the last week she started to have some symptoms of acid reflux, so she restarted omeprazole.    Migraine: Notes that she has migraines that cause of vertigo.  She had went to a dizzy clinic in [place] where she previously lived, she had a physician outside to prevent migraines and vertigo she should take 0.5 mg lorazepam at bedtime.  She finds that this does help prevent migraines, however since last September she has been getting 1-2 migraines per month.  She gets a visual disturbance now prior to the migraine rather than vertigo.  Of note she has been having daily headaches upon waking.  This is different than her migraines which present usually behind her left eye.  She has had migraines since she was 18 years old however they do not seem to be as severe as they were previously.    Muscle spasms: History of muscle spasms, she only takes 1 tablet of tizanidine at bedtime and finds that this is effective.  Denies adverse effects.     Today's Vitals: There were no vitals taken for this visit.  ----------------      I spent 60 minutes with this patient today. All changes were made via collaborative practice agreement with Nate Barcenas MD. A copy of the visit note was provided to the patient's provider(s).    The patient was sent via [employer] a summary of these recommendations.     Trevor Hastings, PharmD, BCACP  Medication Management (MTM) Pharmacist  [facility]    Telemedicine Visit Details  Type of service:  Telephone visit  Start Time: 10:30AM  End Time: 11:30AM  Originating Location (patient  location): Home  Distant Location (provider location):  [facility]     Medication Therapy Recommendations  Age-related osteoporosis without current pathological fracture    Rationale: Synergistic therapy - Needs additional medication therapy - Indication   Recommendation: Start Medication - calcium carbonate-vitamin D 500-200 MG-UNIT tablet   Status: Accepted - no CPA Needed         Asthmatic bronchitis , chronic (H)    Current Medication: albuterol (PROAIR HFA/PROVENTIL HFA/VENTOLIN HFA) 108 (90 Base) MCG/ACT inhaler   Rationale: Does not understand instructions - Adherence - Adherence   Recommendation: Provide Education   Status: Patient Agreed - Adherence/Education         Gastroesophageal reflux disease with esophagitis, unspecified whether hemorrhage    Current Medication: omeprazole (PRILOSEC) 20 MG DR capsule (Discontinued)   Rationale: Undesirable effect - Adverse medication event - Safety   Recommendation: Change Medication - famotidine 20 MG tablet   Status: Accepted - no CPA Needed         Type 2 diabetes mellitus with stage 3b chronic kidney disease, with long-term current use of insulin (H)    Current Medication: insulin aspart (NOVOLOG FLEXPEN) 100 UNIT/ML pen   Rationale: Medication requires monitoring - Needs additional monitoring   Recommendation: Self-Monitoring - FreeStyle Shola 2 Sensor Misc   Status: Accepted per CPA          Rationale: Synergistic therapy - Needs additional medication therapy - Indication   Recommendation: Start Medication - Mounjaro 2.5 MG/0.5ML Sopn   Status: Accepted per CPA

## 2022-06-24 NOTE — PATIENT INSTRUCTIONS
PLAN:                            1.  Initiate mounjaro 2.5 mg weekly if covered by insurance  2.  Initiate freestyle collin 2 continuous glucose monitor if covered by insurance   3.  Educate on proper inhaler technique for albuterol HFA to improve efficacy   4.  For breakthrough acid reflux recommend to use famotidine 20 mg twice daily as needed to replace omeprazole  5.  Educate on importance of obtaining adequate calcium daily for bone support, recommend 1200 mg calcium daily between diet and supplements.  If needed recommended to chew 500mg     Follow-up: Return in about 3 months (around 9/23/2022) for Follow up, with me, using a phone visit.

## 2022-06-29 ENCOUNTER — TELEPHONE (OUTPATIENT)
Dept: INTERNAL MEDICINE | Facility: CLINIC | Age: 72
End: 2022-06-29

## 2022-06-29 DIAGNOSIS — M81.0 SENILE OSTEOPOROSIS: Primary | ICD-10-CM

## 2022-07-02 ENCOUNTER — NURSE TRIAGE (OUTPATIENT)
Dept: NURSING | Facility: CLINIC | Age: 72
End: 2022-07-02

## 2022-07-02 NOTE — TELEPHONE ENCOUNTER
Called patient back to follow-up on low blood pressure. Patient states she was feeling a little bit better and blood pressure was back up to 120/60.    Joanne Fuentes RN  07/02/22 3:39 PM  [facility] Nurse Advisor

## 2022-07-02 NOTE — TELEPHONE ENCOUNTER
"Patient calling- wondering if she is having side effects from a new medication.    Noa- started Thurs - 8 hrs after injection, had vertigo    Half an hour ago felt \"weird\" - could barely  the shower. Checked blood pressure:  101/45  95/56  106/59    Drank a big glass of water    Also noticed last night that he blood sugar was low  BG- 75   Took 20 units instead of 36u. BG was 112 this morning.    Advised patient to continue to monitor blood pressure and call back if symptoms persist or worsen. Advised patient keep track of blood pressure and symptoms and follow up with PCP next week. Patient verbalized understanding and agreed with plan.    Joanne Fuentes RN  07/02/22 3:37 PM  [facility] Nurse Advisor      Reason for Disposition    Diastolic BP < 50 mm Hg    Additional Information    Negative: Started suddenly after an allergic medicine, an allergic food, or bee sting    Negative: Shock suspected (e.g., cold/pale/clammy skin, too weak to stand, low BP, rapid pulse)    Negative: Difficult to awaken or acting confused (e.g., disoriented, slurred speech)    Negative: Fainted    Negative: [1] Systolic BP < 90 AND [2] dizzy, lightheaded, or weak    Negative: Chest pain    Negative: Bleeding (e.g., vomiting blood, rectal bleeding or tarry stools, severe vaginal bleeding)(Exception: fainted from sight of small amount of blood; small cut or abrasion)    Negative: Extra heart beats or heart is beating fast  (i.e., \"palpitations\")    Negative: Sounds like a life-threatening emergency to the triager    Negative: [1] Fall in systolic BP > 20 mm Hg from normal AND [2] dizzy, lightheaded, or weak    Negative: Patient sounds very sick or weak to the triager    Negative: [1] Systolic BP < 80 AND [2] NOT dizzy, lightheaded or weak    Negative: Abdominal pain    Negative: Fever > 100.4 F (38.0 C)    Negative: Major surgery in the past month    Negative: [1] Drinking very little AND [2] dehydration suspected (e.g., " no urine > 12 hours, very dry mouth, very lightheaded)    Negative: [1] Systolic BP < 90 AND [2] NOT dizzy, lightheaded or weak    Negative: [1] Systolic BP  AND [2] taking blood pressure medications AND [3] dizzy, lightheaded or weak    Negative: [1] Systolic BP  AND [2] taking blood pressure medications AND [3] NOT dizzy, lightheaded or weak    Negative: Fall in systolic BP > 20 mmHg after eating a meal    Negative: Fall in systolic BP > 20 mmHg after standing up    Negative: [1] Fall in systolic BP > 20 mm Hg from normal AND [2] NOT dizzy, lightheaded, or weak    Protocols used: LOW BLOOD PRESSURE-A-AH

## 2022-07-08 ENCOUNTER — MEDICAL CORRESPONDENCE (OUTPATIENT)
Dept: PHARMACY | Facility: CLINIC | Age: 72
End: 2022-07-08

## 2022-07-13 ENCOUNTER — TELEPHONE (OUTPATIENT)
Dept: INTERNAL MEDICINE | Facility: CLINIC | Age: 72
End: 2022-07-13

## 2022-07-13 NOTE — TELEPHONE ENCOUNTER
Hello!     Patients insurance requires a completed Certificate of Medical Necessity filled out prior to dispensing a Continuing Glucose Monitoring system. Below is the link to the  CMN form. If you could please fill out and sign the document. You can scan into the Media tab in Epic or fax the completed document to [phone].      Freestyle Shola 2 form:  [email or web address]       Thank you!    Diabetes Care Services Team  [facility]  [address] [place] [address]

## 2022-07-14 DIAGNOSIS — Z79.4 TYPE 2 DIABETES MELLITUS WITH STAGE 3B CHRONIC KIDNEY DISEASE, WITH LONG-TERM CURRENT USE OF INSULIN (H): ICD-10-CM

## 2022-07-14 DIAGNOSIS — N18.32 TYPE 2 DIABETES MELLITUS WITH STAGE 3B CHRONIC KIDNEY DISEASE, WITH LONG-TERM CURRENT USE OF INSULIN (H): ICD-10-CM

## 2022-07-14 DIAGNOSIS — E11.22 TYPE 2 DIABETES MELLITUS WITH STAGE 3B CHRONIC KIDNEY DISEASE, WITH LONG-TERM CURRENT USE OF INSULIN (H): ICD-10-CM

## 2022-07-14 NOTE — TELEPHONE ENCOUNTER
See telephone encounter from 7/13 for more information. This was renewed 7/8. Patient needs CMN filled out.

## 2022-07-20 ENCOUNTER — VIRTUAL VISIT (OUTPATIENT)
Dept: PHARMACY | Facility: CLINIC | Age: 72
End: 2022-07-20
Payer: OTHER GOVERNMENT

## 2022-07-20 DIAGNOSIS — E11.22 TYPE 2 DIABETES MELLITUS WITH STAGE 3B CHRONIC KIDNEY DISEASE, WITH LONG-TERM CURRENT USE OF INSULIN (H): Primary | ICD-10-CM

## 2022-07-20 DIAGNOSIS — Z79.4 TYPE 2 DIABETES MELLITUS WITH STAGE 3B CHRONIC KIDNEY DISEASE, WITH LONG-TERM CURRENT USE OF INSULIN (H): Primary | ICD-10-CM

## 2022-07-20 DIAGNOSIS — N18.32 TYPE 2 DIABETES MELLITUS WITH STAGE 3B CHRONIC KIDNEY DISEASE, WITH LONG-TERM CURRENT USE OF INSULIN (H): Primary | ICD-10-CM

## 2022-07-20 DIAGNOSIS — I10 BENIGN ESSENTIAL HYPERTENSION: ICD-10-CM

## 2022-07-20 PROCEDURE — 99606 MTMS BY PHARM EST 15 MIN: CPT | Performed by: PHARMACIST

## 2022-07-20 PROCEDURE — 99607 MTMS BY PHARM ADDL 15 MIN: CPT | Performed by: PHARMACIST

## 2022-07-20 RX ORDER — INSULIN GLARGINE 100 [IU]/ML
30 INJECTION, SOLUTION SUBCUTANEOUS EVERY EVENING
Qty: 60 ML | Refills: 4 | COMMUNITY
Start: 2022-07-20 | End: 2022-08-19

## 2022-07-20 RX ORDER — TIRZEPATIDE 5 MG/.5ML
5 INJECTION, SOLUTION SUBCUTANEOUS WEEKLY
Qty: 2 ML | Refills: 1 | Status: SHIPPED | OUTPATIENT
Start: 2022-07-20 | End: 2022-08-17

## 2022-07-20 NOTE — PROGRESS NOTES
Medication Therapy Management (MTM) Encounter    ASSESSMENT:                            Medication Adherence/Access: No issues identified    1. Type 2 diabetes mellitus with stage 3b chronic kidney disease, with long-term current use of insulin (H)  Not at goal A1c less than 8% per ADA guidelines, however based on most recent blood sugars her A1c will be very tightly controlled and on significantly less insulin.  Reviewed benefits versus risks of further titration of tirzepatide.  Due to improvement in her nausea now after 3 weeks she would like to try the next step dose of 5 mg weekly.  Due to ongoing cost we will apply a 1 month sample coupon.  If this is poorly tolerated we will likely decrease back down to 2.5 mg weekly dose.  Based on today control blood sugars recommend to continue tapering down on her insulin, adjusting her Lantus to 30 units once daily in the evening. Stable on ARB for renal protection, statin, evaluate for aspirin use at follow-up.     2. Benign essential hypertension  Based on home blood pressure readings they are typically at goal of less than 130/80 per ACC/AHA hypertension guidelines.  However it is unclear why she is experiencing a morning hypotension.  Agree that she will continue to monitor her blood pressures, as further adjustments above.  She is on ARB for renal protection, however may benefit from either splitting her dose in half and taking twice daily, or adjusting dosing to a.m. administration.     PLAN:                            1.  Increase mounjaro to 5 mg weekly - applied 1 month free coupon   2.  Decrease lantus to 30 units once daily in the evening     Follow-up: Return in about 4 weeks (around 8/17/2022) for Follow up, with me, using a phone visit.    SUBJECTIVE/OBJECTIVE:                          Meggan Everett is a 71 year old female called for a follow up visit from 6/23/22. She was referred to me from Dr. Barcenas.      Reason for visit: Medication management  follow up     Allergies/ADRs: Reviewed in chart  Past Medical History: Reviewed in chart  Tobacco: She reports that she has never smoked. She has never used smokeless tobacco.  Alcohol: none    Medication Adherence/Access: no issues reported.    Type 2 diabetes: Notes that now after about 3 weeks of Mounjaro 2.5 mg, most of her nausea has resolved.  She did have 1 bout of diarrhea however this seems to be improving.  She has IBS at baseline and this is not unlike her to have lots of diarrhea.  Notes that the indigestion she experienced in the beginning was not severe.  She does have ongoing burping but she has resumed omeprazole due to lack of benefit from famotidine and has found that this has benefited her symptoms to a certain extent.  She also thinks that this is helping manage her bowels now only having 1 bowel movement daily or once every other day.  She has lost 17 pounds over the last 3 weeks.  Note she has also been very careful with her diet eating chicken, vegetables, salad, trying to stay away from any foods with significant carbohydrates including fruit.  As discussed via MyChart we had completely discontinued her NovoLog, this was approximately 5 days ago.  She is now down to injecting Lantus 20 units twice daily.  Most recent blood sugars as follows:  Fastin-142, 110, 130, 140   Post-prandial: 123, 160, 199, 167, 148, 138, 141, 167, 130, 146, 147, 178   Bedtime: 154, 143, 149, 144   Lab Results   Component Value Date    A1C 8.7 2022    A1C 7.4 2021     Hypertension: Notes that in the morning between 9 AM and noon she will feel a little bit lightheaded and dizzy and has had low blood pressures on her new blood pressure cuff.  Due to this by MyChart we had decreased her dose of irbesartan in half, however she had resumed this normal dosing because her blood pressures were elevated in the afternoons.  Lower dose of irbesartan did help her dizziness in the morning.  She cannot explain  when she is experiencing low blood pressures, she is not necessarily sure that this is related to the new injectable medication.  Her blood pressures have been in the low 100s over 50s/60 mmHg.  BP Readings from Last 3 Encounters:   11/29/21 138/60   07/28/21 (!) 140/58   02/24/20 128/72     Today's Vitals: There were no vitals taken for this visit.  ----------------    I spent 30 minutes with this patient today. All changes were made via collaborative practice agreement with Nate Barcenas MD. A copy of the visit note was provided to the patient's provider(s).    The patient was sent via [employer] a summary of these recommendations.     Trevor Hastings, PharmD, BCACP  Medication Management (MTM) Pharmacist  [facility]    Telemedicine Visit Details  Type of service:  Telephone visit  Start Time: 1PM  End Time: 1:30PM  Originating Location (patient location): Home  Distant Location (provider location):  [facility]     Medication Therapy Recommendations  Type 2 diabetes mellitus with stage 3b chronic kidney disease, with long-term current use of insulin (H)    Current Medication: Tirzepatide (MOUNJARO) 2.5 MG/0.5ML SOPN (Discontinued)   Rationale: Dose too low - Dosage too low - Effectiveness   Recommendation: Increase Dose - Tirzepatide 5 MG/0.5ML Sopn   Status: Accepted per CPA          Current Medication: insulin glargine (LANTUS SOLOSTAR) 100 UNIT/ML pen   Rationale: Dose too high - Dosage too high - Safety   Recommendation: Decrease Dose   Status: Accepted per CPA

## 2022-07-21 NOTE — PATIENT INSTRUCTIONS
PLAN:                            1.  Increase mounjaro to 5 mg weekly - applied 1 month free coupon   2.  Decrease lantus to 30 units once daily in the evening     Follow-up: Return in about 4 weeks (around 8/17/2022) for Follow up, with me, using a phone visit.

## 2022-07-27 DIAGNOSIS — R22.43 LOCALIZED SWELLING, MASS, OR LUMP OF LOWER EXTREMITY, BILATERAL: Primary | ICD-10-CM

## 2022-08-11 ENCOUNTER — HOSPITAL ENCOUNTER (OUTPATIENT)
Dept: PHYSICAL THERAPY | Facility: CLINIC | Age: 72
Setting detail: THERAPIES SERIES
Discharge: HOME | End: 2022-08-11
Attending: INTERNAL MEDICINE
Payer: MEDICARE

## 2022-08-11 DIAGNOSIS — R22.43 LOCALIZED SWELLING, MASS, OR LUMP OF LOWER EXTREMITY, BILATERAL: ICD-10-CM

## 2022-08-11 PROCEDURE — 97161 PT EVAL LOW COMPLEX 20 MIN: CPT | Mod: GP | Performed by: PHYSICAL THERAPIST

## 2022-08-11 PROCEDURE — 97140 MANUAL THERAPY 1/> REGIONS: CPT | Mod: GP | Performed by: PHYSICAL THERAPIST

## 2022-08-11 NOTE — PROGRESS NOTES
[facility]        OUTPATIENT PHYSICAL THERAPY EDEMA EVALUATION  PLAN OF TREATMENT FOR OUTPATIENT REHABILITATION  (COMPLETE FOR INITIAL CLAIMS ONLY)  Patient's Last Name, First Name, Meggan Hylton                           Provider s Name:   [facility] Medical Record No.  [id number]     Start of Care Date:  08/11/22   Onset Date:   (7/27/22 - date of referral)   Type:  PT   Medical Diagnosis:      Therapy Diagnosis:  BLE secondary lymphedema / phlebolymphedema Visits from SOC:  1                                     __________________________________________________________________________________   Plan of Treatment/Functional Goals:    Gradient compression bandaging, Fit for compression garment, Exercises, Precautions to prevent infection / exacerbation, Education, Manual therapy, Skin care / precautions, Home management program development        GOALS  1. Goal description: pt to have around the clock tolerance to BLE GCB for edema reduction response       Target date: 08/25/22  2. Goal description: once appropirate, pt to be independent with donning, doffing and care of compression garments for longterm edema management for maintenance       Target date: 08/25/22  3. Goal description: pt to be independent with longterm edema management of BLEs via HEP, elevation, skin cares and compression garment wear/use       Target date: 11/09/22  4. Goal description: pt to have at least 5 point improvement on LLIS due to decreased edema and associated symptoms in BLEs       Target date: 11/09/22    Treatment Frequency: 3x/week   Treatment duration: 12 weeks (8/11/22 - 11/9/22)    Charisse Dorantes, PT, DPT, CLT                                    I CERTIFY THE NEED FOR THESE SERVICES FURNISHED UNDER        THIS PLAN OF TREATMENT AND WHILE UNDER MY CARE     (Physician  co-signature of this document indicates review and certification of the therapy plan).                        Referring physician: Dr. Nate Barcenas MD   Initial Assessment  See Epic Evaluation- Start of care: 08/11/22

## 2022-08-11 NOTE — PROGRESS NOTES
"Outpatient Lymphedema Therapy Evaluation       08/11/22 1200   Rehab Discipline   Discipline PT   Type of Visit   Type of visit Initial Edema Evaluation       present No   General Information   Start of care 08/11/22   Referring physician Dr. Nate Barcenas MD   Orders Evaluate and treat as indicated   Order date 07/27/22   Medical diagnosis BLE secondary lymphedema   Edema onset   (7/27/22 - date of referral)   Affected body parts RLE;LLE   Edema etiology comments PMH: DM stage 2, obesity, dependency, hypothyroidism, CKD Stage3, OA of both knees   Pertinent history of current problem (PT: include personal factors and/or comorbidities that impact the POC; OT: include additional occupational profile info) pt reports swelling \"on and off\" since last winter, pt was wearing light diabetic socks and tried ordering compression stockings but \"can't get them on over my chubby legs\", pt reports she has had blisters in the past; has tried doing some leg exercies in the past few weeks which helps with the tightness   Surgical / medical history reviewed Yes   Edema special tests   (blood clot 1986 with pregnancy)   Prior level of functional mobility walker with functional mobility, sometimes during the day when pt at home doesn't use an AD but always in community   Prior treatment Compression garments;Exercise;Elevation   Patient role / employment history Retired   Living environment House / townhome   Living environment comments lives with son, daughter in law and grandkids (15, 12 and 8) but pt lives in the basement and doesn't always see them because she has 2 flights of stairs to get upstairs (has her own apartment)   Current assistive devices Walker   Fall Risk Screen   Fall screen completed by PT   Have you fallen 2 or more times in the past year? No   Have you fallen and had an injury in the past year? No   Is patient a fall risk? No   Abuse Screen (yes response referral indicated)   Feels Unsafe " at Home or Work/School no   Feels Threatened by Someone no   Does Anyone Try to Keep You From Having Contact with Others or Doing Things Outside Your Home? no   Physical Signs of Abuse Present no   System Outcome Measures   Outcome Measures Lymphedema   Lymphedema Life Impact Scale (score range 0-72). A higher score indicates greater impairment. 24   Subjective Report   Patient report of symptoms legs are bot heavy, tight and tender with touch   Precautions   Precautions Renal Insufficiency   Precautions comments no MLD   Patient / Family Goals   Patient / family goals statement to control the swelling and lessen the pain   Pain   Patient currently in pain Yes   Pain location B knees   Pain rating 2/10   Pain comments constant pain, pt reports she needs B knee replacements   Cognitive Status   Orientation Orientation to person, place and time   Level of consciousness Alert   Follows commands and answers questions 100% of the time   Personal safety and judgement Intact   Memory Intact   Edema Exam / Assessment   Skin condition Pitting;Intact   Skin condition comments BLE skin all intact with one patch of red, dry scaly skin with one intact blister on RLE medial lower leg with 2+ pitting present foot to knee, scattered spider veins and varicose veins   Scar No   Dorsal pedal pulse Decreased   Dorsal pedal pulse comments from edema   Stemmer sign Negative   Ulceration No   Girth Measurements   Girth Measurements Refer to separate girth measurement flowsheet   Volume LE   Right LE (mL) 5593.88   Left LE (mL) 6165.32   LE volume comparison LLE volume greater than RLE volume   % difference 10.2%   Range of Motion   ROM No deficits were identified   Strength   Strength No deficits were identified   Posture   Posture Normal   Palpation   Palpation mild hypersensitivities during pitting assessment / palpation   Sensory   Sensory perception Light touch   Light touch Intact   Vascular Assessment   Vascular Assessment Comments  "venous component to edema due to spider and varicose veins and discoloration present   Coordination   Coordination Gross motor coordination appropriate   Muscle Tone   Muscle tone No deficits were identified   Planned Edema Interventions   Planned edema interventions Gradient compression bandaging;Fit for compression garment;Exercises;Precautions to prevent infection / exacerbation;Education;Manual therapy;Skin care / precautions;Home management program development   Clinical Impression   Criteria for skilled therapeutic intervention met Yes   Therapy diagnosis BLE secondary lymphedema / phlebolymphedema   Influenced by the following impairments / conditions Stage 2;Phlebolymphedema   Functional limitations due to impairments / conditions heaviness in legs makes mobility more difficulty as well as \"bone on bone\" in B knees; snug fitting socks and shoes due to edematous foot   Clinical Presentation Stable/Uncomplicated   Clinical Presentation Rationale clinical judgement; chronic edema, no current weeping/wounds   Clinical Decision Making (Complexity) Low complexity   Treatment Frequency 3x/week   Treatment duration 12 weeks   Patient / family and/or staff in agreement with plan of care Yes   Risks and benefits of therapy have been explained Yes   Education Assessment   Preferred learning style Listening   Barriers to learning No barriers   Goals   Edema Eval Goals 1;2;3;4;5   Goal 1   Goal identifier stg   Goal description pt to have around the clock tolerance to BLE GCB for edema reduction response   Target date 08/25/22   Goal 2   Goal identifier tg   Goal description once appropirate, pt to be independent with donning, doffing and care of compression garments for longterm edema management for maintenance   Target date 08/25/22   Goal 3   Goal identifier ltg   Goal description pt to be independent with longterm edema management of BLEs via HEP, elevation, skin cares and compression garment wear/use   Target date " 11/09/22   Goal 4   Goal identifier ltg   Goal description pt to have at least 5 point improvement on LLIS due to decreased edema and associated symptoms in BLEs   Target date 11/09/22   Total Evaluation Time   PT Eval, Low Complexity Minutes (47536) 10

## 2022-08-12 ENCOUNTER — HOSPITAL ENCOUNTER (OUTPATIENT)
Dept: PHYSICAL THERAPY | Facility: CLINIC | Age: 72
Setting detail: THERAPIES SERIES
Discharge: HOME | End: 2022-08-12
Attending: INTERNAL MEDICINE
Payer: MEDICARE

## 2022-08-12 PROCEDURE — 97140 MANUAL THERAPY 1/> REGIONS: CPT | Mod: GP

## 2022-08-15 ENCOUNTER — HOSPITAL ENCOUNTER (OUTPATIENT)
Dept: PHYSICAL THERAPY | Facility: CLINIC | Age: 72
Setting detail: THERAPIES SERIES
Discharge: HOME | End: 2022-08-15
Attending: INTERNAL MEDICINE
Payer: MEDICARE

## 2022-08-15 PROCEDURE — 97140 MANUAL THERAPY 1/> REGIONS: CPT | Mod: GP,CQ | Performed by: REHABILITATION PRACTITIONER

## 2022-08-16 ENCOUNTER — MYC MEDICAL ADVICE (OUTPATIENT)
Dept: PHARMACY | Facility: CLINIC | Age: 72
End: 2022-08-16

## 2022-08-16 DIAGNOSIS — E11.22 TYPE 2 DIABETES MELLITUS WITH STAGE 3B CHRONIC KIDNEY DISEASE, WITH LONG-TERM CURRENT USE OF INSULIN (H): Primary | ICD-10-CM

## 2022-08-16 DIAGNOSIS — N18.32 TYPE 2 DIABETES MELLITUS WITH STAGE 3B CHRONIC KIDNEY DISEASE, WITH LONG-TERM CURRENT USE OF INSULIN (H): Primary | ICD-10-CM

## 2022-08-16 DIAGNOSIS — Z79.4 TYPE 2 DIABETES MELLITUS WITH STAGE 3B CHRONIC KIDNEY DISEASE, WITH LONG-TERM CURRENT USE OF INSULIN (H): Primary | ICD-10-CM

## 2022-08-17 RX ORDER — TIRZEPATIDE 7.5 MG/.5ML
0.75 INJECTION, SOLUTION SUBCUTANEOUS
Qty: 2 ML | Refills: 1 | Status: SHIPPED | OUTPATIENT
Start: 2022-08-17 | End: 2022-08-19

## 2022-08-17 ASSESSMENT — ENCOUNTER SYMPTOMS
HEMATOCHEZIA: 0
MYALGIAS: 1
COUGH: 0
PARESTHESIAS: 0
SORE THROAT: 0
DIZZINESS: 1
SHORTNESS OF BREATH: 0
HEADACHES: 0
EYE PAIN: 0
HEARTBURN: 1
PALPITATIONS: 0
BREAST MASS: 0
DYSURIA: 0
JOINT SWELLING: 1
NERVOUS/ANXIOUS: 1
NAUSEA: 1
WEAKNESS: 1
CONSTIPATION: 0
ARTHRALGIAS: 1
HEMATURIA: 0
ABDOMINAL PAIN: 0
FEVER: 0
FREQUENCY: 0
DIARRHEA: 0

## 2022-08-17 ASSESSMENT — ACTIVITIES OF DAILY LIVING (ADL): CURRENT_FUNCTION: SHOPPING REQUIRES ASSISTANCE

## 2022-08-18 ENCOUNTER — HOSPITAL ENCOUNTER (OUTPATIENT)
Dept: PHYSICAL THERAPY | Facility: CLINIC | Age: 72
Setting detail: THERAPIES SERIES
Discharge: HOME | End: 2022-08-18
Attending: INTERNAL MEDICINE
Payer: MEDICARE

## 2022-08-18 PROCEDURE — 97140 MANUAL THERAPY 1/> REGIONS: CPT | Mod: GP,CQ | Performed by: REHABILITATION PRACTITIONER

## 2022-08-19 ENCOUNTER — OFFICE VISIT (OUTPATIENT)
Dept: INTERNAL MEDICINE | Facility: CLINIC | Age: 72
End: 2022-08-19
Payer: MEDICARE

## 2022-08-19 VITALS
OXYGEN SATURATION: 99 % | WEIGHT: 286.2 LBS | HEIGHT: 61 IN | HEART RATE: 92 BPM | BODY MASS INDEX: 54.04 KG/M2 | DIASTOLIC BLOOD PRESSURE: 68 MMHG | SYSTOLIC BLOOD PRESSURE: 149 MMHG

## 2022-08-19 DIAGNOSIS — I89.0 LYMPHEDEMA OF BOTH LOWER EXTREMITIES: ICD-10-CM

## 2022-08-19 DIAGNOSIS — M10.9 URIC ACID ARTHROPATHY: ICD-10-CM

## 2022-08-19 DIAGNOSIS — Z79.4 TYPE 2 DIABETES MELLITUS WITH STAGE 3B CHRONIC KIDNEY DISEASE, WITH LONG-TERM CURRENT USE OF INSULIN (H): ICD-10-CM

## 2022-08-19 DIAGNOSIS — Z13.220 SCREENING FOR HYPERLIPIDEMIA: ICD-10-CM

## 2022-08-19 DIAGNOSIS — I10 ESSENTIAL HYPERTENSION: ICD-10-CM

## 2022-08-19 DIAGNOSIS — N18.32 TYPE 2 DIABETES MELLITUS WITH STAGE 3B CHRONIC KIDNEY DISEASE, WITH LONG-TERM CURRENT USE OF INSULIN (H): ICD-10-CM

## 2022-08-19 DIAGNOSIS — Z00.00 PREVENTATIVE HEALTH CARE: Primary | ICD-10-CM

## 2022-08-19 DIAGNOSIS — J44.89 ASTHMATIC BRONCHITIS , CHRONIC (H): ICD-10-CM

## 2022-08-19 DIAGNOSIS — E11.22 TYPE 2 DIABETES MELLITUS WITH STAGE 3B CHRONIC KIDNEY DISEASE, WITH LONG-TERM CURRENT USE OF INSULIN (H): ICD-10-CM

## 2022-08-19 DIAGNOSIS — E03.9 ACQUIRED HYPOTHYROIDISM: ICD-10-CM

## 2022-08-19 DIAGNOSIS — L24.4 IRRITANT CONTACT DERMATITIS DUE TO DRUG IN CONTACT WITH SKIN: ICD-10-CM

## 2022-08-19 DIAGNOSIS — G43.109 VERTIGINOUS MIGRAINE: ICD-10-CM

## 2022-08-19 LAB
ALBUMIN SERPL BCG-MCNC: 4.3 G/DL (ref 3.5–5.2)
ALP SERPL-CCNC: 142 U/L (ref 35–104)
ALT SERPL W P-5'-P-CCNC: 15 U/L (ref 10–35)
ANION GAP SERPL CALCULATED.3IONS-SCNC: 17 MMOL/L (ref 7–15)
AST SERPL W P-5'-P-CCNC: 26 U/L (ref 10–35)
BASOPHILS # BLD AUTO: 0.1 10E3/UL (ref 0–0.2)
BASOPHILS NFR BLD AUTO: 1 %
BILIRUB SERPL-MCNC: 0.6 MG/DL
BUN SERPL-MCNC: 71.8 MG/DL (ref 8–23)
CALCIUM SERPL-MCNC: 10.6 MG/DL (ref 8.8–10.2)
CHLORIDE SERPL-SCNC: 101 MMOL/L (ref 98–107)
CHOLEST SERPL-MCNC: 132 MG/DL
CREAT SERPL-MCNC: 2.87 MG/DL (ref 0.51–0.95)
CREAT UR-MCNC: 95.7 MG/DL
CRP SERPL-MCNC: 10.7 MG/L
DEPRECATED HCO3 PLAS-SCNC: 20 MMOL/L (ref 22–29)
EOSINOPHIL # BLD AUTO: 0.1 10E3/UL (ref 0–0.7)
EOSINOPHIL NFR BLD AUTO: 1 %
ERYTHROCYTE [DISTWIDTH] IN BLOOD BY AUTOMATED COUNT: 14.3 % (ref 10–15)
ERYTHROCYTE [SEDIMENTATION RATE] IN BLOOD BY WESTERGREN METHOD: 41 MM/HR (ref 0–20)
GFR SERPL CREATININE-BSD FRML MDRD: 17 ML/MIN/1.73M2
GLUCOSE SERPL-MCNC: 170 MG/DL (ref 70–99)
HBA1C MFR BLD: 7.2 % (ref 0–5.6)
HCT VFR BLD AUTO: 40.8 % (ref 35–47)
HDLC SERPL-MCNC: 39 MG/DL
HGB BLD-MCNC: 13.2 G/DL (ref 11.7–15.7)
IMM GRANULOCYTES # BLD: 0 10E3/UL
IMM GRANULOCYTES NFR BLD: 0 %
LDLC SERPL CALC-MCNC: 65 MG/DL
LYMPHOCYTES # BLD AUTO: 1.4 10E3/UL (ref 0.8–5.3)
LYMPHOCYTES NFR BLD AUTO: 19 %
MCH RBC QN AUTO: 29.1 PG (ref 26.5–33)
MCHC RBC AUTO-ENTMCNC: 32.4 G/DL (ref 31.5–36.5)
MCV RBC AUTO: 90 FL (ref 78–100)
MICROALBUMIN UR-MCNC: 12.6 MG/L
MICROALBUMIN/CREAT UR: 13.17 MG/G CR (ref 0–25)
MONOCYTES # BLD AUTO: 0.6 10E3/UL (ref 0–1.3)
MONOCYTES NFR BLD AUTO: 8 %
NEUTROPHILS # BLD AUTO: 5.4 10E3/UL (ref 1.6–8.3)
NEUTROPHILS NFR BLD AUTO: 71 %
NONHDLC SERPL-MCNC: 93 MG/DL
PLATELET # BLD AUTO: 272 10E3/UL (ref 150–450)
POTASSIUM SERPL-SCNC: 4.7 MMOL/L (ref 3.4–5.3)
PROT SERPL-MCNC: 7.6 G/DL (ref 6.4–8.3)
RBC # BLD AUTO: 4.54 10E6/UL (ref 3.8–5.2)
SODIUM SERPL-SCNC: 138 MMOL/L (ref 136–145)
TRIGL SERPL-MCNC: 138 MG/DL
TSH SERPL DL<=0.005 MIU/L-ACNC: 0.87 UIU/ML (ref 0.3–4.2)
URATE SERPL-MCNC: 5.1 MG/DL (ref 2.4–5.7)
WBC # BLD AUTO: 7.6 10E3/UL (ref 4–11)

## 2022-08-19 PROCEDURE — 80061 LIPID PANEL: CPT | Performed by: INTERNAL MEDICINE

## 2022-08-19 PROCEDURE — 85652 RBC SED RATE AUTOMATED: CPT | Performed by: INTERNAL MEDICINE

## 2022-08-19 PROCEDURE — 84550 ASSAY OF BLOOD/URIC ACID: CPT | Performed by: INTERNAL MEDICINE

## 2022-08-19 PROCEDURE — 86140 C-REACTIVE PROTEIN: CPT | Performed by: INTERNAL MEDICINE

## 2022-08-19 PROCEDURE — G0439 PPPS, SUBSEQ VISIT: HCPCS | Performed by: INTERNAL MEDICINE

## 2022-08-19 PROCEDURE — 82043 UR ALBUMIN QUANTITATIVE: CPT | Performed by: INTERNAL MEDICINE

## 2022-08-19 PROCEDURE — 80050 GENERAL HEALTH PANEL: CPT | Performed by: INTERNAL MEDICINE

## 2022-08-19 PROCEDURE — 36415 COLL VENOUS BLD VENIPUNCTURE: CPT | Performed by: INTERNAL MEDICINE

## 2022-08-19 PROCEDURE — 83036 HEMOGLOBIN GLYCOSYLATED A1C: CPT | Performed by: INTERNAL MEDICINE

## 2022-08-19 PROCEDURE — 99214 OFFICE O/P EST MOD 30 MIN: CPT | Mod: 25 | Performed by: INTERNAL MEDICINE

## 2022-08-19 RX ORDER — TIRZEPATIDE 7.5 MG/.5ML
0.75 INJECTION, SOLUTION SUBCUTANEOUS
Qty: 6 ML | Refills: 0 | Status: SHIPPED | OUTPATIENT
Start: 2022-08-19 | End: 2022-08-22 | Stop reason: ALTCHOICE

## 2022-08-19 RX ORDER — ALBUTEROL SULFATE 90 UG/1
2 AEROSOL, METERED RESPIRATORY (INHALATION) EVERY 6 HOURS PRN
Qty: 18 G | Refills: 3 | Status: SHIPPED | OUTPATIENT
Start: 2022-08-19 | End: 2024-01-25

## 2022-08-19 RX ORDER — METOPROLOL SUCCINATE 100 MG/1
100 TABLET, EXTENDED RELEASE ORAL DAILY
Qty: 90 TABLET | Refills: 3 | Status: SHIPPED | OUTPATIENT
Start: 2022-08-19 | End: 2022-12-19

## 2022-08-19 RX ORDER — LEVOTHYROXINE SODIUM 137 UG/1
137 TABLET ORAL DAILY
Qty: 30 TABLET | Refills: 11 | Status: SHIPPED | OUTPATIENT
Start: 2022-08-19 | End: 2022-12-12

## 2022-08-19 RX ORDER — INSULIN GLARGINE 100 [IU]/ML
20 INJECTION, SOLUTION SUBCUTANEOUS EVERY EVENING
Qty: 60 ML | Refills: 4 | Status: SHIPPED | OUTPATIENT
Start: 2022-08-19 | End: 2022-10-13

## 2022-08-19 RX ORDER — TOPIRAMATE 25 MG/1
25 TABLET, FILM COATED ORAL 2 TIMES DAILY
Qty: 180 TABLET | Refills: 3 | Status: SHIPPED | OUTPATIENT
Start: 2022-08-19 | End: 2022-12-12

## 2022-08-19 RX ORDER — TRIAMCINOLONE ACETONIDE 1 MG/G
CREAM TOPICAL 2 TIMES DAILY
Qty: 30 G | Refills: 3 | Status: SHIPPED | OUTPATIENT
Start: 2022-08-19

## 2022-08-19 ASSESSMENT — ENCOUNTER SYMPTOMS
HEARTBURN: 1
NAUSEA: 1
MYALGIAS: 1
HEADACHES: 0
BREAST MASS: 0
FREQUENCY: 0
DIARRHEA: 0
SORE THROAT: 0
ARTHRALGIAS: 1
JOINT SWELLING: 1
CONSTIPATION: 0
DYSURIA: 0
PALPITATIONS: 0
PARESTHESIAS: 0
FEVER: 0
HEMATOCHEZIA: 0
ABDOMINAL PAIN: 0
WEAKNESS: 1
EYE PAIN: 0
DIZZINESS: 1
HEMATURIA: 0
SHORTNESS OF BREATH: 0
NERVOUS/ANXIOUS: 1
COUGH: 0

## 2022-08-19 ASSESSMENT — ACTIVITIES OF DAILY LIVING (ADL): CURRENT_FUNCTION: SHOPPING REQUIRES ASSISTANCE

## 2022-08-19 NOTE — PROGRESS NOTES
ANNUAL WELLNESS VISIT--Face to Face    Assessment and Plan:     ASSESSMENT and PLAN:  1. Preventative health care  - REVIEW OF HEALTH MAINTENANCE PROTOCOL ORDERS  - ZOSTER VACCINE RECOMBINANT ADJUVANTED (SHINGRIX); Future    2. BMI 45.0-49.9, adult (H)  Trial of topiramate for weight, migraine headaches  - topiramate (TOPAMAX) 25 MG tablet; Take 1 tablet (25 mg) by mouth 2 times daily  Dispense: 180 tablet; Refill: 3    3. Screening for hyperlipidemia  - Lipid panel reflex to direct LDL Non-fasting; Future  - Lipid panel reflex to direct LDL Non-fasting    4. Type 2 diabetes mellitus with stage 3b chronic kidney disease, with long-term current use of insulin (H)  Doing very well.  Decrease insulin as able  - insulin glargine (LANTUS SOLOSTAR) 100 UNIT/ML pen; Inject 20 Units Subcutaneous every evening  Dispense: 60 mL; Refill: 4  - Albumin Random Urine Quantitative with Creat Ratio; Future  - Hemoglobin A1c; Future  - topiramate (TOPAMAX) 25 MG tablet; Take 1 tablet (25 mg) by mouth 2 times daily  Dispense: 180 tablet; Refill: 3  - Hemoglobin A1c  - Albumin Random Urine Quantitative with Creat Ratio    5. Asthmatic bronchitis , chronic (H)  - albuterol (PROAIR HFA/PROVENTIL HFA/VENTOLIN HFA) 108 (90 Base) MCG/ACT inhaler; Inhale 2 puffs into the lungs every 6 hours as needed for shortness of breath / dyspnea or wheezing  Dispense: 18 g; Refill: 3    6. Acquired hypothyroidism  Trial of slightly higher dose thyroid based on symptoms  - TSH; Future  - levothyroxine (SYNTHROID/LEVOTHROID) 137 MCG tablet; Take 1 tablet (137 mcg) by mouth daily  Dispense: 30 tablet; Refill: 11  - TSH    7. Essential hypertension  Blood pressure low with heart rate increase.  Trial off ARB  - metoprolol succinate ER (TOPROL XL) 100 MG 24 hr tablet; Take 1 tablet (100 mg) by mouth daily  Dispense: 90 tablet; Refill: 3    8. Uric acid arthropathy  Recheck labs  - Uric acid; Future  - CBC with Platelets & Differential; Future  -  Comprehensive metabolic panel; Future  - Erythrocyte sedimentation rate auto; Future  - CRP inflammation; Future  - Uric acid  - CBC with Platelets & Differential  - Comprehensive metabolic panel  - Erythrocyte sedimentation rate auto  - CRP inflammation    9. Vertiginous migraine  Trial of topiramate  - topiramate (TOPAMAX) 25 MG tablet; Take 1 tablet (25 mg) by mouth 2 times daily  Dispense: 180 tablet; Refill: 3    10. Lymphedema of both lower extremities  Continue treatment and wrapping.  No retentive drugs.  Try to avoid furosemide.  Consider hydrochlorothiazide    11. Irritant contact dermatitis due to drug in contact with skin  - triamcinolone (KENALOG) 0.1 % external cream; Apply topically 2 times daily  Dispense: 30 g; Refill: 3     Preventive Care Assessed: As above    Patient Instructions   We can increase thyroid from 125 to 137 for more energy and more warmth    Lymphedema is being treated, can hopefull decrease furosemide dose    Stop Irbesartan for 2 weeks and monitor cough, and low blood pressure.  I think both will improve    Try topiramate 25 mgs twice a day for nerve pain and migraine prevention and appetite deterent    Blood pressure goal is 135 top, and below 90 bottom.  Range top is 15 points up or down so 120 to 150    Address weight by minimizing insulin and helping decrease appetite    Address pain with allopurinol and topiramate    Shots due for shingrix    Triamcinolone cream for rash spots            Return in about 6 months (around 2/19/2023) for using a video visit.         Subjective:   Meggan is a 71 year old female who presents to the clinic today for an Annual Wellness Visit.    CHIEF COMPLAINT:  Chief Complaint   Patient presents with     Wellness Visit       HPI: Started on [facility]ra [facility] for diabetes.  Has it was immediately able to decrease Lantus insulin and discontinue short acting insulin with a weight loss of about 15 pounds.  She feels well although appetite has been  decreased.  Heart rate has been increased and blood pressure has been systolic     Renal function has worsened recently.  She was referred for lymphedema treatment.  She is wearing wraps.  She is still taking furosemide each day    Although TSH has been normal she feels cold and tired    Vertiginous migraines were severe a few months ago but are recently more quiet    Review of Systems: Rash on skin due to adhesive    Patient Care Team:  Nate Barcenas MD as PCP - General (Internal Medicine)  Stacey Kelly MD as Referring Physician (Internal Medicine)  Trevor Hastings, PharmD as Pharmacist (Pharmacist)  Elizabeth Ramos RN as Specialty Care Coordinator (Rheumatology)  Jairo Fraga MD as Assigned PCP  Trevor Hastings, PharmD as Assigned [place] Pharmacist     Patient Active Problem List   Diagnosis     Fibroadenoma of LEFT breast, with fibrocystic changes     Senile osteoporosis     Chronic kidney disease, stage III (moderate) (H)     BMI 45.0-49.9, adult (H)     Chronic use of benzodiazepine for therapeutic purpose     Primary osteoarthritis of both knees     Obstructive sleep apnea syndrome     Type 2 diabetes mellitus with stage 3b chronic kidney disease, with long-term current use of insulin (H)     Screening for colon cancer     Asthmatic bronchitis , chronic (H)     Acquired hypothyroidism     Vertiginous migraine     Lymphedema of both lower extremities     Past Medical History:   Diagnosis Date     Asthma      Chronic kidney failure, stage 3 (moderate) (H)      Chronic use of benzodiazepine for therapeutic purpose      Esophageal reflux      Fibroadenoma of LEFT breast, with fibrocystic changes 05/14/2012     Gout      Hyperlipidemia      Hypothyroid      Mammographic microcalcification 05/01/2012    Left     Migraine with aura      Obesity      ASHER (obstructive sleep apnea)     uses CPAP     Osteoporosis      Primary osteoarthritis of both knees 09/28/2020     RBBB      Screening for colon cancer  11/29/2021     Type 2 diabetes mellitus (H)       Past Surgical History:   Procedure Laterality Date     BIOPSY BREAST       D & C  2000, 2002     DILATE CERVIX, ABLATE ENDOMETRIUM, COMBINED  2005     ENDOMETRIAL ABLATION       HC BIOPSY OF BREAST, OPEN INCISIONAL  1972    Left     HC LAPAROSCOPY, SURGICAL; CHOLECYSTECTOMY  1998     LAPAROSCOPIC CHOLECYSTECTOMY  1999     LUMBAR LAMINECTOMY  1998    Description: Laminectomy Lumbar     Microdiscectomy  1998      Family History   Problem Relation Age of Onset     Autoimmune Disease No family hx of      Hypertension Mother         alive in her 90s     Atrial fibrillation Father         alive in his 90s     LUNG DISEASE Sister         interstitial lung disease     Heart Disease Sister       Social History     Socioeconomic History     Marital status:      Spouse name: Not on file     Number of children: Not on file     Years of education: Not on file     Highest education level: Not on file   Occupational History     Occupation: Retired   Tobacco Use     Smoking status: Never Smoker     Smokeless tobacco: Never Used   Substance and Sexual Activity     Alcohol use: No     Drug use: No     Sexual activity: Never   Other Topics Concern     Not on file   Social History Narrative    She is  and is retired. She lives with her son and his family on the lower level of their house.  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  She has 5 grandchildren.  She does not smoke cigarettes or drink alcohol.     Social Determinants of Health     Financial Resource Strain: Not on file   Food Insecurity: Not on file   Transportation Needs: Not on file   Physical Activity: Not on file   Stress: Not on file   Social Connections: Not on file   Intimate Partner Violence: Not on file   Housing Stability: Not on file      Social History     Social History Narrative    She is  and is retired. She lives with her son and his  family on the lower level of their house.  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  She has 5 grandchildren.  She does not smoke cigarettes or drink alcohol.       Current Outpatient Medications   Medication Sig Dispense Refill     albuterol (PROAIR HFA/PROVENTIL HFA/VENTOLIN HFA) 108 (90 Base) MCG/ACT inhaler Inhale 2 puffs into the lungs every 6 hours as needed for shortness of breath / dyspnea or wheezing 18 g 3     albuterol (PROVENTIL) (2.5 MG/3ML) 0.083% neb solution Take 1 vial (2.5 mg) by nebulization every 6 hours as needed for shortness of breath / dyspnea or wheezing 75 mL 3     alcohol swab prep pads Use to swab area of injection/vicki as directed. 100 each 3     allopurinol (ZYLOPRIM) 300 MG tablet Take 1 tablet (300 mg) by mouth daily 90 tablet 3     blood glucose (NO BRAND SPECIFIED) test strip Use to test blood sugar 4 times daily or as directed.  Accu-Chek Devika plus Strips 200 strip 3     blood glucose monitoring (ACCU-CHEK FASTCLIX) lancets Use to test blood sugar 4 times daily. 204 each 6     blood glucose monitoring (NO BRAND SPECIFIED) meter device kit Use to test blood sugar 4 times daily or as directed. Preferred blood glucose meter OR supplies to accompany: Blood Glucose Monitor Brands: per insurance. 1 kit 0     celecoxib (CELEBREX) 200 MG capsule Take 1 capsule (200 mg) by mouth daily 90 capsule 3     Cholecalciferol (VITAMIN D) 2000 UNIT tablet Take 2 tablets by mouth daily        colchicine (COLCYRS) 0.6 MG tablet Take 1 tablet (0.6 mg) by mouth daily 90 tablet 3     Continuous Blood Gluc  (FREESTYLE DIEGO 2 READER) [place] 1 each continuous Use to read blood sugars per 's instructions. 1 each 0     Continuous Blood Gluc Sensor (FREESTYLE DIEGO 2 SENSOR) [facility] [place] each every 14 days To check blood sugars continuously 2 each 5     famotidine (PEPCID) 20 MG tablet Take 20 mg by mouth 2 times daily as needed        "furosemide (LASIX) 40 MG tablet Take 1 tablet (40 mg) by mouth daily 90 tablet 3     insulin glargine (LANTUS SOLOSTAR) 100 UNIT/ML pen Inject 20 Units Subcutaneous every evening 60 mL 4     irbesartan (AVAPRO) 150 MG tablet Take 1 tablet (150 mg) by mouth At Bedtime 90 tablet 3     levothyroxine (SYNTHROID) 125 MCG tablet Take 1 tablet (125 mcg) by mouth daily 90 tablet 3     levothyroxine (SYNTHROID/LEVOTHROID) 137 MCG tablet Take 1 tablet (137 mcg) by mouth daily 30 tablet 11     LORazepam (ATIVAN) 0.5 MG tablet Take 1 tablet (0.5 mg) by mouth nightly as needed for anxiety 60 tablet 1     metoprolol succinate ER (TOPROL XL) 100 MG 24 hr tablet Take 1 tablet (100 mg) by mouth daily 90 tablet 3     Probiotic Product (PROBIOTIC-10 PO) Take 1 capsule by mouth daily Florastor        rosuvastatin (CRESTOR) 10 MG tablet Take 1 tablet (10 mg) by mouth three times a week 90 tablet 3     thin (NO BRAND SPECIFIED) lancets Use with lanceting device. To accompany: Blood Glucose Monitor Brands: per insurance. 200 each 6     Tirzepatide (MOUNJARO) 7.5 MG/0.5ML SOPN Inject 0.75 mg Subcutaneous every 7 days $0 voucher: BIN [id number], PCN [id number], Grp [id number], ID [id number] 6 mL 0     tiZANidine (ZANAFLEX) 4 MG capsule Take 1 capsule (4 mg) by mouth 3 times daily as needed for muscle spasms 90 capsule 0     topiramate (TOPAMAX) 25 MG tablet Take 1 tablet (25 mg) by mouth 2 times daily 180 tablet 3     triamcinolone (KENALOG) 0.1 % external cream Apply topically 2 times daily 30 g 3      Objective:   BP (!) 149/68 (BP Location: Left arm, Patient Position: Sitting, Cuff Size: Adult Large)   Pulse 92   Ht 1.546 m (5' 0.87\")   Wt 129.8 kg (286 lb 3.2 oz)   SpO2 99%   BMI 54.32 kg/m   Estimated body mass index is 54.32 kg/m  as calculated from the following:    Height as of this encounter: 1.546 m (5' 0.87\").    Weight as of this encounter: 129.8 kg (286 lb 3.2 oz).    VisionScreening:  No exam data present     PHYSICAL EXAM:  Wearing " "mask when entering room?  Yes  Constitutional:  Reveals pleasant overweight woman who ambulates with a cane  Palpation of radial pulse regular   Ears:  Clear without wax   Thyroid:  Non palpable   Cardiac; Regular rate and rhythm   Lungs: Clear.  Respiratory effort normal.  Edema of Legs: Massive, wrapped with Ace wraps  Psychiatric:  Alert and oriented       Recent Labs   Lab Test 04/29/22  1144 11/29/21  1251 07/28/21  1147   CHOL  --   --  197   * 179* 157*   VITDT 70  --   --        No flowsheet data found.  Cognitive Screening   1) Repeat 3 items (Leader, Season, Table)    2) Clock draw: NORMAL  3) 3 item recall: Recalls 3 objects  Results: NORMAL clock, 1-2 items recalled: COGNITIVE IMPAIRMENT LESS LIKELY    Mini-CogTM Copyright S Sharan. Licensed by the author for use in [employer]; reprinted with permission (indra@[facility]). All rights reserved.    A Mini-Cog score of 0-2 suggests the possibility of dementia, score of 3-5 suggests no dementia    ROOMING STAFF:    Patient has been advised of split billing requirements and indicates understanding: Yes   Are you in the first 12 months of your Medicare coverage?  No      Do you feel safe in your environment? Yes      Have you ever done Advance Care Planning? (For example, a Health Directive, POLST, or a discussion with a medical provider or your loved ones about your wishes): Yes, advance care planning is on file.    Healthy Habits:     In general, how would you rate your overall health?  Good    Frequency of exercise:  None    Do you usually eat at least 4 servings of fruit and vegetables a day, include whole grains    & fiber and avoid regularly eating high fat or \"junk\" foods?  Yes    Taking medications regularly:  Yes    Medication side effects:  Lightheadedness    Ability to successfully perform activities of daily living:  Shopping requires assistance    Home Safety:  Lack of grab bars in the bathroom    Hearing Impairment:  No hearing " concerns    In the past 6 months, have you been bothered by leaking of urine?  No    In general, how would you rate your overall mental or emotional health?  Fair      PHQ-2 Total Score: 2    Additional concerns today:  Yes      Do you have sleep apnea, excessive snoring or daytime drowsiness?: yes    The patient's current medical problems were reviewed.    I have had an Advance Directives discussion with the patient.    Weight management plan: Discussed healthy diet and exercise guidelines    The following health maintenance items are reviewed in Epic and correct as of today:  Health Maintenance Due   Topic Date Due     DIABETIC FOOT EXAM  Never done     SPIROMETRY  Never done     COPD ACTION PLAN  Never done     URINALYSIS  Never done     ZOSTER IMMUNIZATION (1 of 2) Never done     MEDICARE ANNUAL WELLNESS VISIT  08/27/2020     EYE EXAM  05/17/2022     LIPID  07/28/2022       Appropriate preventive services were discussed with this patient, including applicable screening as appropriate for cardiovascular disease, diabetes, osteopenia/osteoporosis, and glaucoma.  As appropriate for age/gender, discussed screening for colorectal cancer, prostate cancer, breast cancer, and cervical cancer. Checklist reviewing preventive services available has been given to the patient.    Reviewed patients plan of care and provided an AVS. The Basic Care Plan for Meggan meets the Care Plan requirement. This Care Plan has been established and reviewed with the Patient, and printed in the AVS.    The following health maintenance schedule was reviewed with the patient and provided in printed form in the after visit summary:   Health Maintenance   Topic Date Due     DIABETIC FOOT EXAM  Never done     SPIROMETRY  Never done     COPD ACTION PLAN  Never done     URINALYSIS  Never done     ZOSTER IMMUNIZATION (1 of 2) Never done     MEDICARE ANNUAL WELLNESS VISIT  08/27/2020     EYE EXAM  05/17/2022     LIPID  07/28/2022     INFLUENZA  VACCINE (1) 09/01/2022     COLORECTAL CANCER SCREENING  02/03/2023     MAMMO SCREENING  02/10/2023     A1C  02/19/2023     BMP  04/29/2023     ANNUAL REVIEW OF HM ORDERS  08/19/2023     FALL RISK ASSESSMENT  08/19/2023     HEMOGLOBIN  08/19/2023     ADVANCE CARE PLANNING  08/19/2027     DEXA  01/19/2033     PARATHYROID  Completed     PHOSPHORUS  Completed     HEPATITIS C SCREENING  Completed     PHQ-2 (once per calendar year)  Completed     Pneumococcal Vaccine: 65+ Years  Completed     ALK PHOS  Completed     COVID-19 Vaccine  Completed     IPV IMMUNIZATION  Aged Out     MENINGITIS IMMUNIZATION  Aged Out     MICROALBUMIN  Discontinued     DTAP/TDAP/TD IMMUNIZATION  Discontinued        Start Time:12:40 PM  End time:  1:13 PM  Face to face plus orders: 33 minutes  Documentation time:  3 minutes    The visit lasted a total of 36 minutes     Reviewed and updated as needed this visit by clinical staff   Tobacco  Allergies  Meds  Problems               Nate Barcenas MD  [facility]  Answers for HPI/ROS submitted by the patient on 8/17/2022  abdominal pain: No  Blood in stool: No  Blood in urine: No  chest pain: No  congestion: No  constipation: No  cough: No  diarrhea: No  dizziness: Yes  ear pain: No  eye pain: No  nervous/anxious: Yes  fever: No  frequency: No  genital sores: No  headaches: No  hearing loss: No  heartburn: Yes  arthralgias: Yes  joint swelling: Yes  peripheral edema: Yes  mood changes: No  myalgias: Yes  nausea: Yes  dysuria: No  palpitations: No  Skin sensation changes: No  sore throat: No  urgency: No  rash: No  shortness of breath: No  visual disturbance: No  weakness: Yes  pelvic pain: No  vaginal bleeding: No  vaginal discharge: No  tenderness: No  breast mass: No  breast discharge: No

## 2022-08-19 NOTE — TELEPHONE ENCOUNTER
Since patient is only able to use free voucher 1x, wants to see if more affordable to get 3 month supply at a time. Updated order per CPA with Nate Barcenas MD while covering for Trevor Hastings PharmD.    Cindy Long, Pharm.D, [facility]  Medication Therapy Management Pharmacist  [phone]

## 2022-08-19 NOTE — PROGRESS NOTES
"SUBJECTIVE:   Meggan Everett is a 71 year old female who presents for Preventive Visit.    {Split Bill scripting  The purpose of this visit is to discuss your medical history and prevent health problems before you are sick. You may be responsible for a co-pay, coinsurance, or deductible if your visit today includes services such as checking on a sore throat, having an x-ray or lab test, or treating and evaluating a new or existing condition :083229}  Patient has been advised of split billing requirements and indicates understanding: Yes  Are you in the first 12 months of your Medicare coverage?  No    Healthy Habits:     In general, how would you rate your overall health?  Good    Frequency of exercise:  None    Do you usually eat at least 4 servings of fruit and vegetables a day, include whole grains    & fiber and avoid regularly eating high fat or \"junk\" foods?  Yes    Taking medications regularly:  Yes    Medication side effects:  Lightheadedness    Ability to successfully perform activities of daily living:  Shopping requires assistance    Home Safety:  Lack of grab bars in the bathroom    Hearing Impairment:  No hearing concerns    In the past 6 months, have you been bothered by leaking of urine?  No    In general, how would you rate your overall mental or emotional health?  Fair      PHQ-2 Total Score: 2    Additional concerns today:  Yes    Do you feel safe in your environment? Yes    Have you ever done Advance Care Planning? (For example, a Health Directive, POLST, or a discussion with a medical provider or your loved ones about your wishes): Yes, patient states has an Advance Care Planning document and will bring a copy to the clinic.    {Hearing Test Done (Optional):723993}   Fall risk  { :582526}  {If any of the above assessments are answered yes, consider ordering appropriate referrals (Optional):042352::\"click delete button to remove this line now\"}  Cognitive Screening   1) Repeat 3 items (Leader, " "Season, Table)  {Get patient's attention, then say, \"I am going to say three words that I want you to remember now and later.  The words are Leader, Season, and Table.  Please say them for me now.\"  If pt. unable after 3 tries, go to next item}  2) Clock draw: {Say the following phrases in order: \"Please draw a clock.  Start by drawing a large [place].  Put all the numbers in the [place] and set the hands to show 11:10 (10 past 11).\"  If pt cannot complete in 3 minutes, stop and ask for recall items.  \"NORMAL\" test = all twelve numbers in correct location, and clock hands correctly designating 11:10}NORMAL  3) 3 item recall: {Say: \"What were the three words I asked you to remember?\"}Recalls 3 objects  Results: 3 items recalled: COGNITIVE IMPAIRMENT LESS LIKELY    Mini-CogTM Copyright S Sharan. Licensed by the author for use in [facility]; reprinted with permission (soob@[facility]). All rights reserved.      Do you have sleep apnea, excessive snoring or daytime drowsiness?: yes    Reviewed and updated as needed this visit by clinical staff   Tobacco  Allergies  Meds                Reviewed and updated as needed this visit by Provider                   Social History     Tobacco Use     Smoking status: Never Smoker     Smokeless tobacco: Never Used   Substance Use Topics     Alcohol use: No     {Rooming Staff- Complete this question if Prescreen response is not shown below for today's visit. If you drink alcohol do you typically have >3 drinks per day or >7 drinks per week? (Optional):448949}    Alcohol Use 8/17/2022   Prescreen: >3 drinks/day or >7 drinks/week? Not Applicable   {add AUDIT responses (Optional) (A score of 7 for adult men is an indication of hazardous drinking; a score of 8 or more is an indication of an alcohol use disorder.  A score of 7 or more for adult women is an indication of hazardous drinking or an alchohol use disorder):029070}    {Outside tests to abstract? " :448516}    {additional problems to add (Optional):858837}    Current providers sharing in care for this patient include: {Rooming staff:  Please update Care Team in Rooming Activity, refresh this note and then delete this statement}  Patient Care Team:  Nate Barcenas MD as PCP - General (Internal Medicine)  Stacey Kelly MD as Referring Physician (Internal Medicine)  Trevor Hastings, PharmD as Pharmacist (Pharmacist)  Elizabeth Ramos RN as Specialty Care Coordinator (Rheumatology)  Jairo Fraga MD as Assigned PCP  Trevor Hastings, PharmD as Assigned MTM Pharmacist    The following health maintenance items are reviewed in Epic and correct as of today:  Health Maintenance Due   Topic Date Due     DIABETIC FOOT EXAM  Never done     SPIROMETRY  Never done     ADVANCE CARE PLANNING  Never done     COPD ACTION PLAN  Never done     URINALYSIS  Never done     ZOSTER IMMUNIZATION (1 of 2) Never done     MEDICARE ANNUAL WELLNESS VISIT  08/27/2020     EYE EXAM  05/17/2022     LIPID  07/28/2022     ANNUAL REVIEW OF HM ORDERS  07/28/2022     HEMOGLOBIN  07/28/2022     {Chronicprobdata (optional):881394}  {Decision Support (Optional):573921}        Review of Systems   Constitutional: Negative for fever.   HENT: Negative for congestion, ear pain, hearing loss and sore throat.    Eyes: Negative for pain and visual disturbance.   Respiratory: Negative for cough and shortness of breath.    Cardiovascular: Positive for peripheral edema. Negative for chest pain and palpitations.   Gastrointestinal: Positive for heartburn and nausea. Negative for abdominal pain, constipation, diarrhea and hematochezia.   Breasts:  Negative for tenderness, breast mass and discharge.   Genitourinary: Negative for dysuria, frequency, genital sores, hematuria, pelvic pain, urgency, vaginal bleeding and vaginal discharge.   Musculoskeletal: Positive for arthralgias, joint swelling and myalgias.   Skin: Negative for rash.   Neurological: Positive  "for dizziness and weakness. Negative for headaches and paresthesias.   Psychiatric/Behavioral: Negative for mood changes. The patient is nervous/anxious.      {ROS COMP (Optional):477418}    OBJECTIVE:   BP (!) 149/68 (BP Location: Left arm, Patient Position: Sitting, Cuff Size: Adult Large)   Pulse 92   Ht 1.546 m (5' 0.87\")   Wt 129.8 kg (286 lb 3.2 oz)   SpO2 99%   BMI 54.32 kg/m   Estimated body mass index is 54.32 kg/m  as calculated from the following:    Height as of this encounter: 1.546 m (5' 0.87\").    Weight as of this encounter: 129.8 kg (286 lb 3.2 oz).  Physical Exam  {Exam (Optional) :676952}    {Diagnostic Test Results (Optional):108025::\"Diagnostic Test Results:\",\"Labs reviewed in Epic\"}    ASSESSMENT / PLAN:   {Diag Picklist:868369}    {Patient advised of split billing (Optional):471649}    COUNSELING:  {Medicare Counselin}    Estimated body mass index is 54.32 kg/m  as calculated from the following:    Height as of this encounter: 1.546 m (5' 0.87\").    Weight as of this encounter: 129.8 kg (286 lb 3.2 oz).    {Weight Management Plan (ACO) Complete if BMI is abnormal-  Ages 18-64  BMI >24.9.  Age 65+ with BMI <23 or >30 (Optional):268684}    She reports that she has never smoked. She has never used smokeless tobacco.      Appropriate preventive services were discussed with this patient, including applicable screening as appropriate for cardiovascular disease, diabetes, osteopenia/osteoporosis, and glaucoma.  As appropriate for age/gender, discussed screening for colorectal cancer, prostate cancer, breast cancer, and cervical cancer. Checklist reviewing preventive services available has been given to the patient.    Reviewed patients plan of care and provided an AVS. The {CarePlan:048094} for Meggan meets the Care Plan requirement. This Care Plan has been established and reviewed with the {PATIENT, FAMILY MEMBER, CAREGIVER:371236}.    Counseling Resources:  ATP IV Guidelines  Pooled " Cohorts Equation Calculator  Breast Cancer Risk Calculator  Breast Cancer: Medication to Reduce Risk  FRAX Risk Assessment  ICSI Preventive Guidelines  Dietary Guidelines for Americans, 2010  [place] MyPlate  ASA Prophylaxis  Lung CA Screening    Nate Barcenas MD  [facility]    Identified Health Risks:

## 2022-08-19 NOTE — PATIENT INSTRUCTIONS
We can increase thyroid from 125 to 137 for more energy and more warmth    Lymphedema is being treated, can hopefull decrease furosemide dose    Stop Irbesartan for 2 weeks and monitor cough, and low blood pressure.  I think both will improve    Try topiramate 25 mgs twice a day for nerve pain and migraine prevention and appetite deterent    Blood pressure goal is 135 top, and below 90 bottom.  Range top is 15 points up or down so 120 to 150    Address weight by minimizing insulin and helping decrease appetite    Address pain with allopurinol and topiramate    Shots due for shingrix    Triamcinolone cream for rash spots

## 2022-08-20 ENCOUNTER — NURSE TRIAGE (OUTPATIENT)
Dept: NURSING | Facility: CLINIC | Age: 72
End: 2022-08-20

## 2022-08-20 ENCOUNTER — E-VISIT (OUTPATIENT)
Dept: INTERNAL MEDICINE | Facility: CLINIC | Age: 72
End: 2022-08-20
Payer: MEDICARE

## 2022-08-20 DIAGNOSIS — E11.22 TYPE 2 DIABETES MELLITUS WITH STAGE 3B CHRONIC KIDNEY DISEASE, WITH LONG-TERM CURRENT USE OF INSULIN (H): ICD-10-CM

## 2022-08-20 DIAGNOSIS — Z79.4 TYPE 2 DIABETES MELLITUS WITH STAGE 3B CHRONIC KIDNEY DISEASE, WITH LONG-TERM CURRENT USE OF INSULIN (H): ICD-10-CM

## 2022-08-20 DIAGNOSIS — N28.9 RENAL INSUFFICIENCY: Primary | ICD-10-CM

## 2022-08-20 DIAGNOSIS — N18.32 TYPE 2 DIABETES MELLITUS WITH STAGE 3B CHRONIC KIDNEY DISEASE, WITH LONG-TERM CURRENT USE OF INSULIN (H): ICD-10-CM

## 2022-08-20 PROCEDURE — 99207 PR NON-BILLABLE SERV PER CHARTING: CPT | Performed by: INTERNAL MEDICINE

## 2022-08-20 NOTE — TELEPHONE ENCOUNTER
Telephone call:     Patient is calling with concern with her lab results from yesterday office visit with Dr. Barcenas.   Creatinine and GFR.  She reports concern for symptoms after starting on Monjaro. No new symptoms today. She has discussed this with Dr. Lozano in office.   She has an appointment for her lymphedema on Monday at 10:30 and would like to discuss her results prior to her appointment.     Patient can be reached at [phone]. Requesting a call as soon as possible.     Alice Peralat RN   08/20/22 1:34 PM  [facility] Nurse Advisor  Reason for Disposition    [1] Follow-up call from patient regarding patient's clinical status AND [2] information NON-URGENT    Additional Information    Negative: Lab calling with strep throat test results and triager can call in prescription    Negative: Lab calling with urinalysis test results and triager can call in prescription    Negative: Medication questions    Negative: Medication renewal and refill questions    Negative: Pre-operative or pre-procedural questions    Negative: ED call to PCP (i.e., primary care provider; doctor, NP, or PA)    Negative: Doctor (or NP/PA) call to PCP    Negative: Call about patient who is currently hospitalized    Negative: Lab or radiology calling with CRITICAL test results    Negative: [1] Follow-up call from patient regarding patient's clinical status AND [2] information urgent    Negative: [1] Caller requests to speak ONLY to PCP AND [2] URGENT question    Negative: [1] Caller requests to speak to PCP now AND [2] won't tell us reason for call  (Exception: If 10 pm to 6 am, caller must first discuss reason for the call.)    Negative: Notification of hospital admission    Negative: Notification of death    Negative: Lab or radiology calling with test results    Negative: Caller requesting lab results  (Exception: Routine or non-urgent lab result.)    Protocols used: PCP CALL - NO TRIAGE-A-

## 2022-08-22 RX ORDER — TIRZEPATIDE 5 MG/.5ML
5 INJECTION, SOLUTION SUBCUTANEOUS WEEKLY
Qty: 6 ML | Refills: 3 | Status: SHIPPED | OUTPATIENT
Start: 2022-08-22 | End: 2022-08-24

## 2022-08-22 NOTE — TELEPHONE ENCOUNTER
I spoke with Meggan to relay this message.  She had appropriately stopped irbesartan as directed last week by PCP.  Notes that when she saw her lab tests, she also stopped furosemide last Saturday.  Notes she has been feeling very dry, having very dry mouth.  She tries to drink fluids throughout the day however she feels full even after just 3 sips of water.  The Mounjaro makes her very full all day long.  This effect is similar between the low-dose 2 and half milligram weekly and the 5 mg weekly dose.  She continues to have significant amounts of gas and indigestion.  Reviewed that she was going to titrate up to Mounjaro 7.5mg, however I would recommend that she continue on 5 mg weekly due to ongoing symptoms and current concerned about acute kidney injury.  She will come into clinic for follow-up labs as directed by Dr. Barcenas.  BMP already ordered.    Of note she has lost 35 pounds on Mounjaro to date.

## 2022-08-22 NOTE — TELEPHONE ENCOUNTER
I have already communicated with Pharm.D. PharmD not certain if this is due to the Mounjaro.    Irbesartan discontinued on Friday, August 19 which should help    Recommend complete cessation of furosemide and repeat labs August 24 for August 25    Pharm.D. may also contact

## 2022-08-24 ENCOUNTER — HOSPITAL ENCOUNTER (OUTPATIENT)
Dept: PHYSICAL THERAPY | Facility: CLINIC | Age: 72
Setting detail: THERAPIES SERIES
Discharge: HOME | End: 2022-08-24
Attending: INTERNAL MEDICINE
Payer: MEDICARE

## 2022-08-24 PROCEDURE — 97140 MANUAL THERAPY 1/> REGIONS: CPT | Mod: GP

## 2022-08-25 ENCOUNTER — LAB (OUTPATIENT)
Dept: LAB | Facility: CLINIC | Age: 72
End: 2022-08-25
Payer: MEDICARE

## 2022-08-25 DIAGNOSIS — N28.9 RENAL INSUFFICIENCY: ICD-10-CM

## 2022-08-25 LAB
ANION GAP SERPL CALCULATED.3IONS-SCNC: 14 MMOL/L (ref 7–15)
BUN SERPL-MCNC: 39.9 MG/DL (ref 8–23)
CALCIUM SERPL-MCNC: 10.1 MG/DL (ref 8.8–10.2)
CHLORIDE SERPL-SCNC: 102 MMOL/L (ref 98–107)
CREAT SERPL-MCNC: 1.59 MG/DL (ref 0.51–0.95)
DEPRECATED HCO3 PLAS-SCNC: 19 MMOL/L (ref 22–29)
GFR SERPL CREATININE-BSD FRML MDRD: 34 ML/MIN/1.73M2
GLUCOSE SERPL-MCNC: 166 MG/DL (ref 70–99)
POTASSIUM SERPL-SCNC: 4.8 MMOL/L (ref 3.4–5.3)
SODIUM SERPL-SCNC: 135 MMOL/L (ref 136–145)

## 2022-08-25 PROCEDURE — 36415 COLL VENOUS BLD VENIPUNCTURE: CPT

## 2022-08-25 PROCEDURE — 80048 BASIC METABOLIC PNL TOTAL CA: CPT

## 2022-08-26 ENCOUNTER — HOSPITAL ENCOUNTER (OUTPATIENT)
Dept: PHYSICAL THERAPY | Facility: CLINIC | Age: 72
Setting detail: THERAPIES SERIES
Discharge: HOME | End: 2022-08-26
Attending: INTERNAL MEDICINE
Payer: MEDICARE

## 2022-08-26 PROCEDURE — 97140 MANUAL THERAPY 1/> REGIONS: CPT | Mod: GP,CQ | Performed by: REHABILITATION PRACTITIONER

## 2022-08-29 ENCOUNTER — HOSPITAL ENCOUNTER (OUTPATIENT)
Dept: PHYSICAL THERAPY | Facility: CLINIC | Age: 72
Setting detail: THERAPIES SERIES
Discharge: HOME | End: 2022-08-29
Attending: INTERNAL MEDICINE
Payer: MEDICARE

## 2022-08-29 PROCEDURE — 97140 MANUAL THERAPY 1/> REGIONS: CPT | Mod: GP | Performed by: PHYSICAL THERAPIST

## 2022-08-31 ENCOUNTER — HOSPITAL ENCOUNTER (OUTPATIENT)
Dept: PHYSICAL THERAPY | Facility: CLINIC | Age: 72
Setting detail: THERAPIES SERIES
Discharge: HOME | End: 2022-08-31
Attending: INTERNAL MEDICINE
Payer: MEDICARE

## 2022-08-31 PROCEDURE — 97140 MANUAL THERAPY 1/> REGIONS: CPT | Mod: GP,CQ | Performed by: REHABILITATION PRACTITIONER

## 2022-09-02 ENCOUNTER — HOSPITAL ENCOUNTER (OUTPATIENT)
Dept: PHYSICAL THERAPY | Facility: CLINIC | Age: 72
Setting detail: THERAPIES SERIES
Discharge: HOME | End: 2022-09-02
Attending: INTERNAL MEDICINE
Payer: MEDICARE

## 2022-09-02 DIAGNOSIS — N28.9 RENAL INSUFFICIENCY: Primary | ICD-10-CM

## 2022-09-02 PROCEDURE — 97140 MANUAL THERAPY 1/> REGIONS: CPT | Mod: GP

## 2022-09-03 ENCOUNTER — HEALTH MAINTENANCE LETTER (OUTPATIENT)
Age: 72
End: 2022-09-03

## 2022-09-06 ENCOUNTER — HOSPITAL ENCOUNTER (OUTPATIENT)
Dept: PHYSICAL THERAPY | Facility: CLINIC | Age: 72
Setting detail: THERAPIES SERIES
Discharge: HOME | End: 2022-09-06
Attending: INTERNAL MEDICINE
Payer: MEDICARE

## 2022-09-06 ENCOUNTER — MYC REFILL (OUTPATIENT)
Dept: INTERNAL MEDICINE | Facility: CLINIC | Age: 72
End: 2022-09-06

## 2022-09-06 DIAGNOSIS — Z79.899 CHRONIC USE OF BENZODIAZEPINE FOR THERAPEUTIC PURPOSE: ICD-10-CM

## 2022-09-06 DIAGNOSIS — G89.29 CHRONIC BILATERAL LOW BACK PAIN WITHOUT SCIATICA: ICD-10-CM

## 2022-09-06 DIAGNOSIS — F41.9 ANXIETY: ICD-10-CM

## 2022-09-06 DIAGNOSIS — M54.50 CHRONIC BILATERAL LOW BACK PAIN WITHOUT SCIATICA: ICD-10-CM

## 2022-09-06 PROCEDURE — 97140 MANUAL THERAPY 1/> REGIONS: CPT | Mod: GP,CQ | Performed by: REHABILITATION PRACTITIONER

## 2022-09-06 RX ORDER — LORAZEPAM 0.5 MG/1
0.5 TABLET ORAL
Qty: 60 TABLET | Refills: 1 | Status: SHIPPED | OUTPATIENT
Start: 2022-09-06 | End: 2022-12-19

## 2022-09-06 RX ORDER — TIZANIDINE HYDROCHLORIDE 4 MG/1
4 CAPSULE, GELATIN COATED ORAL 3 TIMES DAILY PRN
Qty: 90 CAPSULE | Refills: 0 | Status: SHIPPED | OUTPATIENT
Start: 2022-09-06 | End: 2022-09-15

## 2022-09-08 NOTE — PROGRESS NOTES
[facility]    Outpatient Physical Therapy Progress Note  Patient: Meggan Everett  : 1950    Beginning/End Dates of Reporting Period:  22 to 22    Referring Provider: Dr. Nate Barcenas MD    Therapy Diagnosis: BLE secondary lymphedema       Objective Measurements:   Objective Measure: girth (last taken 22)  Details: RLE -9.6% and LLE -3.9%     Outcome Measures (most recent score):   LLIS = 24 on eval; will again complete at discharge     Goals:  Goal Identifier stg   Goal Description pt to have around the clock tolerance to BLE GCB for edema reduction response   Target Date 22   Date Met  22   Progress (detail required for progress note):       Goal Identifier tg   Goal Description once appropirate, pt to be independent with donning, doffing and care of compression garments for longterm edema management for maintenance   Target Date 22   Date Met      Progress (detail required for progress note):       Goal Identifier ltg   Goal Description pt to be independent with longterm edema management of BLEs via HEP, elevation, skin cares and compression garment wear/use   Target Date 22   Date Met      Progress (detail required for progress note):       Goal Identifier ltg   Goal Description pt to have at least 5 point improvement on LLIS due to decreased edema and associated symptoms in BLEs   Target Date 22   Date Met      Progress (detail required for progress note):       Plan:  Continue therapy per current plan of care.  Patient is doing well with 3x/week treatment and edema reductions in BLEs from GCB. Ongoing GCB needed until girth/volume of BLEs plateaus and then will be appropriate to fit for garments.     Discharge:  No

## 2022-09-09 ENCOUNTER — HOSPITAL ENCOUNTER (OUTPATIENT)
Dept: PHYSICAL THERAPY | Facility: CLINIC | Age: 72
Setting detail: THERAPIES SERIES
Discharge: HOME | End: 2022-09-09
Attending: INTERNAL MEDICINE
Payer: MEDICARE

## 2022-09-09 PROCEDURE — 97140 MANUAL THERAPY 1/> REGIONS: CPT | Mod: GP

## 2022-09-12 ENCOUNTER — HOSPITAL ENCOUNTER (OUTPATIENT)
Dept: PHYSICAL THERAPY | Facility: CLINIC | Age: 72
Setting detail: THERAPIES SERIES
Discharge: HOME | End: 2022-09-12
Attending: INTERNAL MEDICINE
Payer: MEDICARE

## 2022-09-12 PROCEDURE — 97140 MANUAL THERAPY 1/> REGIONS: CPT | Mod: GP,CQ | Performed by: REHABILITATION PRACTITIONER

## 2022-09-14 ENCOUNTER — HOSPITAL ENCOUNTER (OUTPATIENT)
Dept: PHYSICAL THERAPY | Facility: CLINIC | Age: 72
Setting detail: THERAPIES SERIES
Discharge: HOME | End: 2022-09-14
Attending: INTERNAL MEDICINE
Payer: MEDICARE

## 2022-09-14 ENCOUNTER — MYC MEDICAL ADVICE (OUTPATIENT)
Dept: INTERNAL MEDICINE | Facility: CLINIC | Age: 72
End: 2022-09-14

## 2022-09-14 DIAGNOSIS — M54.50 CHRONIC BILATERAL LOW BACK PAIN WITHOUT SCIATICA: ICD-10-CM

## 2022-09-14 DIAGNOSIS — G89.29 CHRONIC BILATERAL LOW BACK PAIN WITHOUT SCIATICA: ICD-10-CM

## 2022-09-14 PROCEDURE — 97535 SELF CARE MNGMENT TRAINING: CPT | Mod: GP

## 2022-09-14 PROCEDURE — 97140 MANUAL THERAPY 1/> REGIONS: CPT | Mod: GP

## 2022-09-15 ENCOUNTER — MEDICAL CORRESPONDENCE (OUTPATIENT)
Dept: HEALTH INFORMATION MANAGEMENT | Facility: CLINIC | Age: 72
End: 2022-09-15

## 2022-09-15 RX ORDER — TIZANIDINE HYDROCHLORIDE 4 MG/1
4 CAPSULE, GELATIN COATED ORAL 3 TIMES DAILY PRN
Qty: 90 CAPSULE | Refills: 1 | Status: SHIPPED | OUTPATIENT
Start: 2022-09-15 | End: 2022-12-19

## 2022-09-15 NOTE — TELEPHONE ENCOUNTER
Rx pended for Zanaflex.     MD please review message and advise, I'm not sure if you have access to sugar readings?

## 2022-09-21 ENCOUNTER — LAB (OUTPATIENT)
Dept: LAB | Facility: CLINIC | Age: 72
End: 2022-09-21
Payer: MEDICARE

## 2022-09-21 ENCOUNTER — HOSPITAL ENCOUNTER (OUTPATIENT)
Dept: PHYSICAL THERAPY | Facility: CLINIC | Age: 72
Setting detail: THERAPIES SERIES
Discharge: HOME | End: 2022-09-21
Attending: INTERNAL MEDICINE
Payer: MEDICARE

## 2022-09-21 DIAGNOSIS — N28.9 RENAL INSUFFICIENCY: ICD-10-CM

## 2022-09-21 LAB
ANION GAP SERPL CALCULATED.3IONS-SCNC: 16 MMOL/L (ref 7–15)
BUN SERPL-MCNC: 30.9 MG/DL (ref 8–23)
CALCIUM SERPL-MCNC: 9.9 MG/DL (ref 8.8–10.2)
CHLORIDE SERPL-SCNC: 99 MMOL/L (ref 98–107)
CREAT SERPL-MCNC: 1.37 MG/DL (ref 0.51–0.95)
DEPRECATED HCO3 PLAS-SCNC: 19 MMOL/L (ref 22–29)
GFR SERPL CREATININE-BSD FRML MDRD: 41 ML/MIN/1.73M2
GLUCOSE SERPL-MCNC: 241 MG/DL (ref 70–99)
POTASSIUM SERPL-SCNC: 4.9 MMOL/L (ref 3.4–5.3)
SODIUM SERPL-SCNC: 134 MMOL/L (ref 136–145)

## 2022-09-21 PROCEDURE — 36415 COLL VENOUS BLD VENIPUNCTURE: CPT

## 2022-09-21 PROCEDURE — 80048 BASIC METABOLIC PNL TOTAL CA: CPT

## 2022-09-21 PROCEDURE — 97140 MANUAL THERAPY 1/> REGIONS: CPT | Mod: GP,CQ | Performed by: REHABILITATION PRACTITIONER

## 2022-09-23 ENCOUNTER — HOSPITAL ENCOUNTER (OUTPATIENT)
Dept: PHYSICAL THERAPY | Facility: CLINIC | Age: 72
Setting detail: THERAPIES SERIES
Discharge: HOME | End: 2022-09-23
Attending: INTERNAL MEDICINE
Payer: MEDICARE

## 2022-09-23 PROCEDURE — 97140 MANUAL THERAPY 1/> REGIONS: CPT | Mod: GP,CQ | Performed by: REHABILITATION PRACTITIONER

## 2022-09-30 DIAGNOSIS — N18.32 TYPE 2 DIABETES MELLITUS WITH STAGE 3B CHRONIC KIDNEY DISEASE, WITH LONG-TERM CURRENT USE OF INSULIN (H): ICD-10-CM

## 2022-09-30 DIAGNOSIS — E11.22 TYPE 2 DIABETES MELLITUS WITH STAGE 3B CHRONIC KIDNEY DISEASE, WITH LONG-TERM CURRENT USE OF INSULIN (H): ICD-10-CM

## 2022-09-30 DIAGNOSIS — Z79.4 TYPE 2 DIABETES MELLITUS WITH STAGE 3B CHRONIC KIDNEY DISEASE, WITH LONG-TERM CURRENT USE OF INSULIN (H): ICD-10-CM

## 2022-09-30 NOTE — TELEPHONE ENCOUNTER
PRESCRIPTIONS MUST BE WRITTEN THIS WAY TO MAKE THEM MEDICARE COMPLIANT.     Freestyle Shola 2 Sensors  SIG:  Change q 14 days  QTY:  2  Refills:  5    -Prescription must be written on or after the clinical note date and will only be able to be used for 6 months from the date of the clinical notes. (We will be requesting new clinical notes and prescriptions every 6 months to meet Medicare Guidelines.)    All Documents (including clinical notes) MUST be signed by the same doctor.    Please contact us at [phone] (this number is for clinics only) with any questions. Patients may call us at [phone].      Thank you,  [facility]

## 2022-10-10 ENCOUNTER — HOSPITAL ENCOUNTER (OUTPATIENT)
Dept: PHYSICAL THERAPY | Facility: CLINIC | Age: 72
Setting detail: THERAPIES SERIES
Discharge: HOME | End: 2022-10-10
Attending: INTERNAL MEDICINE
Payer: MEDICARE

## 2022-10-10 PROCEDURE — 97140 MANUAL THERAPY 1/> REGIONS: CPT | Mod: GP | Performed by: PHYSICAL THERAPIST

## 2022-10-11 NOTE — PROGRESS NOTES
[facility]    Outpatient Physical Therapy Progress Note  Patient: Meggan Everett  : 1950    Beginning/End Dates of Reporting Period:  22 to 10/6/22    Referring Provider: Nate Barcenas MD    Therapy Diagnosis: BLE secondary lymphedema      Client Self Report: wednesday night I decided I had to learn how to do this so I took them off and it took me a couple tries    Objective Measurements:  Objective Measure: girth  Details: R -1.8% and L -6.2%     Outcome Measures (most recent score):   LLIS = 24 on eval; will again complete at time of discharge    Goals:  Goal Identifier stg   Goal Description pt to have around the clock tolerance to BLE GCB for edema reduction response   Target Date 22   Date Met  22   Progress (detail required for progress note):        Goal Identifier tg   Goal Description once appropirate, pt to be independent with donning, doffing and care of compression garments for longterm edema management for maintenance   Target Date 22   Date Met   10/10/22   Progress (detail required for progress note):        Goal Identifier ltg   Goal Description pt to be independent with longterm edema management of BLEs via HEP, elevation, skin cares and compression garment wear/use   Target Date 22   Date Met      Progress (detail required for progress note):        Goal Identifier ltg   Goal Description pt to have at least 5 point improvement on LLIS due to decreased edema and associated symptoms in BLEs   Target Date 22   Date Met      Progress (detail required for progress note):          Plan:  Other: Patient has made good progress with BLE edema reductions and was fit into velcro compression garments for BLEs for maintenance and anticipate a few additional sessions to ensure that current compression garments and wear schedule are adequate in achieving longterm  edema management for maintenance.      Discharge:  No

## 2022-10-18 ENCOUNTER — HOSPITAL ENCOUNTER (OUTPATIENT)
Dept: PHYSICAL THERAPY | Facility: CLINIC | Age: 72
Setting detail: THERAPIES SERIES
Discharge: HOME | End: 2022-10-18
Attending: INTERNAL MEDICINE
Payer: MEDICARE

## 2022-10-18 PROCEDURE — 97140 MANUAL THERAPY 1/> REGIONS: CPT | Mod: GP | Performed by: PHYSICAL THERAPIST

## 2022-11-07 DIAGNOSIS — E03.9 ACQUIRED HYPOTHYROIDISM: ICD-10-CM

## 2022-11-07 DIAGNOSIS — M10.9 URIC ACID ARTHROPATHY: ICD-10-CM

## 2022-11-07 DIAGNOSIS — Z79.4 TYPE 2 DIABETES MELLITUS WITH STAGE 3B CHRONIC KIDNEY DISEASE, WITH LONG-TERM CURRENT USE OF INSULIN (H): Primary | ICD-10-CM

## 2022-11-07 DIAGNOSIS — N18.32 TYPE 2 DIABETES MELLITUS WITH STAGE 3B CHRONIC KIDNEY DISEASE, WITH LONG-TERM CURRENT USE OF INSULIN (H): Primary | ICD-10-CM

## 2022-11-07 DIAGNOSIS — E11.22 TYPE 2 DIABETES MELLITUS WITH STAGE 3B CHRONIC KIDNEY DISEASE, WITH LONG-TERM CURRENT USE OF INSULIN (H): Primary | ICD-10-CM

## 2022-11-15 NOTE — PROGRESS NOTES
[facility]    Outpatient Physical Therapy Progress Note  Patient: Meggan Everett  : 1950    Beginning/End Dates of Reporting Period:  10/6/22 to 22    Referring Provider: Dr. Nate Barcenas MD    Therapy Diagnosis: BLE secondary lymphedema, chronic      Client Self Report: the garments are better since being trimmed    Objective Measurements:  Objective Measure: girth  Details: since last measured on 10/10/22: RLE -3.8% and LLE -3.0%     Outcome Measures (most recent score):   LLIS = 24 on eval; will again complete at discharge    Goals:  Goal Identifier stg   Goal Description pt to have around the clock tolerance to BLE GCB for edema reduction response   Target Date 22   Date Met  22   Progress (detail required for progress note):       Goal Identifier tg   Goal Description once appropirate, pt to be independent with donning, doffing and care of compression garments for longterm edema management for maintenance   Target Date 22   Date Met  10/10/22   Progress (detail required for progress note):       Goal Identifier ltg   Goal Description pt to be independent with longterm edema management of BLEs via HEP, elevation, skin cares and compression garment wear/use   Target Date 23   Date Met      Progress (detail required for progress note):       Goal Identifier ltg   Goal Description pt to have at least 5 point improvement on LLIS due to decreased edema and associated symptoms in BLEs   Target Date 23   Date Met      Progress (detail required for progress note):       Plan:  Patient is wearing BLE compression garments for maintenance and anticipate a few additional sessions to ensure that current compression garments and wear schedule are adequate in achieving longterm edema management for maintenance. Pt scheduled to return on 22 for anticipated  discharge.    Discharge:  No        RECERTIFICATION    Meggan Everett  1950  MRN: [id number]    Session Number: 16 LIZZY/Narinder/Medicare since start of care.    Diagnosis: BLE secondary lymphedema, chronic   Onset Date: 7/27/22 - date of referral  Start of Care: 8/11/22 - date of evaluation    Reasons for Continuing Treatment:   Goals not yet met - see above    Frequency/Duration  1-3 additional session anticipated only over the next 8 weeks    Recertification Period  11/9/22 - 1/9/23    Physician Signature:    Date:    X_______________________________________________________    Physician Name: Dr. Nate Barcenas MD    I certify the need for these services furnished under this plan of treatment and while under my care. Physician co-signature of this document indicates review and certification of the therapy plan.  This signature may be written on paper, or electronically signed within EPIC.

## 2022-11-17 ENCOUNTER — HOSPITAL ENCOUNTER (OUTPATIENT)
Dept: PHYSICAL THERAPY | Facility: CLINIC | Age: 72
Setting detail: THERAPIES SERIES
Discharge: HOME | End: 2022-11-17
Attending: INTERNAL MEDICINE
Payer: MEDICARE

## 2022-11-17 ENCOUNTER — LAB (OUTPATIENT)
Dept: LAB | Facility: CLINIC | Age: 72
End: 2022-11-17
Payer: MEDICARE

## 2022-11-17 DIAGNOSIS — E11.22 TYPE 2 DIABETES MELLITUS WITH STAGE 3B CHRONIC KIDNEY DISEASE, WITH LONG-TERM CURRENT USE OF INSULIN (H): ICD-10-CM

## 2022-11-17 DIAGNOSIS — Z79.4 TYPE 2 DIABETES MELLITUS WITH STAGE 3B CHRONIC KIDNEY DISEASE, WITH LONG-TERM CURRENT USE OF INSULIN (H): ICD-10-CM

## 2022-11-17 DIAGNOSIS — N18.32 TYPE 2 DIABETES MELLITUS WITH STAGE 3B CHRONIC KIDNEY DISEASE, WITH LONG-TERM CURRENT USE OF INSULIN (H): ICD-10-CM

## 2022-11-17 DIAGNOSIS — E03.9 ACQUIRED HYPOTHYROIDISM: ICD-10-CM

## 2022-11-17 DIAGNOSIS — M10.9 URIC ACID ARTHROPATHY: ICD-10-CM

## 2022-11-17 LAB
ANION GAP SERPL CALCULATED.3IONS-SCNC: 13 MMOL/L (ref 7–15)
BUN SERPL-MCNC: 25.3 MG/DL (ref 8–23)
CALCIUM SERPL-MCNC: 10.5 MG/DL (ref 8.8–10.2)
CHLORIDE SERPL-SCNC: 104 MMOL/L (ref 98–107)
CREAT SERPL-MCNC: 1.14 MG/DL (ref 0.51–0.95)
CRP SERPL-MCNC: 3.27 MG/L
DEPRECATED HCO3 PLAS-SCNC: 21 MMOL/L (ref 22–29)
ERYTHROCYTE [SEDIMENTATION RATE] IN BLOOD BY WESTERGREN METHOD: 20 MM/HR (ref 0–30)
GFR SERPL CREATININE-BSD FRML MDRD: 51 ML/MIN/1.73M2
GLUCOSE SERPL-MCNC: 180 MG/DL (ref 70–99)
HBA1C MFR BLD: 7.1 % (ref 0–5.6)
POTASSIUM SERPL-SCNC: 4.3 MMOL/L (ref 3.4–5.3)
SODIUM SERPL-SCNC: 138 MMOL/L (ref 136–145)
TSH SERPL DL<=0.005 MIU/L-ACNC: 0.25 UIU/ML (ref 0.3–4.2)
URATE SERPL-MCNC: 4.5 MG/DL (ref 2.4–5.7)

## 2022-11-17 PROCEDURE — 86140 C-REACTIVE PROTEIN: CPT

## 2022-11-17 PROCEDURE — 84550 ASSAY OF BLOOD/URIC ACID: CPT

## 2022-11-17 PROCEDURE — 97140 MANUAL THERAPY 1/> REGIONS: CPT | Mod: GP | Performed by: PHYSICAL THERAPIST

## 2022-11-17 PROCEDURE — 85652 RBC SED RATE AUTOMATED: CPT

## 2022-11-17 PROCEDURE — 84443 ASSAY THYROID STIM HORMONE: CPT

## 2022-11-17 PROCEDURE — 80048 BASIC METABOLIC PNL TOTAL CA: CPT

## 2022-11-17 PROCEDURE — 36415 COLL VENOUS BLD VENIPUNCTURE: CPT

## 2022-11-17 PROCEDURE — 83036 HEMOGLOBIN GLYCOSYLATED A1C: CPT

## 2022-11-17 NOTE — PROGRESS NOTES
[facility]    Outpatient Physical Therapy Discharge Note  Patient: Meggan Everett  : 1950    Beginning/End Dates of Reporting Period:  22 to 22    Referring Provider: Nate Patel MD    Therapy Diagnosis: BLE secondary lymphedema      Client Self Report: my legs haven't looked this good in years    Objective Measurements:  Objective Measure: girth  Details: since last seen and measured on 10/18/22: RLE -3.2% and LLE -4.8% and since initial evaluation on 22: RLE -20.2% and LLE -22.9%     Outcome Measures (most recent score):  Lymphedema Life Impact Scale (score range 0-72). A higher score indicates greater impairment.: 0    Goals:  Goal Identifier stg   Goal Description pt to have around the clock tolerance to BLE GCB for edema reduction response   Target Date 22   Date Met  22   Progress (detail required for progress note):       Goal Identifier tg   Goal Description once appropirate, pt to be independent with donning, doffing and care of compression garments for longterm edema management for maintenance   Target Date 22   Date Met  10/10/22   Progress (detail required for progress note):       Goal Identifier ltg   Goal Description pt to be independent with longterm edema management of BLEs via HEP, elevation, skin cares and compression garment wear/use   Target Date 23   Date Met   22   Progress (detail required for progress note):       Goal Identifier ltg   Goal Description pt to have at least 5 point improvement on LLIS due to decreased edema and associated symptoms in BLEs   Target Date 23   Date Met   22   Progress (detail required for progress note):         Plan:  Discharge from therapy.    Discharge:    Reason for Discharge: Patient has met all goals.    Equipment Issued: knee hi Sigvaris velcro wraps from [email or web address] for  BLEs and SpandagripE from [facility] for additional compression at feet and ankles    Discharge Plan: Patient to continue home program.

## 2022-12-01 ENCOUNTER — MYC MEDICAL ADVICE (OUTPATIENT)
Dept: INTERNAL MEDICINE | Facility: CLINIC | Age: 72
End: 2022-12-01

## 2022-12-01 DIAGNOSIS — E03.9 ACQUIRED HYPOTHYROIDISM: ICD-10-CM

## 2022-12-06 RX ORDER — LEVOTHYROXINE SODIUM 125 UG/1
125 TABLET ORAL DAILY
Qty: 90 TABLET | Refills: 3 | Status: SHIPPED | OUTPATIENT
Start: 2022-12-06 | End: 2023-09-01

## 2022-12-12 ENCOUNTER — VIRTUAL VISIT (OUTPATIENT)
Dept: INTERNAL MEDICINE | Facility: CLINIC | Age: 72
End: 2022-12-12
Payer: MEDICARE

## 2022-12-12 DIAGNOSIS — G89.29 CHRONIC BILATERAL LOW BACK PAIN WITHOUT SCIATICA: ICD-10-CM

## 2022-12-12 DIAGNOSIS — M54.50 CHRONIC BILATERAL LOW BACK PAIN WITHOUT SCIATICA: ICD-10-CM

## 2022-12-12 DIAGNOSIS — E03.9 ACQUIRED HYPOTHYROIDISM: ICD-10-CM

## 2022-12-12 DIAGNOSIS — E11.22 TYPE 2 DIABETES MELLITUS WITH STAGE 3B CHRONIC KIDNEY DISEASE, WITH LONG-TERM CURRENT USE OF INSULIN (H): ICD-10-CM

## 2022-12-12 DIAGNOSIS — G43.109 VERTIGINOUS MIGRAINE: ICD-10-CM

## 2022-12-12 DIAGNOSIS — Z79.4 TYPE 2 DIABETES MELLITUS WITH STAGE 3B CHRONIC KIDNEY DISEASE, WITH LONG-TERM CURRENT USE OF INSULIN (H): ICD-10-CM

## 2022-12-12 DIAGNOSIS — K21.9 GASTROESOPHAGEAL REFLUX DISEASE WITHOUT ESOPHAGITIS: ICD-10-CM

## 2022-12-12 DIAGNOSIS — M54.50 ACUTE RIGHT-SIDED LOW BACK PAIN WITHOUT SCIATICA: Primary | ICD-10-CM

## 2022-12-12 DIAGNOSIS — N18.32 TYPE 2 DIABETES MELLITUS WITH STAGE 3B CHRONIC KIDNEY DISEASE, WITH LONG-TERM CURRENT USE OF INSULIN (H): ICD-10-CM

## 2022-12-12 PROCEDURE — 99214 OFFICE O/P EST MOD 30 MIN: CPT | Mod: 95 | Performed by: INTERNAL MEDICINE

## 2022-12-12 RX ORDER — FAMOTIDINE 20 MG/1
20 TABLET, FILM COATED ORAL 2 TIMES DAILY
Qty: 180 TABLET | Refills: 3 | Status: SHIPPED | OUTPATIENT
Start: 2022-12-12 | End: 2023-08-18

## 2022-12-12 RX ORDER — PANTOPRAZOLE SODIUM 40 MG/1
40 TABLET, DELAYED RELEASE ORAL DAILY
Qty: 30 TABLET | Refills: 11 | Status: SHIPPED | OUTPATIENT
Start: 2022-12-12 | End: 2022-12-19

## 2022-12-12 RX ORDER — INSULIN GLARGINE 100 [IU]/ML
10 INJECTION, SOLUTION SUBCUTANEOUS EVERY EVENING
Qty: 60 ML | Refills: 4 | COMMUNITY
Start: 2022-12-12 | End: 2023-01-20

## 2022-12-12 RX ORDER — PREDNISONE 20 MG/1
20 TABLET ORAL EVERY MORNING
Qty: 4 TABLET | Refills: 0 | Status: SHIPPED | OUTPATIENT
Start: 2022-12-12 | End: 2022-12-16

## 2022-12-12 RX ORDER — SUCRALFATE 1 G/1
1 TABLET ORAL 2 TIMES DAILY PRN
Qty: 60 TABLET | Refills: 11 | Status: SHIPPED | OUTPATIENT
Start: 2022-12-12 | End: 2023-08-18

## 2022-12-12 RX ORDER — TOPIRAMATE 25 MG/1
25 TABLET, FILM COATED ORAL AT BEDTIME
Qty: 180 TABLET | Refills: 3 | COMMUNITY
Start: 2022-12-12 | End: 2023-01-20

## 2022-12-12 NOTE — PATIENT INSTRUCTIONS
Prednisone 20 mg daily for 4 days    Celebrex once or twice daily    Increase famotidine to twice daily    Pantoprazole 40 mg daily    Sucralfate 1 g as needed    DeCrease thyroid 125 mcg    Topiramate 25 mg at bed    Continue tizanidine up to 4 tablets daily    Weight loss discussed and congratulated

## 2022-12-12 NOTE — PROGRESS NOTES
Meggan is a 72 year old female contacting the clinic today via video, who will use the platform: [employer] for the visit.  Phone # for [employer], or if Amwell drops:   Telephone Information:   Mobile [phone]          ASSESSMENT and PLAN:  1. Acute right-sided low back pain without sciatica  Continue Celebrex and tizanidine.  Headedness on and topiramate  - predniSONE (DELTASONE) 20 MG tablet; Take 1 tablet (20 mg) by mouth every morning for 4 days  Dispense: 4 tablet; Refill: 0  - topiramate (TOPAMAX) 25 MG tablet; Take 1 tablet (25 mg) by mouth At Bedtime  Dispense: 180 tablet; Refill: 3    2. Chronic bilateral low back pain without sciatica      3. Type 2 diabetes mellitus with stage 3b chronic kidney disease, with long-term current use of insulin (H)  Clarify insulin dosage  - insulin glargine (LANTUS SOLOSTAR) 100 UNIT/ML pen; Inject 10 Units Subcutaneous every evening  Dispense: 60 mL; Refill: 4  - topiramate (TOPAMAX) 25 MG tablet; Take 1 tablet (25 mg) by mouth At Bedtime  Dispense: 180 tablet; Refill: 3    4. Acquired hypothyroidism  Okay to decrease dose based on suppressed TSH    5. Gastroesophageal reflux disease without esophagitis  Increase intensity of treatment  - sucralfate (CARAFATE) 1 GM tablet; Take 1 tablet (1 g) by mouth 2 times daily as needed (acid reflux)  Dispense: 60 tablet; Refill: 11  - famotidine (PEPCID) 20 MG tablet; Take 1 tablet (20 mg) by mouth 2 times daily  Dispense: 180 tablet; Refill: 3  - pantoprazole (PROTONIX) 40 MG EC tablet; Take 1 tablet (40 mg) by mouth daily  Dispense: 30 tablet; Refill: 11    6. Vertiginous migraine  - topiramate (TOPAMAX) 25 MG tablet; Take 1 tablet (25 mg) by mouth At Bedtime  Dispense: 180 tablet; Refill: 3    7. BMI 45.0-49.9, adult (H)  Aggressive weight loss noted       Preventive Care Assessed: Otherwise up-to-date  Patient Instructions   Prednisone 20 mg daily for 4 days    Celebrex once or twice daily    Increase famotidine to twice  daily    Pantoprazole 40 mg daily    Sucralfate 1 g as needed    DeCrease thyroid 125 mcg    Topiramate 25 mg at bed    Continue tizanidine up to 4 tablets daily    Weight loss discussed and congratulated            Return in about 4 months (around 4/12/2023) for using a video visit.       CHIEF COMPLAINT:  Chief Complaint   Patient presents with     Musculoskeletal Problem     Back pain       History of Present Illness       Back Pain:  She presents for follow up of back pain. Patient's back pain is a chronic problem.  Location of back pain:  Right lower back  Description of back pain: gnawing  Back pain spreads: right buttocks    Since patient first noticed back pain, pain is: always present, but gets better and worse  Does back pain interfere with her job:  Not applicable      She eats 0-1 servings of fruits and vegetables daily.She consumes 0 sweetened beverage(s) daily.She exercises with enough effort to increase her heart rate 9 or less minutes per day.  She exercises with enough effort to increase her heart rate 3 or less days per week.   She is taking medications regularly.      HISTORY OF PRESENT ILLNESS:  Meggan is a 72 year old female contacting the clinic today via video for complaints of back pain.  Symptoms began proximally 3 weeks ago when she lifted up her grandchild.  Symptoms are on the right back but do not radiate into the leg.  Tizanidine and Celebrex have helped slightly.  History lumbar spine disease and required surgery in the past    Continues to lose weight.  Current weight now 245 pounds, approximately 75 pounds down.  She is trying to prepare for knee surgery.  She has decreased her insulin to 10 units at night and last A1c was 7.1    Acid reflux is much worse.  Omeprazole caused diarrhea.  She is taking famotidine just once daily    REVIEW OF SYSTEMS:   Improved leg pain after lymphedema treatment    PFSH:  Social History     Social History Narrative    She is  and is retired.  "She lives with her son and his family on the lower level of their house.  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  She has 5 grandchildren.  She does not smoke cigarettes or drink alcohol.       TOBACCO USE:  History   Smoking Status     Never   Smokeless Tobacco     Never       VITALS:  There were no vitals filed for this visit.  There were no vitals taken for this visit. Estimated body mass index is 54.32 kg/m  as calculated from the following:    Height as of 8/19/22: 1.546 m (5' 0.87\").    Weight as of 8/19/22: 129.8 kg (286 lb 3.2 oz).    PHYSICAL EXAM:  (observations via Video)  Alert and oriented    MEDICATIONS:   Current Outpatient Medications   Medication Sig Dispense Refill     famotidine (PEPCID) 20 MG tablet Take 1 tablet (20 mg) by mouth 2 times daily 180 tablet 3     insulin glargine (LANTUS SOLOSTAR) 100 UNIT/ML pen Inject 10 Units Subcutaneous every evening 60 mL 4     pantoprazole (PROTONIX) 40 MG EC tablet Take 1 tablet (40 mg) by mouth daily 30 tablet 11     predniSONE (DELTASONE) 20 MG tablet Take 1 tablet (20 mg) by mouth every morning for 4 days 4 tablet 0     sucralfate (CARAFATE) 1 GM tablet Take 1 tablet (1 g) by mouth 2 times daily as needed (acid reflux) 60 tablet 11     topiramate (TOPAMAX) 25 MG tablet Take 1 tablet (25 mg) by mouth At Bedtime 180 tablet 3     albuterol (PROAIR HFA/PROVENTIL HFA/VENTOLIN HFA) 108 (90 Base) MCG/ACT inhaler Inhale 2 puffs into the lungs every 6 hours as needed for shortness of breath / dyspnea or wheezing 18 g 3     albuterol (PROVENTIL) (2.5 MG/3ML) 0.083% neb solution Take 1 vial (2.5 mg) by nebulization every 6 hours as needed for shortness of breath / dyspnea or wheezing 75 mL 3     alcohol swab prep pads Use to swab area of injection/vicki as directed. 100 each 3     allopurinol (ZYLOPRIM) 300 MG tablet Take 1 tablet (300 mg) by mouth daily 90 tablet 3     blood glucose (NO BRAND SPECIFIED) test " strip Use to test blood sugar 4 times daily or as directed.  Accu-Chek Devika plus Strips 200 strip 3     blood glucose monitoring (ACCU-CHEK FASTCLIX) lancets Use to test blood sugar 4 times daily. 204 each 6     blood glucose monitoring (NO BRAND SPECIFIED) meter device kit Use to test blood sugar 4 times daily or as directed. Preferred blood glucose meter OR supplies to accompany: Blood Glucose Monitor Brands: per insurance. 1 kit 0     celecoxib (CELEBREX) 200 MG capsule Take 1 capsule (200 mg) by mouth daily 30 capsule 1     Cholecalciferol (VITAMIN D) 2000 UNIT tablet Take 2 tablets by mouth daily        colchicine (COLCYRS) 0.6 MG tablet Take 1 tablet (0.6 mg) by mouth daily 90 tablet 3     Continuous Blood Gluc  (FREESTYLE DIEGO 2 READER) PHOENIX 1 each continuous Use to read blood sugars per 's instructions. 1 each 0     Continuous Blood Gluc Sensor (FREESTYLE DIEGO 2 SENSOR) MISC 1 each every 14 days To check blood sugars continuously 2 each 5     furosemide (LASIX) 40 MG tablet Take 1 tablet (40 mg) by mouth daily 90 tablet 3     irbesartan (AVAPRO) 150 MG tablet Take 1 tablet (150 mg) by mouth At Bedtime 90 tablet 3     levothyroxine (SYNTHROID) 125 MCG tablet Take 1 tablet (125 mcg) by mouth daily 90 tablet 3     LORazepam (ATIVAN) 0.5 MG tablet Take 1 tablet (0.5 mg) by mouth nightly as needed for anxiety 60 tablet 1     metoprolol succinate ER (TOPROL XL) 100 MG 24 hr tablet Take 1 tablet (100 mg) by mouth daily 90 tablet 3     Probiotic Product (PROBIOTIC-10 PO) Take 1 capsule by mouth daily Florastor        rosuvastatin (CRESTOR) 10 MG tablet Take 1 tablet (10 mg) by mouth three times a week 90 tablet 3     thin (NO BRAND SPECIFIED) lancets Use with lanceting device. To accompany: Blood Glucose Monitor Brands: per insurance. 200 each 6     Tirzepatide (MOUNJARO) 10 MG/0.5ML SOPN Inject 10 mg Subcutaneous once a week 2 mL 1     tiZANidine (ZANAFLEX) 4 MG capsule Take 1 capsule (4 mg)  by mouth 3 times daily as needed for muscle spasms 90 capsule 1     triamcinolone (KENALOG) 0.1 % external cream Apply topically 2 times daily 30 g 3       Outside Notes summarized: Pharm.D. x2  Labs, x-rays, cardiology, GI tests reviewed:   Recent Labs   Lab Test 11/17/22  1213 09/21/22  1218 08/25/22  1423 08/19/22  1325 04/29/22  1144 11/29/21  1251 07/28/21  1147   HGB  --   --   --  13.2  --   --  13.7   WBC  --   --   --  7.6  --   --  9.2    134*   < > 138 142   < > 143   POTASSIUM 4.3 4.9   < > 4.7 4.5   < > 4.2   CR 1.14* 1.37*   < > 2.87* 1.47*   < > 1.37*   A1C 7.1*  --   --  7.2* 8.7*   < > 8.5*   URIC 4.5  --   --  5.1 6.7   < >  --    B12  --   --   --   --  787  --   --    TSH 0.25*  --   --  0.87  --    < >  --    VITDT  --   --   --   --  70  --   --    SED 20  --   --  41*  --   --   --    CRP 3.27  --   --  10.70*  --   --   --     < > = values in this interval not displayed.     No results found for: [id number]  Lab Results   Component Value Date    CHOL 132 08/19/2022     New orders: No orders of the defined types were placed in this encounter.      Independent review of:    Supplemental history by:        Nate Barcenas MD  [facility]    Video-Visit Details  Type of service:  Video Visit  Patient has given verbal consent to a Video visit?  Yes  How would you like to obtain your AVS?  MyChart  Will anyone else be joining your video visit? No    Originating Location (pt. Location): Home    Distant Location (provider location):  Off-site    Video Start Time: 3:06 PM  Video End time:  3:31 PM  Face to face plus orders: 25 minutes  Documentation time:  3 minutes     The visit lasted a total of 28 minutes

## 2022-12-13 ENCOUNTER — TELEPHONE (OUTPATIENT)
Dept: INTERNAL MEDICINE | Facility: CLINIC | Age: 72
End: 2022-12-13

## 2022-12-13 DIAGNOSIS — K21.9 GASTROESOPHAGEAL REFLUX DISEASE WITHOUT ESOPHAGITIS: ICD-10-CM

## 2022-12-13 DIAGNOSIS — M54.50 ACUTE RIGHT-SIDED LOW BACK PAIN WITHOUT SCIATICA: Primary | ICD-10-CM

## 2022-12-13 RX ORDER — GABAPENTIN 100 MG/1
100 CAPSULE ORAL 2 TIMES DAILY
Qty: 60 CAPSULE | Refills: 11 | Status: SHIPPED | OUTPATIENT
Start: 2022-12-13 | End: 2023-01-20

## 2022-12-13 NOTE — TELEPHONE ENCOUNTER
New Medication Request    Contacts       Type Contact Phone/Fax    12/13/2022 11:06 AM CST Phone (Incoming) Meggan Everett (Self) [phone] (M)          What medication are you requesting?: Pt is calling Lidocaine patches for her back pain.  She states that it works well for her sister.    She is in worse pain this morning    Reason for medication request: pain but doesn't want pain medication.    Have you taken this medication before?: No    Controlled Substance Agreement on file:   CSA -- Patient Level:     [Media Unavailable] Controlled Substance Agreement - Non - Opioid - Scan on 3/4/2020: NON-OPIOID CONTROLLED SUBSTANCE AGREEMENT   [Media Unavailable] Controlled Substance Agreement - Opioid - Scan on 12/27/2018   [Media Unavailable] Controlled Substance Agreement - Opioid - Scan on 1/15/2018: HE   [Media Unavailable] Controlled Substance Agreement - Opioid - Scan on 1/8/2018   [Media Unavailable] Controlled Substance Agreement - Opioid - Scan on 11/18/2015         Patient offered an appointment? No    Preferred Pharmacy:   [id number] - [place] [address] [address] [address] [place] [address] [address]  [facility] [address]  Phone: [phone] Fax: [phone]          Could we send this information to you in [facility] or would you prefer to receive a phone call?:   Patient would prefer a phone call   Okay to leave a detailed message?: Yes at Cell number on file:    Telephone Information:   Mobile [phone]

## 2022-12-13 NOTE — CONFIDENTIAL NOTE
Okay to try gabapentin.  I will prescribe low-dose.  Watch for sedation or leg swelling.  Nausea and vomiting is very unlikely    Most important medication I added yesterday was prednisone.  Recommend adding that    Okay to hold topiramate

## 2022-12-13 NOTE — CONFIDENTIAL NOTE
I am not sure what the request is.  If she wants lidocaine patches these can be purchased over-the-counter.  They are rarely if ever covered by insurance

## 2022-12-13 NOTE — TELEPHONE ENCOUNTER
Pt stated understanding to try over the counter lidocaine patches.     Pt said that she is having further concern regarding Topamax.     She is concerned that Topamax may worsen her depression, cause her to not feel well again, she has not yet taken the medication as prescribed by Dr. Barcenas.     Since patient's visit yesterday with Dr. Barcenas, pt is now wondering about taking Gabapentin instead of the Topamax. She reports that she took gabapentin number of years ago and only took medication once. She experienced nausea and vomiting, she is wondering if she had tried too high of a dose? She does not recall the dose that she was prescribed.

## 2022-12-14 RX ORDER — PANTOPRAZOLE SODIUM 40 MG/1
40 TABLET, DELAYED RELEASE ORAL DAILY
Qty: 90 TABLET | Refills: 11 | OUTPATIENT
Start: 2022-12-14

## 2022-12-14 RX ORDER — SUCRALFATE 1 G/1
1 TABLET ORAL 2 TIMES DAILY PRN
Qty: 90 TABLET | Refills: 11 | OUTPATIENT
Start: 2022-12-14

## 2022-12-14 NOTE — TELEPHONE ENCOUNTER
Relayed PCP message below to patient.  Patient verbalized understanding, no further questions at this time.    Okay to try gabapentin.  I will prescribe low-dose.  Watch for sedation or leg swelling.  Nausea and vomiting is very unlikely     Most important medication I added yesterday was prednisone.  Recommend adding that     Okay to hold topiramate

## 2022-12-19 DIAGNOSIS — F41.9 ANXIETY: ICD-10-CM

## 2022-12-19 DIAGNOSIS — Z79.899 CHRONIC USE OF BENZODIAZEPINE FOR THERAPEUTIC PURPOSE: ICD-10-CM

## 2022-12-19 RX ORDER — LORAZEPAM 0.5 MG/1
0.5 TABLET ORAL
Qty: 90 TABLET | Refills: 1 | Status: SHIPPED | OUTPATIENT
Start: 2022-12-19 | End: 2022-12-30

## 2022-12-29 ENCOUNTER — MYC MEDICAL ADVICE (OUTPATIENT)
Dept: INTERNAL MEDICINE | Facility: CLINIC | Age: 72
End: 2022-12-29

## 2022-12-29 DIAGNOSIS — Z79.899 CHRONIC USE OF BENZODIAZEPINE FOR THERAPEUTIC PURPOSE: ICD-10-CM

## 2022-12-29 DIAGNOSIS — G43.109 VERTIGINOUS MIGRAINE: Primary | ICD-10-CM

## 2022-12-29 DIAGNOSIS — F41.9 ANXIETY: ICD-10-CM

## 2022-12-30 RX ORDER — LORAZEPAM 0.5 MG/1
0.5 TABLET ORAL 2 TIMES DAILY PRN
Qty: 180 TABLET | Refills: 1 | Status: SHIPPED | OUTPATIENT
Start: 2022-12-30 | End: 2023-01-20

## 2023-01-20 ENCOUNTER — VIRTUAL VISIT (OUTPATIENT)
Dept: INTERNAL MEDICINE | Facility: CLINIC | Age: 73
End: 2023-01-20
Payer: MEDICARE

## 2023-01-20 ENCOUNTER — LAB (OUTPATIENT)
Dept: INTERNAL MEDICINE | Facility: CLINIC | Age: 73
End: 2023-01-20

## 2023-01-20 DIAGNOSIS — J44.89 ASTHMATIC BRONCHITIS , CHRONIC (H): ICD-10-CM

## 2023-01-20 DIAGNOSIS — N18.32 TYPE 2 DIABETES MELLITUS WITH STAGE 3B CHRONIC KIDNEY DISEASE, WITH LONG-TERM CURRENT USE OF INSULIN (H): Primary | ICD-10-CM

## 2023-01-20 DIAGNOSIS — E11.22 TYPE 2 DIABETES MELLITUS WITH STAGE 3B CHRONIC KIDNEY DISEASE, WITH LONG-TERM CURRENT USE OF INSULIN (H): Primary | ICD-10-CM

## 2023-01-20 DIAGNOSIS — I10 ESSENTIAL HYPERTENSION: ICD-10-CM

## 2023-01-20 DIAGNOSIS — G43.109 VERTIGINOUS MIGRAINE: ICD-10-CM

## 2023-01-20 DIAGNOSIS — I89.0 LYMPHEDEMA OF BOTH LOWER EXTREMITIES: ICD-10-CM

## 2023-01-20 DIAGNOSIS — Z79.4 TYPE 2 DIABETES MELLITUS WITH STAGE 3B CHRONIC KIDNEY DISEASE, WITH LONG-TERM CURRENT USE OF INSULIN (H): Primary | ICD-10-CM

## 2023-01-20 DIAGNOSIS — E03.9 ACQUIRED HYPOTHYROIDISM: ICD-10-CM

## 2023-01-20 DIAGNOSIS — Z12.11 SCREEN FOR COLON CANCER: ICD-10-CM

## 2023-01-20 DIAGNOSIS — Z79.899 CHRONIC USE OF BENZODIAZEPINE FOR THERAPEUTIC PURPOSE: ICD-10-CM

## 2023-01-20 PROCEDURE — 99214 OFFICE O/P EST MOD 30 MIN: CPT | Mod: 95 | Performed by: INTERNAL MEDICINE

## 2023-01-20 RX ORDER — INSULIN GLARGINE 100 [IU]/ML
10 INJECTION, SOLUTION SUBCUTANEOUS EVERY EVENING
Qty: 60 ML | Refills: 4 | Status: SHIPPED | OUTPATIENT
Start: 2023-01-20 | End: 2023-03-07

## 2023-01-20 RX ORDER — LORAZEPAM 0.5 MG/1
1 TABLET ORAL AT BEDTIME
Qty: 180 TABLET | Refills: 1 | COMMUNITY
Start: 2023-01-20 | End: 2023-07-10

## 2023-01-20 NOTE — PROGRESS NOTES
"Meggan is a 72 year old who is being evaluated via a billable video visit.      How would you like to obtain your AVS? MyChart  If the video visit is dropped, the invitation should be resent by: Text to cell phone: [phone]  Will anyone else be joining your video visit? No  {If patient encounters technical issues they should call [phone] :[id number]}        {PROVIDER CHARTING PREFERENCE:652564}    Subjective   Meggan is a 72 year old, presenting for the following health issues:  Recheck Medication and Diabetes      History of Present Illness       Diabetes:   She presents for follow up of diabetes.  She is checking home blood glucose with a continuous glucose monitor.  She checks blood glucose before and after meals and at bedtime.  Blood glucose is never over 200 and sometimes under 70. She is aware of hypoglycemia symptoms including shakiness and weakness. She has no concerns regarding her diabetes at this time.  She is not experiencing numbness or burning in feet, excessive thirst, blurry vision, weight changes or redness, sores or blisters on feet. The patient has not had a diabetic eye exam in the last 12 months.         Hypertension: She presents for follow up of hypertension.  She does check blood pressure  regularly outside of the clinic. Outside blood pressures have been over 140/90. She follows a low salt diet.     Headaches:   Since the patient's last clinic visit, headaches are: no change  The patient is getting headaches:  One a week  She is able to do normal daily activities when she has a migraine.  The patient is taking the following rescue/relief medications:  Other   Patient states \"I get some relief\" from the rescue/relief medications.   The patient is taking the following medications to prevent migraines:  No medications to prevent migraines  In the past 4 weeks, the patient has gone to an Urgent Care or Emergency Room 0 times times due to headaches.        {SUPERLIST " "(Optional):510134}  {additonal problems for provider to add (Optional):748478}    Review of Systems   {ROS COMP (Optional):557766}      Objective    Vitals - Patient Reported  Systolic (Patient Reported): 133  Diastolic (Patient Reported): 70  Pain Score: No Pain (0)        Physical Exam   {video visit exam brief selected:489342::\"GENERAL: Healthy, alert and no distress\",\"EYES: Eyes grossly normal to inspection.  No discharge or erythema, or obvious scleral/conjunctival abnormalities.\",\"RESP: No audible wheeze, cough, or visible cyanosis.  No visible retractions or increased work of breathing.  \",\"SKIN: Visible skin clear. No significant rash, abnormal pigmentation or lesions.\",\"NEURO: Cranial nerves grossly intact.  Mentation and speech appropriate for age.\",\"PSYCH: Mentation appears normal, affect normal/bright, judgement and insight intact, normal speech and appearance well-groomed.\"}    {Diagnostic Test Results (Optional):083904}    {AMBULATORY ATTESTATION (Optional):936143}        Video-Visit Details    Type of service:  Video Visit   Video Start Time: {video visit start/end time for provider to select:771642}  Video End Time:{video visit start/end time for provider to select:362009}    Originating Location (pt. Location): {video visit patient location:465786::\"Home\"}  {PROVIDER LOCATION On-site should be selected for visits conducted from your clinic location or adjoining [facility] hospital, academic office, or other nearby [facility] building. Off-site should be selected for all other provider locations, including home:832520}  Distant Location (provider location):  {virtual location provider:394163}  Platform used for Video Visit: [employer]    "

## 2023-01-20 NOTE — PROGRESS NOTES
Meggan is a 72 year old female contacting the clinic today via video, who will use the platform: Rubikloud for the visit.  Phone # for Doximity, or if Amwell drops:   Telephone Information:   Mobile [phone]          ASSESSMENT and PLAN:  1. Type 2 diabetes mellitus with stage 3b chronic kidney disease, with long-term current use of insulin (H)  Stable.  Doing well.  Eye referral.  Refill freestyle diego.  Move Lantus to different time to avoid morning and morning hyperglycemia  - Adult Eye  Referral; Future  - Continuous Blood Gluc Sensor (FREESTYLE DIEGO 2 SENSOR) [facility]; 1 each every 14 days To check blood sugars continuously  Dispense: 2 each; Refill: 5  - insulin glargine (LANTUS SOLOSTAR) 100 UNIT/ML pen; Inject 10 Units Subcutaneous every evening  Dispense: 60 mL; Refill: 4    2. Screen for colon cancer  Due for repeat Cologuard  - COLOGUARD([employer]); Future    3. Asthmatic bronchitis , chronic (H)  Breathing slightly flared this winter.  Acid heartburn well controlled    4. BMI 45.0-49.9, adult (H)  Current BMI 45 down from 54.  Discussed and congratulated.  May be able to discontinue Lantus completely    5. Acquired hypothyroidism  Stable with decrease dose down to 125    6. Lymphedema of both lower extremities  Now controlled with wraps and medicines.  Longer needing furosemide    7. Vertiginous migraine  With insomnia.  Trial of 2 lorazepam  - LORazepam (ATIVAN) 0.5 MG tablet; Take 2 tablets (1 mg) by mouth At Bedtime  Dispense: 180 tablet; Refill: 1    8. Chronic use of benzodiazepine for therapeutic purpose  - LORazepam (ATIVAN) 0.5 MG tablet; Take 2 tablets (1 mg) by mouth At Bedtime  Dispense: 180 tablet; Refill: 1    9. Essential hypertension  By resuming irbesartan       Patient Instructions   Test 6-8 times daily    Insulin once at night    Refill freestyle diego    Change Lantus insulin to morning or suppertime    Consider trial of decreased Monjaro    Resume  "[facility]    Ophthalmology referral           Increase lorazepam to 1 mg at bed     Return in about 6 months (around 7/20/2023).       CHIEF COMPLAINT:  Chief Complaint   Patient presents with     Recheck Medication     Diabetes       History of Present Illness       Diabetes:   She presents for follow up of diabetes.  She is checking home blood glucose with a continuous glucose monitor.  She checks blood glucose before and after meals and at bedtime.  Blood glucose is never over 200 and sometimes under 70. She is aware of hypoglycemia symptoms including shakiness and weakness. She has no concerns regarding her diabetes at this time.  She is not experiencing numbness or burning in feet, excessive thirst, blurry vision, weight changes or redness, sores or blisters on feet. The patient has not had a diabetic eye exam in the last 12 months.         Hypertension: She presents for follow up of hypertension.  She does check blood pressure  regularly outside of the clinic. Outside blood pressures have been over 140/90. She follows a low salt diet.     Headaches:   Since the patient's last clinic visit, headaches are: no change  The patient is getting headaches:  One a week  She is able to do normal daily activities when she has a migraine.  The patient is taking the following rescue/relief medications:  Other   Patient states \"I get some relief\" from the rescue/relief medications.   The patient is taking the following medications to prevent migraines:  No medications to prevent migraines  In the past 4 weeks, the patient has gone to an Urgent Care or Emergency Room 0 times times due to headaches.       HISTORY OF PRESENT ILLNESS:  Meggan is a 72 year old female contacting the clinic today via video for review of diabetes.  Uses the freestyle collin.  She checks her blood sugars 6-8 times per day.  She injects Lantus insulin once at bedtime for her type 2 diabetes.  She has now lost from a peak weight of 308 " "pounds down to her current weight of 238 pounds, a total loss of 70 pounds.  Peripheral edema and lymphedema is much improved.  Avapro was stopped due to hypotension and we discussed resuming.  She is not having any low sugars.  Last A1c was 7.1    She is having trouble sleeping despite eating lorazepam in bed.  Migraines are under control.  She stopped topiramate    Preventative care reviewed including mammograms and colon cancer screening    REVIEW OF SYSTEMS:   Minimal peripheral edema    PFSH:  Social History     Social History Narrative    She is  and is retired. She lives with her son and his family on the lower level of their house.  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  She has 5 grandchildren.  She does not smoke cigarettes or drink alcohol.       TOBACCO USE:  History   Smoking Status     Never   Smokeless Tobacco     Never       VITALS:  There were no vitals filed for this visit.  There were no vitals taken for this visit. Estimated body mass index is 54.32 kg/m  as calculated from the following:    Height as of 8/19/22: 1.546 m (5' 0.87\").    Weight as of 8/19/22: 129.8 kg (286 lb 3.2 oz).    PHYSICAL EXAM:  (observations via Video)  Alert and oriented    MEDICATIONS:   Current Outpatient Medications   Medication Sig Dispense Refill     albuterol (PROAIR HFA/PROVENTIL HFA/VENTOLIN HFA) 108 (90 Base) MCG/ACT inhaler Inhale 2 puffs into the lungs every 6 hours as needed for shortness of breath / dyspnea or wheezing 18 g 3     albuterol (PROVENTIL) (2.5 MG/3ML) 0.083% neb solution Take 1 vial (2.5 mg) by nebulization every 6 hours as needed for shortness of breath / dyspnea or wheezing 75 mL 3     alcohol swab prep pads Use to swab area of injection/vicki as directed. 100 each 3     allopurinol (ZYLOPRIM) 300 MG tablet Take 1 tablet (300 mg) by mouth daily 90 tablet 3     blood glucose (NO BRAND SPECIFIED) test strip Use to test blood sugar 4 " times daily or as directed.  Accu-Chek Devika plus Strips 200 strip 3     blood glucose monitoring (ACCU-CHEK FASTCLIX) lancets Use to test blood sugar 4 times daily. 204 each 6     blood glucose monitoring (NO BRAND SPECIFIED) meter device kit Use to test blood sugar 4 times daily or as directed. Preferred blood glucose meter OR supplies to accompany: Blood Glucose Monitor Brands: per insurance. 1 kit 0     celecoxib (CELEBREX) 200 MG capsule Take 1 capsule (200 mg) by mouth daily 30 capsule 1     Cholecalciferol (VITAMIN D) 2000 UNIT tablet Take 2 tablets by mouth daily        colchicine (COLCYRS) 0.6 MG tablet Take 1 tablet (0.6 mg) by mouth daily 90 tablet 3     Continuous Blood Gluc  (FREESTYLE DIEGO 2 READER) [place] 1 each continuous Use to read blood sugars per 's instructions. 1 each 0     Continuous Blood Gluc Sensor (FREESTYLE DIEGO 2 SENSOR) MISC 1 each every 14 days To check blood sugars continuously 2 each 5     famotidine (PEPCID) 20 MG tablet Take 1 tablet (20 mg) by mouth 2 times daily 180 tablet 3     furosemide (LASIX) 40 MG tablet Take 1 tablet (40 mg) by mouth daily 90 tablet 3     insulin glargine (LANTUS SOLOSTAR) 100 UNIT/ML pen Inject 10 Units Subcutaneous every evening 60 mL 4     irbesartan (AVAPRO) 150 MG tablet Take 1 tablet (150 mg) by mouth At Bedtime 90 tablet 3     levothyroxine (SYNTHROID) 125 MCG tablet Take 1 tablet (125 mcg) by mouth daily 90 tablet 3     LORazepam (ATIVAN) 0.5 MG tablet Take 2 tablets (1 mg) by mouth At Bedtime 180 tablet 1     metoprolol succinate ER (TOPROL XL) 100 MG 24 hr tablet Take 1 tablet (100 mg) by mouth daily 90 tablet 3     pantoprazole (PROTONIX) 40 MG EC tablet Take 1 tablet (40 mg) by mouth daily 90 tablet 3     Probiotic Product (PROBIOTIC-10 PO) Take 1 capsule by mouth daily Florastor        rosuvastatin (CRESTOR) 10 MG tablet Take 1 tablet (10 mg) by mouth three times a week 90 tablet 3     sucralfate (CARAFATE) 1 GM tablet Take  1 tablet (1 g) by mouth 2 times daily as needed (acid reflux) 60 tablet 11     thin (NO BRAND SPECIFIED) lancets Use with lanceting device. To accompany: Blood Glucose Monitor Brands: per insurance. 200 each 6     Tirzepatide (MOUNJARO) 12.5 MG/0.5ML SOPN Inject 12.5 mg Subcutaneous once a week 2 mL 2     tiZANidine (ZANAFLEX) 4 MG capsule Take 1 capsule (4 mg) by mouth 3 times daily as needed for muscle spasms 270 capsule 3     triamcinolone (KENALOG) 0.1 % external cream Apply topically 2 times daily 30 g 3       Outside Notes summarized: Pharm.D. note December  Labs, x-rays, cardiology, GI tests reviewed: Renal function improving.  A1c 7.1  Recent Labs   Lab Test 11/17/22  1213 09/21/22  1218 08/25/22  1423 08/19/22  1325 04/29/22  1144 11/29/21  1251 07/28/21  1147   HGB  --   --   --  13.2  --   --  13.7   WBC  --   --   --  7.6  --   --  9.2    134*   < > 138 142   < > 143   POTASSIUM 4.3 4.9   < > 4.7 4.5   < > 4.2   CR 1.14* 1.37*   < > 2.87* 1.47*   < > 1.37*   A1C 7.1*  --   --  7.2* 8.7*   < > 8.5*   URIC 4.5  --   --  5.1 6.7   < >  --    B12  --   --   --   --  787  --   --    TSH 0.25*  --   --  0.87  --    < >  --    VITDT  --   --   --   --  70  --   --    SED 20  --   --  41*  --   --   --    CRP 3.27  --   --  10.70*  --   --   --     < > = values in this interval not displayed.     No results found for: [id number]  Lab Results   Component Value Date    CHOL 132 08/19/2022     New orders:   Orders Placed This Encounter   Procedures     Adult Eye  Referral       Independent review of:     Nate Barcenas MD  [facility]    Video-Visit Details  Type of service:  Video Visit  Patient has given verbal consent to a Video visit?  Yes  How would you like to obtain your AVS?  MyChart  Will anyone else be joining your video visit, giving supplemental history? No    Originating location (pt location): Home    Distant Location (provider location):   Off-site    Video Start Time: 12:26 PM  Video End time:  12:48 PM  Face to face plus orders: 22 minutes  Documentation time:  3 minutes     The visit lasted a total of 28 minutes

## 2023-01-20 NOTE — PATIENT INSTRUCTIONS
Test 6-8 times daily    Insulin once at night    Refill freestyle collin    Change Lantus insulin to morning or suppertime    Consider trial of decreased Monjaro    Resume irbesartan    Cologuard    Ophthalmology referral    Increase lorazepam to 1 mg at bed

## 2023-02-02 ENCOUNTER — NURSE TRIAGE (OUTPATIENT)
Dept: INTERNAL MEDICINE | Facility: CLINIC | Age: 73
End: 2023-02-02
Payer: MEDICARE

## 2023-02-02 NOTE — TELEPHONE ENCOUNTER
Reason for Call:  Other call back    Detailed comments: Meggan has had diahrea since 1/28/2023 and imodium is not helping much. Up many times during the night going. Its worse at night then during the day. Does not know what to do and would like nurse to call her    Phone Number Patient can be reached at: Home number on file [phone] (home)    Best Time: any    Can we leave a detailed message on this number? YES    Call taken on 2/2/2023 at 7:22 AM by Sis Talamantes

## 2023-02-02 NOTE — TELEPHONE ENCOUNTER
Nurse Triage SBAR    Is this a 2nd Level Triage? YES, LICENSED PRACTITIONER REVIEW IS REQUIRED    Situation: patient reports diarrhea for the past 5 days    Background: In the last 24 hours she has had 5-7 loose watery stools. No recent travel, antibiotics or spoiled foods. Denies abdominal pain but states she does have some discomfort/cramping in her lower back. Denies signs of dehydration including weakness, dizziness, dry mouth and weight loss. States she drinks about 40 oz of water per day. States Imodium is not effective at controlling this.    Assessment: Home care discussed with patient including proper hydration and BRAT diet. Patient wondering what else she can do and if she needs a visit.    Protocol Recommended Disposition:   See in Office Today    Recommendation: Protocol recommends patient be seen today. Routing to PCP for further advice.     Routed to provider    Does the patient meet one of the following criteria for ADS visit consideration? 16+ years old, with an [facility] PCP     TIP  Providers, please consider if this condition is appropriate for management at one of our Acute and Diagnostic Services sites.     If patient is a good candidate, please use dotphrase <dot>triageresponse and select Refer to ADS to document.  Reason for Disposition    SEVERE diarrhea (e.g., 7 or more times / day more than normal) and present > 24 hours (1 day)    Additional Information    Negative: SEVERE diarrhea (e.g., 7 or more times / day more than normal) and age > 60 years    Negative: Constant abdominal pain lasting > 2 hours    Negative: Drinking very little and has signs of dehydration (e.g., no urine > 12 hours, very dry mouth, very lightheaded)    Negative: Patient sounds very sick or weak to the triager    Negative: SEVERE abdominal pain (e.g., excruciating) and present > 1 hour    Negative: SEVERE abdominal pain and age > 60 years    Negative: Bloody, black, or tarry bowel movements (Exception: chronic-unchanged  "black-grey bowel movements and is taking iron pills or Pepto-Bismol)    Negative: Vomiting also present and worse than the diarrhea    Negative: Blood in stool and without diarrhea    Negative: Shock suspected (e.g., cold/pale/clammy skin, too weak to stand, low BP, rapid pulse)    Negative: Difficult to awaken or acting confused (e.g., disoriented, slurred speech)    Negative: Sounds like a life-threatening emergency to the triager    Answer Assessment - Initial Assessment Questions  1. DIARRHEA SEVERITY: \"How bad is the diarrhea?\" \"How many more stools have you had in the past 24 hours than normal?\"     - NO DIARRHEA (SCALE 0)    - MILD (SCALE 1-3): Few loose or mushy BMs; increase of 1-3 stools over normal daily number of stools; mild increase in ostomy output.    -  MODERATE (SCALE 4-7): Increase of 4-6 stools daily over normal; moderate increase in ostomy output.  * SEVERE (SCALE 8-10; OR 'WORST POSSIBLE'): Increase of 7 or more stools daily over normal; moderate increase in ostomy output; incontinence.      Increase of 5-7 times  2. ONSET: \"When did the diarrhea begin?\"       1/28  3. BM CONSISTENCY: \"How loose or watery is the diarrhea?\"       watery  4. VOMITING: \"Are you also vomiting?\" If Yes, ask: \"How many times in the past 24 hours?\"       no  5. ABDOMINAL PAIN: \"Are you having any abdominal pain?\" If Yes, ask: \"What does it feel like?\" (e.g., crampy, dull, intermittent, constant)       No pain but discomfort/cramping  6. ABDOMINAL PAIN SEVERITY: If present, ask: \"How bad is the pain?\"  (e.g., Scale 1-10; mild, moderate, or severe)    - MILD (1-3): doesn't interfere with normal activities, abdomen soft and not tender to touch     - MODERATE (4-7): interferes with normal activities or awakens from sleep, abdomen tender to touch     - SEVERE (8-10): excruciating pain, doubled over, unable to do any normal activities        0  7. ORAL INTAKE: If vomiting, \"Have you been able to drink liquids?\" \"How much " "liquids have you had in the past 24 hours?\"      Yes about 40 oz  8. HYDRATION: \"Any signs of dehydration?\" (e.g., dry mouth [not just dry lips], too weak to stand, dizziness, new weight loss) \"When did you last urinate?\"      Normal urination, no signs of dehydration  9. EXPOSURE: \"Have you traveled to a foreign country recently?\" \"Have you been exposed to anyone with diarrhea?\" \"Could you have eaten any food that was spoiled?\"      Denies travel or food  10. ANTIBIOTIC USE: \"Are you taking antibiotics now or have you taken antibiotics in the past 2 months?\"        no  11. OTHER SYMPTOMS: \"Do you have any other symptoms?\" (e.g., fever, blood in stool)        no  12. PREGNANCY: \"Is there any chance you are pregnant?\" \"When was your last menstrual period?\"        no    Protocols used: DIARRHEA-A-OH    "

## 2023-02-02 NOTE — TELEPHONE ENCOUNTER
Could be diverticulitis    Current treatment guidelines suggest clear liquid diet, and anti inflammatories like celebrex for a few days, no antibiotics    Push fluids    Hold colchicine, furosemide, irbesartan, mounjaro    Take imodium double dose, 4 mgs per time, up to 16 mgs daily

## 2023-02-02 NOTE — TELEPHONE ENCOUNTER
Patient calling back stating she took a home covid test and it was negative.    She states she has developed some abdominal cramping. She restates that she has tried the imodium and it has not been effective. States she has been trying to take up to 4 per day as recommended on the bottle.    She wonders if there is anything else Dr Barcenas can prescribe or any tests he can order so she can figure out what is causing this and how to get it to resolve.

## 2023-02-02 NOTE — TELEPHONE ENCOUNTER
Options to consider would of course be some sort of viral gastroenteritis or COVID    Over-the-counter medications such as iron or magnesium supplements are notorious for causing diarrhea    Losartan was resumed on January 20 but that should not cause diarrhea    Certain medications can cause diarrhea like omeprazole and anti-inflammatory medications such as Celebrex or pantoprazole    Any recent antibiotic use?    There is nothing to see in the clinic when you have diarrhea.  Sometimes further testing is warranted

## 2023-02-02 NOTE — TELEPHONE ENCOUNTER
Spoke with patient and relayed information below from Dr Barcenas. Patient verbalized understanding and agrees with the plan. She has no further questions and will update the clinic next week.

## 2023-02-02 NOTE — TELEPHONE ENCOUNTER
Spoke with patient and relayed information below from Dr Barcenas. Patient verbalized understanding and states she has not done a Covid test, but has some on hand and will take one. States she will call the clinic back if it comes back positive.     Reviewed home care with patient and instructed to call back if symptoms become worse or if her covid test is positive. Patient verbalized understanding and agrees with the plan.

## 2023-02-08 DIAGNOSIS — R19.7 DIARRHEA, UNSPECIFIED TYPE: Primary | ICD-10-CM

## 2023-02-08 RX ORDER — DIPHENOXYLATE HCL/ATROPINE 2.5-.025MG
1 TABLET ORAL 4 TIMES DAILY PRN
Qty: 30 TABLET | Refills: 1 | Status: SHIPPED | OUTPATIENT
Start: 2023-02-08 | End: 2023-05-04

## 2023-02-14 ENCOUNTER — TRANSFERRED RECORDS (OUTPATIENT)
Dept: HEALTH INFORMATION MANAGEMENT | Facility: CLINIC | Age: 73
End: 2023-02-14

## 2023-02-14 LAB — RETINOPATHY: POSITIVE

## 2023-02-16 ENCOUNTER — TELEPHONE (OUTPATIENT)
Dept: INTERNAL MEDICINE | Facility: CLINIC | Age: 73
End: 2023-02-16
Payer: MEDICARE

## 2023-02-22 NOTE — TELEPHONE ENCOUNTER
Spoke with patient who states she was able to find this at the [facility]. States she has been taking it as prescribed and does not need any alternatives.

## 2023-03-04 LAB — NONINV COLON CA DNA+OCC BLD SCRN STL QL: NEGATIVE

## 2023-03-07 ENCOUNTER — OFFICE VISIT (OUTPATIENT)
Dept: INTERNAL MEDICINE | Facility: CLINIC | Age: 73
End: 2023-03-07
Payer: MEDICARE

## 2023-03-07 VITALS
DIASTOLIC BLOOD PRESSURE: 74 MMHG | HEART RATE: 86 BPM | OXYGEN SATURATION: 99 % | WEIGHT: 229.2 LBS | BODY MASS INDEX: 42.18 KG/M2 | HEIGHT: 62 IN | TEMPERATURE: 97.8 F | RESPIRATION RATE: 12 BRPM | SYSTOLIC BLOOD PRESSURE: 118 MMHG

## 2023-03-07 DIAGNOSIS — E11.22 TYPE 2 DIABETES MELLITUS WITH STAGE 3B CHRONIC KIDNEY DISEASE, WITH LONG-TERM CURRENT USE OF INSULIN (H): ICD-10-CM

## 2023-03-07 DIAGNOSIS — M17.11 PRIMARY OSTEOARTHRITIS OF RIGHT KNEE: ICD-10-CM

## 2023-03-07 DIAGNOSIS — I10 ESSENTIAL HYPERTENSION: ICD-10-CM

## 2023-03-07 DIAGNOSIS — N18.32 TYPE 2 DIABETES MELLITUS WITH STAGE 3B CHRONIC KIDNEY DISEASE, WITH LONG-TERM CURRENT USE OF INSULIN (H): ICD-10-CM

## 2023-03-07 DIAGNOSIS — J44.89 ASTHMATIC BRONCHITIS , CHRONIC (H): ICD-10-CM

## 2023-03-07 DIAGNOSIS — Z79.4 TYPE 2 DIABETES MELLITUS WITH STAGE 3B CHRONIC KIDNEY DISEASE, WITH LONG-TERM CURRENT USE OF INSULIN (H): ICD-10-CM

## 2023-03-07 DIAGNOSIS — Z23 NEED FOR SHINGLES VACCINE: ICD-10-CM

## 2023-03-07 DIAGNOSIS — M1A.9XX1 CHRONIC TOPHACEOUS GOUT: ICD-10-CM

## 2023-03-07 DIAGNOSIS — N18.31 STAGE 3A CHRONIC KIDNEY DISEASE (H): ICD-10-CM

## 2023-03-07 DIAGNOSIS — E03.9 ACQUIRED HYPOTHYROIDISM: ICD-10-CM

## 2023-03-07 DIAGNOSIS — I89.0 LYMPHEDEMA OF BOTH LOWER EXTREMITIES: ICD-10-CM

## 2023-03-07 DIAGNOSIS — Z01.818 PRE-OPERATIVE EXAMINATION FOR INTERNAL MEDICINE: Primary | ICD-10-CM

## 2023-03-07 LAB
ALBUMIN SERPL BCG-MCNC: 4.2 G/DL (ref 3.5–5.2)
ALBUMIN UR-MCNC: 30 MG/DL
ALP SERPL-CCNC: 278 U/L (ref 35–104)
ALT SERPL W P-5'-P-CCNC: 53 U/L (ref 10–35)
ANION GAP SERPL CALCULATED.3IONS-SCNC: 15 MMOL/L (ref 7–15)
APPEARANCE UR: CLEAR
AST SERPL W P-5'-P-CCNC: 40 U/L (ref 10–35)
ATRIAL RATE - MUSE: 83 BPM
BACTERIA #/AREA URNS HPF: ABNORMAL /HPF
BASOPHILS # BLD AUTO: 0.1 10E3/UL (ref 0–0.2)
BASOPHILS NFR BLD AUTO: 1 %
BILIRUB SERPL-MCNC: 0.7 MG/DL
BILIRUB UR QL STRIP: ABNORMAL
BUN SERPL-MCNC: 44.2 MG/DL (ref 8–23)
CALCIUM SERPL-MCNC: 10.4 MG/DL (ref 8.8–10.2)
CHLORIDE SERPL-SCNC: 107 MMOL/L (ref 98–107)
COLOR UR AUTO: YELLOW
CREAT SERPL-MCNC: 1.35 MG/DL (ref 0.51–0.95)
DEPRECATED HCO3 PLAS-SCNC: 17 MMOL/L (ref 22–29)
DIASTOLIC BLOOD PRESSURE - MUSE: NORMAL MMHG
EOSINOPHIL # BLD AUTO: 0.1 10E3/UL (ref 0–0.7)
EOSINOPHIL NFR BLD AUTO: 1 %
ERYTHROCYTE [DISTWIDTH] IN BLOOD BY AUTOMATED COUNT: 15 % (ref 10–15)
GFR SERPL CREATININE-BSD FRML MDRD: 41 ML/MIN/1.73M2
GLUCOSE SERPL-MCNC: 174 MG/DL (ref 70–99)
GLUCOSE UR STRIP-MCNC: NEGATIVE MG/DL
HBA1C MFR BLD: 7 % (ref 0–5.6)
HCT VFR BLD AUTO: 40.2 % (ref 35–47)
HGB BLD-MCNC: 13.1 G/DL (ref 11.7–15.7)
HGB UR QL STRIP: ABNORMAL
IMM GRANULOCYTES # BLD: 0 10E3/UL
IMM GRANULOCYTES NFR BLD: 0 %
INTERPRETATION ECG - MUSE: NORMAL
KETONES UR STRIP-MCNC: NEGATIVE MG/DL
LEUKOCYTE ESTERASE UR QL STRIP: ABNORMAL
LYMPHOCYTES # BLD AUTO: 2 10E3/UL (ref 0.8–5.3)
LYMPHOCYTES NFR BLD AUTO: 20 %
MCH RBC QN AUTO: 29.6 PG (ref 26.5–33)
MCHC RBC AUTO-ENTMCNC: 32.6 G/DL (ref 31.5–36.5)
MCV RBC AUTO: 91 FL (ref 78–100)
MONOCYTES # BLD AUTO: 0.6 10E3/UL (ref 0–1.3)
MONOCYTES NFR BLD AUTO: 6 %
NEUTROPHILS # BLD AUTO: 7.4 10E3/UL (ref 1.6–8.3)
NEUTROPHILS NFR BLD AUTO: 73 %
NITRATE UR QL: NEGATIVE
P AXIS - MUSE: 3 DEGREES
PH UR STRIP: 5.5 [PH] (ref 5–8)
PLATELET # BLD AUTO: 273 10E3/UL (ref 150–450)
POTASSIUM SERPL-SCNC: 4.6 MMOL/L (ref 3.4–5.3)
PR INTERVAL - MUSE: 154 MS
PROT SERPL-MCNC: 6.9 G/DL (ref 6.4–8.3)
QRS DURATION - MUSE: 128 MS
QT - MUSE: 422 MS
QTC - MUSE: 495 MS
R AXIS - MUSE: 54 DEGREES
RBC # BLD AUTO: 4.42 10E6/UL (ref 3.8–5.2)
RBC #/AREA URNS AUTO: ABNORMAL /HPF
SODIUM SERPL-SCNC: 139 MMOL/L (ref 136–145)
SP GR UR STRIP: 1.02 (ref 1–1.03)
SQUAMOUS #/AREA URNS AUTO: ABNORMAL /LPF
SYSTOLIC BLOOD PRESSURE - MUSE: NORMAL MMHG
T AXIS - MUSE: 6 DEGREES
TSH SERPL DL<=0.005 MIU/L-ACNC: 0.37 UIU/ML (ref 0.3–4.2)
UROBILINOGEN UR STRIP-ACNC: 0.2 E.U./DL
VENTRICULAR RATE- MUSE: 83 BPM
WBC # BLD AUTO: 10.1 10E3/UL (ref 4–11)
WBC #/AREA URNS AUTO: ABNORMAL /HPF

## 2023-03-07 PROCEDURE — 99214 OFFICE O/P EST MOD 30 MIN: CPT | Performed by: INTERNAL MEDICINE

## 2023-03-07 PROCEDURE — 36415 COLL VENOUS BLD VENIPUNCTURE: CPT | Performed by: INTERNAL MEDICINE

## 2023-03-07 PROCEDURE — 93005 ELECTROCARDIOGRAM TRACING: CPT | Performed by: INTERNAL MEDICINE

## 2023-03-07 PROCEDURE — 80050 GENERAL HEALTH PANEL: CPT | Performed by: INTERNAL MEDICINE

## 2023-03-07 PROCEDURE — 93010 ELECTROCARDIOGRAM REPORT: CPT | Mod: OFF | Performed by: GENERAL ACUTE CARE HOSPITAL

## 2023-03-07 PROCEDURE — 81001 URINALYSIS AUTO W/SCOPE: CPT | Performed by: INTERNAL MEDICINE

## 2023-03-07 PROCEDURE — 83036 HEMOGLOBIN GLYCOSYLATED A1C: CPT | Performed by: INTERNAL MEDICINE

## 2023-03-07 RX ORDER — ROSUVASTATIN CALCIUM 10 MG/1
10 TABLET, COATED ORAL
Qty: 90 TABLET | Refills: 3 | Status: SHIPPED | OUTPATIENT
Start: 2023-03-08 | End: 2023-03-09

## 2023-03-07 RX ORDER — FUROSEMIDE 40 MG
40 TABLET ORAL DAILY PRN
Qty: 90 TABLET | Refills: 3 | COMMUNITY
Start: 2023-03-07 | End: 2023-06-27

## 2023-03-07 RX ORDER — INSULIN GLARGINE 100 [IU]/ML
5 INJECTION, SOLUTION SUBCUTANEOUS EVERY MORNING
Qty: 60 ML | Refills: 4 | COMMUNITY
Start: 2023-03-07 | End: 2023-08-18

## 2023-03-07 RX ORDER — COLCHICINE 0.6 MG/1
0.6 TABLET ORAL DAILY PRN
Qty: 90 TABLET | Refills: 3 | COMMUNITY
Start: 2023-03-07

## 2023-03-07 ASSESSMENT — PAIN SCALES - GENERAL: PAINLEVEL: MILD PAIN (3)

## 2023-03-07 NOTE — PROGRESS NOTES
[facility]  [address] [place] [address]  Phone: [phone]  Fax: [phone]  Primary Provider: Eric Barcenas  Pre-op Performing Provider: ERIC BARCENAS    {Provider  Link to PREOP SmartSet  Use this to apply standard patient instructions to AVS; includes medication directions, common orders, guidelines for anemia, warfarin, additional testing   :935278}  PREOPERATIVE EVALUATION:  Today's date: 3/7/2023    Meggan Everett is a 72 year old female who presents for a preoperative evaluation.    Surgical Information:  Surgery/Procedure: right total knee  Surgery Location: [facility]  Surgeon: stephanie  Surgery Date: 3/27/23  Time of Surgery: TBD  Where patient plans to recover: At a TCU (Transitional Care Unit)  Fax number for surgical facility: [phone]    Type of Anesthesia Anticipated: {ANESTHESIA:714542}    {2021 Provider Charting Preference for Preop :794841}    Subjective     HPI related to upcoming procedure: ***    Preop Questions 3/7/2023   1. Have you ever had a heart attack or stroke? No   2. Have you ever had surgery on your heart or blood vessels, such as a stent placement, a coronary artery bypass, or surgery on an artery in your head, neck, heart, or legs? No   3. Do you have chest pain with activity? No   4. Do you have a history of  heart failure? No   5. Do you currently have a cold, bronchitis or symptoms of other infection? No   6. Do you have a cough, shortness of breath, or wheezing? No   7. Do you or anyone in your family have previous history of blood clots? YES - ***   8. Do you or does anyone in your family have a serious bleeding problem such as prolonged bleeding following surgeries or cuts? No   9. Have you ever had problems with anemia or been told to take iron pills? No   10. Have you had any abnormal blood loss such as black, tarry or bloody stools, or abnormal vaginal bleeding? No   11. Have you ever had  a blood transfusion? No   12. Are you willing to have a blood transfusion if it is medically needed before, during, or after your surgery? Yes   13. Have you or any of your relatives ever had problems with anesthesia? YES - ***   14. Do you have sleep apnea, excessive snoring or daytime drowsiness? YES - ***   14a. Do you have a CPAP machine? Yes   15. Do you have any artifical heart valves or other implanted medical devices like a pacemaker, defibrillator, or continuous glucose monitor? YES - ***   15a. What type of device do you have? continuios glucose monitor   15b. Name of the clinic that manages your device:  [facility]   16. Do you have artificial joints? No   17. Are you allergic to latex? No       Health Care Directive:  Patient does not have a Health Care Directive or Living Will: {ADVANCE_DIRECTIVE_STATUS:066787}    Preoperative Review of :  {Mnpmpreport:572864}  {Review MNPMP for all patients per [facility] MNPMP Profile:571835}    {Chronic problem details (Optional) :743592}    Review of Systems  {ROS Preop Choices:031216}    Patient Active Problem List    Diagnosis Date Noted     Acquired hypothyroidism 08/19/2022     Priority: Medium     Vertiginous migraine 08/19/2022     Priority: Medium     Lymphedema of both lower extremities 08/19/2022     Priority: Medium     Asthmatic bronchitis , chronic (H) 04/07/2022     Priority: Medium     Type 2 diabetes mellitus with stage 3b chronic kidney disease, with long-term current use of insulin (H) 11/29/2021     Priority: Medium     Screening for colon cancer 11/29/2021     Priority: Medium     Chronic use of benzodiazepine for therapeutic purpose 07/28/2021     Priority: Medium     Primary osteoarthritis of both knees 09/28/2020     Priority: Medium     BMI 45.0-49.9, adult (H) 05/31/2019     Priority: Medium     Formatting of this note might be different from the original.  Created by Conversion  Formatting of this note might be different from the  original.  Created by Conversion       Chronic kidney disease, stage III (moderate) (H) 01/22/2019     Priority: Medium     Senile osteoporosis 01/18/2019     Priority: Medium     Obstructive sleep apnea syndrome 12/27/2016     Priority: Medium     Formatting of this note might be different from the original.  Has a CPAP  Formatting of this note might be different from the original.  Has a CPAP       Fibroadenoma of LEFT breast, with fibrocystic changes 05/14/2012     Priority: Medium      Past Medical History:   Diagnosis Date     Asthma      Chronic kidney failure, stage 3 (moderate) (H)      Chronic use of benzodiazepine for therapeutic purpose      Esophageal reflux      Fibroadenoma of LEFT breast, with fibrocystic changes 05/14/2012     Gout      Hyperlipidemia      Hypothyroid      Mammographic microcalcification 05/01/2012    Left     Migraine with aura      Obesity      ASHER (obstructive sleep apnea)     uses CPAP     Osteoporosis      Primary osteoarthritis of both knees 09/28/2020     RBBB      Screening for colon cancer 11/29/2021     Type 2 diabetes mellitus (H)      Past Surgical History:   Procedure Laterality Date     BIOPSY BREAST       D & C  2000, 2002     DILATE CERVIX, ABLATE ENDOMETRIUM, COMBINED  2005     ENDOMETRIAL ABLATION       HC BIOPSY OF BREAST, OPEN INCISIONAL  1972    Left     HC LAPAROSCOPY, SURGICAL; CHOLECYSTECTOMY  1998     LAPAROSCOPIC CHOLECYSTECTOMY  1999     LUMBAR LAMINECTOMY  1998    Description: Laminectomy Lumbar     Microdiscectomy  1998     Current Outpatient Medications   Medication Sig Dispense Refill     albuterol (PROAIR HFA/PROVENTIL HFA/VENTOLIN HFA) 108 (90 Base) MCG/ACT inhaler Inhale 2 puffs into the lungs every 6 hours as needed for shortness of breath / dyspnea or wheezing 18 g 3     albuterol (PROVENTIL) (2.5 MG/3ML) 0.083% neb solution Take 1 vial (2.5 mg) by nebulization every 6 hours as needed for shortness of breath / dyspnea or wheezing 75 mL 3      alcohol swab prep pads Use to swab area of injection/vicki as directed. 100 each 3     allopurinol (ZYLOPRIM) 300 MG tablet Take 1 tablet (300 mg) by mouth daily 90 tablet 3     blood glucose monitoring (ACCU-CHEK FASTCLIX) lancets Use to test blood sugar 4 times daily. 204 each 6     blood glucose monitoring (NO BRAND SPECIFIED) meter device kit Use to test blood sugar 4 times daily or as directed. Preferred blood glucose meter OR supplies to accompany: Blood Glucose Monitor Brands: per insurance. 1 kit 0     celecoxib (CELEBREX) 200 MG capsule Take 1 capsule (200 mg) by mouth daily 30 capsule 1     Cholecalciferol (VITAMIN D) 2000 UNIT tablet Take 2 tablets by mouth daily        famotidine (PEPCID) 20 MG tablet Take 1 tablet (20 mg) by mouth 2 times daily 180 tablet 3     irbesartan (AVAPRO) 150 MG tablet Take 1 tablet (150 mg) by mouth At Bedtime 90 tablet 3     levothyroxine (SYNTHROID) 125 MCG tablet Take 1 tablet (125 mcg) by mouth daily 90 tablet 3     LORazepam (ATIVAN) 0.5 MG tablet Take 2 tablets (1 mg) by mouth At Bedtime 180 tablet 1     metoprolol succinate ER (TOPROL XL) 100 MG 24 hr tablet Take 1 tablet (100 mg) by mouth daily 90 tablet 3     Probiotic Product (PROBIOTIC-10 PO) Take 1 capsule by mouth daily Florastor        rosuvastatin (CRESTOR) 10 MG tablet Take 1 tablet (10 mg) by mouth three times a week 90 tablet 3     thin (NO BRAND SPECIFIED) lancets Use with lanceting device. To accompany: Blood Glucose Monitor Brands: per insurance. 200 each 6     tirzepatide (MOUNJARO) 10 MG/0.5ML pen Inject 10 mg Subcutaneous every 7 days 2 mL 3     tiZANidine (ZANAFLEX) 4 MG capsule Take 1 capsule (4 mg) by mouth 3 times daily as needed for muscle spasms 270 capsule 3     triamcinolone (KENALOG) 0.1 % external cream Apply topically 2 times daily 30 g 3     blood glucose (NO BRAND SPECIFIED) test strip Use to test blood sugar 4 times daily or as directed.  Accu-Chek Devika plus Strips (Patient not taking:  Reported on 3/7/2023) 200 strip 3     colchicine (COLCYRS) 0.6 MG tablet Take 1 tablet (0.6 mg) by mouth daily (Patient not taking: Reported on 3/7/2023) 90 tablet 3     Continuous Blood Gluc  (FREESTYLE DIEGO 2 READER) [place] 1 each continuous Use to read blood sugars per 's instructions. 1 each 0     Continuous Blood Gluc Sensor (FREESTYLE DIEGO 2 SENSOR) MISC 1 each every 14 days To check blood sugars continuously 2 each 5     diphenoxylate-atropine (LOMOTIL) 2.5-0.025 MG tablet Take 1 tablet by mouth 4 times daily as needed for diarrhea 30 tablet 1     furosemide (LASIX) 40 MG tablet Take 1 tablet (40 mg) by mouth daily (Patient not taking: Reported on 3/7/2023) 90 tablet 3     insulin glargine (LANTUS SOLOSTAR) 100 UNIT/ML pen Inject 10 Units Subcutaneous every evening 60 mL 4     pantoprazole (PROTONIX) 40 MG EC tablet Take 1 tablet (40 mg) by mouth daily (Patient not taking: Reported on 3/7/2023) 90 tablet 3     sucralfate (CARAFATE) 1 GM tablet Take 1 tablet (1 g) by mouth 2 times daily as needed (acid reflux) (Patient not taking: Reported on 3/7/2023) 60 tablet 11       Allergies   Allergen Reactions     Codeine Sulfate Nausea     Metformin      Stomach pain      Tape [Adhesive Tape] Blisters     Tetanus Toxoid         Social History     Tobacco Use     Smoking status: Never     Smokeless tobacco: Never   Substance Use Topics     Alcohol use: No     {FAMILY HISTORY (Optional):448757749}  History   Drug Use No         Objective     /74 (BP Location: Left arm, Patient Position: Sitting, Cuff Size: Adult Large)   Pulse 86   Temp 97.8  F (36.6  C)   Resp 12   Ht 1.524 m (5')   SpO2 99%   BMI 55.89 kg/m      Physical Exam  {EXAM Preop Choices:708865}    Recent Labs   Lab Test 11/17/22  1213 09/21/22  1218 08/25/22  1423 08/19/22  1325 11/29/21  1251 07/28/21  1147   HGB  --   --   --  13.2  --  13.7   PLT  --   --   --  272  --  264    134*   < > 138   < > 143   POTASSIUM 4.3  "4.9   < > 4.7   < > 4.2   CR 1.14* 1.37*   < > 2.87*   < > 1.37*   A1C 7.1*  --   --  7.2*   < > 8.5*    < > = values in this interval not displayed.        Diagnostics:  {LABS:744829}   {EK}    Revised Cardiac Risk Index (RCRI):  The patient has the following serious cardiovascular risks for perioperative complications:  {PREOP REVISED CARDIAC RISK INDEX (RCRI) :514617::\" - No serious cardiac risks = 0 points\"}     RCRI Interpretation: {REVISED CARDIAC RISK INTERPRETATION :476422}         Signed Electronically by: Nate Barcenas MD  Copy of this evaluation report is provided to requesting physician.    {Provider Resources  Preop [facility] Preop Guidelines  Revised Cardiac Risk Index :597552}  "

## 2023-03-07 NOTE — PROGRESS NOTES
[facility]  [address] [place] [address]  Phone: [phone]  Fax: [phone]  Primary Provider: Nate Barcenas  :[id number]}  PREOPERATIVE EVALUATION:  Today's date: 3/7/2023    Meggan Everett is a 72 year old female who presents for a preoperative evaluation.    Surgical Information:  Surgery/Procedure: right total knee  Surgery Location: [facility]  Surgeon: stephanie  Surgery Date: 3/27/23  Time of Surgery: TBD  Where patient plans to recover: Home  Fax number for surgical facility: [phone]    Type of Anesthesia Anticipated: General    Assessment/Plan:     ASSESSMENT and PLAN:  1. Pre-operative examination for internal medicine  Stable for proposed surgery.  Appreciate weight loss.  Lymphedema still needs work  - ZOSTER VACCINE RECOMBINANT ADJUVANTED (SHINGRIX); Future    2. Primary osteoarthritis of right knee  Stable for surgery  - UA reflex to Microscopic and Culture ([id number]); Future  - UA reflex to Microscopic and Culture ([id number])  - UA Microscopic with Reflex to Culture    3. Type 2 diabetes mellitus with stage 3b chronic kidney disease, with long-term current use of insulin (H)  Marked improvement on Mounjaro.  May be able to discontinue insulin completely  - UA reflex to Microscopic and Culture ([id number]); Future  - Comprehensive metabolic panel; Future  - Hemoglobin A1c; Future  - insulin glargine (LANTUS SOLOSTAR) 100 UNIT/ML pen; Inject 10 Units Subcutaneous every morning  Dispense: 60 mL; Refill: 4  - EKG 12-lead, tracing only; Future  - EKG 12-lead, tracing only  - UA reflex to Microscopic and Culture ([id number])  - Comprehensive metabolic panel  - Hemoglobin A1c  - UA Microscopic with Reflex to Culture    4. Chronic tophaceous gout  Continue allopurinol.  Colchicine as needed  - colchicine (COLCYRS) 0.6 MG tablet; Take 1 tablet (0.6 mg) by mouth daily as needed for gout pain  Dispense: 90 tablet; Refill: 3    5. Stage 3a chronic  kidney disease (H)  - CBC with Platelets & Differential; Future  - furosemide (LASIX) 40 MG tablet; Take 1 tablet (40 mg) by mouth daily as needed (edema)  Dispense: 90 tablet; Refill: 3  - CBC with Platelets & Differential    6. Essential hypertension  - rosuvastatin (CRESTOR) 10 MG tablet; Take 1 tablet (10 mg) by mouth three times a week  Dispense: 90 tablet; Refill: 3  - EKG 12-lead, tracing only; Future  - EKG 12-lead, tracing only    7. Need for shingles vaccine    8. Asthmatic bronchitis , chronic (H)  Stable on current regimen    9. Lymphedema of both lower extremities  Neck step will discontinue insulin completely    10. Acquired hypothyroidism  Recheck after decreased dose in December  - TSH; Future  - TSH       Patient Instructions   Stop aspirin 1 week before    Stop celebrex 3 days before    No Mounjuro the day of surgery but the week before and the week after    The morning of surgery take:  Famotidine  Metoprolol  2 puffs of albuterol  No insulin  levothyroxine    Evening before:  No Irbesartan              Return in about 4 months (around 7/7/2023) for using a video visit.         Subjective     HPI related to upcoming procedure: Slowly progressing bilateral knee pain.  Plans to have right knee done first.  Left knee to be done in July.  No previous bleeding or bruising problems.  1 episode of superficial phlebitis after childbirth many decades ago after prolonged bedrest    Has lost close to 90 pounds with initiation of the Mounjaro.  She has been able to decrease her insulin from approximately 100 units to 10 units and is now able to proceed with surgery.  She still has lymphedema    Preop Questions 3/7/2023   1. Have you ever had a heart attack or stroke? No   2. Have you ever had surgery on your heart or blood vessels, such as a stent placement, a coronary artery bypass, or surgery on an artery in your head, neck, heart, or legs? No   3. Do you have chest pain with activity? No   4. Do you have a  history of  heart failure? No   5. Do you currently have a cold, bronchitis or symptoms of other infection? No   6. Do you have a cough, shortness of breath, or wheezing? No   7. Do you or anyone in your family have previous history of blood clots? YES -    8. Do you or does anyone in your family have a serious bleeding problem such as prolonged bleeding following surgeries or cuts? No   9. Have you ever had problems with anemia or been told to take iron pills? No   10. Have you had any abnormal blood loss such as black, tarry or bloody stools, or abnormal vaginal bleeding? No   11. Have you ever had a blood transfusion? No   12. Are you willing to have a blood transfusion if it is medically needed before, during, or after your surgery? Yes   13. Have you or any of your relatives ever had problems with anesthesia? YES -    14. Do you have sleep apnea, excessive snoring or daytime drowsiness? YES - untreated, but lost 100 lbs   14a. Do you have a CPAP machine? Yes   15. Do you have any artifical heart valves or other implanted medical devices like a pacemaker, defibrillator, or continuous glucose monitor? YES -    15a. What type of device do you have? continuios glucose monitor   15b. Name of the clinic that manages your device:  [facility]   16. Do you have artificial joints? No   17. Are you allergic to latex? No         Further interval history and review of symptoms: Thyroid dose decreased in December due to overly replaced TSH.  Blood pressure stable with resumption of ARB    Health Care Directive:  Patient does not have a Health Care Directive or Living Will: Advance Directive received and scanned. Click on Code in the patient header to view.    Preoperative Review of :   reviewed - no record of controlled substances prescribed.    Patient Active Problem List    Diagnosis Date Noted     Acquired hypothyroidism 08/19/2022     Priority: Medium     Vertiginous migraine 08/19/2022     Priority: Medium      Lymphedema of both lower extremities 08/19/2022     Priority: Medium     Asthmatic bronchitis , chronic (H) 04/07/2022     Priority: Medium     Type 2 diabetes mellitus with stage 3b chronic kidney disease, with long-term current use of insulin (H) 11/29/2021     Priority: Medium     Screening for colon cancer 11/29/2021     Priority: Medium     Chronic use of benzodiazepine for therapeutic purpose 07/28/2021     Priority: Medium     Primary osteoarthritis of both knees 09/28/2020     Priority: Medium     BMI 45.0-49.9, adult (H) 05/31/2019     Priority: Medium     Formatting of this note might be different from the original.  Created by Conversion  Formatting of this note might be different from the original.  Created by Conversion       Chronic kidney disease, stage III (moderate) (H) 01/22/2019     Priority: Medium     Senile osteoporosis 01/18/2019     Priority: Medium     Obstructive sleep apnea syndrome 12/27/2016     Priority: Medium     Formatting of this note might be different from the original.  Has a CPAP  Formatting of this note might be different from the original.  Has a CPAP       Fibroadenoma of LEFT breast, with fibrocystic changes 05/14/2012     Priority: Medium      Past Medical History:   Diagnosis Date     Asthma      Chronic kidney failure, stage 3 (moderate) (H)      Chronic use of benzodiazepine for therapeutic purpose      Esophageal reflux      Fibroadenoma of LEFT breast, with fibrocystic changes 05/14/2012     Gout      Hyperlipidemia      Hypothyroid      Mammographic microcalcification 05/01/2012    Left     Migraine with aura      Obesity      ASHER (obstructive sleep apnea)     uses CPAP     Osteoporosis      Primary osteoarthritis of both knees 09/28/2020     RBBB      Screening for colon cancer 11/29/2021     Type 2 diabetes mellitus (H)      Past Surgical History:   Procedure Laterality Date     BIOPSY BREAST       D & C  2000, 2002     DILATE CERVIX, ABLATE ENDOMETRIUM, COMBINED   2005     ENDOMETRIAL ABLATION       HC BIOPSY OF BREAST, OPEN INCISIONAL  1972    Left     HC LAPAROSCOPY, SURGICAL; CHOLECYSTECTOMY  1998     LAPAROSCOPIC CHOLECYSTECTOMY  1999     LUMBAR LAMINECTOMY  1998    Description: Laminectomy Lumbar     Microdiscectomy  1998     Current Outpatient Medications   Medication Sig Dispense Refill     albuterol (PROAIR HFA/PROVENTIL HFA/VENTOLIN HFA) 108 (90 Base) MCG/ACT inhaler Inhale 2 puffs into the lungs every 6 hours as needed for shortness of breath / dyspnea or wheezing 18 g 3     albuterol (PROVENTIL) (2.5 MG/3ML) 0.083% neb solution Take 1 vial (2.5 mg) by nebulization every 6 hours as needed for shortness of breath / dyspnea or wheezing 75 mL 3     alcohol swab prep pads Use to swab area of injection/vicki as directed. 100 each 3     allopurinol (ZYLOPRIM) 300 MG tablet Take 1 tablet (300 mg) by mouth daily 90 tablet 3     blood glucose monitoring (ACCU-CHEK FASTCLIX) lancets Use to test blood sugar 4 times daily. 204 each 6     blood glucose monitoring (NO BRAND SPECIFIED) meter device kit Use to test blood sugar 4 times daily or as directed. Preferred blood glucose meter OR supplies to accompany: Blood Glucose Monitor Brands: per insurance. 1 kit 0     celecoxib (CELEBREX) 200 MG capsule Take 1 capsule (200 mg) by mouth daily 30 capsule 1     Cholecalciferol (VITAMIN D) 2000 UNIT tablet Take 2 tablets by mouth daily        colchicine (COLCYRS) 0.6 MG tablet Take 1 tablet (0.6 mg) by mouth daily as needed for gout pain 90 tablet 3     famotidine (PEPCID) 20 MG tablet Take 1 tablet (20 mg) by mouth 2 times daily 180 tablet 3     furosemide (LASIX) 40 MG tablet Take 1 tablet (40 mg) by mouth daily as needed (edema) 90 tablet 3     insulin glargine (LANTUS SOLOSTAR) 100 UNIT/ML pen Inject 10 Units Subcutaneous every morning 60 mL 4     irbesartan (AVAPRO) 150 MG tablet Take 1 tablet (150 mg) by mouth At Bedtime 90 tablet 3     levothyroxine (SYNTHROID) 125 MCG tablet  Take 1 tablet (125 mcg) by mouth daily 90 tablet 3     LORazepam (ATIVAN) 0.5 MG tablet Take 2 tablets (1 mg) by mouth At Bedtime 180 tablet 1     metoprolol succinate ER (TOPROL XL) 100 MG 24 hr tablet Take 1 tablet (100 mg) by mouth daily 90 tablet 3     Probiotic Product (PROBIOTIC-10 PO) Take 1 capsule by mouth daily Florastor        [START ON 3/8/2023] rosuvastatin (CRESTOR) 10 MG tablet Take 1 tablet (10 mg) by mouth three times a week 90 tablet 3     thin (NO BRAND SPECIFIED) lancets Use with lanceting device. To accompany: Blood Glucose Monitor Brands: per insurance. 200 each 6     tirzepatide (MOUNJARO) 10 MG/0.5ML pen Inject 10 mg Subcutaneous every 7 days 2 mL 3     tiZANidine (ZANAFLEX) 4 MG capsule Take 1 capsule (4 mg) by mouth 3 times daily as needed for muscle spasms 270 capsule 3     triamcinolone (KENALOG) 0.1 % external cream Apply topically 2 times daily 30 g 3     Continuous Blood Gluc  (FREESTYLE DIEGO 2 READER) PHOENIX 1 each continuous Use to read blood sugars per 's instructions. 1 each 0     Continuous Blood Gluc Sensor (FREESTYLE DIEGO 2 SENSOR) MISC 1 each every 14 days To check blood sugars continuously 2 each 5     diphenoxylate-atropine (LOMOTIL) 2.5-0.025 MG tablet Take 1 tablet by mouth 4 times daily as needed for diarrhea 30 tablet 1     sucralfate (CARAFATE) 1 GM tablet Take 1 tablet (1 g) by mouth 2 times daily as needed (acid reflux) (Patient not taking: Reported on 3/7/2023) 60 tablet 11       Allergies   Allergen Reactions     Codeine Sulfate Nausea     Metformin      Stomach pain      Tape [Adhesive Tape] Blisters     Tetanus Toxoid         Social History     Tobacco Use     Smoking status: Never     Smokeless tobacco: Never   Substance Use Topics     Alcohol use: No     History   Drug Use No           Objective     /74 (BP Location: Left arm, Patient Position: Sitting, Cuff Size: Adult Large)   Pulse 86   Temp 97.8  F (36.6  C)   Resp 12   Ht 1.575  "elizabeth (5' 2\")   Wt 104 kg (229 lb 3.2 oz)   SpO2 99%   BMI 41.92 kg/m      Physical Exam  Constitutional:  Reveals pleasant overweight woman who ambulates slowly and with a cane  Palpation of radial pulse regular   Cardiac; Regular rate and rhythm   Lungs: Clear.  Respiratory effort normal.  Edema of Legs: Wrapped lymphedema 1+  Psychiatric:  Alert and oriented       Recent Labs   Lab Test 11/17/22  1213 09/21/22  1218 08/25/22  1423 08/19/22  1325 04/29/22  1144 11/29/21  1251 07/28/21  1147   HGB  --   --   --  13.2  --   --  13.7    134*   < > 138 142   < > 143   POTASSIUM 4.3 4.9   < > 4.7 4.5   < > 4.2   CR 1.14* 1.37*   < > 2.87* 1.47*   < > 1.37*   A1C 7.1*  --   --  7.2* 8.7*   < > 8.5*   URIC 4.5  --   --  5.1 6.7   < >  --    B12  --   --   --   --  787  --   --    TSH 0.25*  --   --  0.87  --    < >  --    VITDT  --   --   --   --  70  --   --     < > = values in this interval not displayed.       Lab Results   Component Value Date    CHOL 132 08/19/2022        Diagnostics:  New orders:   Orders Placed This Encounter   Procedures     ZOSTER VACCINE RECOMBINANT ADJUVANTED (SHINGRIX)     UA reflex to Microscopic and Culture ([id number])     Comprehensive metabolic panel     Hemoglobin A1c     TSH     CBC with platelets and differential     UA Microscopic with Reflex to Culture     EKG 12-lead, tracing only     CBC with Platelets & Differential       EKG: appears normal, NSR, normal axis, normal intervals, no acute ST/T changes c/w ischemia, no LVH by voltage criteria, Right Bundle Branch Block, unchanged from previous tracings    Revised Cardiac Risk Index (RCRI):  The patient has the following serious cardiovascular risks for perioperative complications:   - No serious cardiac risks = 0 points     RCRI Interpretation: 0 points: Class I (very low risk - 0.4% complication rate)      Start Time: 11:18 AM  End time:  11:40 AM  Face to face plus orders: 22 minutes  Documentation time:  3 minutes    The visit " lasted a total of 25 minutes          Signed Electronically by: Nate Barcenas MD      HPI

## 2023-03-07 NOTE — PATIENT INSTRUCTIONS
Stop aspirin 1 week before    Stop celebrex 3 days before    No Lisbeth the day of surgery but the week before and the week after    The morning of surgery take:  Famotidine  Metoprolol  2 puffs of albuterol  No insulin  levothyroxine    Evening before:  No Irbesartan

## 2023-03-09 DIAGNOSIS — I10 ESSENTIAL HYPERTENSION: ICD-10-CM

## 2023-03-09 RX ORDER — ROSUVASTATIN CALCIUM 10 MG/1
5 TABLET, COATED ORAL
Qty: 90 TABLET | Refills: 3 | COMMUNITY
Start: 2023-03-10

## 2023-03-13 ENCOUNTER — HOSPITAL ENCOUNTER (OUTPATIENT)
Dept: MAMMOGRAPHY | Facility: CLINIC | Age: 73
Discharge: HOME | End: 2023-03-13
Attending: INTERNAL MEDICINE | Admitting: INTERNAL MEDICINE
Payer: MEDICARE

## 2023-03-13 DIAGNOSIS — Z12.31 VISIT FOR SCREENING MAMMOGRAM: ICD-10-CM

## 2023-03-13 PROCEDURE — 77067 SCR MAMMO BI INCL CAD: CPT

## 2023-03-14 ENCOUNTER — TELEPHONE (OUTPATIENT)
Dept: INTERNAL MEDICINE | Facility: CLINIC | Age: 73
End: 2023-03-14
Payer: MEDICARE

## 2023-03-14 DIAGNOSIS — N30.00 ACUTE CYSTITIS WITHOUT HEMATURIA: Primary | ICD-10-CM

## 2023-03-14 NOTE — TELEPHONE ENCOUNTER
This patient is asking if she needs and US based on her latest mammogram?    Narrative & Impression   SCREENING MAMMOGRAM, BILATERAL, DIGITAL w/CAD AND TOMOSYNTHESIS March 13, 2023 6:03 PM     BREAST SYMPTOMS: No current breast complaints.      COMPARISON: 2/10/2022, 11/20/2022, 7/30/2019.     BREAST DENSITY: Scattered fibroglandular densities.     COMMENTS: A round single view asymmetry is seen in the lower outer  quadrant of the right breast on the MLO view, posterior third.  Left  breast is stable in appearance.                                                                      IMPRESSION: BI-RADS CATEGORY: 0 - Incomplete - Need Additional Imaging  Evaluation and/or Prior Mammograms for Comparison     RECOMMENDED FOLLOW-UP: Diagnostic mammogram and possible ultrasound on  the right.     Exam results letter mailed to patient.

## 2023-03-14 NOTE — TELEPHONE ENCOUNTER
Order/Referral Request    Who is requesting: Patient    Orders being requested: Diagnostic mammogram and possible ultrasound on  the right.    Reason service is needed/diagnosis: Patient stated from mammogram yesterday on the result patient was advised she needed an additional image.     When are orders needed by: ASAP    Has this been discussed with Provider: N/A  Does patient have a preference on a Group/Provider/Facility? No    Does patient have an appointment scheduled?: No    Where to send orders: Place orders within Epic    [facility] or would you prefer to receive a phone call?:   Patient would prefer a phone call   Okay to leave a detailed message?: Yes at Cell number on file:    Telephone Information:   Mobile [phone]

## 2023-03-14 NOTE — TELEPHONE ENCOUNTER
Test Results    Contacts       Type Contact Phone/Fax    03/14/2023 10:40 AM CDT Phone (Incoming) Meggan Everett (Self) [phone] (M)          Who ordered the test:  Dr. Barcenas    Type of test: Culture Test    Date of test:  3/7/2023    Where was the test performed:  [facility]    What are your questions/concerns?:  Patient stated she has not receive a result back for her culture test and it's been awhile. Patient was wondering if there is an update on it?    Could we send this information to you in [employer] or would you prefer to receive a phone call?:   Patient would prefer a phone call   Okay to leave a detailed message?: Yes at Cell number on file:    Telephone Information:   Mobile [phone]

## 2023-03-16 RX ORDER — SULFAMETHOXAZOLE/TRIMETHOPRIM 800-160 MG
1 TABLET ORAL DAILY
Qty: 7 TABLET | Refills: 0 | Status: SHIPPED | OUTPATIENT
Start: 2023-03-16 | End: 2023-03-23

## 2023-03-16 NOTE — CONFIDENTIAL NOTE
Orders were recommended and signed by radiology on March 14 for supplemental views on the mammogram    Other phone message requests clarification on urine from March 7.  Apparently no culture was done but the urine was suggestive of infection    Because no culture was done does not mean there is no infection.  With upcoming surgery would be more aggressive    Recommend sulfa once daily for 7 days, adjusted for renal insufficiency, proceed GRIPTION sent

## 2023-03-20 ENCOUNTER — ANCILLARY PROCEDURE (OUTPATIENT)
Dept: MAMMOGRAPHY | Facility: CLINIC | Age: 73
End: 2023-03-20
Attending: INTERNAL MEDICINE
Payer: MEDICARE

## 2023-03-20 DIAGNOSIS — R92.8 ABNORMAL MAMMOGRAM: ICD-10-CM

## 2023-03-20 PROCEDURE — 76642 ULTRASOUND BREAST LIMITED: CPT | Mod: RT | Performed by: RADIOLOGY

## 2023-03-20 PROCEDURE — G0279 TOMOSYNTHESIS, MAMMO: HCPCS | Mod: RT | Performed by: RADIOLOGY

## 2023-03-20 PROCEDURE — 77065 DX MAMMO INCL CAD UNI: CPT | Mod: RT | Performed by: RADIOLOGY

## 2023-03-30 ENCOUNTER — MEDICAL CORRESPONDENCE (OUTPATIENT)
Dept: HEALTH INFORMATION MANAGEMENT | Facility: CLINIC | Age: 73
End: 2023-03-30

## 2023-04-03 ENCOUNTER — TELEPHONE (OUTPATIENT)
Dept: INTERNAL MEDICINE | Facility: CLINIC | Age: 73
End: 2023-04-03
Payer: MEDICARE

## 2023-04-03 NOTE — TELEPHONE ENCOUNTER
April 3, 2023    Home health orders was received via fax for Dr. Barcenas to sign.  Patient label was attached to paperwork and placed in provider's inbox to be signed.    Sunshine Crawford

## 2023-04-05 NOTE — TELEPHONE ENCOUNTER
April 5, 2023    Home health orders was picked up from outbox of Dr. Barcenas.  Paperwork has been reviewed and is complete.  Per initial initial request, this was sent via fax to [phone].     Leonard Torres

## 2023-04-11 ENCOUNTER — TELEPHONE (OUTPATIENT)
Dept: INTERNAL MEDICINE | Facility: CLINIC | Age: 73
End: 2023-04-11
Payer: MEDICARE

## 2023-04-11 NOTE — TELEPHONE ENCOUNTER
April 11, 2023    [facility] Home health orders was received via fax for Dr. Barcenas to sign.  Patient label was attached to paperwork and placed in provider's inbox to be signed.    Magdiel Kendrick III

## 2023-04-17 ENCOUNTER — TELEPHONE (OUTPATIENT)
Dept: INTERNAL MEDICINE | Facility: CLINIC | Age: 73
End: 2023-04-17
Payer: MEDICARE

## 2023-04-17 NOTE — TELEPHONE ENCOUNTER
April 17, 2023    Home Health Plan of Care was received via fax for Dr. Barcenas to sign.  Patient label was attached to paperwork and placed in provider's inbox to be signed.    Leonard Torres

## 2023-04-17 NOTE — TELEPHONE ENCOUNTER
April 17, 2023    Home health orders was received via fax for Dr. Barcenas to sign.  Patient label was attached to paperwork and placed in provider's inbox to be signed.    Leonard Torres

## 2023-04-18 ENCOUNTER — TELEPHONE (OUTPATIENT)
Dept: INTERNAL MEDICINE | Facility: CLINIC | Age: 73
End: 2023-04-18
Payer: MEDICARE

## 2023-04-18 NOTE — TELEPHONE ENCOUNTER
April 18, 2023    Home health orders was received via fax for Dr. Barcenas to sign.  Patient label was attached to paperwork and placed in provider's inbox to be signed.    Leonard Torres

## 2023-04-19 NOTE — TELEPHONE ENCOUNTER
April 19, 2023    [facility] Health Plan of Care was picked up from outbox of Dr. Barcenas.  Paperwork has been reviewed and is complete.  Per initial initial request, this was sent via fax to [phone].     Leonard Torres

## 2023-04-19 NOTE — TELEPHONE ENCOUNTER
April 19, 2023    Home health orders was picked up from outbox of Dr. Barcenas.  Paperwork has been reviewed and is complete.  Per initial initial request, this was sent via fax to [phone].     Leonard Torres

## 2023-04-19 NOTE — TELEPHONE ENCOUNTER
April 19, 2023    [facility] Health Order was picked up from outbox of Dr. Barcenas.  Paperwork has been reviewed and is complete.  Per initial initial request, this was sent via fax to [phone].     Leonard Torres

## 2023-04-21 ENCOUNTER — TELEPHONE (OUTPATIENT)
Dept: INTERNAL MEDICINE | Facility: CLINIC | Age: 73
End: 2023-04-21
Payer: MEDICARE

## 2023-04-21 NOTE — TELEPHONE ENCOUNTER
April 21, 2023    Home health orders was received via fax for Dr. Barcenas to sign.  Patient label was attached to paperwork and placed in provider's inbox to be signed.    Pam J. Behr

## 2023-04-25 NOTE — TELEPHONE ENCOUNTER
April 25, 2023    Home health orders was picked up from outbox of Dr. Barcenas.  Paperwork has been reviewed and is complete.  Per initial initial request, this was sent via fax to [phone].     Pam J. Behr

## 2023-04-26 ENCOUNTER — TELEPHONE (OUTPATIENT)
Dept: INTERNAL MEDICINE | Facility: CLINIC | Age: 73
End: 2023-04-26
Payer: MEDICARE

## 2023-04-26 NOTE — TELEPHONE ENCOUNTER
April 26, 2023    Home health orders was received via fax for Dr. Barcenas to sign.  Patient label was attached to paperwork and placed in provider's inbox to be signed.    Pam J. Behr

## 2023-05-02 ENCOUNTER — HOSPITAL ENCOUNTER (EMERGENCY)
Facility: HOSPITAL | Age: 73
Discharge: HOME | End: 2023-05-02
Attending: EMERGENCY MEDICINE | Admitting: EMERGENCY MEDICINE
Payer: MEDICARE

## 2023-05-02 VITALS
TEMPERATURE: 98.2 F | OXYGEN SATURATION: 97 % | WEIGHT: 209 LBS | DIASTOLIC BLOOD PRESSURE: 68 MMHG | HEIGHT: 62 IN | SYSTOLIC BLOOD PRESSURE: 147 MMHG | HEART RATE: 101 BPM | RESPIRATION RATE: 32 BRPM | BODY MASS INDEX: 38.46 KG/M2

## 2023-05-02 DIAGNOSIS — I47.10 SVT (SUPRAVENTRICULAR TACHYCARDIA) (H): ICD-10-CM

## 2023-05-02 DIAGNOSIS — R73.9 HYPERGLYCEMIA: ICD-10-CM

## 2023-05-02 DIAGNOSIS — E86.0 DEHYDRATION: ICD-10-CM

## 2023-05-02 LAB
ANION GAP SERPL CALCULATED.3IONS-SCNC: 15 MMOL/L (ref 7–15)
ATRIAL RATE - MUSE: 111 BPM
BASOPHILS # BLD AUTO: 0.1 10E3/UL (ref 0–0.2)
BASOPHILS NFR BLD AUTO: 1 %
BUN SERPL-MCNC: 50.1 MG/DL (ref 8–23)
CALCIUM SERPL-MCNC: 10.2 MG/DL (ref 8.8–10.2)
CHLORIDE SERPL-SCNC: 103 MMOL/L (ref 98–107)
CREAT SERPL-MCNC: 1.55 MG/DL (ref 0.51–0.95)
DEPRECATED HCO3 PLAS-SCNC: 16 MMOL/L (ref 22–29)
DIASTOLIC BLOOD PRESSURE - MUSE: 72 MMHG
EOSINOPHIL # BLD AUTO: 0.1 10E3/UL (ref 0–0.7)
EOSINOPHIL NFR BLD AUTO: 1 %
ERYTHROCYTE [DISTWIDTH] IN BLOOD BY AUTOMATED COUNT: 14.9 % (ref 10–15)
GFR SERPL CREATININE-BSD FRML MDRD: 35 ML/MIN/1.73M2
GLUCOSE SERPL-MCNC: 319 MG/DL (ref 70–99)
HCT VFR BLD AUTO: 39.4 % (ref 35–47)
HGB BLD-MCNC: 12.8 G/DL (ref 11.7–15.7)
HOLD SPECIMEN: NORMAL
HOLD SPECIMEN: NORMAL
IMM GRANULOCYTES # BLD: 0.1 10E3/UL
IMM GRANULOCYTES NFR BLD: 1 %
INTERPRETATION ECG - MUSE: NORMAL
LYMPHOCYTES # BLD AUTO: 2.1 10E3/UL (ref 0.8–5.3)
LYMPHOCYTES NFR BLD AUTO: 16 %
MAGNESIUM SERPL-MCNC: 1.7 MG/DL (ref 1.7–2.3)
MCH RBC QN AUTO: 30.2 PG (ref 26.5–33)
MCHC RBC AUTO-ENTMCNC: 32.5 G/DL (ref 31.5–36.5)
MCV RBC AUTO: 93 FL (ref 78–100)
MONOCYTES # BLD AUTO: 0.8 10E3/UL (ref 0–1.3)
MONOCYTES NFR BLD AUTO: 6 %
NEUTROPHILS # BLD AUTO: 9.5 10E3/UL (ref 1.6–8.3)
NEUTROPHILS NFR BLD AUTO: 75 %
NRBC # BLD AUTO: 0 10E3/UL
NRBC BLD AUTO-RTO: 0 /100
P AXIS - MUSE: 69 DEGREES
PLATELET # BLD AUTO: 331 10E3/UL (ref 150–450)
POTASSIUM SERPL-SCNC: 5.4 MMOL/L (ref 3.4–5.3)
PR INTERVAL - MUSE: 170 MS
QRS DURATION - MUSE: 122 MS
QT - MUSE: 346 MS
QTC - MUSE: 470 MS
R AXIS - MUSE: 72 DEGREES
RBC # BLD AUTO: 4.24 10E6/UL (ref 3.8–5.2)
SODIUM SERPL-SCNC: 134 MMOL/L (ref 136–145)
SYSTOLIC BLOOD PRESSURE - MUSE: 154 MMHG
T AXIS - MUSE: 41 DEGREES
TROPONIN T SERPL HS-MCNC: 21 NG/L
TSH SERPL DL<=0.005 MIU/L-ACNC: 1.6 UIU/ML (ref 0.3–4.2)
VENTRICULAR RATE- MUSE: 111 BPM
WBC # BLD AUTO: 12.6 10E3/UL (ref 4–11)

## 2023-05-02 PROCEDURE — 999N000127 HC STATISTIC PERIPHERAL IV START W US GUIDANCE

## 2023-05-02 PROCEDURE — 83735 ASSAY OF MAGNESIUM: CPT | Performed by: EMERGENCY MEDICINE

## 2023-05-02 PROCEDURE — 36415 COLL VENOUS BLD VENIPUNCTURE: CPT | Performed by: EMERGENCY MEDICINE

## 2023-05-02 PROCEDURE — 96360 HYDRATION IV INFUSION INIT: CPT

## 2023-05-02 PROCEDURE — 258N000003 HC RX IP 258 OP 636: Performed by: EMERGENCY MEDICINE

## 2023-05-02 PROCEDURE — 85025 COMPLETE CBC W/AUTO DIFF WBC: CPT | Performed by: EMERGENCY MEDICINE

## 2023-05-02 PROCEDURE — 99284 EMERGENCY DEPT VISIT MOD MDM: CPT | Mod: 25

## 2023-05-02 PROCEDURE — 250N000011 HC RX IP 250 OP 636

## 2023-05-02 PROCEDURE — 84443 ASSAY THYROID STIM HORMONE: CPT | Performed by: EMERGENCY MEDICINE

## 2023-05-02 PROCEDURE — 93005 ELECTROCARDIOGRAM TRACING: CPT | Performed by: STUDENT IN AN ORGANIZED HEALTH CARE EDUCATION/TRAINING PROGRAM

## 2023-05-02 PROCEDURE — 80048 BASIC METABOLIC PNL TOTAL CA: CPT | Performed by: EMERGENCY MEDICINE

## 2023-05-02 PROCEDURE — 84484 ASSAY OF TROPONIN QUANT: CPT | Performed by: EMERGENCY MEDICINE

## 2023-05-02 RX ORDER — ADENOSINE 3 MG/ML
INJECTION, SOLUTION INTRAVENOUS
Status: COMPLETED
Start: 2023-05-02 | End: 2023-05-02

## 2023-05-02 RX ADMIN — SODIUM CHLORIDE 1000 ML: 9 INJECTION, SOLUTION INTRAVENOUS at 20:58

## 2023-05-02 RX ADMIN — ADENOSINE 6 MG: 3 INJECTION, SOLUTION INTRAVENOUS at 21:08

## 2023-05-02 ASSESSMENT — ACTIVITIES OF DAILY LIVING (ADL)
ADLS_ACUITY_SCORE: 35
ADLS_ACUITY_SCORE: 35

## 2023-05-03 ENCOUNTER — OFFICE VISIT (OUTPATIENT)
Dept: CARDIOLOGY | Facility: CLINIC | Age: 73
End: 2023-05-03
Payer: MEDICARE

## 2023-05-03 VITALS
BODY MASS INDEX: 40.12 KG/M2 | WEIGHT: 218 LBS | DIASTOLIC BLOOD PRESSURE: 60 MMHG | HEIGHT: 62 IN | HEART RATE: 86 BPM | SYSTOLIC BLOOD PRESSURE: 116 MMHG | RESPIRATION RATE: 18 BRPM

## 2023-05-03 DIAGNOSIS — I47.10 SVT (SUPRAVENTRICULAR TACHYCARDIA) (H): ICD-10-CM

## 2023-05-03 PROCEDURE — 99204 OFFICE O/P NEW MOD 45 MIN: CPT | Performed by: INTERNAL MEDICINE

## 2023-05-03 RX ORDER — ASPIRIN 81 MG/1
81 TABLET, CHEWABLE ORAL AT BEDTIME
COMMUNITY
Start: 2023-03-27

## 2023-05-03 NOTE — LETTER
5/3/2023    Nate Barcenas MD  [address] [facility] [address]    RE: Meggan Everett       Dear Colleague,     I had the pleasure of seeing Meggan Everett in the [facility].    HEART CARE ENCOUNTER CONSULTATON NOTE      [facility]  [phone]      Assessment/Recommendations   Assessment/Plan:    Meggan Everett is a very pleasant 72 year old female with PMH of HTN, DM, hyperlipidemia, hypothyroidism who comes in today to the EP clinic.    1. Wide complex tachycardia  - Wide complex tachycardia with RBBB morphology  - Likely SVT given she has a baseline RBBB as well  - symptomatic with syncope and palpitations.  AVNRT vs AVRT vs AT vs VT are possible.  We reviewed SVT mechanisms and reviewed treatment options including electrophysiology study and ablation vs continued medical therapy.  We reviewed the nature of EP studies and ablation for SVT, success rates depending on the specific SVT mechanism, procedural risks (including groin hematoma, tamponade, heart block, stroke) and recovery expectations  - she would like to think about her ablation  - she wants us to call her this Friday        History of Present Illness/Subjective    HPI: Meggan Everett is a very pleasant 72 year old female with PMH of HTN, DM, hyperlipidemia, hypothyroidism who comes in today to the EP clinic.    Meggan had an episode of syncope yesterday. She thinks she passed out for a few seconds in the evening at the kitchen island and hurt her left arm and neck during this fall. She describes there onset to be very abrupt. She saw her HR was as high as 130-170s on her Fitbit. She was found to have a wide complex tachycardia with a typical RBBB morphology in V1. She does have a baseline RBBB as well. This tachycardia terminated with Adenosine 6 mg.    She says she was diagnosed with benign tachycardia in her 40s and has been on Metoprolol ever since. Her dose of Metoprolol was changed  "about a month ago after her knee surgery. She has had short lasting fluttering sensation in the past.    Recent ECG(personally reviewed):  5/2 WCT with RBBB morphology    5/2 Sinus tach with RBBB    Labs below reviewed personally     Physical Examination  Review of Systems   Vitals: /60 (BP Location: Right arm, Patient Position: Sitting, Cuff Size: Adult Regular)   Pulse 86   Resp 18   Ht 1.575 m (5' 2\")   Wt 98.9 kg (218 lb)   BMI 39.87 kg/m    BMI= Body mass index is 39.87 kg/m .  Wt Readings from Last 3 Encounters:   05/03/23 98.9 kg (218 lb)   05/02/23 94.8 kg (209 lb)   03/07/23 104 kg (229 lb 3.2 oz)       General Appearance:   no distress, normal body habitus   ENT/Mouth: membranes moist, no oral lesions or bleeding gums.      EYES:  no scleral icterus, normal conjunctivae   Neck: no carotid bruits or thyromegaly   Chest/Lungs:   lungs are clear to auscultation, no rales or wheezing, no sternal scar, equal chest wall expansion    Cardiovascular:   Regular. Normal first and second heart sounds with no murmurs, rubs, or gallops; the carotid, radial and posterior tibial pulses are intact, no edema bilaterally    Abdomen:  no organomegaly, masses, bruits, or tenderness; bowel sounds are present   Extremities: no cyanosis or clubbing   Skin: no xanthelasma, warm.    Neurologic: normal  bilateral, no tremors     Psychiatric: alert and oriented x3, calm        Please refer above for cardiac ROS details.        Medical History  Surgical History Family History Social History   Past Medical History:   Diagnosis Date    Asthma     Chronic kidney failure, stage 3 (moderate) (H)     Chronic use of benzodiazepine for therapeutic purpose     Esophageal reflux     Fibroadenoma of LEFT breast, with fibrocystic changes 05/14/2012    Gout     Hyperlipidemia     Hypothyroid     Mammographic microcalcification 05/01/2012    Left    Migraine with aura     Obesity     ASHER (obstructive sleep apnea)     uses CPAP    " Osteoporosis     Primary osteoarthritis of both knees 09/28/2020    RBBB     Screening for colon cancer 11/29/2021    Type 2 diabetes mellitus (H)      Past Surgical History:   Procedure Laterality Date    BIOPSY BREAST      D & C  2000, 2002    DILATE CERVIX, ABLATE ENDOMETRIUM, COMBINED  01/01/2005    ENDOMETRIAL ABLATION      HC BIOPSY OF BREAST, OPEN INCISIONAL  01/01/1972    Left    HC LAPAROSCOPY, SURGICAL; CHOLECYSTECTOMY  01/01/1998    LAPAROSCOPIC CHOLECYSTECTOMY  01/01/1999    LUMBAR LAMINECTOMY  01/01/1998    Description: Laminectomy Lumbar    LUMPECTOMY BREAST      Microdiscectomy  01/01/1998     Family History   Problem Relation Age of Onset    Autoimmune Disease No family hx of     Hypertension Mother         alive in her 90s    Atrial fibrillation Father         alive in his 90s    LUNG DISEASE Sister         interstitial lung disease    Heart Disease Sister         Social History     Socioeconomic History    Marital status:      Spouse name: Not on file    Number of children: Not on file    Years of education: Not on file    Highest education level: Not on file   Occupational History    Occupation: Retired   Tobacco Use    Smoking status: Never    Smokeless tobacco: Never   Vaping Use    Vaping status: Not on file   Substance and Sexual Activity    Alcohol use: No    Drug use: No    Sexual activity: Never   Other Topics Concern    Not on file   Social History Narrative    She is  and is retired. She lives with her son and his family on the lower level of their house.  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  She has 5 grandchildren.  She does not smoke cigarettes or drink alcohol.     Social Determinants of Health     Financial Resource Strain: Not on file   Food Insecurity: Not on file   Transportation Needs: Not on file   Physical Activity: Not on file   Stress: Not on file   Social Connections: Not on file   Intimate Partner  Violence: Not on file   Housing Stability: Not on file           Medications  Allergies   Current Outpatient Medications   Medication Sig Dispense Refill    albuterol (PROAIR HFA/PROVENTIL HFA/VENTOLIN HFA) 108 (90 Base) MCG/ACT inhaler Inhale 2 puffs into the lungs every 6 hours as needed for shortness of breath / dyspnea or wheezing 18 g 3    albuterol (PROVENTIL) (2.5 MG/3ML) 0.083% neb solution Take 1 vial (2.5 mg) by nebulization every 6 hours as needed for shortness of breath / dyspnea or wheezing 75 mL 3    allopurinol (ZYLOPRIM) 300 MG tablet Take 1 tablet (300 mg) by mouth daily 90 tablet 3    aspirin (ASA) 81 MG chewable tablet Take 81 mg by mouth daily      celecoxib (CELEBREX) 200 MG capsule Take 1 capsule (200 mg) by mouth daily 30 capsule 1    Cholecalciferol (VITAMIN D) 2000 UNIT tablet Take 2 tablets by mouth daily       colchicine (COLCYRS) 0.6 MG tablet Take 1 tablet (0.6 mg) by mouth daily as needed for gout pain 90 tablet 3    Continuous Blood Gluc  (FREESTYLE DIEGO 2 READER) PHOENIX 1 each continuous Use to read blood sugars per 's instructions. 1 each 0    Continuous Blood Gluc Sensor (FREESTYLE DIEGO 2 SENSOR) MISC 1 each every 14 days To check blood sugars continuously 2 each 5    famotidine (PEPCID) 20 MG tablet Take 1 tablet (20 mg) by mouth 2 times daily 180 tablet 3    insulin glargine (LANTUS SOLOSTAR) 100 UNIT/ML pen Inject 10 Units Subcutaneous every morning 60 mL 4    levothyroxine (SYNTHROID) 125 MCG tablet Take 1 tablet (125 mcg) by mouth daily 90 tablet 3    LORazepam (ATIVAN) 0.5 MG tablet Take 2 tablets (1 mg) by mouth At Bedtime 180 tablet 1    Probiotic Product (PROBIOTIC-10 PO) Take 1 capsule by mouth daily Florastor       rosuvastatin (CRESTOR) 10 MG tablet Take 0.5 tablets (5 mg) by mouth three times a week 90 tablet 3    tirzepatide (MOUNJARO) 10 MG/0.5ML pen Inject 10 mg Subcutaneous every 7 days 2 mL 3    tiZANidine (ZANAFLEX) 4 MG capsule Take 1 capsule  (4 mg) by mouth 3 times daily as needed for muscle spasms 270 capsule 3    triamcinolone (KENALOG) 0.1 % external cream Apply topically 2 times daily 30 g 3    alcohol swab prep pads Use to swab area of injection/vicki as directed. (Patient not taking: Reported on 5/3/2023) 100 each 3    blood glucose monitoring (ACCU-CHEK FASTCLIX) lancets Use to test blood sugar 4 times daily. (Patient not taking: Reported on 5/3/2023) 204 each 6    blood glucose monitoring (NO BRAND SPECIFIED) meter device kit Use to test blood sugar 4 times daily or as directed. Preferred blood glucose meter OR supplies to accompany: Blood Glucose Monitor Brands: per insurance. (Patient not taking: Reported on 5/3/2023) 1 kit 0    diphenoxylate-atropine (LOMOTIL) 2.5-0.025 MG tablet Take 1 tablet by mouth 4 times daily as needed for diarrhea (Patient not taking: Reported on 5/3/2023) 30 tablet 1    furosemide (LASIX) 40 MG tablet Take 1 tablet (40 mg) by mouth daily as needed (edema) (Patient not taking: Reported on 5/3/2023) 90 tablet 3    irbesartan (AVAPRO) 150 MG tablet Take 1 tablet (150 mg) by mouth At Bedtime (Patient not taking: Reported on 5/3/2023) 90 tablet 3    metoprolol succinate ER (TOPROL XL) 100 MG 24 hr tablet Take 1 tablet (100 mg) by mouth daily (Patient not taking: Reported on 5/3/2023) 90 tablet 3    sucralfate (CARAFATE) 1 GM tablet Take 1 tablet (1 g) by mouth 2 times daily as needed (acid reflux) (Patient not taking: Reported on 3/7/2023) 60 tablet 11    thin (NO BRAND SPECIFIED) lancets Use with lanceting device. To accompany: Blood Glucose Monitor Brands: per insurance. (Patient not taking: Reported on 5/3/2023) 200 each 6       Allergies   Allergen Reactions    Codeine Sulfate Nausea    Metformin      Stomach pain     Oxycodone Nausea and Vomiting    Tape [Adhesive Tape] Blisters    Tetanus Toxoid           Lab Results    Chemistry/lipid CBC Cardiac Enzymes/BNP/TSH/INR   Recent Labs   Lab Test 08/19/22  1325   CHOL  132   HDL 39*   LDL 65   TRIG 138     Recent Labs   Lab Test 08/19/22  1325 07/28/21  1147 08/27/19  1133   LDL 65 113 128     Recent Labs   Lab Test 05/02/23 2049   *   POTASSIUM 5.4*   CHLORIDE 103   CO2 16*   *   BUN 50.1*   CR 1.55*   GFRESTIMATED 35*   HIEN 10.2     Recent Labs   Lab Test 05/02/23 2049 03/07/23  1207 11/17/22  1213   CR 1.55* 1.35* 1.14*     Recent Labs   Lab Test 03/07/23  1207 11/17/22  1213 08/19/22  1325   A1C 7.0* 7.1* 7.2*          Recent Labs   Lab Test 05/02/23 2049   WBC 12.6*   HGB 12.8   HCT 39.4   MCV 93        Recent Labs   Lab Test 05/02/23 2049 03/07/23  1207 08/19/22  1325   HGB 12.8 13.1 13.2    No results for input(s): TROPONINI in the last 60291 hours.  No results for input(s): BNP, NTBNPI, NTBNP in the last 25331 hours.  Recent Labs   Lab Test 05/02/23 2049   TSH 1.60     No results for input(s): INR in the last 46998 hours.     Ewa Mc MD        Thank you for allowing me to participate in the care of your patient.      Sincerely,     Ewa Mc MD     [facility]  cc:   Stone Maki MD  [facility] [address] [place] [id number]

## 2023-05-03 NOTE — ED TRIAGE NOTES
Triage Assessment     Row Name 05/02/23 2036       Triage Assessment (Adult)    Airway WDL WDL       Respiratory WDL    Respiratory WDL WDL       Cardiac WDL    Cardiac WDL X;rhythm  sinus tachy            pt was standing in the kitchen making soup pt felt dizzy and blacked out. Pt reports of some chest pressure.

## 2023-05-03 NOTE — ED NOTES
Bed: JNED-04  Expected date: 5/2/23  Expected time: 8:15 PM  Means of arrival: Ambulance  Comments:  [facility]  73 yo F syncope

## 2023-05-03 NOTE — PROGRESS NOTES
HEART CARE ENCOUNTER CONSULTATON NOTE      [facility]  [phone]      Assessment/Recommendations   Assessment/Plan:    Meggan Everett is a very pleasant 72 year old female with PMH of HTN, DM, hyperlipidemia, hypothyroidism who comes in today to the EP clinic.    1. Wide complex tachycardia  - Wide complex tachycardia with RBBB morphology  - Likely SVT given she has a baseline RBBB as well  - symptomatic with syncope and palpitations.  AVNRT vs AVRT vs AT vs VT are possible.  We reviewed SVT mechanisms and reviewed treatment options including electrophysiology study and ablation vs continued medical therapy.  We reviewed the nature of EP studies and ablation for SVT, success rates depending on the specific SVT mechanism, procedural risks (including groin hematoma, tamponade, heart block, stroke) and recovery expectations  - she would like to think about her ablation  - she wants us to call her this Friday        History of Present Illness/Subjective    HPI: Meggan Everett is a very pleasant 72 year old female with PMH of HTN, DM, hyperlipidemia, hypothyroidism who comes in today to the EP clinic.    Meggan had an episode of syncope yesterday. She thinks she passed out for a few seconds in the evening at [place] kitchen [place] and hurt her left arm and neck during this fall. She describes there onset to be very abrupt. She saw her HR was as high as 130-170s on her Fitbit. She was found to have a wide complex tachycardia with a typical RBBB morphology in V1. She does have a baseline RBBB as well. This tachycardia terminated with Adenosine 6 mg.    She says she was diagnosed with benign tachycardia in her 40s and has been on Metoprolol ever since. Her dose of Metoprolol was changed about a month ago after her knee surgery. She has had short lasting fluttering sensation in the past.    Recent ECG(personally reviewed):  5/2 WCT with RBBB morphology    5/2 Sinus tach with RBBB    Labs below  "reviewed personally     Physical Examination  Review of Systems   Vitals: /60 (BP Location: Right arm, Patient Position: Sitting, Cuff Size: Adult Regular)   Pulse 86   Resp 18   Ht 1.575 m (5' 2\")   Wt 98.9 kg (218 lb)   BMI 39.87 kg/m    BMI= Body mass index is 39.87 kg/m .  Wt Readings from Last 3 Encounters:   05/03/23 98.9 kg (218 lb)   05/02/23 94.8 kg (209 lb)   03/07/23 104 kg (229 lb 3.2 oz)       General Appearance:   no distress, normal body habitus   ENT/Mouth: membranes moist, no oral lesions or bleeding gums.      EYES:  no scleral icterus, normal conjunctivae   Neck: no carotid bruits or thyromegaly   Chest/Lungs:   lungs are clear to auscultation, no rales or wheezing, no sternal scar, equal chest wall expansion    Cardiovascular:   Regular. Normal first and second heart sounds with no murmurs, rubs, or gallops; the carotid, radial and posterior tibial pulses are intact, no edema bilaterally    Abdomen:  no organomegaly, masses, bruits, or tenderness; bowel sounds are present   Extremities: no cyanosis or clubbing   Skin: no xanthelasma, warm.    Neurologic: normal  bilateral, no tremors     Psychiatric: alert and oriented x3, calm        Please refer above for cardiac ROS details.        Medical History  Surgical History Family History Social History   Past Medical History:   Diagnosis Date     Asthma      Chronic kidney failure, stage 3 (moderate) (H)      Chronic use of benzodiazepine for therapeutic purpose      Esophageal reflux      Fibroadenoma of LEFT breast, with fibrocystic changes 05/14/2012     Gout      Hyperlipidemia      Hypothyroid      Mammographic microcalcification 05/01/2012    Left     Migraine with aura      Obesity      ASHER (obstructive sleep apnea)     uses CPAP     Osteoporosis      Primary osteoarthritis of both knees 09/28/2020     RBBB      Screening for colon cancer 11/29/2021     Type 2 diabetes mellitus (H)      Past Surgical History:   Procedure " Laterality Date     BIOPSY BREAST       D & C  2000, 2002     DILATE CERVIX, ABLATE ENDOMETRIUM, COMBINED  01/01/2005     ENDOMETRIAL ABLATION       HC BIOPSY OF BREAST, OPEN INCISIONAL  01/01/1972    Left     HC LAPAROSCOPY, SURGICAL; CHOLECYSTECTOMY  01/01/1998     LAPAROSCOPIC CHOLECYSTECTOMY  01/01/1999     LUMBAR LAMINECTOMY  01/01/1998    Description: Laminectomy Lumbar     LUMPECTOMY BREAST       Microdiscectomy  01/01/1998     Family History   Problem Relation Age of Onset     Autoimmune Disease No family hx of      Hypertension Mother         alive in her 90s     Atrial fibrillation Father         alive in his 90s     LUNG DISEASE Sister         interstitial lung disease     Heart Disease Sister         Social History     Socioeconomic History     Marital status:      Spouse name: Not on file     Number of children: Not on file     Years of education: Not on file     Highest education level: Not on file   Occupational History     Occupation: Retired   Tobacco Use     Smoking status: Never     Smokeless tobacco: Never   Vaping Use     Vaping status: Not on file   Substance and Sexual Activity     Alcohol use: No     Drug use: No     Sexual activity: Never   Other Topics Concern     Not on file   Social History Narrative    She is  and is retired. She lives with her son and his family on the lower level of their house.  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  She has 5 grandchildren.  She does not smoke cigarettes or drink alcohol.     Social Determinants of Health     Financial Resource Strain: Not on file   Food Insecurity: Not on file   Transportation Needs: Not on file   Physical Activity: Not on file   Stress: Not on file   Social Connections: Not on file   Intimate Partner Violence: Not on file   Housing Stability: Not on file           Medications  Allergies   Current Outpatient Medications   Medication Sig Dispense Refill      albuterol (PROAIR HFA/PROVENTIL HFA/VENTOLIN HFA) 108 (90 Base) MCG/ACT inhaler Inhale 2 puffs into the lungs every 6 hours as needed for shortness of breath / dyspnea or wheezing 18 g 3     albuterol (PROVENTIL) (2.5 MG/3ML) 0.083% neb solution Take 1 vial (2.5 mg) by nebulization every 6 hours as needed for shortness of breath / dyspnea or wheezing 75 mL 3     allopurinol (ZYLOPRIM) 300 MG tablet Take 1 tablet (300 mg) by mouth daily 90 tablet 3     aspirin (ASA) 81 MG chewable tablet Take 81 mg by mouth daily       celecoxib (CELEBREX) 200 MG capsule Take 1 capsule (200 mg) by mouth daily 30 capsule 1     Cholecalciferol (VITAMIN D) 2000 UNIT tablet Take 2 tablets by mouth daily        colchicine (COLCYRS) 0.6 MG tablet Take 1 tablet (0.6 mg) by mouth daily as needed for gout pain 90 tablet 3     Continuous Blood Gluc  (FREESTYLE DIEGO 2 READER) PHOENIX 1 each continuous Use to read blood sugars per 's instructions. 1 each 0     Continuous Blood Gluc Sensor (FREESTYLE DIEGO 2 SENSOR) MISC 1 each every 14 days To check blood sugars continuously 2 each 5     famotidine (PEPCID) 20 MG tablet Take 1 tablet (20 mg) by mouth 2 times daily 180 tablet 3     insulin glargine (LANTUS SOLOSTAR) 100 UNIT/ML pen Inject 10 Units Subcutaneous every morning 60 mL 4     levothyroxine (SYNTHROID) 125 MCG tablet Take 1 tablet (125 mcg) by mouth daily 90 tablet 3     LORazepam (ATIVAN) 0.5 MG tablet Take 2 tablets (1 mg) by mouth At Bedtime 180 tablet 1     Probiotic Product (PROBIOTIC-10 PO) Take 1 capsule by mouth daily Florastor        rosuvastatin (CRESTOR) 10 MG tablet Take 0.5 tablets (5 mg) by mouth three times a week 90 tablet 3     tirzepatide (MOUNJARO) 10 MG/0.5ML pen Inject 10 mg Subcutaneous every 7 days 2 mL 3     tiZANidine (ZANAFLEX) 4 MG capsule Take 1 capsule (4 mg) by mouth 3 times daily as needed for muscle spasms 270 capsule 3     triamcinolone (KENALOG) 0.1 % external cream Apply topically 2  times daily 30 g 3     alcohol swab prep pads Use to swab area of injection/vicki as directed. (Patient not taking: Reported on 5/3/2023) 100 each 3     blood glucose monitoring (ACCU-CHEK FASTCLIX) lancets Use to test blood sugar 4 times daily. (Patient not taking: Reported on 5/3/2023) 204 each 6     blood glucose monitoring (NO BRAND SPECIFIED) meter device kit Use to test blood sugar 4 times daily or as directed. Preferred blood glucose meter OR supplies to accompany: Blood Glucose Monitor Brands: per insurance. (Patient not taking: Reported on 5/3/2023) 1 kit 0     diphenoxylate-atropine (LOMOTIL) 2.5-0.025 MG tablet Take 1 tablet by mouth 4 times daily as needed for diarrhea (Patient not taking: Reported on 5/3/2023) 30 tablet 1     furosemide (LASIX) 40 MG tablet Take 1 tablet (40 mg) by mouth daily as needed (edema) (Patient not taking: Reported on 5/3/2023) 90 tablet 3     irbesartan (AVAPRO) 150 MG tablet Take 1 tablet (150 mg) by mouth At Bedtime (Patient not taking: Reported on 5/3/2023) 90 tablet 3     metoprolol succinate ER (TOPROL XL) 100 MG 24 hr tablet Take 1 tablet (100 mg) by mouth daily (Patient not taking: Reported on 5/3/2023) 90 tablet 3     sucralfate (CARAFATE) 1 GM tablet Take 1 tablet (1 g) by mouth 2 times daily as needed (acid reflux) (Patient not taking: Reported on 3/7/2023) 60 tablet 11     thin (NO BRAND SPECIFIED) lancets Use with lanceting device. To accompany: Blood Glucose Monitor Brands: per insurance. (Patient not taking: Reported on 5/3/2023) 200 each 6       Allergies   Allergen Reactions     Codeine Sulfate Nausea     Metformin      Stomach pain      Oxycodone Nausea and Vomiting     Tape [Adhesive Tape] Blisters     Tetanus Toxoid           Lab Results    Chemistry/lipid CBC Cardiac Enzymes/BNP/TSH/INR   Recent Labs   Lab Test 08/19/22  1325   CHOL 132   HDL 39*   LDL 65   TRIG 138     Recent Labs   Lab Test 08/19/22  1325 07/28/21  1147 08/27/19  1133   LDL 65 113 128      Recent Labs   Lab Test 05/02/23 2049   *   POTASSIUM 5.4*   CHLORIDE 103   CO2 16*   *   BUN 50.1*   CR 1.55*   GFRESTIMATED 35*   HIEN 10.2     Recent Labs   Lab Test 05/02/23 2049 03/07/23 1207 11/17/22  1213   CR 1.55* 1.35* 1.14*     Recent Labs   Lab Test 03/07/23  1207 11/17/22  1213 08/19/22  1325   A1C 7.0* 7.1* 7.2*          Recent Labs   Lab Test 05/02/23 2049   WBC 12.6*   HGB 12.8   HCT 39.4   MCV 93        Recent Labs   Lab Test 05/02/23 2049 03/07/23  1207 08/19/22  1325   HGB 12.8 13.1 13.2    No results for input(s): TROPONINI in the last 82692 hours.  No results for input(s): BNP, NTBNPI, NTBNP in the last 75230 hours.  Recent Labs   Lab Test 05/02/23 2049   TSH 1.60     No results for input(s): INR in the last 40888 hours.     Ewa Mc MD

## 2023-05-03 NOTE — ED PROVIDER NOTES
EMERGENCY DEPARTMENT ENCOUNTER      NAME: Meggan Everett  AGE: 72 year old female  YOB: 1950  MRN: [id number]  EVALUATION DATE & TIME: 2023  8:24 PM    PCP: Nate Barcenas    ED PROVIDER: Stone Maki M.D.      Chief Complaint   Patient presents with     Syncope         FINAL IMPRESSION:  1. SVT (supraventricular tachycardia) (H)    2. Hyperglycemia    3. Dehydration          ED COURSE & MEDICAL DECISION MAKIN year old female presents to the Emergency Department for evaluation of syncope.  Patient had a episode of syncope while she was working in her kitchen earlier today.  Did not fall or sustain any severe trauma.  She arrives to the emergency department tachycardic to the 160s.  EKG shows a wide-complex tachycardia, she has a baseline bundle branch block, cannot appreciate any P waves and I suspect this is SVT given the persistent rate and appearance on telemetry.  She was treated with adenosine 6 mg here and subsequently converted into sinus rhythm.  Her labs were obtained as below.  This was notable for hyperglycemia with mildly progressed chronic kidney disease and some minimal hyperkalemia.  She was given aggressive IV fluids totaling more than 2 L while she was here in the emergency department.  She also has a minimally elevated troponin, I think this is probably more due to demand ischemia from the persistently high heart rate in this elderly patient, she does not have any concerning sounding chest pain or anything to make me suspect that this is ACS.  Of note the patient did have knee surgery about 5 weeks ago.  I do not suspect pulmonary embolism at this point given the alternative etiology of SVT and stable vital signs after cardioversion.  Patient was feeling better on recheck.  Given her multiple but mild lab abnormalities as well as age, did discuss possibly hospitalizing her but at this point I think she is demonstrating stability on telemetry with much improved  symptoms and was comfortable with managing things as an outpatient.  I am going to refer to the rapid access clinic to review her episode of SVT and discussed follow-up in clinic with her primary care doctor also to review concerns and continue monitoring blood sugar and other symptoms.  Patient was in agreement with this.  We discussed return precautions and patient was discharged in stable condition    At the conclusion of the encounter I discussed the results of all of the tests and the disposition. The questions were answered. The patient or family acknowledged understanding and was agreeable with the care plan.     8:42 PM I met with the patient for the initial interview and physical examination. Discussed plan for treatment and workup in the ED.  PPE: Provider wore gloves, and N95 mask.      Critical Care  Performed by: Stone Maki MD  Authorized by: Stone Maki MD     Total critical care time: 50 minutes  Critical care time was exclusive of separately billable procedures and treating other patients.  Critical care was necessary to treat or prevent imminent or life-threatening deterioration of the following conditions: SVT causing heart rate greater than 150 requiring ED cardioversion  Critical care was time spent personally by me on the following activities: development of treatment plan with patient or surrogate, discussions with consultants, examination of patient, evaluation of patient's response to treatment, obtaining history from patient or surrogate, ordering and performing treatments and interventions, ordering and review of laboratory studies, ordering and review of radiographic studies and re-evaluation of patient's condition, this excludes any separately billable procedures.        MEDICATIONS GIVEN IN THE EMERGENCY:  Medications   0.9% sodium chloride BOLUS (has no administration in time range)   0.9% sodium chloride BOLUS (0 mLs Intravenous Stopped 5/2/23 2220)   adenosine (ADENOCARD) 6  MG/2ML injection (  Not Given 5/2/23 2115)   adenosine (ADENOCARD) 6 MG/2ML injection (6 mg  $Given 5/2/23 2108)       NEW PRESCRIPTIONS STARTED AT TODAY'S ER VISIT  Discharge Medication List as of 5/2/2023 10:41 PM             =================================================================    HPI    Patient information was obtained from: Patient, EMS      Meggan Everett is a 72 year old female with a pertinent history of hyperlipidemia, osteoarthritis, type 2 diabetes mellitus, stage 3 kidney disease, chronic use of benzodiazepines, and lymphedema who presents to this ED via EMS for evaluation after syncope.  Patient was working in her kitchen when she suddenly felt lightheadedness with little warning and collapsed to the ground.  She was briefly unconscious.  After waking up she noticed that her heart rate was pounding and fast.  She denies feeling sick earlier in the day today.  She has been recovering for the last 5 weeks from right knee surgery.  She notes some chronic swelling in her legs which is unchanged.  She denies any chest pain.  No recent fevers or other illness.  No nausea or vomiting.  Denies shortness of breath.  No history of similar symptoms.      REVIEW OF SYSTEMS   All systems reviewed and negative except as noted in HPI.    PAST MEDICAL HISTORY:  Past Medical History:   Diagnosis Date     Asthma      Chronic kidney failure, stage 3 (moderate) (H)      Chronic use of benzodiazepine for therapeutic purpose      Esophageal reflux      Fibroadenoma of LEFT breast, with fibrocystic changes 05/14/2012     Gout      Hyperlipidemia      Hypothyroid      Mammographic microcalcification 05/01/2012    Left     Migraine with aura      Obesity      ASHER (obstructive sleep apnea)     uses CPAP     Osteoporosis      Primary osteoarthritis of both knees 09/28/2020     RBBB      Screening for colon cancer 11/29/2021     Type 2 diabetes mellitus (H)        PAST SURGICAL HISTORY:  Past Surgical History:    Procedure Laterality Date     BIOPSY BREAST       D & C  2000, 2002     DILATE CERVIX, ABLATE ENDOMETRIUM, COMBINED  01/01/2005     ENDOMETRIAL ABLATION       HC BIOPSY OF BREAST, OPEN INCISIONAL  01/01/1972    Left     HC LAPAROSCOPY, SURGICAL; CHOLECYSTECTOMY  01/01/1998     LAPAROSCOPIC CHOLECYSTECTOMY  01/01/1999     LUMBAR LAMINECTOMY  01/01/1998    Description: Laminectomy Lumbar     LUMPECTOMY BREAST       Microdiscectomy  01/01/1998           CURRENT MEDICATIONS:    Current Facility-Administered Medications   Medication     0.9% sodium chloride BOLUS     Current Outpatient Medications   Medication     albuterol (PROAIR HFA/PROVENTIL HFA/VENTOLIN HFA) 108 (90 Base) MCG/ACT inhaler     albuterol (PROVENTIL) (2.5 MG/3ML) 0.083% neb solution     alcohol swab prep pads     allopurinol (ZYLOPRIM) 300 MG tablet     blood glucose monitoring (ACCU-CHEK FASTCLIX) lancets     blood glucose monitoring (NO BRAND SPECIFIED) meter device kit     celecoxib (CELEBREX) 200 MG capsule     Cholecalciferol (VITAMIN D) 2000 UNIT tablet     colchicine (COLCYRS) 0.6 MG tablet     Continuous Blood Gluc  (FREESTYLE DIEGO 2 READER) PHOENIX     Continuous Blood Gluc Sensor (FREESTYLE DIEGO 2 SENSOR) MISC     diphenoxylate-atropine (LOMOTIL) 2.5-0.025 MG tablet     famotidine (PEPCID) 20 MG tablet     furosemide (LASIX) 40 MG tablet     insulin glargine (LANTUS SOLOSTAR) 100 UNIT/ML pen     irbesartan (AVAPRO) 150 MG tablet     levothyroxine (SYNTHROID) 125 MCG tablet     LORazepam (ATIVAN) 0.5 MG tablet     metoprolol succinate ER (TOPROL XL) 100 MG 24 hr tablet     Probiotic Product (PROBIOTIC-10 PO)     rosuvastatin (CRESTOR) 10 MG tablet     sucralfate (CARAFATE) 1 GM tablet     thin (NO BRAND SPECIFIED) lancets     tirzepatide (MOUNJARO) 10 MG/0.5ML pen     tiZANidine (ZANAFLEX) 4 MG capsule     triamcinolone (KENALOG) 0.1 % external cream         ALLERGIES:  Allergies   Allergen Reactions     Codeine Sulfate Nausea      "Metformin      Stomach pain      Tape [Adhesive Tape] Blisters     Tetanus Toxoid        FAMILY HISTORY:  Family History   Problem Relation Age of Onset     Autoimmune Disease No family hx of      Hypertension Mother         alive in her 90s     Atrial fibrillation Father         alive in his 90s     LUNG DISEASE Sister         interstitial lung disease     Heart Disease Sister        SOCIAL HISTORY:   Social History     Socioeconomic History     Marital status:    Occupational History     Occupation: Retired   Tobacco Use     Smoking status: Never     Smokeless tobacco: Never   Substance and Sexual Activity     Alcohol use: No     Drug use: No     Sexual activity: Never   Social History Narrative    She is  and is retired. She lives with her son and his family on the lower level of their house.  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  She has 5 grandchildren.  She does not smoke cigarettes or drink alcohol.       VITALS:  BP (!) 147/68   Pulse 101   Temp 98.2  F (36.8  C) (Oral)   Resp (!) 32   Ht 1.575 m (5' 2\")   Wt 94.8 kg (209 lb)   SpO2 97%   BMI 38.23 kg/m      PHYSICAL EXAM    Constitutional: Well-developed elderly female patient, laying in bed, no acute distress  HENT: Normocephalic, Atraumatic. Neck Supple.  Eyes: EOMI, Conjunctiva normal.  Respiratory: Breathing comfortably on room air. Speaks full sentences easily. Lungs clear to ascultation.  Cardiovascular: Tachycardic heart rate, Regular rhythm.  Mild bilateral lower extremity peripheral edema.  Abdomen: Soft, nontender  Musculoskeletal: Good range of motion in all major joints. No major deformities noted.  Integument: Warm, Dry.  Neurologic: Alert & awake, Normal motor function, Normal sensory function, No focal deficits noted.   Psychiatric: Cooperative. Affect appropriate.     LAB:  All pertinent labs reviewed and interpreted.  Labs Ordered and Resulted from Time of ED " Arrival to Time of ED Departure   BASIC METABOLIC PANEL - Abnormal       Result Value    Sodium 134 (*)     Potassium 5.4 (*)     Chloride 103      Carbon Dioxide (CO2) 16 (*)     Anion Gap 15      Urea Nitrogen 50.1 (*)     Creatinine 1.55 (*)     Calcium 10.2      Glucose 319 (*)     GFR Estimate 35 (*)    TROPONIN T, HIGH SENSITIVITY - Abnormal    Troponin T, High Sensitivity 21 (*)    CBC WITH PLATELETS AND DIFFERENTIAL - Abnormal    WBC Count 12.6 (*)     RBC Count 4.24      Hemoglobin 12.8      Hematocrit 39.4      MCV 93      MCH 30.2      MCHC 32.5      RDW 14.9      Platelet Count 331      % Neutrophils 75      % Lymphocytes 16      % Monocytes 6      % Eosinophils 1      % Basophils 1      % Immature Granulocytes 1      NRBCs per 100 WBC 0      Absolute Neutrophils 9.5 (*)     Absolute Lymphocytes 2.1      Absolute Monocytes 0.8      Absolute Eosinophils 0.1      Absolute Basophils 0.1      Absolute Immature Granulocytes 0.1      Absolute NRBCs 0.0     TSH WITH FREE T4 REFLEX - Normal    TSH 1.60     MAGNESIUM - Normal    Magnesium 1.7         RADIOLOGY:  Reviewed all pertinent imaging. Please see official radiology report.  No orders to display       EKG:    Performed at: 2037    Impression: Wide-complex tachycardia, SVT, right bundle branch block    Rate: 161  Rhythm: SVT  Axis: Normal  FL Interval: NA  QRS Interval: 120  QTc Interval: 530  ST Changes: None  Comparison: Compared to March 7, 2023, SVT has replaced sinus rhythm and rate is increased    I have independently reviewed and interpreted the EKG(s) documented above.      Performed at: 2118    Impression: Sinus tachycardia, right bundle branch block    Rate: 111  Rhythm: Sinus  Axis: Normal  FL Interval: 170  QRS Interval: 122  QTc Interval: 470  ST Changes: None  Comparison: Compared to earlier today, sinus rhythm has replaced SVT and rate is decreased    I have independently reviewed and interpreted the EKG(s) documented above.    PROCEDURES:    None.         Stone Maki M.D.  Emergency Medicine  [facility] EMERGENCY DEPARTMENT  [address] [place] [id number] [phone]  Dept: [phone]      Stone Maki MD  05/02/23 8423

## 2023-05-03 NOTE — DISCHARGE INSTRUCTIONS
You were seen in the emergency department today after a spell of passing out.  You were found to be an abnormal heart rhythm called supraventricular tachycardia.  This is a heart rhythm which causes your heart to beat too fast due to abnormal electrical conduction.  You were converted out of this rhythm with medicines here.  You had some lab testing here which looked concerning for some evidence of dehydration and blood sugar which was elevated.  You received some copious IV fluids here.  We would like you to discuss this episode as soon as possible in cardiology clinic.  We did place a referral so a  should be reaching out to you tomorrow to help you make this appointment.  Please make sure you are drinking plenty of liquids and using your normal blood sugar medications.  We would like you to plan to follow-up in your primary clinic as well to get your blood sugar rechecked and reevaluate symptoms.  If you have any other immediate concerns like severe persistent lightheadedness, chest pain, breathing difficulties, etc. we can reevaluate at any time in the emergency department.

## 2023-05-03 NOTE — PATIENT INSTRUCTIONS
[facility]  Cardiac Electrophysiology  [address]  [place] [id number]   Office: [phone]  Fax: [phone]       Thank you for seeing us in clinic today - it is a pleasure to be a part of your care team.  Below is a summary of our plan from today's visit.      Supraventricular tachycardia (SVT)- symptomatic with syncope and palpitations.  We reviewed SVT mechanisms and reviewed treatment options including electrophysiology study and ablation vs continued medical therapy.  We reviewed the nature of EP studies and ablation for SVT, success rates depending on the specific SVT mechanism, procedural risks (including groin hematoma, tamponade, heart block, stroke) and recovery expectations.  - will think about her options    - we will call you in a few days      Please do not hesitate to be in touch with our office at [phone] with any questions that may arise.      Thank you for trusting us with your care,    Ewa Mc MD  Clinical Cardiac Electrophysiology  [facility]  [address]  [place] [address]   Office: [phone]  Fax: [phone]

## 2023-05-03 NOTE — TELEPHONE ENCOUNTER
May 3, 2023    Home health orders was picked up from outbox of Dr. Barcenas.  Paperwork has been reviewed and is complete.  Per initial initial request, this was sent via fax to [phone].     Sunshine Crawford

## 2023-05-04 ENCOUNTER — TELEPHONE (OUTPATIENT)
Dept: INTERNAL MEDICINE | Facility: CLINIC | Age: 73
End: 2023-05-04

## 2023-05-04 ENCOUNTER — VIRTUAL VISIT (OUTPATIENT)
Dept: INTERNAL MEDICINE | Facility: CLINIC | Age: 73
End: 2023-05-04
Payer: MEDICARE

## 2023-05-04 ENCOUNTER — TELEPHONE (OUTPATIENT)
Dept: CARDIOLOGY | Facility: CLINIC | Age: 73
End: 2023-05-04

## 2023-05-04 ENCOUNTER — PREP FOR PROCEDURE (OUTPATIENT)
Dept: CARDIOLOGY | Facility: CLINIC | Age: 73
End: 2023-05-04

## 2023-05-04 ENCOUNTER — DOCUMENTATION ONLY (OUTPATIENT)
Dept: CARDIOLOGY | Facility: CLINIC | Age: 73
End: 2023-05-04

## 2023-05-04 DIAGNOSIS — Z96.651 STATUS POST TOTAL RIGHT KNEE REPLACEMENT: ICD-10-CM

## 2023-05-04 DIAGNOSIS — I47.10 SVT (SUPRAVENTRICULAR TACHYCARDIA) (H): Primary | ICD-10-CM

## 2023-05-04 DIAGNOSIS — Z79.4 TYPE 2 DIABETES MELLITUS WITH STAGE 3B CHRONIC KIDNEY DISEASE, WITH LONG-TERM CURRENT USE OF INSULIN (H): ICD-10-CM

## 2023-05-04 DIAGNOSIS — I47.10 SVT (SUPRAVENTRICULAR TACHYCARDIA) (H): ICD-10-CM

## 2023-05-04 DIAGNOSIS — I10 ESSENTIAL HYPERTENSION: ICD-10-CM

## 2023-05-04 DIAGNOSIS — E11.22 TYPE 2 DIABETES MELLITUS WITH STAGE 3B CHRONIC KIDNEY DISEASE, WITH LONG-TERM CURRENT USE OF INSULIN (H): ICD-10-CM

## 2023-05-04 DIAGNOSIS — N18.32 TYPE 2 DIABETES MELLITUS WITH STAGE 3B CHRONIC KIDNEY DISEASE, WITH LONG-TERM CURRENT USE OF INSULIN (H): ICD-10-CM

## 2023-05-04 DIAGNOSIS — R55 SYNCOPE, UNSPECIFIED SYNCOPE TYPE: Primary | ICD-10-CM

## 2023-05-04 PROBLEM — Z96.652 STATUS POST TOTAL LEFT KNEE REPLACEMENT: Status: ACTIVE | Noted: 2023-03-27

## 2023-05-04 PROBLEM — I89.0 LYMPHEDEMA: Status: RESOLVED | Noted: 2023-03-27 | Resolved: 2023-05-04

## 2023-05-04 PROBLEM — Z96.659 S/P TOTAL KNEE ARTHROPLASTY: Status: ACTIVE | Noted: 2023-03-27

## 2023-05-04 PROBLEM — I89.0 LYMPHEDEMA: Status: ACTIVE | Noted: 2023-03-27

## 2023-05-04 PROCEDURE — 99215 OFFICE O/P EST HI 40 MIN: CPT | Mod: VID | Performed by: INTERNAL MEDICINE

## 2023-05-04 RX ORDER — FENTANYL CITRATE 50 UG/ML
25 INJECTION, SOLUTION INTRAMUSCULAR; INTRAVENOUS
Status: CANCELLED | OUTPATIENT
Start: 2023-05-04

## 2023-05-04 RX ORDER — SODIUM CHLORIDE 9 MG/ML
100 INJECTION, SOLUTION INTRAVENOUS CONTINUOUS
Status: CANCELLED | OUTPATIENT
Start: 2023-05-04

## 2023-05-04 RX ORDER — LIDOCAINE 40 MG/G
CREAM TOPICAL
Status: CANCELLED | OUTPATIENT
Start: 2023-05-04

## 2023-05-04 RX ORDER — DEXTROSE MONOHYDRATE 25 G/50ML
25-50 INJECTION, SOLUTION INTRAVENOUS
Status: CANCELLED | OUTPATIENT
Start: 2023-05-04

## 2023-05-04 RX ORDER — DEXMEDETOMIDINE HYDROCHLORIDE 4 UG/ML
.1-1.5 INJECTION, SOLUTION INTRAVENOUS CONTINUOUS
Status: CANCELLED | OUTPATIENT
Start: 2023-05-04

## 2023-05-04 RX ORDER — NICOTINE POLACRILEX 4 MG
15-30 LOZENGE BUCCAL
Status: CANCELLED | OUTPATIENT
Start: 2023-05-04

## 2023-05-04 NOTE — PROGRESS NOTES
"Meggan is a 72 year old who is being evaluated via a billable video visit.      How would you like to obtain your AVS? MyChart  If the video visit is dropped, the invitation should be resent by: Send to e-mail at: [email or web address]  Will anyone else be joining your video visit? No  {If patient encounters technical issues they should call [phone] :950080}        {PROVIDER CHARTING PREFERENCE:064715}    Subjective   Meggan is a 72 year old, presenting for the following health issues:  Hospital F/U (Syncope 5/2/23 -recently seen Cardiologist yesterday and possible Heart Ablation / / )        5/4/2023     8:19 AM   Additional Questions   Roomed by Karime ANSARI     {SUPERLIST (Optional):529125}  {additonal problems for provider to add (Optional):812003}      Review of Systems   {ROS COMP (Optional):590942}      Objective           Vitals:  No vitals were obtained today due to virtual visit.    Physical Exam   {video visit exam brief selected:341241::\"GENERAL: Healthy, alert and no distress\",\"EYES: Eyes grossly normal to inspection.  No discharge or erythema, or obvious scleral/conjunctival abnormalities.\",\"RESP: No audible wheeze, cough, or visible cyanosis.  No visible retractions or increased work of breathing.  \",\"SKIN: Visible skin clear. No significant rash, abnormal pigmentation or lesions.\",\"NEURO: Cranial nerves grossly intact.  Mentation and speech appropriate for age.\",\"PSYCH: Mentation appears normal, affect normal/bright, judgement and insight intact, normal speech and appearance well-groomed.\"}    {Diagnostic Test Results (Optional):146179}    {AMBULATORY ATTESTATION (Optional):169058}        Video-Visit Details    Type of service:  Video Visit   Video Start Time: {video visit start/end time for provider to select:189056}  Video End Time:{video visit start/end time for provider to select:792429}    Originating Location (pt. Location): Home  {PROVIDER LOCATION On-site should be selected for " visits conducted from your clinic location or adjoining [facility] hospital, academic office, or other nearby [facility] building. Off-site should be selected for all other provider locations, including home:[address]}  Distant Location (provider location):  On-site  Platform used for Video Visit: Phillip

## 2023-05-04 NOTE — PROGRESS NOTES
H&P  PMD: []  Received [] Card OV: [x]  Date: 5/4/23 Teach  []   Orders  I [x] P  [x]  Letter []   AC: None- NA AAD: Metoprolol-Hold 7 days prior    All other AM Meds: Take All   1/2 Long acting Insulin and hold short acting     1950  Home:[phone] (home) Cell:[phone] (mobile)  Emergency Contact: Prince Everett   PCP: Nate Barcenas, [phone]    Important patient information for CSC/Cath Lab staff : None    [facility] EP Cath Lab Procedure Order   Ablation Type:Supraventricular Tachycardia  Ordering Provider: Dr Mc  Ordering Date: 5/4/2023  Diagnosis:  SVT  Anticipated Case Duration:  Standard ( Case per day SA 2:1, DW 4:1, KA 3:1)   Scheduling Timeframe:  Next Available  Scheduling Restrictions: None  Scheduling Contact: Please contact pt to schedule, if you are unable to schedule date within the next 24 hours please contact pt to update on scheduling process  EP RN Follow Up Apt: Dr Duran or Dr Sullivan Ablations, do NOT need RN PC follow-up post procedure. Dr Mc's SVT, AFL, and PVC ablations need 3-4 day EP RN PC post procedure.  Current Device/Device Co Needed for Procedure: None NoneNone  Pre-Procedural Testing needed: EchoMapping System Required:  Carto (Alexandro Sullivan) Amaury Carter  ICE Needed:  Yes  Anesthesia:  MAC- Monitored Anesthesia Care    [facility] EP Cath Lab Prep   H&P:  Compled by Dr Sullivan on 5/3/23 if scheduled within 30 days, pt to schedule with PMD if procedure outside of this timeframe  Pre-op Labs: CBC, BMP, Beta HcG if appropriate, and INR if on Warfarin will be ordered AM of procedure and Review of most recent labs, WEL for procedure  T&S Pre-Procedure Review: T&S is not required for procedure  Medical Records Pertinent for Procedure:  Echo Pending  Allergies: Reviewed allergies, no concerns regarding orders for procedure    Allergies   Allergen Reactions    Codeine Sulfate Nausea    Metformin      Stomach pain     Oxycodone Nausea and Vomiting    Tape  [Adhesive Tape] Blisters    Tetanus Toxoid        Current Outpatient Medications:     albuterol (PROAIR HFA/PROVENTIL HFA/VENTOLIN HFA) 108 (90 Base) MCG/ACT inhaler, Inhale 2 puffs into the lungs every 6 hours as needed for shortness of breath / dyspnea or wheezing, Disp: 18 g, Rfl: 3    albuterol (PROVENTIL) (2.5 MG/3ML) 0.083% neb solution, Take 1 vial (2.5 mg) by nebulization every 6 hours as needed for shortness of breath / dyspnea or wheezing, Disp: 75 mL, Rfl: 3    alcohol swab prep pads, Use to swab area of injection/vicki as directed., Disp: 100 each, Rfl: 3    allopurinol (ZYLOPRIM) 300 MG tablet, Take 1 tablet (300 mg) by mouth daily, Disp: 90 tablet, Rfl: 3    aspirin (ASA) 81 MG chewable tablet, Take 81 mg by mouth daily, Disp: , Rfl:     blood glucose monitoring (ACCU-CHEK FASTCLIX) lancets, Use to test blood sugar 4 times daily., Disp: 204 each, Rfl: 6    blood glucose monitoring (NO BRAND SPECIFIED) meter device kit, Use to test blood sugar 4 times daily or as directed. Preferred blood glucose meter OR supplies to accompany: Blood Glucose Monitor Brands: per insurance., Disp: 1 kit, Rfl: 0    celecoxib (CELEBREX) 200 MG capsule, Take 1 capsule (200 mg) by mouth daily, Disp: 30 capsule, Rfl: 1    Cholecalciferol (VITAMIN D) 2000 UNIT tablet, Take 2 tablets by mouth daily , Disp: , Rfl:     colchicine (COLCYRS) 0.6 MG tablet, Take 1 tablet (0.6 mg) by mouth daily as needed for gout pain, Disp: 90 tablet, Rfl: 3    Continuous Blood Gluc  (FREESTYLE DIEGO 2 READER) PHOENIX, 1 each continuous Use to read blood sugars per 's instructions., Disp: 1 each, Rfl: 0    Continuous Blood Gluc Sensor (FREESTYLE DIEGO 2 SENSOR) MISC, 1 each every 14 days To check blood sugars continuously, Disp: 2 each, Rfl: 5    diphenoxylate-atropine (LOMOTIL) 2.5-0.025 MG tablet, Take 1 tablet by mouth 4 times daily as needed for diarrhea (Patient not taking: Reported on 5/4/2023), Disp: 30 tablet, Rfl: 1     famotidine (PEPCID) 20 MG tablet, Take 1 tablet (20 mg) by mouth 2 times daily, Disp: 180 tablet, Rfl: 3    furosemide (LASIX) 40 MG tablet, Take 40 mg by mouth daily as needed (edema), Disp: 90 tablet, Rfl: 3    insulin glargine (LANTUS SOLOSTAR) 100 UNIT/ML pen, Inject 5 Units Subcutaneous every morning, Disp: 60 mL, Rfl: 4    irbesartan (AVAPRO) 150 MG tablet, Take 1 tablet (150 mg) by mouth At Bedtime, Disp: 90 tablet, Rfl: 3    levothyroxine (SYNTHROID) 125 MCG tablet, Take 1 tablet (125 mcg) by mouth daily, Disp: 90 tablet, Rfl: 3    LORazepam (ATIVAN) 0.5 MG tablet, Take 2 tablets (1 mg) by mouth At Bedtime, Disp: 180 tablet, Rfl: 1    metoprolol succinate ER (TOPROL XL) 100 MG 24 hr tablet, Take 1 tablet (100 mg) by mouth daily, Disp: 90 tablet, Rfl: 3    Probiotic Product (PROBIOTIC-10 PO), Take 1 capsule by mouth daily Florastor , Disp: , Rfl:     rosuvastatin (CRESTOR) 10 MG tablet, Take 0.5 tablets (5 mg) by mouth three times a week, Disp: 90 tablet, Rfl: 3    sucralfate (CARAFATE) 1 GM tablet, Take 1 tablet (1 g) by mouth 2 times daily as needed (acid reflux), Disp: 60 tablet, Rfl: 11    thin (NO BRAND SPECIFIED) lancets, Use with lanceting device. To accompany: Blood Glucose Monitor Brands: per insurance., Disp: 200 each, Rfl: 6    tirzepatide (MOUNJARO) 10 MG/0.5ML pen, Inject 10 mg Subcutaneous every 7 days, Disp: 2 mL, Rfl: 3    tiZANidine (ZANAFLEX) 4 MG capsule, Take 1 capsule (4 mg) by mouth 3 times daily as needed for muscle spasms, Disp: 270 capsule, Rfl: 3    triamcinolone (KENALOG) 0.1 % external cream, Apply topically 2 times daily, Disp: 30 g, Rfl: 3    Documentation Date:5/4/2023 8:52 AM  Floresita Givens RN

## 2023-05-04 NOTE — TELEPHONE ENCOUNTER
Noted pts PC wanting to proceed with scheduling Ablation.  Orders placed and sent to scheduling.  5/4/2023 8:49 AM  Floresita Givens RN

## 2023-05-04 NOTE — PATIENT INSTRUCTIONS
Stay off irbesartan    Medicine list clarified    Monitor orthostatic heart rate and blood pressures and frequent heart rate and blood pressures    Suspicion regarding bradycardic syncope discussed    Hold ablation until further information    Contact cardiology regarding advice regarding upcoming knee surgery

## 2023-05-04 NOTE — TELEPHONE ENCOUNTER
May 4, 2023    Home health orders was received via fax for Dr. Barcenas to sign.  Patient label was attached to paperwork and placed in provider's inbox to be signed.    Sunsihne Crawford

## 2023-05-04 NOTE — PROGRESS NOTES
Meggan is a 72 year old female contacting the clinic today via video, who will use the platform: [employer] for the visit.  Phone # for [employer], or if Amwell drops:   Telephone Information:   Mobile [phone]          ASSESSMENT and PLAN:  1. Syncope, unspecified syncope type  More suspicious of bradycardia arrhythmia as instigating event with reactive SVT.  Advised very careful monitoring before moving ahead with ablation    2. SVT (supraventricular tachycardia) (H)  Noted and unquestionably present but question whether primary or secondary.  Tachybradycardia syndrome discussed    3. Essential hypertension  Monitor carefully.  Agree with staying off irbesartan for now    4. Type 2 diabetes mellitus with stage 3b chronic kidney disease, with long-term current use of insulin (H)  Improving on Mounjaro    5. BMI 39.0-39.9,adult  Weight loss noted in chart updated.  Total weight loss from peak of 308 to current weight of 218 pounds, weight loss of 80 pounds and current BMI 39    6. Status post total right knee replacement  Noted.  Planning to have other knee done next month.  Will need to weigh risks and benefits and timing of ablation         Patient Instructions   Stay off irbesartan    Medicine list clarified    Monitor orthostatic heart rate and blood pressures and frequent heart rate and blood pressures    Suspicion regarding bradycardic syncope discussed    Hold ablation until further information    Contact cardiology regarding advice regarding upcoming knee surgery            Return in about 3 months (around 8/4/2023) for with me.       CHIEF COMPLAINT:  Chief Complaint   Patient presents with     Hospital F/U     Syncope 5/2/23 -recently seen Cardiologist yesterday and possible Heart Ablation              HISTORY OF PRESENT ILLNESS:  Meggan is a 72 year old female contacting the clinic today via video for review of emergency room visit.  While standing in her kitchen on May 2 she fainted and awoke on the  "floor.  Upon awakening she felt a rapid pulse and noted this on her Fitbit.  She was taken to the emergency room where she was noted to be in a supraventricular tachycardia with a rate of 111 by EKG but initially 160.  She was given adenosine with conversion.  She was referred to cardiology whom she saw yesterday.  They recommended ablation.  She has a history of \"benign tachycardia\" dating back 30 years and has taken metoprolol for this.    She has felt dizzy and lightheaded when standing for the last several months since losing weight on Mounjaro.  While in the hospital for her knee surgery she was advised to hold her irbesartan and decrease her metoprolol by one half due to low blood pressure.  She resumed her normal dose of metoprolol last week but has not monitored her blood pressure carefully.  Home nursing has shown that her blood pressure has been \"okay    She is lost 80 pounds on Mounjaro compared to last year.  She was able to successfully undergo knee replacement and is planning to have the other knee replaced in June    She does not recollect feeling palpitations prior to her syncope.  She is very concerned that the possibility of ablation    HPI    REVIEW OF SYSTEMS:   Minimal peripheral edema, wearing CORRIE hose    PFSH:  Social History     Social History Narrative    She is  and is retired. She lives with her son and his family on the lower level of their house.  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  She has 5 grandchildren.  She does not smoke cigarettes or drink alcohol.     Children live upstairs    TOBACCO USE:  History   Smoking Status     Never   Smokeless Tobacco     Never       VITALS:  There were no vitals filed for this visit.  There were no vitals taken for this visit. Estimated body mass index is 39.87 kg/m  as calculated from the following:    Height as of 5/3/23: 1.575 m (5' 2\").    Weight as of 5/3/23: 98.9 kg (218 " lb).    PHYSICAL EXAM:  (observations via Video)  Alert and oriented    MEDICATIONS:   Current Outpatient Medications   Medication Sig Dispense Refill     albuterol (PROAIR HFA/PROVENTIL HFA/VENTOLIN HFA) 108 (90 Base) MCG/ACT inhaler Inhale 2 puffs into the lungs every 6 hours as needed for shortness of breath / dyspnea or wheezing 18 g 3     albuterol (PROVENTIL) (2.5 MG/3ML) 0.083% neb solution Take 1 vial (2.5 mg) by nebulization every 6 hours as needed for shortness of breath / dyspnea or wheezing 75 mL 3     alcohol swab prep pads Use to swab area of injection/vicki as directed. 100 each 3     allopurinol (ZYLOPRIM) 300 MG tablet Take 1 tablet (300 mg) by mouth daily 90 tablet 3     aspirin (ASA) 81 MG chewable tablet Take 81 mg by mouth daily       blood glucose monitoring (ACCU-CHEK FASTCLIX) lancets Use to test blood sugar 4 times daily. 204 each 6     blood glucose monitoring (NO BRAND SPECIFIED) meter device kit Use to test blood sugar 4 times daily or as directed. Preferred blood glucose meter OR supplies to accompany: Blood Glucose Monitor Brands: per insurance. 1 kit 0     celecoxib (CELEBREX) 200 MG capsule Take 1 capsule (200 mg) by mouth daily 30 capsule 1     Cholecalciferol (VITAMIN D) 2000 UNIT tablet Take 2 tablets by mouth daily        colchicine (COLCYRS) 0.6 MG tablet Take 1 tablet (0.6 mg) by mouth daily as needed for gout pain 90 tablet 3     Continuous Blood Gluc  (FREESTYLE DIEGO 2 READER) [place] 1 each continuous Use to read blood sugars per 's instructions. 1 each 0     Continuous Blood Gluc Sensor (FREESTYLE DIEGO 2 SENSOR) [facility] 1 each every 14 days To check blood sugars continuously 2 each 5     famotidine (PEPCID) 20 MG tablet Take 1 tablet (20 mg) by mouth 2 times daily 180 tablet 3     furosemide (LASIX) 40 MG tablet Take 40 mg by mouth daily as needed (edema) 90 tablet 3     insulin glargine (LANTUS SOLOSTAR) 100 UNIT/ML pen Inject 5 Units Subcutaneous every  morning 60 mL 4     levothyroxine (SYNTHROID) 125 MCG tablet Take 1 tablet (125 mcg) by mouth daily 90 tablet 3     LORazepam (ATIVAN) 0.5 MG tablet Take 2 tablets (1 mg) by mouth At Bedtime 180 tablet 1     metoprolol succinate ER (TOPROL XL) 100 MG 24 hr tablet Take 1 tablet (100 mg) by mouth daily 90 tablet 3     Probiotic Product (PROBIOTIC-10 PO) Take 1 capsule by mouth daily Florastor        rosuvastatin (CRESTOR) 10 MG tablet Take 0.5 tablets (5 mg) by mouth three times a week 90 tablet 3     sucralfate (CARAFATE) 1 GM tablet Take 1 tablet (1 g) by mouth 2 times daily as needed (acid reflux) 60 tablet 11     thin (NO BRAND SPECIFIED) lancets Use with lanceting device. To accompany: Blood Glucose Monitor Brands: per insurance. 200 each 6     tirzepatide (MOUNJARO) 10 MG/0.5ML pen Inject 10 mg Subcutaneous every 7 days 2 mL 3     tiZANidine (ZANAFLEX) 4 MG capsule Take 1 capsule (4 mg) by mouth 3 times daily as needed for muscle spasms 270 capsule 3     triamcinolone (KENALOG) 0.1 % external cream Apply topically 2 times daily 30 g 3       Outside Notes summarized: Emergency room note May 2  Labs, x-rays, cardiology, GI tests reviewed: EKG and labs from a second  Recent Labs   Lab Test 05/02/23  2049 03/07/23  1207 11/17/22  1213 08/25/22  1423 08/19/22  1325 04/29/22  1144 04/29/22  1144   HGB 12.8 13.1  --   --  13.2  --   --    WBC 12.6* 10.1  --   --  7.6  --   --    * 139 138   < > 138  --  142   POTASSIUM 5.4* 4.6 4.3   < > 4.7  --  4.5   CR 1.55* 1.35* 1.14*   < > 2.87*  --  1.47*   A1C  --  7.0* 7.1*  --  7.2*  --  8.7*   URIC  --   --  4.5  --  5.1  --  6.7   B12  --   --   --   --   --   --  787   TSH 1.60 0.37 0.25*  --  0.87   < >  --    VITDT  --   --   --   --   --   --  70   SED  --   --  20  --  41*  --   --     < > = values in this interval not displayed.     No results found for: [id number]  Lab Results   Component Value Date    CHOL 132 08/19/2022     New orders: No orders of the  defined types were placed in this encounter.      Independent review of:     Nate Barcenas MD  [facility]    Video-Visit Details  Type of service:  Video Visit  Patient has given verbal consent to a Video visit?  Yes  How would you like to obtain your AVS?  MyChart  Will anyone else be joining your video visit, giving supplemental history? No    Originating location (pt location): Home    Distant Location (provider location):  Off-site    Video Start Time: 9:18 AM  Video End time:  9:56 AM  Face to face plus orders: 38 minutes  Documentation time:  3 minutes     The visit lasted a total of 41 minutes

## 2023-05-04 NOTE — TELEPHONE ENCOUNTER
Noted.  PC msg created and postponed to 5/5/23 to contact pt for decision.  5/4/2023 8:47 AM  Floresita Givens, Ewa Masters MD  P [facility]  Dear team,       Meggan would like us to call her on Friday to let us know if she would like an ablation.     If she does,we will need a TTE prior her procedure.     Ensite X with Amaury Carter   CBC BMP on day of procedure   Hold Metoprolol 4 days prior to procedure   Monitored anesthesia care     Thanks,   ENRICO

## 2023-05-10 NOTE — TELEPHONE ENCOUNTER
May 10, 2023    Home health orders was picked up from outbox of Dr. Barcenas.  Paperwork has been reviewed and is complete.  Per initial initial request, this was sent via fax to [phone].     Magdiel Kendrick III

## 2023-05-16 ENCOUNTER — TELEPHONE (OUTPATIENT)
Dept: CARDIOLOGY | Facility: CLINIC | Age: 73
End: 2023-05-16
Payer: MEDICARE

## 2023-05-16 NOTE — TELEPHONE ENCOUNTER
Jm Santiago,     Just spoke with this patient and she stated that she's not going to be scheduling at this time, that her insurance will not cover the procedure until her condition has been monitored longer, that she will call back to schedule when she's able to.     Would you like me to just note her case request at this time or delete it?     Thanks!   Remi        Noted.  JW

## 2023-05-25 ENCOUNTER — MYC MEDICAL ADVICE (OUTPATIENT)
Dept: INTERNAL MEDICINE | Facility: CLINIC | Age: 73
End: 2023-05-25
Payer: MEDICARE

## 2023-05-25 DIAGNOSIS — M25.50 ARTHRALGIA, UNSPECIFIED JOINT: ICD-10-CM

## 2023-05-26 RX ORDER — CELECOXIB 200 MG/1
200 CAPSULE ORAL DAILY
Qty: 30 CAPSULE | Refills: 1 | Status: SHIPPED | OUTPATIENT
Start: 2023-05-26 | End: 2023-08-18

## 2023-05-26 NOTE — TELEPHONE ENCOUNTER
"Routing refill request to provider for review/approval because:  Labs out of range:  AST, ALT, Cre, CBC    Last Written Prescription Date:  12/11/23 - Dr. Barcenas   Last Fill Quantity: 30,  # refills: 1   Last office visit provider:  5/4/23 - hospital F/U     Requested Prescriptions   Pending Prescriptions Disp Refills     celecoxib (CELEBREX) 200 MG capsule 30 capsule 1     Sig: Take 1 capsule (200 mg) by mouth daily       NSAID Medications Failed - 5/26/2023  7:49 AM        Failed - Normal ALT on file in past 12 months     Recent Labs   Lab Test 03/07/23  1207   ALT 53*             Failed - Normal AST on file in past 12 months     Recent Labs   Lab Test 03/07/23  1207   AST 40*             Failed - Patient is age 6-64 years        Failed - Normal CBC on file in past 12 months     Recent Labs   Lab Test 05/02/23 2049   WBC 12.6*   RBC 4.24   HGB 12.8   HCT 39.4                    Failed - Normal serum creatinine on file in past 12 months     Recent Labs   Lab Test 05/02/23 2049   CR 1.55*       Ok to refill medication if creatinine is low          Passed - Blood pressure under 140/90 in past 12 months     BP Readings from Last 3 Encounters:   05/03/23 116/60   05/02/23 (!) 147/68   03/07/23 118/74                 Passed - Recent (12 mo) or future (30 days) visit within the authorizing provider's specialty     Patient has had an office visit with the authorizing provider or a provider within the authorizing providers department within the previous 12 mos or has a future within next 30 days. See \"Patient Info\" tab in inbasket, or \"Choose Columns\" in Meds & Orders section of the refill encounter.              Passed - Medication is active on med list        Passed - No active pregnancy on record        Passed - No positive pregnancy test in past 12 months             Archana Roberto RN 05/26/23 11:22 AM  "

## 2023-06-05 ENCOUNTER — MYC MEDICAL ADVICE (OUTPATIENT)
Dept: INTERNAL MEDICINE | Facility: CLINIC | Age: 73
End: 2023-06-05
Payer: MEDICARE

## 2023-06-05 DIAGNOSIS — N18.32 TYPE 2 DIABETES MELLITUS WITH STAGE 3B CHRONIC KIDNEY DISEASE, WITH LONG-TERM CURRENT USE OF INSULIN (H): ICD-10-CM

## 2023-06-05 DIAGNOSIS — E11.22 TYPE 2 DIABETES MELLITUS WITH STAGE 3B CHRONIC KIDNEY DISEASE, WITH LONG-TERM CURRENT USE OF INSULIN (H): ICD-10-CM

## 2023-06-05 DIAGNOSIS — Z79.4 TYPE 2 DIABETES MELLITUS WITH STAGE 3B CHRONIC KIDNEY DISEASE, WITH LONG-TERM CURRENT USE OF INSULIN (H): ICD-10-CM

## 2023-06-06 RX ORDER — TIRZEPATIDE 10 MG/.5ML
10 INJECTION, SOLUTION SUBCUTANEOUS
Qty: 2 ML | Refills: 3 | Status: SHIPPED | OUTPATIENT
Start: 2023-06-06 | End: 2023-08-18

## 2023-06-27 ENCOUNTER — OFFICE VISIT (OUTPATIENT)
Dept: INTERNAL MEDICINE | Facility: CLINIC | Age: 73
End: 2023-06-27
Payer: MEDICARE

## 2023-06-27 VITALS
HEART RATE: 76 BPM | HEIGHT: 63 IN | RESPIRATION RATE: 18 BRPM | OXYGEN SATURATION: 92 % | DIASTOLIC BLOOD PRESSURE: 72 MMHG | WEIGHT: 216.8 LBS | BODY MASS INDEX: 38.41 KG/M2 | TEMPERATURE: 97.6 F | SYSTOLIC BLOOD PRESSURE: 144 MMHG

## 2023-06-27 DIAGNOSIS — M17.0 PRIMARY OSTEOARTHRITIS OF BOTH KNEES: ICD-10-CM

## 2023-06-27 DIAGNOSIS — E66.01 CLASS 2 SEVERE OBESITY DUE TO EXCESS CALORIES WITH SERIOUS COMORBIDITY AND BODY MASS INDEX (BMI) OF 38.0 TO 38.9 IN ADULT (H): ICD-10-CM

## 2023-06-27 DIAGNOSIS — E66.812 CLASS 2 SEVERE OBESITY DUE TO EXCESS CALORIES WITH SERIOUS COMORBIDITY AND BODY MASS INDEX (BMI) OF 38.0 TO 38.9 IN ADULT (H): ICD-10-CM

## 2023-06-27 DIAGNOSIS — E11.22 TYPE 2 DIABETES MELLITUS WITH STAGE 3B CHRONIC KIDNEY DISEASE, WITH LONG-TERM CURRENT USE OF INSULIN (H): ICD-10-CM

## 2023-06-27 DIAGNOSIS — N18.32 TYPE 2 DIABETES MELLITUS WITH STAGE 3B CHRONIC KIDNEY DISEASE, WITH LONG-TERM CURRENT USE OF INSULIN (H): ICD-10-CM

## 2023-06-27 DIAGNOSIS — Z01.818 PRE-OPERATIVE EXAMINATION FOR INTERNAL MEDICINE: Primary | ICD-10-CM

## 2023-06-27 DIAGNOSIS — Z79.4 TYPE 2 DIABETES MELLITUS WITH STAGE 3B CHRONIC KIDNEY DISEASE, WITH LONG-TERM CURRENT USE OF INSULIN (H): ICD-10-CM

## 2023-06-27 DIAGNOSIS — Z96.651 STATUS POST TOTAL RIGHT KNEE REPLACEMENT: ICD-10-CM

## 2023-06-27 DIAGNOSIS — Z23 NEED FOR SHINGLES VACCINE: ICD-10-CM

## 2023-06-27 LAB
BASOPHILS # BLD AUTO: 0.1 10E3/UL (ref 0–0.2)
BASOPHILS NFR BLD AUTO: 1 %
EOSINOPHIL # BLD AUTO: 0.2 10E3/UL (ref 0–0.7)
EOSINOPHIL NFR BLD AUTO: 2 %
ERYTHROCYTE [DISTWIDTH] IN BLOOD BY AUTOMATED COUNT: 14.3 % (ref 10–15)
HBA1C MFR BLD: 7.1 % (ref 0–5.6)
HCT VFR BLD AUTO: 41.1 % (ref 35–47)
HGB BLD-MCNC: 13.1 G/DL (ref 11.7–15.7)
IMM GRANULOCYTES # BLD: 0 10E3/UL
IMM GRANULOCYTES NFR BLD: 0 %
LYMPHOCYTES # BLD AUTO: 2.2 10E3/UL (ref 0.8–5.3)
LYMPHOCYTES NFR BLD AUTO: 31 %
MCH RBC QN AUTO: 29.6 PG (ref 26.5–33)
MCHC RBC AUTO-ENTMCNC: 31.9 G/DL (ref 31.5–36.5)
MCV RBC AUTO: 93 FL (ref 78–100)
MONOCYTES # BLD AUTO: 0.4 10E3/UL (ref 0–1.3)
MONOCYTES NFR BLD AUTO: 6 %
NEUTROPHILS # BLD AUTO: 4.1 10E3/UL (ref 1.6–8.3)
NEUTROPHILS NFR BLD AUTO: 59 %
PLATELET # BLD AUTO: 254 10E3/UL (ref 150–450)
RBC # BLD AUTO: 4.43 10E6/UL (ref 3.8–5.2)
WBC # BLD AUTO: 7 10E3/UL (ref 4–11)

## 2023-06-27 PROCEDURE — 83036 HEMOGLOBIN GLYCOSYLATED A1C: CPT | Performed by: INTERNAL MEDICINE

## 2023-06-27 PROCEDURE — 85025 COMPLETE CBC W/AUTO DIFF WBC: CPT | Performed by: INTERNAL MEDICINE

## 2023-06-27 PROCEDURE — 99214 OFFICE O/P EST MOD 30 MIN: CPT | Performed by: INTERNAL MEDICINE

## 2023-06-27 PROCEDURE — 36415 COLL VENOUS BLD VENIPUNCTURE: CPT | Performed by: INTERNAL MEDICINE

## 2023-06-27 PROCEDURE — 80053 COMPREHEN METABOLIC PANEL: CPT | Performed by: INTERNAL MEDICINE

## 2023-06-27 NOTE — PROGRESS NOTES
[facility]  [address] [address]  Phone: [phone]  Fax: [phone]  Primary Provider: Nate Barcenas  :[id number]}  PREOPERATIVE EVALUATION:  Today's date: 6/27/2023    Meggan Everett is a 72 year old female who presents for a preoperative evaluation.    Surgical Information:  Surgery/Procedure: knee replacement  Surgery Location: [facility]  Surgeon: Dr. Faith  Surgery Date: 7/17/23  Time of Surgery: TBD  Where patient plans to recover: At home with family  Fax number for surgical facility: [phone]    Type of Anesthesia Anticipated: Spinal    Assessment/Plan:     ASSESSMENT and PLAN:  1. Pre-operative examination for internal medicine  Stable for surgery  - zoster vaccine recombinant adjuvanted (SHINGRIX) injection; Inject 0.5 mLs into the muscle once for 1 dose Pharmacist administered  Dispense: 0.5 mL; Refill: 0  - CBC with Platelets & Differential; Future  - CBC with Platelets & Differential    2. Primary osteoarthritis of both knees  Stable for surgery  - CBC with Platelets & Differential; Future  - Comprehensive metabolic panel; Future  - CBC with Platelets & Differential  - Comprehensive metabolic panel    3. Need for shingles vaccine  - CBC with Platelets & Differential; Future  - CBC with Platelets & Differential    4. Class 2 severe obesity due to excess calories with serious comorbidity and body mass index (BMI) of 38.0 to 38.9 in adult (H)  Weight stable but over 50 pound weight loss noted  - CBC with Platelets & Differential; Future  - CBC with Platelets & Differential    5. Status post total right knee replacement  Handled right knee surgery just fine  - CBC with Platelets & Differential; Future  - CBC with Platelets & Differential    6. Type 2 diabetes mellitus with stage 3b chronic kidney disease, with long-term current use of insulin (H)  Discussed addition of Jardiance and hopefully  eliminating insulin completely.  Continue taking insulin once or twice daily now  - Hemoglobin A1c; Future  - CBC with Platelets & Differential; Future  - empagliflozin (JARDIANCE) 10 MG TABS tablet; Take 1 tablet (10 mg) by mouth daily  Dispense: 90 tablet; Refill: 3  - Hemoglobin A1c  - CBC with Platelets & Differential       Patient Instructions   Add jardiance 10 mgs daily for diabetes and kidney protection    Try stopping insulin again        Return in about 6 months (around 12/27/2023) for using a video visit.         Subjective         6/27/2023     3:01 PM   Additional Questions   Roomed by Estefany   Accompanied by N/A     Forms 6/27/2023   Any forms needing to be completed Yes         6/27/2023     3:01 PM   Patient Reported Additional Medications   Patient reports taking the following new medications N/A       HPI related to upcoming procedure: Underwent right knee replacement in March.  That surgery went well.  Now scheduled for left knee replacement.  Began Miguel at a weight of 308 pounds.  Current weight 216.  Feels much better of course    Insulin requirements have decreased aggressively.  She does like to use her Dexcom meter.  She does have renal insufficiency but no proteinuria.  We discussed addition of Jardiance.  She checks her blood sugar 3 or 4 times daily.  She takes insulin once daily, sometimes twice, and sometimes is able to skip        6/27/2023    10:35 AM   Preop Questions   1. Have you ever had a heart attack or stroke? No   2. Have you ever had surgery on your heart or blood vessels, such as a stent placement, a coronary artery bypass, or surgery on an artery in your head, neck, heart, or legs? No   3. Do you have chest pain with activity? No   4. Do you have a history of  heart failure? No   5. Do you currently have a cold, bronchitis or symptoms of other infection? No   6. Do you have a cough, shortness of breath, or wheezing? No   7. Do you or anyone in your family have previous  history of blood clots? No   8. Do you or does anyone in your family have a serious bleeding problem such as prolonged bleeding following surgeries or cuts? No   9. Have you ever had problems with anemia or been told to take iron pills? No   10. Have you had any abnormal blood loss such as black, tarry or bloody stools, or abnormal vaginal bleeding? No   11. Have you ever had a blood transfusion? No   12. Are you willing to have a blood transfusion if it is medically needed before, during, or after your surgery? Yes   13. Have you or any of your relatives ever had problems with anesthesia? YES -    14. Do you have sleep apnea, excessive snoring or daytime drowsiness? No   15. Do you have any artifical heart valves or other implanted medical devices like a pacemaker, defibrillator, or continuous glucose monitor? No   16. Do you have artificial joints? YES -right knee replacement   17. Are you allergic to latex? No         Further interval history and review of symptoms: Weight continues to decrease.  Insulin requirements minimal.    Small nodule left forehead    HPI    Health Care Directive:  Patient does not have a Health Care Directive or Living Will: Patient states has Advance Directive and will bring in a copy to clinic.    Preoperative Review of :   reviewed - no record of controlled substances prescribed.    Patient Active Problem List    Diagnosis Date Noted     Class 2 severe obesity due to excess calories with serious comorbidity in adult (H) 06/27/2023     Priority: Medium     Status post total right knee replacement 03/27/2023     Priority: Medium     Acquired hypothyroidism 08/19/2022     Priority: Medium     Vertiginous migraine 08/19/2022     Priority: Medium     Lymphedema of both lower extremities 08/19/2022     Priority: Medium     Asthmatic bronchitis , chronic (H) 04/07/2022     Priority: Medium     Type 2 diabetes mellitus with stage 3b chronic kidney disease, with long-term current use of  insulin (H) 11/29/2021     Priority: Medium     Screening for colon cancer 11/29/2021     Priority: Medium     Chronic use of benzodiazepine for therapeutic purpose 07/28/2021     Priority: Medium     Primary osteoarthritis of both knees 09/28/2020     Priority: Medium     Chronic kidney disease, stage III (moderate) (H) 01/22/2019     Priority: Medium     Senile osteoporosis 01/18/2019     Priority: Medium     Obstructive sleep apnea syndrome 12/27/2016     Priority: Medium     Formatting of this note might be different from the original.  Has a CPAP  Formatting of this note might be different from the original.  Has a CPAP       Fibroadenoma of LEFT breast, with fibrocystic changes 05/14/2012     Priority: Medium      Past Medical History:   Diagnosis Date     Asthma      Chronic kidney failure, stage 3 (moderate) (H)      Chronic use of benzodiazepine for therapeutic purpose      Esophageal reflux      Fibroadenoma of LEFT breast, with fibrocystic changes 05/14/2012     Gout      Hyperlipidemia      Hypothyroid      Mammographic microcalcification 05/01/2012    Left     Migraine with aura      Obesity      ASHER (obstructive sleep apnea)     uses CPAP     Osteoporosis      Primary osteoarthritis of both knees 09/28/2020     RBBB      Screening for colon cancer 11/29/2021     Type 2 diabetes mellitus (H)      Past Surgical History:   Procedure Laterality Date     BIOPSY BREAST       D & C  2000, 2002     DILATE CERVIX, ABLATE ENDOMETRIUM, COMBINED  01/01/2005     ENDOMETRIAL ABLATION       HC BIOPSY OF BREAST, OPEN INCISIONAL  01/01/1972    Left     HC LAPAROSCOPY, SURGICAL; CHOLECYSTECTOMY  01/01/1998     LAPAROSCOPIC CHOLECYSTECTOMY  01/01/1999     LUMBAR LAMINECTOMY  01/01/1998    Description: Laminectomy Lumbar     LUMPECTOMY BREAST       Microdiscectomy  01/01/1998     Current Outpatient Medications   Medication Sig Dispense Refill     albuterol (PROAIR HFA/PROVENTIL HFA/VENTOLIN HFA) 108 (90 Base) MCG/ACT  inhaler Inhale 2 puffs into the lungs every 6 hours as needed for shortness of breath / dyspnea or wheezing 18 g 3     albuterol (PROVENTIL) (2.5 MG/3ML) 0.083% neb solution Take 1 vial (2.5 mg) by nebulization every 6 hours as needed for shortness of breath / dyspnea or wheezing 75 mL 3     allopurinol (ZYLOPRIM) 300 MG tablet Take 1 tablet (300 mg) by mouth daily 90 tablet 3     aspirin (ASA) 81 MG chewable tablet Take 81 mg by mouth daily       celecoxib (CELEBREX) 200 MG capsule Take 1 capsule (200 mg) by mouth daily 30 capsule 1     Cholecalciferol (VITAMIN D) 2000 UNIT tablet Take 2 tablets by mouth daily        colchicine (COLCYRS) 0.6 MG tablet Take 1 tablet (0.6 mg) by mouth daily as needed for gout pain 90 tablet 3     Continuous Blood Gluc  (BehavioSecYLE DIEGO 2 READER) [place] 1 each continuous Use to read blood sugars per 's instructions. 1 each 0     Continuous Blood Gluc Sensor (FREESTYLE DIEGO 2 SENSOR) [facility] 1 each every 14 days To check blood sugars continuously 2 each 5     empagliflozin (JARDIANCE) 10 MG TABS tablet Take 1 tablet (10 mg) by mouth daily 90 tablet 3     famotidine (PEPCID) 20 MG tablet Take 1 tablet (20 mg) by mouth 2 times daily 180 tablet 3     insulin glargine (LANTUS SOLOSTAR) 100 UNIT/ML pen Inject 5 Units Subcutaneous every morning 60 mL 4     levothyroxine (SYNTHROID) 125 MCG tablet Take 1 tablet (125 mcg) by mouth daily 90 tablet 3     LORazepam (ATIVAN) 0.5 MG tablet Take 2 tablets (1 mg) by mouth At Bedtime 180 tablet 1     metoprolol succinate ER (TOPROL XL) 100 MG 24 hr tablet Take 1 tablet (100 mg) by mouth daily 90 tablet 3     Probiotic Product (PROBIOTIC-10 PO) Take 1 capsule by mouth daily Florastor        rosuvastatin (CRESTOR) 10 MG tablet Take 0.5 tablets (5 mg) by mouth three times a week 90 tablet 3     tirzepatide (MOUNJARO) 10 MG/0.5ML pen Inject 10 mg Subcutaneous every 7 days 2 mL 3     tiZANidine (ZANAFLEX) 4 MG capsule Take 1 capsule (4 mg)  "by mouth 3 times daily as needed for muscle spasms 270 capsule 3     triamcinolone (KENALOG) 0.1 % external cream Apply topically 2 times daily 30 g 3     zoster vaccine recombinant adjuvanted (SHINGRIX) injection Inject 0.5 mLs into the muscle once for 1 dose Pharmacist administered 0.5 mL 0     alcohol swab prep pads Use to swab area of injection/vicki as directed. (Patient not taking: Reported on 6/27/2023) 100 each 3     blood glucose monitoring (ACCU-CHEK FASTCLIX) lancets Use to test blood sugar 4 times daily. (Patient not taking: Reported on 6/27/2023) 204 each 6     blood glucose monitoring (NO BRAND SPECIFIED) meter device kit Use to test blood sugar 4 times daily or as directed. Preferred blood glucose meter OR supplies to accompany: Blood Glucose Monitor Brands: per insurance. (Patient not taking: Reported on 6/27/2023) 1 kit 0     sucralfate (CARAFATE) 1 GM tablet Take 1 tablet (1 g) by mouth 2 times daily as needed (acid reflux) (Patient not taking: Reported on 6/27/2023) 60 tablet 11     thin (NO BRAND SPECIFIED) lancets Use with lanceting device. To accompany: Blood Glucose Monitor Brands: per insurance. (Patient not taking: Reported on 6/27/2023) 200 each 6       Allergies   Allergen Reactions     Codeine Sulfate Nausea     Metformin      Stomach pain      Oxycodone Nausea and Vomiting     Tape [Adhesive Tape] Blisters     Tetanus Toxoid         Social History     Tobacco Use     Smoking status: Never     Smokeless tobacco: Never   Substance Use Topics     Alcohol use: No     History   Drug Use No           Objective     BP (!) 144/72 (BP Location: Left arm, Patient Position: Sitting, Cuff Size: Adult Large)   Pulse 76   Temp 97.6  F (36.4  C) (Tympanic)   Resp 18   Ht 1.6 m (5' 3\")   Wt 98.3 kg (216 lb 12.8 oz)   LMP 06/27/2007 (Approximate)   SpO2 92%   Breastfeeding No   BMI 38.40 kg/m      Physical Exam  Wearing mask when entering room?  No  Constitutional:  Reveals pleasant woman who " ambulates using a cane  Palpation of radial pulse regular   Cardiac:  Regular rate and rhythm   Lungs: Clear.  Respiratory effort normal.  Edema of Legs: Trace  Psychiatric:  Alert and oriented   Small nodule left upper forehead suggestive by palpation of fibroadenoma    Recent Labs   Lab Test 05/02/23 2049 03/07/23  1207 11/17/22  1213 08/25/22  1423 08/19/22  1325 04/29/22  1144 04/29/22  1144   HGB 12.8 13.1  --   --  13.2  --   --    * 139 138   < > 138  --  142   POTASSIUM 5.4* 4.6 4.3   < > 4.7  --  4.5   CR 1.55* 1.35* 1.14*   < > 2.87*  --  1.47*   A1C  --  7.0* 7.1*  --  7.2*  --  8.7*   URIC  --   --  4.5  --  5.1  --  6.7   B12  --   --   --   --   --   --  787   TSH 1.60 0.37 0.25*  --  0.87   < >  --    VITDT  --   --   --   --   --   --  70    < > = values in this interval not displayed.       Lab Results   Component Value Date    CHOL 132 08/19/2022        Diagnostics:  New orders:   Orders Placed This Encounter   Procedures     Hemoglobin A1c     Comprehensive metabolic panel     CBC with platelets and differential     CBC with Platelets & Differential       No EKG this visit, completed in the last 90 days.    Revised Cardiac Risk Index (RCRI):  The patient has the following serious cardiovascular risks for perioperative complications:   - No serious cardiac risks = 0 points     RCRI Interpretation: 0 points: Class I (very low risk - 0.4% complication rate)      Start Time: 3:36 PM  End time:  4:03 PM  Face to face plus orders: 27 minutes  Documentation time:  3 minutes    The visit lasted a total of 30 minutes          Signed Electronically by: Nate Barcenas MD      @[facility]@

## 2023-06-27 NOTE — PROGRESS NOTES
[facility]  [address] [place] [address]  Phone: [phone]  Fax: [phone]  Primary Provider: Eric Barcenas  Pre-op Performing Provider: ERIC BARCENAS    {Provider  Link to PREOP SmartSet  Use this to apply standard patient instructions to AVS; includes medication directions, common orders, guidelines for anemia, warfarin, additional testing   :172060}  PREOPERATIVE EVALUATION:  Today's date: 6/27/2023    Meggan Everett is a 72 year old female who presents for a preoperative evaluation.      6/27/2023     3:01 PM   Additional Questions   Roomed by Estefany   Accompanied by N/A     Forms 6/27/2023   Any forms needing to be completed Yes         6/27/2023     3:01 PM   Patient Reported Additional Medications   Patient reports taking the following new medications N/A     Surgical Information:  Surgery/Procedure: knee replacement  Surgery Location: [facility]  Surgeon: Dr. Faith  Surgery Date: 7/17/23  Time of Surgery: TBD  Where patient plans to recover: At home with family  Fax number for surgical facility: [phone]    {2021 Provider Charting Preference for Preop :260973}    Subjective       HPI related to upcoming procedure: ***        6/27/2023    10:35 AM   Preop Questions   1. Have you ever had a heart attack or stroke? No   2. Have you ever had surgery on your heart or blood vessels, such as a stent placement, a coronary artery bypass, or surgery on an artery in your head, neck, heart, or legs? No   3. Do you have chest pain with activity? No   4. Do you have a history of  heart failure? No   5. Do you currently have a cold, bronchitis or symptoms of other infection? No   6. Do you have a cough, shortness of breath, or wheezing? No   7. Do you or anyone in your family have previous history of blood clots? No   8. Do you or does anyone in your family have a serious bleeding problem such as prolonged bleeding  following surgeries or cuts? No   9. Have you ever had problems with anemia or been told to take iron pills? No   10. Have you had any abnormal blood loss such as black, tarry or bloody stools, or abnormal vaginal bleeding? No   11. Have you ever had a blood transfusion? No   12. Are you willing to have a blood transfusion if it is medically needed before, during, or after your surgery? Yes   13. Have you or any of your relatives ever had problems with anesthesia? YES - ***   14. Do you have sleep apnea, excessive snoring or daytime drowsiness? No   15. Do you have any artifical heart valves or other implanted medical devices like a pacemaker, defibrillator, or continuous glucose monitor? No   16. Do you have artificial joints? YES - ***   17. Are you allergic to latex? No       Health Care Directive:  Patient does not have a Health Care Directive or Living Will: Patient states has Advance Directive and will bring in a copy to clinic.    Preoperative Review of :  {Mnpmpreport:200358}  {Review MNPMP for all patients per [facility] MNPMP Profile:036874}    {Chronic problem details (Optional) :272910}    Review of Systems  {ROS Preop Choices:903354}    Patient Active Problem List    Diagnosis Date Noted     Status post total right knee replacement 03/27/2023     Priority: Medium     Acquired hypothyroidism 08/19/2022     Priority: Medium     Vertiginous migraine 08/19/2022     Priority: Medium     Lymphedema of both lower extremities 08/19/2022     Priority: Medium     Asthmatic bronchitis , chronic (H) 04/07/2022     Priority: Medium     Type 2 diabetes mellitus with stage 3b chronic kidney disease, with long-term current use of insulin (H) 11/29/2021     Priority: Medium     Screening for colon cancer 11/29/2021     Priority: Medium     Chronic use of benzodiazepine for therapeutic purpose 07/28/2021     Priority: Medium     Primary osteoarthritis of both knees 09/28/2020     Priority: Medium     Chronic kidney disease,  stage III (moderate) (H) 01/22/2019     Priority: Medium     Senile osteoporosis 01/18/2019     Priority: Medium     Obstructive sleep apnea syndrome 12/27/2016     Priority: Medium     Formatting of this note might be different from the original.  Has a CPAP  Formatting of this note might be different from the original.  Has a CPAP       Fibroadenoma of LEFT breast, with fibrocystic changes 05/14/2012     Priority: Medium      Past Medical History:   Diagnosis Date     Asthma      Chronic kidney failure, stage 3 (moderate) (H)      Chronic use of benzodiazepine for therapeutic purpose      Esophageal reflux      Fibroadenoma of LEFT breast, with fibrocystic changes 05/14/2012     Gout      Hyperlipidemia      Hypothyroid      Mammographic microcalcification 05/01/2012    Left     Migraine with aura      Obesity      ASHER (obstructive sleep apnea)     uses CPAP     Osteoporosis      Primary osteoarthritis of both knees 09/28/2020     RBBB      Screening for colon cancer 11/29/2021     Type 2 diabetes mellitus (H)      Past Surgical History:   Procedure Laterality Date     BIOPSY BREAST       D & C  2000, 2002     DILATE CERVIX, ABLATE ENDOMETRIUM, COMBINED  01/01/2005     ENDOMETRIAL ABLATION       HC BIOPSY OF BREAST, OPEN INCISIONAL  01/01/1972    Left     HC LAPAROSCOPY, SURGICAL; CHOLECYSTECTOMY  01/01/1998     LAPAROSCOPIC CHOLECYSTECTOMY  01/01/1999     LUMBAR LAMINECTOMY  01/01/1998    Description: Laminectomy Lumbar     LUMPECTOMY BREAST       Microdiscectomy  01/01/1998     Current Outpatient Medications   Medication Sig Dispense Refill     albuterol (PROAIR HFA/PROVENTIL HFA/VENTOLIN HFA) 108 (90 Base) MCG/ACT inhaler Inhale 2 puffs into the lungs every 6 hours as needed for shortness of breath / dyspnea or wheezing 18 g 3     albuterol (PROVENTIL) (2.5 MG/3ML) 0.083% neb solution Take 1 vial (2.5 mg) by nebulization every 6 hours as needed for shortness of breath / dyspnea or wheezing 75 mL 3      allopurinol (ZYLOPRIM) 300 MG tablet Take 1 tablet (300 mg) by mouth daily 90 tablet 3     aspirin (ASA) 81 MG chewable tablet Take 81 mg by mouth daily       celecoxib (CELEBREX) 200 MG capsule Take 1 capsule (200 mg) by mouth daily 30 capsule 1     Cholecalciferol (VITAMIN D) 2000 UNIT tablet Take 2 tablets by mouth daily        colchicine (COLCYRS) 0.6 MG tablet Take 1 tablet (0.6 mg) by mouth daily as needed for gout pain 90 tablet 3     Continuous Blood Gluc  (FREESTYLE DIEGO 2 READER) [place] 1 each continuous Use to read blood sugars per 's instructions. 1 each 0     Continuous Blood Gluc Sensor (FREESTYLE DIEGO 2 SENSOR) MISC 1 each every 14 days To check blood sugars continuously 2 each 5     famotidine (PEPCID) 20 MG tablet Take 1 tablet (20 mg) by mouth 2 times daily 180 tablet 3     insulin glargine (LANTUS SOLOSTAR) 100 UNIT/ML pen Inject 5 Units Subcutaneous every morning 60 mL 4     levothyroxine (SYNTHROID) 125 MCG tablet Take 1 tablet (125 mcg) by mouth daily 90 tablet 3     LORazepam (ATIVAN) 0.5 MG tablet Take 2 tablets (1 mg) by mouth At Bedtime 180 tablet 1     metoprolol succinate ER (TOPROL XL) 100 MG 24 hr tablet Take 1 tablet (100 mg) by mouth daily 90 tablet 3     Probiotic Product (PROBIOTIC-10 PO) Take 1 capsule by mouth daily Florastor        rosuvastatin (CRESTOR) 10 MG tablet Take 0.5 tablets (5 mg) by mouth three times a week 90 tablet 3     tirzepatide (MOUNJARO) 10 MG/0.5ML pen Inject 10 mg Subcutaneous every 7 days 2 mL 3     tiZANidine (ZANAFLEX) 4 MG capsule Take 1 capsule (4 mg) by mouth 3 times daily as needed for muscle spasms 270 capsule 3     triamcinolone (KENALOG) 0.1 % external cream Apply topically 2 times daily 30 g 3     alcohol swab prep pads Use to swab area of injection/vicki as directed. (Patient not taking: Reported on 6/27/2023) 100 each 3     blood glucose monitoring (ACCU-CHEK FASTCLIX) lancets Use to test blood sugar 4 times daily. (Patient  "not taking: Reported on 2023) 204 each 6     blood glucose monitoring (NO BRAND SPECIFIED) meter device kit Use to test blood sugar 4 times daily or as directed. Preferred blood glucose meter OR supplies to accompany: Blood Glucose Monitor Brands: per insurance. (Patient not taking: Reported on 2023) 1 kit 0     furosemide (LASIX) 40 MG tablet Take 40 mg by mouth daily as needed (edema) 90 tablet 3     sucralfate (CARAFATE) 1 GM tablet Take 1 tablet (1 g) by mouth 2 times daily as needed (acid reflux) (Patient not taking: Reported on 2023) 60 tablet 11     thin (NO BRAND SPECIFIED) lancets Use with lanceting device. To accompany: Blood Glucose Monitor Brands: per insurance. (Patient not taking: Reported on 2023) 200 each 6       Allergies   Allergen Reactions     Codeine Sulfate Nausea     Metformin      Stomach pain      Oxycodone Nausea and Vomiting     Tape [Adhesive Tape] Blisters     Tetanus Toxoid         Social History     Tobacco Use     Smoking status: Never     Smokeless tobacco: Never   Substance Use Topics     Alcohol use: No     {FAMILY HISTORY (Optional):134457480}  History   Drug Use No         Objective     BP (!) 144/72 (BP Location: Left arm, Patient Position: Sitting, Cuff Size: Adult Large)   Pulse 76   Temp 97.6  F (36.4  C) (Tympanic)   Resp 18   Ht 1.6 m (5' 3\")   Wt 98.3 kg (216 lb 12.8 oz)   LMP 2007 (Approximate)   SpO2 92%   Breastfeeding No   BMI 38.40 kg/m      Physical Exam  {EXAM Preop Choices:265792}    Recent Labs   Lab Test 23  2049 23  1207 22  1213   HGB 12.8 13.1  --     273  --    * 139 138   POTASSIUM 5.4* 4.6 4.3   CR 1.55* 1.35* 1.14*   A1C  --  7.0* 7.1*        Diagnostics:  {LABS:118120}   {EK}    Revised Cardiac Risk Index (RCRI):  The patient has the following serious cardiovascular risks for perioperative complications:  {PREOP REVISED CARDIAC RISK INDEX (RCRI) :383676::\" - No serious cardiac risks " "= 0 points\"}     RCRI Interpretation: {REVISED CARDIAC RISK INTERPRETATION :379891}         Signed Electronically by: Nate Bacrenas MD  Copy of this evaluation report is provided to requesting physician.    {Provider Resources  [facility]  Revised Cardiac Risk Index :735965}  "

## 2023-06-28 LAB
ALBUMIN SERPL BCG-MCNC: 4.2 G/DL (ref 3.5–5.2)
ALP SERPL-CCNC: 250 U/L (ref 35–104)
ALT SERPL W P-5'-P-CCNC: 52 U/L (ref 0–50)
ANION GAP SERPL CALCULATED.3IONS-SCNC: 11 MMOL/L (ref 7–15)
AST SERPL W P-5'-P-CCNC: 45 U/L (ref 0–45)
BILIRUB SERPL-MCNC: 0.5 MG/DL
BUN SERPL-MCNC: 33 MG/DL (ref 8–23)
CALCIUM SERPL-MCNC: 9.7 MG/DL (ref 8.8–10.2)
CHLORIDE SERPL-SCNC: 105 MMOL/L (ref 98–107)
CREAT SERPL-MCNC: 1.09 MG/DL (ref 0.51–0.95)
DEPRECATED HCO3 PLAS-SCNC: 21 MMOL/L (ref 22–29)
GFR SERPL CREATININE-BSD FRML MDRD: 53 ML/MIN/1.73M2
GLUCOSE SERPL-MCNC: 132 MG/DL (ref 70–99)
POTASSIUM SERPL-SCNC: 5 MMOL/L (ref 3.4–5.3)
PROT SERPL-MCNC: 6.9 G/DL (ref 6.4–8.3)
SODIUM SERPL-SCNC: 137 MMOL/L (ref 136–145)

## 2023-07-03 DIAGNOSIS — Z79.4 TYPE 2 DIABETES MELLITUS WITH STAGE 3B CHRONIC KIDNEY DISEASE, WITH LONG-TERM CURRENT USE OF INSULIN (H): ICD-10-CM

## 2023-07-03 DIAGNOSIS — N18.32 TYPE 2 DIABETES MELLITUS WITH STAGE 3B CHRONIC KIDNEY DISEASE, WITH LONG-TERM CURRENT USE OF INSULIN (H): ICD-10-CM

## 2023-07-03 DIAGNOSIS — E11.22 TYPE 2 DIABETES MELLITUS WITH STAGE 3B CHRONIC KIDNEY DISEASE, WITH LONG-TERM CURRENT USE OF INSULIN (H): ICD-10-CM

## 2023-07-03 NOTE — TELEPHONE ENCOUNTER
PRESCRIPTIONS MUST BE WRITTEN THIS WAY TO MAKE THEM MEDICARE COMPLIANT    FREESTYLE DIEGO 2 SENSORS  SIG: Change every 14 days.  QTY: 2  REFILLS: 5    -Prescriptions must be written after the clinical note date and will only be able to be used for 6 months from the date of the clinical notes. All prescriptions must be from same provider who patient had visit with. (We will be requesting new clinical notes and prescriptions every 6 months to meet Medicare Guidelines.)        Please contact us at [phone] (this number is for clinics only) with any questions. Please only give [phone] to patients.

## 2023-07-06 ENCOUNTER — MYC MEDICAL ADVICE (OUTPATIENT)
Dept: INTERNAL MEDICINE | Facility: CLINIC | Age: 73
End: 2023-07-06
Payer: MEDICARE

## 2023-07-06 DIAGNOSIS — Z79.899 CHRONIC USE OF BENZODIAZEPINE FOR THERAPEUTIC PURPOSE: ICD-10-CM

## 2023-07-06 DIAGNOSIS — G43.109 VERTIGINOUS MIGRAINE: ICD-10-CM

## 2023-07-10 DIAGNOSIS — N18.32 TYPE 2 DIABETES MELLITUS WITH STAGE 3B CHRONIC KIDNEY DISEASE, WITH LONG-TERM CURRENT USE OF INSULIN (H): ICD-10-CM

## 2023-07-10 DIAGNOSIS — E11.22 TYPE 2 DIABETES MELLITUS WITH STAGE 3B CHRONIC KIDNEY DISEASE, WITH LONG-TERM CURRENT USE OF INSULIN (H): ICD-10-CM

## 2023-07-10 DIAGNOSIS — Z79.4 TYPE 2 DIABETES MELLITUS WITH STAGE 3B CHRONIC KIDNEY DISEASE, WITH LONG-TERM CURRENT USE OF INSULIN (H): ICD-10-CM

## 2023-07-10 RX ORDER — LORAZEPAM 0.5 MG/1
1 TABLET ORAL AT BEDTIME
Qty: 180 TABLET | Refills: 1 | Status: SHIPPED | OUTPATIENT
Start: 2023-07-10 | End: 2023-08-18

## 2023-07-10 NOTE — TELEPHONE ENCOUNTER
PRESCRIPTIONS MUST BE WRITTEN THIS WAY TO MAKE THEM MEDICARE COMPLIANT    FREESTYLE DIEGO 2 SENSORS  SIG: Change every 14 days.  QTY: 2  REFILLS: 5    -Prescriptions must be written after the clinical note date and will only be able to be used for 6 months from the date of the clinical notes. All prescriptions must be from same provider who patient had visit with. (We will be requesting new clinical notes and prescriptions every 6 months to meet Medicare Guidelines.)        Please contact us at [phone] (this number is for clinics only) with any questions. Please only give [phone] to patients.

## 2023-07-13 ENCOUNTER — MYC MEDICAL ADVICE (OUTPATIENT)
Dept: INTERNAL MEDICINE | Facility: CLINIC | Age: 73
End: 2023-07-13
Payer: MEDICARE

## 2023-07-13 DIAGNOSIS — Z79.4 TYPE 2 DIABETES MELLITUS WITH STAGE 3B CHRONIC KIDNEY DISEASE, WITH LONG-TERM CURRENT USE OF INSULIN (H): ICD-10-CM

## 2023-07-13 DIAGNOSIS — N18.32 TYPE 2 DIABETES MELLITUS WITH STAGE 3B CHRONIC KIDNEY DISEASE, WITH LONG-TERM CURRENT USE OF INSULIN (H): ICD-10-CM

## 2023-07-13 DIAGNOSIS — E11.22 TYPE 2 DIABETES MELLITUS WITH STAGE 3B CHRONIC KIDNEY DISEASE, WITH LONG-TERM CURRENT USE OF INSULIN (H): ICD-10-CM

## 2023-07-20 ENCOUNTER — PATIENT OUTREACH (OUTPATIENT)
Dept: CARE COORDINATION | Facility: CLINIC | Age: 73
End: 2023-07-20
Payer: MEDICARE

## 2023-07-20 ENCOUNTER — TELEPHONE (OUTPATIENT)
Dept: INTERNAL MEDICINE | Facility: CLINIC | Age: 73
End: 2023-07-20

## 2023-07-20 NOTE — TELEPHONE ENCOUNTER
Order/Referral Request    Who is requesting: patient    Orders being requested: Physical therapy for at home    Reason service is needed/diagnosis: due to total knee replacement left side    [facility] will not order home thereapy per pt    When are orders needed by: asap    Has this been discussed with Provider: Yes    Does patient have a preference on a Group/Provider/Facility? [facility]    Does patient have an appointment scheduled?: No    Where to send orders: Place orders within Epic    Could we send this information to you in [facility] or would you prefer to receive a phone call?:   Patient would prefer a phone call   Okay to leave a detailed message?: Yes at Cell number on file:    Telephone Information:   Mobile [phone]

## 2023-07-21 ENCOUNTER — VIRTUAL VISIT (OUTPATIENT)
Dept: INTERNAL MEDICINE | Facility: CLINIC | Age: 73
End: 2023-07-21
Payer: MEDICARE

## 2023-07-21 DIAGNOSIS — M10.9 URIC ACID ARTHROPATHY: ICD-10-CM

## 2023-07-21 DIAGNOSIS — Z96.652 STATUS POST TOTAL LEFT KNEE REPLACEMENT: Primary | ICD-10-CM

## 2023-07-21 DIAGNOSIS — Z79.4 TYPE 2 DIABETES MELLITUS WITH STAGE 3B CHRONIC KIDNEY DISEASE, WITH LONG-TERM CURRENT USE OF INSULIN (H): ICD-10-CM

## 2023-07-21 DIAGNOSIS — N18.32 TYPE 2 DIABETES MELLITUS WITH STAGE 3B CHRONIC KIDNEY DISEASE, WITH LONG-TERM CURRENT USE OF INSULIN (H): ICD-10-CM

## 2023-07-21 DIAGNOSIS — E11.22 TYPE 2 DIABETES MELLITUS WITH STAGE 3B CHRONIC KIDNEY DISEASE, WITH LONG-TERM CURRENT USE OF INSULIN (H): ICD-10-CM

## 2023-07-21 PROCEDURE — 99214 OFFICE O/P EST MOD 30 MIN: CPT | Mod: 95 | Performed by: INTERNAL MEDICINE

## 2023-07-21 RX ORDER — ALLOPURINOL 100 MG/1
200 TABLET ORAL DAILY
Qty: 180 TABLET | Refills: 3 | Status: SHIPPED | OUTPATIENT
Start: 2023-07-21 | End: 2024-03-19

## 2023-07-21 RX ORDER — HYDROMORPHONE HYDROCHLORIDE 2 MG/1
2-4 TABLET ORAL
COMMUNITY
Start: 2023-07-17 | End: 2023-08-18

## 2023-07-21 RX ORDER — AMOXICILLIN 250 MG
1-2 CAPSULE ORAL
COMMUNITY
Start: 2023-07-17 | End: 2023-08-18

## 2023-07-21 RX ORDER — ACETAMINOPHEN 500 MG
1000 TABLET ORAL EVERY 8 HOURS PRN
COMMUNITY
End: 2023-09-18

## 2023-07-21 NOTE — CONFIDENTIAL NOTE
Medicare continues to pay for video visits.  Who says Medicare will not pay for video visit?    A video or face-to-face visit is required for physical therapy    Please schedule video visit

## 2023-07-21 NOTE — PROGRESS NOTES
"Meggan is a 72 year old who is being evaluated via a billable video visit.      How would you like to obtain your AVS? MyChart  If the video visit is dropped, the invitation should be resent by: Text to cell phone: [phone]  Will anyone else be joining your video visit? No  {If patient encounters technical issues they should call [phone] :292775}        {PROVIDER CHARTING PREFERENCE:024060}    Subjective   Meggan is a 72 year old, presenting for the following health issues:  Referral (PT-Knee replacement done on Monday. ) and Recheck Medication (Allopurinol- 200mg or 300 mg? 200mg prescription pended for review if patient should stay on 200 mg.//Aspirin - now taking 2 times daily due to knee surgery )        7/21/2023     2:15 PM   Additional Questions   Roomed by Josselin JARAMILLO     History of Present Illness       Reason for visit:  Referral request    She eats 2-3 servings of fruits and vegetables daily.She consumes 0 sweetened beverage(s) daily.She exercises with enough effort to increase her heart rate 9 or less minutes per day.  She exercises with enough effort to increase her heart rate 3 or less days per week.   She is taking medications regularly.       {SUPERLIST (Optional):884106}  {additonal problems for provider to add (Optional):713440}      Review of Systems   {ROS COMP (Optional):286353}      Objective    Vitals - Patient Reported  SpO2 (Patient Reported): 98  Pulse (Patient Reported): 81  Pain Score: Moderate Pain (5)  Pain Loc: Knee        Physical Exam   {video visit exam brief selected:898190::\"GENERAL: Healthy, alert and no distress\",\"EYES: Eyes grossly normal to inspection.  No discharge or erythema, or obvious scleral/conjunctival abnormalities.\",\"RESP: No audible wheeze, cough, or visible cyanosis.  No visible retractions or increased work of breathing.  \",\"SKIN: Visible skin clear. No significant rash, abnormal pigmentation or lesions.\",\"NEURO: Cranial nerves grossly intact.  Mentation " "and speech appropriate for age.\",\"PSYCH: Mentation appears normal, affect normal/bright, judgement and insight intact, normal speech and appearance well-groomed.\"}    {Diagnostic Test Results (Optional):478965}    {AMBULATORY ATTESTATION (Optional):078210}        Video-Visit Details    Type of service:  Video Visit   Video Start Time: {video visit start/end time for provider to select:421861}  Video End Time:{video visit start/end time for provider to select:246856}    Originating Location (pt. Location): {video visit patient location:486400::\"Home\"}  {PROVIDER LOCATION On-site should be selected for visits conducted from your clinic location or adjoining [facility] hospital, academic office, or other nearby [facility] building. Off-site should be selected for all other provider locations, including home:599158}  Distant Location (provider location):  {virtual location provider:140205}  Platform used for Video Visit: {Virtual Visit Platforms:292165::\"[employer]\"}    "

## 2023-07-21 NOTE — TELEPHONE ENCOUNTER
07/21 So I called and talked to pt she is at home after knee surgery the Transitional care did not take good care of her never had ice etccc never got meds on time knee is very swollen, she can't drive, Medicare won;t pay for a video visit so how can she get PT  ordered please

## 2023-07-21 NOTE — PROGRESS NOTES
Meggan is a 72 year old female contacting the clinic today via video, who will use the platform: [employer] ([employer]) for the visit.  Phone # for D[employer], or if Amwell drops:   Telephone Information:   Mobile [phone]          ASSESSMENT and PLAN:  1. Status post total left knee replacement  On July 17 but with sloppy hospital and aftercare.  Referral for home care for home physical therapy  - Home Care Referral    2. Uric acid arthropathy  Continue with 200 mg daily  - allopurinol (ZYLOPRIM) 100 MG tablet; Take 2 tablets (200 mg) by mouth daily  Dispense: 180 tablet; Refill: 3    3. Type 2 diabetes mellitus with stage 3b chronic kidney disease, with long-term current use of insulin (H)  Doing well.  Diabetic control updated in hospital 1 week later for unclear reasons       Patient Instructions   Referral for home physical therapy and home care    Refill allopurinol            Return in about 4 months (around 11/21/2023) for using a video visit.      Hospital Follow-up Visit:    Hospital/Nursing Home/IP Rehab Facility: [facility]  Date of Admission: July 17, 2023  Date of Discharge: July 18, 2023  Reason(s) for Admission: Left knee replacement      Was your hospitalization related to COVID-19? No   Problems taking medications regularly:  None  Medication changes since discharge: None  Problems adhering to non-medication therapy:  None    Discharge summary reviewed  Diagnostic Tests/Treatments reviewed.  Follow up needed: Orthopedic and home care  Update since discharge: improved.   Post Discharge Medication Reconciliation: discharge medications reconciled and changed, per note/orders.  Plan of care communicated with patient          CHIEF COMPLAINT:  Chief Complaint   Patient presents with     Referral     PT-Knee replacement done on Monday.      Recheck Medication     Allopurinol- 200mg or 300 mg? 200mg prescription pended for review if patient should stay on 200 mg.    Aspirin - now taking 2 times daily due to knee  surgery            7/21/2023     2:15 PM   Additional Questions   Roomed by Josselin JARAMILLO       HISTORY OF PRESENT ILLNESS:  Meggan is a 72 year old female contacting the clinic today via video for request for home care.  She was seen for a preop on June 27th and underwent left knee replacement on July 17 at [facility].  She was kept overnight.  She reports that the hospital care was sloppy including no access to ice, hospital bed that did not work and alarmed constantly, and poor nursing care.  She was then discharged with the wrong medication.  When she attempted to arrange home care for physical therapy hospital was unable to do so and requested that her primary do this.  Her son lives upstairs in her house and her daughter is visiting so she does have some help but is unable to leave and unable to drive due to her knee replacement.  She is also complaining of some sciatic type pain.  She was prescribed Dilaudid.  Bowels are working okay despite the narcotics    History of Present Illness       Reason for visit:  Referral request    She eats 2-3 servings of fruits and vegetables daily.She consumes 0 sweetened beverage(s) daily.She exercises with enough effort to increase her heart rate 9 or less minutes per day.  She exercises with enough effort to increase her heart rate 3 or less days per week.   She is taking medications regularly.      REVIEW OF SYSTEMS:   No peripheral edema    PFSH:  Social History     Social History Narrative    She is  and is retired. She lives with her son and his family on the lower level of their house.  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  She has 5 grandchildren.  She does not smoke cigarettes or drink alcohol.     Daughter visiting    TOBACCO USE:  History   Smoking Status     Never   Smokeless Tobacco     Never       VITALS:  There were no vitals filed for this visit.  LMP 06/27/2007 (Approximate)   Breastfeeding  "No  Estimated body mass index is 38.4 kg/m  as calculated from the following:    Height as of 6/27/23: 1.6 m (5' 3\").    Weight as of 6/27/23: 98.3 kg (216 lb 12.8 oz).    PHYSICAL EXAM:  (observations via Video)  Alert and oriented but blurry background    MEDICATIONS:   Current Outpatient Medications   Medication Sig Dispense Refill     acetaminophen (TYLENOL) 500 MG tablet Take 1,000 mg by mouth       albuterol (PROAIR HFA/PROVENTIL HFA/VENTOLIN HFA) 108 (90 Base) MCG/ACT inhaler Inhale 2 puffs into the lungs every 6 hours as needed for shortness of breath / dyspnea or wheezing 18 g 3     albuterol (PROVENTIL) (2.5 MG/3ML) 0.083% neb solution Take 1 vial (2.5 mg) by nebulization every 6 hours as needed for shortness of breath / dyspnea or wheezing 75 mL 3     alcohol swab prep pads Use to swab area of injection/vicki as directed. 100 each 3     allopurinol (ZYLOPRIM) 100 MG tablet Take 2 tablets (200 mg) by mouth daily 180 tablet 3     aspirin (ASA) 81 MG chewable tablet Take 81 mg by mouth daily       blood glucose monitoring (ACCU-CHEK FASTCLIX) lancets Use to test blood sugar 4 times daily. 204 each 6     blood glucose monitoring (NO BRAND SPECIFIED) meter device kit Use to test blood sugar 4 times daily or as directed. Preferred blood glucose meter OR supplies to accompany: Blood Glucose Monitor Brands: per insurance. 1 kit 0     celecoxib (CELEBREX) 200 MG capsule Take 1 capsule (200 mg) by mouth daily 30 capsule 1     Cholecalciferol (VITAMIN D) 2000 UNIT tablet Take 2 tablets by mouth daily        colchicine (COLCYRS) 0.6 MG tablet Take 1 tablet (0.6 mg) by mouth daily as needed for gout pain 90 tablet 3     Continuous Blood Gluc  (FREESTYLE DIEGO 2 READER) PHOENIX 1 each continuous Use to read blood sugars per 's instructions. 1 each 0     Continuous Blood Gluc Sensor (FREESTYLE DIEGO 2 SENSOR) MISC 1 each every 14 days To check blood sugars continuously 2 each 5     empagliflozin " (JARDIANCE) 10 MG TABS tablet Take 1 tablet (10 mg) by mouth daily 90 tablet 3     famotidine (PEPCID) 20 MG tablet Take 1 tablet (20 mg) by mouth 2 times daily 180 tablet 3     HYDROmorphone (DILAUDID) 2 MG tablet Take 2-4 mg by mouth       insulin glargine (LANTUS SOLOSTAR) 100 UNIT/ML pen Inject 5 Units Subcutaneous every morning 60 mL 4     levothyroxine (SYNTHROID) 125 MCG tablet Take 1 tablet (125 mcg) by mouth daily 90 tablet 3     LORazepam (ATIVAN) 0.5 MG tablet Take 2 tablets (1 mg) by mouth At Bedtime 180 tablet 1     metoprolol succinate ER (TOPROL XL) 100 MG 24 hr tablet Take 1 tablet (100 mg) by mouth daily 90 tablet 3     Probiotic Product (PROBIOTIC-10 PO) Take 1 capsule by mouth daily Florastor        rosuvastatin (CRESTOR) 10 MG tablet Take 0.5 tablets (5 mg) by mouth three times a week 90 tablet 3     senna-docusate (SENOKOT-S/PERICOLACE) 8.6-50 MG tablet Take 1-2 tablets by mouth       sucralfate (CARAFATE) 1 GM tablet Take 1 tablet (1 g) by mouth 2 times daily as needed (acid reflux) 60 tablet 11     thin (NO BRAND SPECIFIED) lancets Use with lanceting device. To accompany: Blood Glucose Monitor Brands: per insurance. 200 each 6     tirzepatide (MOUNJARO) 10 MG/0.5ML pen Inject 10 mg Subcutaneous every 7 days 2 mL 3     tirzepatide (MOUNJARO) 5 MG/0.5ML pen Inject 10 mg Subcutaneous every 7 days 12 mL 3     tiZANidine (ZANAFLEX) 4 MG capsule Take 1 capsule (4 mg) by mouth 3 times daily as needed for muscle spasms 270 capsule 3     triamcinolone (KENALOG) 0.1 % external cream Apply topically 2 times daily 30 g 3       Outside Notes summarized: Hospital notes  Labs, x-rays, cardiology, GI tests reviewed: A1c controlled and repeated in the hospital  Recent Labs   Lab Test 06/27/23  1612 05/02/23  2049 03/07/23  1207 11/17/22  1213 08/25/22  1423 08/19/22  1325 04/29/22  1144 04/29/22  1144   HGB 13.1 12.8 13.1  --   --  13.2  --   --    WBC 7.0 12.6* 10.1  --   --  7.6  --   --     134* 139  138   < > 138  --  142   POTASSIUM 5.0 5.4* 4.6 4.3   < > 4.7  --  4.5   CR 1.09* 1.55* 1.35* 1.14*   < > 2.87*  --  1.47*   A1C 7.1*  --  7.0* 7.1*  --  7.2*  --  8.7*   URIC  --   --   --  4.5  --  5.1  --  6.7   B12  --   --   --   --   --   --   --  787   TSH  --  1.60 0.37 0.25*  --  0.87   < >  --    VITDT  --   --   --   --   --   --   --  70   SED  --   --   --  20  --  41*  --   --     < > = values in this interval not displayed.     No results found for: [id number]  Lab Results   Component Value Date    CHOL 132 08/19/2022     New orders:   Orders Placed This Encounter   Procedures     Home Care Referral       Independent review of:     Nate Barcenas MD  [facility]    Video-Visit Details  Type of service:  Video Visit  Patient has given verbal consent to a Video visit?  Yes  How would you like to obtain your AVS?  MyChart  Will anyone else be joining your video visit, giving supplemental history? No    Originating location (pt location): Home    Distant Location (provider location):  Off-site    Video Start Time: 2:55 PM  Video End time:  3:15 PM  Face to face plus orders: 20 minutes  Documentation time:  3 minutes     The visit lasted a total of 23 minutes

## 2023-07-25 ENCOUNTER — MEDICAL CORRESPONDENCE (OUTPATIENT)
Dept: HEALTH INFORMATION MANAGEMENT | Facility: CLINIC | Age: 73
End: 2023-07-25

## 2023-07-25 ENCOUNTER — TELEPHONE (OUTPATIENT)
Dept: INTERNAL MEDICINE | Facility: CLINIC | Age: 73
End: 2023-07-25
Payer: MEDICARE

## 2023-07-25 NOTE — TELEPHONE ENCOUNTER
Order/Referral Request    Who is requesting: Floresita    Orders being requested: Verbal Orders:  PT for 1 week for 1 week, then 3 times per week for 1 week.    Reason service is needed/diagnosis: Working on strengthening, range of motion, and mobility.    When are orders needed by: ASAP    Has this been discussed with Provider: No    Does patient have a preference on a Group/Provider/Facility? [facility]    Does patient have an appointment scheduled?: No    Where to send orders:  Call Floresita at [phone]    prefer to receive a phone call?:   Patient would prefer a phone call     Okay to leave a detailed message?: Yes at Other phone number:  [phone]

## 2023-07-25 NOTE — TELEPHONE ENCOUNTER
Left message on specified voicemail with verbal orders. Provided clinic phone number for order follow-up / requested that the orders be faxed to clinic for provider signature.

## 2023-07-25 NOTE — TELEPHONE ENCOUNTER
Home Care is calling regarding an established patient with [facility].        7/25/2023   Home Care Information   Date of Home Care episode start 7/25/2023   Home Care agency [facility]     Requesting orders from: Nate Barcenas  Provider is following patient: Yes  Is this a 60-day recertification request?  No    Orders Requested    Physical Therapy  Request for initial certification (first set of orders)   Frequency:  1x/wk for 1 wks  then 3x/wk for 1 wks      Information was gathered and will be sent to provider for review.  RN will contact Home Care with information after provider review.    Archana Roberto RN

## 2023-07-31 ENCOUNTER — TELEPHONE (OUTPATIENT)
Dept: INTERNAL MEDICINE | Facility: CLINIC | Age: 73
End: 2023-07-31
Payer: MEDICARE

## 2023-07-31 NOTE — TELEPHONE ENCOUNTER
Order/Referral Request    Who is requesting: Floresita - PT    Orders being requested: Verbal Orders:  Continue with in home PT 3 times per week for 1 week, for the week of August 7th.    Reason service is needed/diagnosis: Continue rehab for knee replacement, not ready for outpatient    When are orders needed by: ASAP    Has this been discussed with Provider: Yes    Does patient have a preference on a Group/Provider/Facility? [facility]    Does patient have an appointment scheduled?: Yes: Week of August 7, 2023    Where to send orders:  Call Floresita at [phone]    refer to receive a phone call?:   Floresita would prefer a phone call     Okay to leave a detailed message?: Yes at Other phone number:  [phone]

## 2023-07-31 NOTE — TELEPHONE ENCOUNTER
Home Care is calling regarding an established patient with [facility].        7/25/2023   Home Care Information   Date of Home Care episode start 7/25/2023   Home Care agency [facility]     Requesting orders from: Nate Barcenas  Provider is following patient: Yes  Is this a 60-day recertification request?  No    Orders Requested    Physical Therapy  Request for continuation of care with no increase or decrease in frequency  Frequency:  3x/wk for 1 wks          Verbal orders given.  Home Care will send orders for provider to sign.  Contact information confirmed and updated as needed.    Yenifer Hunter RN

## 2023-08-02 ENCOUNTER — TELEPHONE (OUTPATIENT)
Dept: INTERNAL MEDICINE | Facility: CLINIC | Age: 73
End: 2023-08-02
Payer: MEDICARE

## 2023-08-02 NOTE — TELEPHONE ENCOUNTER
August 2, 2023    Home health orders was received via fax for Dr. Barcenas to sign.  Patient label was attached to paperwork and placed in provider's inbox to be signed.    Sunshine Crawford

## 2023-08-03 ENCOUNTER — PATIENT OUTREACH (OUTPATIENT)
Dept: CARE COORDINATION | Facility: CLINIC | Age: 73
End: 2023-08-03
Payer: MEDICARE

## 2023-08-04 ENCOUNTER — TELEPHONE (OUTPATIENT)
Dept: INTERNAL MEDICINE | Facility: CLINIC | Age: 73
End: 2023-08-04
Payer: MEDICARE

## 2023-08-04 NOTE — TELEPHONE ENCOUNTER
Order/Referral Request    Who is requesting: Tiera    Orders being requested: Verbal Orders:  Patient was scheduled for 3 PT visits this week, request to cancel 1 visit this week due patient illness.     Reason service is needed/diagnosis: n/a    When are orders needed by: ASAP    Has this been discussed with Provider: Yes    Does patient have a preference on a Group/Provider/Facility? [facility]    Does patient have an appointment scheduled?: Yes: Week of 7/31/23    Where to send orders:  Call Tiera at [phone]    prefer to receive a phone call?:   Patient would prefer a phone call     Okay to leave a detailed message?: Yes at Other phone number:  [phone]

## 2023-08-08 DIAGNOSIS — Z53.9 DIAGNOSIS NOT YET DEFINED: Primary | ICD-10-CM

## 2023-08-08 PROCEDURE — G0180 MD CERTIFICATION HHA PATIENT: HCPCS | Performed by: INTERNAL MEDICINE

## 2023-08-08 NOTE — TELEPHONE ENCOUNTER
August 8, 2023    Home health orders was picked up from outbox of Dr. Barcenas.  Paperwork has been reviewed and is complete.  Per initial initial request, this was sent via fax to [phone].     Sunshine Crawford

## 2023-08-08 NOTE — TELEPHONE ENCOUNTER
August 8, 2023    Home health orders was picked up from outbox of Dr. Barcenas.  Paperwork has been reviewed and is complete.  Per initial initial request, this was sent via fax to [phone].     Sunshine Crawford

## 2023-08-09 NOTE — TELEPHONE ENCOUNTER
August 9, 2023    Home health orders was picked up from outbox of Dr. Barcenas.  Paperwork has been reviewed and is complete.  Per initial initial request, this was sent via fax to [phone].     Sunshine Crawford

## 2023-08-18 ENCOUNTER — APPOINTMENT (OUTPATIENT)
Dept: CT IMAGING | Facility: HOSPITAL | Age: 73
End: 2023-08-18
Attending: EMERGENCY MEDICINE
Payer: MEDICARE

## 2023-08-18 ENCOUNTER — HOSPITAL ENCOUNTER (OUTPATIENT)
Facility: HOSPITAL | Age: 73
Setting detail: OBSERVATION
Discharge: HOME | End: 2023-08-19
Attending: EMERGENCY MEDICINE | Admitting: INTERNAL MEDICINE
Payer: MEDICARE

## 2023-08-18 DIAGNOSIS — R79.89 ELEVATED TROPONIN: ICD-10-CM

## 2023-08-18 DIAGNOSIS — E11.65 HYPERGLYCEMIA DUE TO DIABETES MELLITUS (H): ICD-10-CM

## 2023-08-18 DIAGNOSIS — E86.0 DEHYDRATION: ICD-10-CM

## 2023-08-18 DIAGNOSIS — I47.10 SVT (SUPRAVENTRICULAR TACHYCARDIA) (H): ICD-10-CM

## 2023-08-18 PROBLEM — E03.9 ACQUIRED HYPOTHYROIDISM: Status: ACTIVE | Noted: 2022-08-19

## 2023-08-18 PROBLEM — M10.9 GOUT: Status: ACTIVE | Noted: 2023-08-18

## 2023-08-18 PROBLEM — R63.4 WEIGHT LOSS: Status: ACTIVE | Noted: 2023-08-18

## 2023-08-18 PROBLEM — K21.9 ESOPHAGEAL REFLUX: Status: ACTIVE | Noted: 2023-08-18

## 2023-08-18 PROBLEM — E78.5 HYPERLIPIDEMIA: Status: ACTIVE | Noted: 2023-08-18

## 2023-08-18 LAB
ALBUMIN SERPL BCG-MCNC: 4.1 G/DL (ref 3.5–5.2)
ALP SERPL-CCNC: 269 U/L (ref 35–104)
ALT SERPL W P-5'-P-CCNC: 39 U/L (ref 0–50)
ANION GAP SERPL CALCULATED.3IONS-SCNC: 14 MMOL/L (ref 7–15)
ANION GAP SERPL CALCULATED.3IONS-SCNC: 22 MMOL/L (ref 7–15)
APTT PPP: 22 SECONDS (ref 22–38)
AST SERPL W P-5'-P-CCNC: 50 U/L (ref 0–45)
B-OH-BUTYR SERPL-SCNC: 0.5 MMOL/L
BASE EXCESS BLDV CALC-SCNC: -0.7 MMOL/L
BASOPHILS # BLD AUTO: 0.1 10E3/UL (ref 0–0.2)
BASOPHILS NFR BLD AUTO: 1 %
BILIRUB DIRECT SERPL-MCNC: <0.2 MG/DL (ref 0–0.3)
BILIRUB SERPL-MCNC: 0.5 MG/DL
BUN SERPL-MCNC: 44.3 MG/DL (ref 8–23)
BUN SERPL-MCNC: 48.8 MG/DL (ref 8–23)
CALCIUM SERPL-MCNC: 10.1 MG/DL (ref 8.8–10.2)
CALCIUM SERPL-MCNC: 9.8 MG/DL (ref 8.8–10.2)
CHLORIDE SERPL-SCNC: 102 MMOL/L (ref 98–107)
CHLORIDE SERPL-SCNC: 109 MMOL/L (ref 98–107)
CREAT SERPL-MCNC: 1.09 MG/DL (ref 0.51–0.95)
CREAT SERPL-MCNC: 1.34 MG/DL (ref 0.51–0.95)
DEPRECATED HCO3 PLAS-SCNC: 14 MMOL/L (ref 22–29)
DEPRECATED HCO3 PLAS-SCNC: 20 MMOL/L (ref 22–29)
EOSINOPHIL # BLD AUTO: 0.2 10E3/UL (ref 0–0.7)
EOSINOPHIL NFR BLD AUTO: 2 %
ERYTHROCYTE [DISTWIDTH] IN BLOOD BY AUTOMATED COUNT: 14.1 % (ref 10–15)
GFR SERPL CREATININE-BSD FRML MDRD: 42 ML/MIN/1.73M2
GFR SERPL CREATININE-BSD FRML MDRD: 53 ML/MIN/1.73M2
GLUCOSE SERPL-MCNC: 106 MG/DL (ref 70–99)
GLUCOSE SERPL-MCNC: 363 MG/DL (ref 70–99)
HCO3 BLDV-SCNC: 24 MMOL/L (ref 24–30)
HCT VFR BLD AUTO: 40.6 % (ref 35–47)
HGB BLD-MCNC: 12.8 G/DL (ref 11.7–15.7)
HOLD SPECIMEN: NORMAL
IMM GRANULOCYTES # BLD: 0.1 10E3/UL
IMM GRANULOCYTES NFR BLD: 1 %
INR PPP: 1.05 (ref 0.85–1.15)
LYMPHOCYTES # BLD AUTO: 3 10E3/UL (ref 0.8–5.3)
LYMPHOCYTES NFR BLD AUTO: 26 %
MAGNESIUM SERPL-MCNC: 1.8 MG/DL (ref 1.7–2.3)
MCH RBC QN AUTO: 29.4 PG (ref 26.5–33)
MCHC RBC AUTO-ENTMCNC: 31.5 G/DL (ref 31.5–36.5)
MCV RBC AUTO: 93 FL (ref 78–100)
MONOCYTES # BLD AUTO: 0.8 10E3/UL (ref 0–1.3)
MONOCYTES NFR BLD AUTO: 7 %
NEUTROPHILS # BLD AUTO: 7.3 10E3/UL (ref 1.6–8.3)
NEUTROPHILS NFR BLD AUTO: 63 %
NRBC # BLD AUTO: 0 10E3/UL
NRBC BLD AUTO-RTO: 0 /100
OXYHGB MFR BLDV: 55.1 % (ref 70–75)
PCO2 BLDV: 40 MM HG (ref 35–50)
PH BLDV: 7.39 [PH] (ref 7.35–7.45)
PLATELET # BLD AUTO: 321 10E3/UL (ref 150–450)
PO2 BLDV: 31 MM HG (ref 25–47)
POTASSIUM SERPL-SCNC: 4.4 MMOL/L (ref 3.4–5.3)
POTASSIUM SERPL-SCNC: 5 MMOL/L (ref 3.4–5.3)
PROT SERPL-MCNC: 6.9 G/DL (ref 6.4–8.3)
RBC # BLD AUTO: 4.35 10E6/UL (ref 3.8–5.2)
SAO2 % BLDV: 55.6 % (ref 70–75)
SODIUM SERPL-SCNC: 138 MMOL/L (ref 136–145)
SODIUM SERPL-SCNC: 143 MMOL/L (ref 136–145)
TROPONIN T SERPL HS-MCNC: 16 NG/L
TROPONIN T SERPL HS-MCNC: 16 NG/L
TROPONIN T SERPL HS-MCNC: 34 NG/L
TSH SERPL DL<=0.005 MIU/L-ACNC: 2.32 UIU/ML (ref 0.3–4.2)
WBC # BLD AUTO: 11.4 10E3/UL (ref 4–11)

## 2023-08-18 PROCEDURE — 36415 COLL VENOUS BLD VENIPUNCTURE: CPT | Performed by: EMERGENCY MEDICINE

## 2023-08-18 PROCEDURE — 250N000013 HC RX MED GY IP 250 OP 250 PS 637: Performed by: INTERNAL MEDICINE

## 2023-08-18 PROCEDURE — 84484 ASSAY OF TROPONIN QUANT: CPT | Mod: 91 | Performed by: EMERGENCY MEDICINE

## 2023-08-18 PROCEDURE — 93005 ELECTROCARDIOGRAM TRACING: CPT | Performed by: STUDENT IN AN ORGANIZED HEALTH CARE EDUCATION/TRAINING PROGRAM

## 2023-08-18 PROCEDURE — 85610 PROTHROMBIN TIME: CPT | Performed by: EMERGENCY MEDICINE

## 2023-08-18 PROCEDURE — 85025 COMPLETE CBC W/AUTO DIFF WBC: CPT | Performed by: EMERGENCY MEDICINE

## 2023-08-18 PROCEDURE — 96374 THER/PROPH/DIAG INJ IV PUSH: CPT | Mod: 59

## 2023-08-18 PROCEDURE — 250N000013 HC RX MED GY IP 250 OP 250 PS 637: Performed by: EMERGENCY MEDICINE

## 2023-08-18 PROCEDURE — 250N000011 HC RX IP 250 OP 636: Mod: JZ | Performed by: EMERGENCY MEDICINE

## 2023-08-18 PROCEDURE — 82805 BLOOD GASES W/O2 SATURATION: CPT | Performed by: EMERGENCY MEDICINE

## 2023-08-18 PROCEDURE — 210N000001 HC R&B IMCU HEART CARE

## 2023-08-18 PROCEDURE — 99223 1ST HOSP IP/OBS HIGH 75: CPT | Performed by: INTERNAL MEDICINE

## 2023-08-18 PROCEDURE — 36415 COLL VENOUS BLD VENIPUNCTURE: CPT | Performed by: STUDENT IN AN ORGANIZED HEALTH CARE EDUCATION/TRAINING PROGRAM

## 2023-08-18 PROCEDURE — 83735 ASSAY OF MAGNESIUM: CPT | Performed by: EMERGENCY MEDICINE

## 2023-08-18 PROCEDURE — 82010 KETONE BODYS QUAN: CPT | Performed by: EMERGENCY MEDICINE

## 2023-08-18 PROCEDURE — 99291 CRITICAL CARE FIRST HOUR: CPT | Mod: 25

## 2023-08-18 PROCEDURE — 96361 HYDRATE IV INFUSION ADD-ON: CPT

## 2023-08-18 PROCEDURE — 258N000003 HC RX IP 258 OP 636: Performed by: EMERGENCY MEDICINE

## 2023-08-18 PROCEDURE — G1010 CDSM STANSON: HCPCS

## 2023-08-18 PROCEDURE — 82248 BILIRUBIN DIRECT: CPT | Performed by: EMERGENCY MEDICINE

## 2023-08-18 PROCEDURE — 250N000012 HC RX MED GY IP 250 OP 636 PS 637: Performed by: EMERGENCY MEDICINE

## 2023-08-18 PROCEDURE — 250N000011 HC RX IP 250 OP 636: Mod: JZ

## 2023-08-18 PROCEDURE — 93005 ELECTROCARDIOGRAM TRACING: CPT | Performed by: EMERGENCY MEDICINE

## 2023-08-18 PROCEDURE — 84443 ASSAY THYROID STIM HORMONE: CPT | Performed by: EMERGENCY MEDICINE

## 2023-08-18 PROCEDURE — 96375 TX/PRO/DX INJ NEW DRUG ADDON: CPT | Mod: XU

## 2023-08-18 PROCEDURE — 85730 THROMBOPLASTIN TIME PARTIAL: CPT | Performed by: EMERGENCY MEDICINE

## 2023-08-18 PROCEDURE — 80053 COMPREHEN METABOLIC PANEL: CPT | Performed by: EMERGENCY MEDICINE

## 2023-08-18 RX ORDER — ONDANSETRON 4 MG/1
4 TABLET, ORALLY DISINTEGRATING ORAL EVERY 6 HOURS PRN
Status: DISCONTINUED | OUTPATIENT
Start: 2023-08-18 | End: 2023-08-19 | Stop reason: HOSPADM

## 2023-08-18 RX ORDER — ASPIRIN 81 MG/1
162 TABLET, CHEWABLE ORAL ONCE
Status: COMPLETED | OUTPATIENT
Start: 2023-08-18 | End: 2023-08-18

## 2023-08-18 RX ORDER — ONDANSETRON 2 MG/ML
4 INJECTION INTRAMUSCULAR; INTRAVENOUS ONCE
Status: COMPLETED | OUTPATIENT
Start: 2023-08-18 | End: 2023-08-18

## 2023-08-18 RX ORDER — ONDANSETRON 2 MG/ML
INJECTION INTRAMUSCULAR; INTRAVENOUS
Status: COMPLETED
Start: 2023-08-18 | End: 2023-08-18

## 2023-08-18 RX ORDER — IOPAMIDOL 755 MG/ML
75 INJECTION, SOLUTION INTRAVASCULAR ONCE
Status: COMPLETED | OUTPATIENT
Start: 2023-08-18 | End: 2023-08-18

## 2023-08-18 RX ORDER — ASPIRIN 81 MG/1
81 TABLET, CHEWABLE ORAL AT BEDTIME
Status: DISCONTINUED | OUTPATIENT
Start: 2023-08-18 | End: 2023-08-19 | Stop reason: HOSPADM

## 2023-08-18 RX ORDER — VITAMIN B COMPLEX
1 TABLET ORAL DAILY
COMMUNITY

## 2023-08-18 RX ORDER — ADENOSINE 3 MG/ML
12 INJECTION, SOLUTION INTRAVENOUS ONCE
Status: COMPLETED | OUTPATIENT
Start: 2023-08-18 | End: 2023-08-18

## 2023-08-18 RX ORDER — METOPROLOL SUCCINATE 100 MG/1
100 TABLET, EXTENDED RELEASE ORAL DAILY
Status: DISCONTINUED | OUTPATIENT
Start: 2023-08-19 | End: 2023-08-19

## 2023-08-18 RX ORDER — ONDANSETRON 2 MG/ML
4 INJECTION INTRAMUSCULAR; INTRAVENOUS EVERY 6 HOURS PRN
Status: DISCONTINUED | OUTPATIENT
Start: 2023-08-18 | End: 2023-08-19 | Stop reason: HOSPADM

## 2023-08-18 RX ORDER — LIDOCAINE 40 MG/G
CREAM TOPICAL
Status: DISCONTINUED | OUTPATIENT
Start: 2023-08-18 | End: 2023-08-19 | Stop reason: HOSPADM

## 2023-08-18 RX ORDER — LORAZEPAM 0.5 MG/1
0.5 TABLET ORAL AT BEDTIME
Status: DISCONTINUED | OUTPATIENT
Start: 2023-08-18 | End: 2023-08-19 | Stop reason: HOSPADM

## 2023-08-18 RX ORDER — ALBUTEROL SULFATE 90 UG/1
2 AEROSOL, METERED RESPIRATORY (INHALATION) EVERY 6 HOURS PRN
Status: DISCONTINUED | OUTPATIENT
Start: 2023-08-18 | End: 2023-08-19 | Stop reason: HOSPADM

## 2023-08-18 RX ORDER — ROSUVASTATIN CALCIUM 5 MG/1
5 TABLET, COATED ORAL
Status: DISCONTINUED | OUTPATIENT
Start: 2023-08-19 | End: 2023-08-19 | Stop reason: HOSPADM

## 2023-08-18 RX ORDER — FAMOTIDINE 20 MG/1
20 TABLET, FILM COATED ORAL DAILY
COMMUNITY

## 2023-08-18 RX ORDER — LEVOTHYROXINE SODIUM 125 UG/1
125 TABLET ORAL DAILY
Status: DISCONTINUED | OUTPATIENT
Start: 2023-08-19 | End: 2023-08-19 | Stop reason: HOSPADM

## 2023-08-18 RX ORDER — FAMOTIDINE 20 MG/1
20 TABLET, FILM COATED ORAL DAILY
Status: DISCONTINUED | OUTPATIENT
Start: 2023-08-19 | End: 2023-08-19 | Stop reason: HOSPADM

## 2023-08-18 RX ORDER — ACETAMINOPHEN 500 MG
1000 TABLET ORAL EVERY 8 HOURS PRN
Status: DISCONTINUED | OUTPATIENT
Start: 2023-08-18 | End: 2023-08-19 | Stop reason: HOSPADM

## 2023-08-18 RX ORDER — ALLOPURINOL 100 MG/1
200 TABLET ORAL DAILY
Status: DISCONTINUED | OUTPATIENT
Start: 2023-08-19 | End: 2023-08-19 | Stop reason: HOSPADM

## 2023-08-18 RX ORDER — LORAZEPAM 0.5 MG/1
0.5 TABLET ORAL AT BEDTIME
COMMUNITY
End: 2024-01-26

## 2023-08-18 RX ORDER — ENOXAPARIN SODIUM 100 MG/ML
40 INJECTION SUBCUTANEOUS EVERY 24 HOURS
Status: DISCONTINUED | OUTPATIENT
Start: 2023-08-19 | End: 2023-08-19 | Stop reason: HOSPADM

## 2023-08-18 RX ORDER — TIZANIDINE HYDROCHLORIDE 4 MG/1
4 CAPSULE, GELATIN COATED ORAL AT BEDTIME
COMMUNITY
End: 2023-09-01

## 2023-08-18 RX ADMIN — SODIUM CHLORIDE 1000 ML: 9 INJECTION, SOLUTION INTRAVENOUS at 15:27

## 2023-08-18 RX ADMIN — ONDANSETRON 4 MG: 2 INJECTION INTRAMUSCULAR; INTRAVENOUS at 18:38

## 2023-08-18 RX ADMIN — TIZANIDINE 4 MG: 4 TABLET ORAL at 22:13

## 2023-08-18 RX ADMIN — IOPAMIDOL 75 ML: 755 INJECTION, SOLUTION INTRAVENOUS at 17:44

## 2023-08-18 RX ADMIN — ASPIRIN 81 MG CHEWABLE TABLET 162 MG: 81 TABLET CHEWABLE at 18:35

## 2023-08-18 RX ADMIN — SODIUM CHLORIDE, POTASSIUM CHLORIDE, SODIUM LACTATE AND CALCIUM CHLORIDE 2000 ML: 600; 310; 30; 20 INJECTION, SOLUTION INTRAVENOUS at 18:32

## 2023-08-18 RX ADMIN — ADENOSINE 12 MG: 3 INJECTION, SOLUTION INTRAVENOUS at 15:37

## 2023-08-18 RX ADMIN — INSULIN ASPART 10 UNITS: 100 INJECTION, SOLUTION INTRAVENOUS; SUBCUTANEOUS at 18:33

## 2023-08-18 RX ADMIN — ASPIRIN 81 MG CHEWABLE TABLET 81 MG: 81 TABLET CHEWABLE at 22:13

## 2023-08-18 RX ADMIN — LORAZEPAM 0.5 MG: 0.5 TABLET ORAL at 22:13

## 2023-08-18 ASSESSMENT — ACTIVITIES OF DAILY LIVING (ADL)
TOILETING_ISSUES: NO
WALKING_OR_CLIMBING_STAIRS: AMBULATION DIFFICULTY, REQUIRES EQUIPMENT
DOING_ERRANDS_INDEPENDENTLY_DIFFICULTY: NO
CONCENTRATING,_REMEMBERING_OR_MAKING_DECISIONS_DIFFICULTY: NO
EQUIPMENT_CURRENTLY_USED_AT_HOME: CANE, STRAIGHT
ADLS_ACUITY_SCORE: 24
FALL_HISTORY_WITHIN_LAST_SIX_MONTHS: YES
DIFFICULTY_COMMUNICATING: NO
DIFFICULTY_EATING/SWALLOWING: NO
ADLS_ACUITY_SCORE: 35
NUMBER_OF_TIMES_PATIENT_HAS_FALLEN_WITHIN_LAST_SIX_MONTHS: 1
VISION_MANAGEMENT: GLASSESS
WEAR_GLASSES_OR_BLIND: YES
DRESSING/BATHING_DIFFICULTY: NO
ADLS_ACUITY_SCORE: 35
ADLS_ACUITY_SCORE: 24
WALKING_OR_CLIMBING_STAIRS_DIFFICULTY: YES
CHANGE_IN_FUNCTIONAL_STATUS_SINCE_ONSET_OF_CURRENT_ILLNESS/INJURY: NO

## 2023-08-18 NOTE — ED NOTES
Bed: JNED-02  Expected date: 8/18/23  Expected time: 3:03 PM  Means of arrival: Ambulance  Comments:  MPL- 72yof gen weak, - low bp, starting fluids and maybe giving adenosine

## 2023-08-18 NOTE — PHARMACY-ADMISSION MEDICATION HISTORY
Pharmacist Admission Medication History    Admission medication history is complete. The information provided in this note is only as accurate as the sources available at the time of the update.    Medication reconciliation/reorder completed by provider prior to medication history? No    Information Source(s): Patient and CareEverywhere/SureScripts via in-person    Pertinent Information: Patient was instructed to stop Lantus 5 months ago with a plan to start Jardiance. She has not started the Jardiance yet.    Changes made to PTA medication list:  Added: None  Deleted: Celecoxib, Dilaudid, Lantus, Senna, sucralfate  Changed: D3 2000 to 1000 units daily, famotidine 20mg BID to every day    Medication Affordability:  Not including over the counter (OTC) medications, was there a time in the past 3 months when you did not take your medications as prescribed because of cost?: Yes (Mounjaro over $250 a month)    Allergies reviewed with patient and updates made in EHR: no    Medication History Completed By: Wilda Rodgers [facility] 8/18/2023 6:30 PM    Prior to Admission medications    Medication Sig Last Dose Taking? Auth Provider Long Term End Date   acetaminophen (TYLENOL) 500 MG tablet Take 1,000 mg by mouth every 8 hours as needed Unknown Yes Reported, Patient     albuterol (PROAIR HFA/PROVENTIL HFA/VENTOLIN HFA) 108 (90 Base) MCG/ACT inhaler Inhale 2 puffs into the lungs every 6 hours as needed for shortness of breath / dyspnea or wheezing Unknown Yes Nate Barcenas MD Yes    albuterol (PROVENTIL) (2.5 MG/3ML) 0.083% neb solution Take 1 vial (2.5 mg) by nebulization every 6 hours as needed for shortness of breath / dyspnea or wheezing Unknown Yes Jairo Fraga MD Yes    allopurinol (ZYLOPRIM) 100 MG tablet Take 2 tablets (200 mg) by mouth daily 8/17/2023 Yes Nate Barcenas MD Yes 7/20/24   aspirin (ASA) 81 MG chewable tablet Take 81 mg by mouth At Bedtime 8/17/2023 Yes Reported, Patient     colchicine  (COLCYRS) 0.6 MG tablet Take 1 tablet (0.6 mg) by mouth daily as needed for gout pain Unknown Yes Nate Barcenas MD Yes    famotidine (PEPCID) 20 MG tablet Take 20 mg by mouth daily 8/18/2023 Yes Unknown, Entered By History No    levothyroxine (SYNTHROID) 125 MCG tablet Take 1 tablet (125 mcg) by mouth daily 8/18/2023 Yes Nate Barcenas MD Yes    LORazepam (ATIVAN) 0.5 MG tablet Take 0.5 mg by mouth At Bedtime 8/17/2023 Yes Unknown, Entered By History No    metoprolol succinate ER (TOPROL XL) 100 MG 24 hr tablet Take 1 tablet (100 mg) by mouth daily 8/18/2023 Yes Nate Barcenas MD Yes    Probiotic Product (PROBIOTIC-10 PO) Take 1 capsule by mouth daily Florastor  8/18/2023 Yes Reported, Patient Yes    rosuvastatin (CRESTOR) 10 MG tablet Take 0.5 tablets (5 mg) by mouth three times a week Past Week Yes Nate Barcenas MD Yes    tirzepatide (MOUNJARO) 5 MG/0.5ML pen Inject 10 mg Subcutaneous every 7 days 8/17/2023 Yes Nate Barcenas MD  7/16/24   tiZANidine (ZANAFLEX) 4 MG capsule Take 4 mg by mouth At Bedtime 8/17/2023 Yes Unknown, Entered By History No    triamcinolone (KENALOG) 0.1 % external cream Apply topically 2 times daily  Patient taking differently: Apply topically 2 times daily as needed Unknown Yes Nate Barcenas MD     Vitamin D3 (CHOLECALCIFEROL) 25 mcg (1000 units) tablet Take 1 tablet by mouth daily 8/18/2023 Yes Unknown, Entered By History No    alcohol swab prep pads Use to swab area of injection/vicki as directed.   Jairo Fraga MD Yes    blood glucose monitoring (ACCU-CHEK FASTCLIX) lancets Use to test blood sugar 4 times daily.   Nate Barcenas MD Yes    blood glucose monitoring (NO BRAND SPECIFIED) meter device kit Use to test blood sugar 4 times daily or as directed. Preferred blood glucose meter OR supplies to accompany: Blood Glucose Monitor Brands: per insurance.   Jairo Fraga MD Yes    Continuous Blood Gluc  (FREESTYLE DIEGO 2 READER)  PHOENIX 1 each continuous Use to read blood sugars per 's instructions.   Nate Barcenas MD     Continuous Blood Gluc Sensor (FREESTYLE DIEGO 2 SENSOR) MISC 1 each every 14 days To check blood sugars continuously   Nate Barcenas MD     empagliflozin (JARDIANCE) 10 MG TABS tablet Take 1 tablet (10 mg) by mouth daily   Nate Barcenas MD     thin (NO BRAND SPECIFIED) lancets Use with lanceting device. To accompany: Blood Glucose Monitor Brands: per insurance.   Jairo Fraga MD Yes

## 2023-08-18 NOTE — ED PROVIDER NOTES
Emergency Department Encounter     Evaluation Date & Time:   8/18/2023  3:10 PM    CHIEF COMPLAINT:  Tachycardia      Triage Note:Pt brought in by EMS from [facility].  Pt suddenly felt lightheaded with a rapid heartrate while in the freezer section.  Pt had knee surgery recently and had similar episode when other knee replaced years ago.  Pt was diagnosed with SVT at that time.                     ED COURSE & MEDICAL DECISION MAKING:     ED Course as of 08/18/23 1848   Fri Aug 18, 2023   1514 I met with patient for initial interview and encounter. We also discussed plan for treatment and diagnostic interventions.    1538 I was present for adenosine administration. Pads in place if needed.  Pt had complete resolution of SVT following 12mg of adenosine.   1636 hsTrop 16, will get 2 hour delta.   1719 Repeat trop initially ran as add-on to previous blood. Clarified with nurse who will make sure a new one is drawn and sent.     1720 I spoke with lab to check on chemistry and reportedly analyzer down, but back up and is running, should be back in 5 minutes.   1737 Chemistry shows some dehydration, IVF ordered.  Also shows Glucose of 363 and anion gap of 22, CO2 14.  Ordering VBG and ketones.  Pt currently on oral Jardiance and weekly Mounjaro injections.  She was previously on lantus 5 units in the morning, but had this stopped 5 weeks ago due to improved A1C and weight loss.  She reports her glucoses have been running better previously.     1804 CTA chest neg for PE.  Repeat trop up to 34, likely all demand related to SVT she was in. No ongoing chest pain or dyspnea to warrant heparin. Ordering chewable aspirin. Will hospitalize overnight for further testing.   1805 Pt re-evaluated, updated on these results and plan for hospitalization.     1845 I spoke with Dr. Silvestre, who accepts.  Would like to wait for repeat labs and ketones before deciding on exact bed. However, beds incredibly tight and I'm being told to order a  bed to secure it.  I spoke with charge to inform them of plan and that pt may need higher level bed.  Pt will not leave ED until we have a confirmed bed decision.  Charge and primary nurse aware of this plan.       Pt here with abrupt onset lightheadedness and tachycardia to 166 here after she walked into freezer section of [employer] just pta.  Pt arrives by EMS, received 300mL of IVF. She reports same episode a few years ago after right knee was replaced and was treated for SVT with adenosine and resolution. States she's otherwise been well with no cp/sob or leg pain/swelling.  No additional treatment pta.  Pt arrives in what appears to be SVT.  Will place pads, check labs and CTA chest to rule out PE given recent orthopedic surgical history. Pt was not on anticoagulation after surgery and had a PE after pregnancy many years ago.  We did try vagal maneuvers without success.  Will proceed with adenosine.      Medical Decision Making    History:  Supplemental history from: Documented in chart, if applicable  External Record(s) reviewed: Documented in chart, if applicable.    Work Up:  Chart documentation includes differential considered and any EKGs or imaging independently interpreted by provider, where specified.  In additional to work up documented, I considered the following work up: Documented in chart, if applicable.    External consultation:  Discussion of management with another provider: Documented in chart, if applicable    Complicating factors:  Care impacted by chronic illness: Chronic Kidney Disease, Chronic Lung Disease, Diabetes, and Hyperlipidemia  Care affected by social determinants of health: Access to Medical Care    Disposition considerations: Admit.    At the conclusion of the encounter I discussed the results of all the tests and the disposition. The questions were answered. The patient or family acknowledged understanding and was agreeable with the care plan.    MEDICATIONS GIVEN IN THE EMERGENCY  "DEPARTMENT:  Medications   0.9% sodium chloride BOLUS (0 mLs Intravenous Stopped 8/18/23 1643)   adenosine (ADENOCARD) injection 12 mg (12 mg Intravenous $Given 8/18/23 1537)   iopamidol (ISOVUE-370) solution 75 mL (75 mLs Intravenous $Given 8/18/23 1744)   insulin aspart (NovoLOG) injection (RAPID ACTING) (10 Units Subcutaneous $Given 8/18/23 1833)   lactated ringers BOLUS 2,000 mL (2,000 mLs Intravenous $New Bag 8/18/23 1832)   aspirin (ASA) chewable tablet 162 mg (162 mg Oral $Given 8/18/23 1835)   ondansetron (ZOFRAN) injection 4 mg (4 mg Intravenous $Given 8/18/23 1838)       NEW PRESCRIPTIONS STARTED AT TODAY'S ED VISIT:  New Prescriptions    No medications on file       HPI   HPI     Meggan Everett is a 72 year old female with a pertinent history of HLD, osteoarthritis, DM II, stage 3 CKD, asthma, chronic use of benzodiazepines, and lymphedema who presents to this ED by EMS for evaluation of lightheadedness.     Per chart review, patient was seen at this ED on 5/2/23 (3.5 months ago) for evaluation of similar complaints. EKG showed wide-complex tachycardia with baseline bundle branch block. Suspected SVT and treated with 6 mg of adenosine and aggressive IVF. Discharged in improved condition.     Patient reports that she entered the freezer section at [facility] earlier today, when she had an acute onset of lightheadedness without a syncopal episode. She also complains of an associated \"fast\" heart rate with generalized weakness.     Of note, patient states that these symptoms are very similar to her symptoms after her previous knee surgery. Diagnosed with SVT and given adenosine (see above). Confirmed ED course as above as well.     REVIEW OF SYSTEMS:  See HPI    Medical History     Past Medical History:   Diagnosis Date    Asthma     Chronic kidney failure, stage 3 (moderate) (H)     Chronic use of benzodiazepine for therapeutic purpose     Esophageal reflux     Fibroadenoma of LEFT breast, with fibrocystic " changes 05/14/2012    Gout     Hyperlipidemia     Hypothyroid     Mammographic microcalcification 05/01/2012    Migraine with aura     Obesity     ASHER (obstructive sleep apnea)     Osteoporosis     Primary osteoarthritis of both knees 09/28/2020    RBBB     Screening for colon cancer 11/29/2021    Type 2 diabetes mellitus (H)        Past Surgical History:   Procedure Laterality Date    BIOPSY BREAST      D & C  2000, 2002    DILATE CERVIX, ABLATE ENDOMETRIUM, COMBINED  01/01/2005    ENDOMETRIAL ABLATION      HC BIOPSY OF BREAST, OPEN INCISIONAL  01/01/1972    Left    HC LAPAROSCOPY, SURGICAL; CHOLECYSTECTOMY  01/01/1998    LAPAROSCOPIC CHOLECYSTECTOMY  01/01/1999    LUMBAR LAMINECTOMY  01/01/1998    Description: Laminectomy Lumbar    LUMPECTOMY BREAST      Microdiscectomy  01/01/1998       Family History   Problem Relation Age of Onset    Autoimmune Disease No family hx of     Hypertension Mother         alive in her 90s    Atrial fibrillation Father         alive in his 90s    LUNG DISEASE Sister         interstitial lung disease    Heart Disease Sister        Social History     Tobacco Use    Smoking status: Never    Smokeless tobacco: Never   Vaping Use    Vaping Use: Never used   Substance Use Topics    Alcohol use: No    Drug use: No       acetaminophen (TYLENOL) 500 MG tablet  albuterol (PROAIR HFA/PROVENTIL HFA/VENTOLIN HFA) 108 (90 Base) MCG/ACT inhaler  albuterol (PROVENTIL) (2.5 MG/3ML) 0.083% neb solution  allopurinol (ZYLOPRIM) 100 MG tablet  aspirin (ASA) 81 MG chewable tablet  colchicine (COLCYRS) 0.6 MG tablet  famotidine (PEPCID) 20 MG tablet  levothyroxine (SYNTHROID) 125 MCG tablet  LORazepam (ATIVAN) 0.5 MG tablet  metoprolol succinate ER (TOPROL XL) 100 MG 24 hr tablet  Probiotic Product (PROBIOTIC-10 PO)  rosuvastatin (CRESTOR) 10 MG tablet  tirzepatide (MOUNJARO) 5 MG/0.5ML pen  tiZANidine (ZANAFLEX) 4 MG capsule  triamcinolone (KENALOG) 0.1 % external cream  Vitamin D3 (CHOLECALCIFEROL) 25 mcg  "(1000 units) tablet  alcohol swab prep pads  blood glucose monitoring (ACCU-CHEK FASTCLIX) lancets  blood glucose monitoring (NO BRAND SPECIFIED) meter device kit  Continuous Blood Gluc  (FREESTYLE DIEGO 2 READER) [place]  Continuous Blood Gluc Sensor (FREESTYLE DIEGO 2 SENSOR) MISC  empagliflozin (JARDIANCE) 10 MG TABS tablet  thin (NO BRAND SPECIFIED) lancets        Physical Exam     Vitals:  BP (!) 145/64   Pulse 102   Temp 97.9  F (36.6  C) (Oral)   Resp 18   Ht 1.6 m (5' 3\")   Wt 94.3 kg (208 lb)   LMP 06/27/2007 (Approximate)   SpO2 98%   BMI 36.85 kg/m      PHYSICAL EXAM:   Physical Exam  Vitals and nursing note reviewed.   Constitutional:       General: She is not in acute distress.     Appearance: Normal appearance.   HENT:      Head: Normocephalic and atraumatic.      Nose: Nose normal.      Mouth/Throat:      Mouth: Mucous membranes are moist.   Eyes:      Pupils: Pupils are equal, round, and reactive to light.   Neck:      Vascular: No JVD.   Cardiovascular:      Rate and Rhythm: Regular rhythm. Tachycardia present.      Pulses: Normal pulses.           Radial pulses are 2+ on the right side and 2+ on the left side.        Dorsalis pedis pulses are 2+ on the right side and 2+ on the left side.   Pulmonary:      Effort: Pulmonary effort is normal. No respiratory distress.      Breath sounds: Normal breath sounds.   Abdominal:      Palpations: Abdomen is soft.      Tenderness: There is no abdominal tenderness.   Musculoskeletal:      Cervical back: Full passive range of motion without pain and neck supple.      Comments: Left knee with well healing surgical wound and no secondary signs of an infection. No calf tenderness or swelling b/l   Skin:     General: Skin is warm.      Findings: No rash.   Neurological:      General: No focal deficit present.      Mental Status: She is alert. Mental status is at baseline.      Comments: Fluent speech, no acute lateralizing deficits   Psychiatric:        "  Mood and Affect: Mood normal.         Behavior: Behavior normal.         Results     LAB:  All pertinent labs reviewed and interpreted  Labs Ordered and Resulted from Time of ED Arrival to Time of ED Departure   BASIC METABOLIC PANEL - Abnormal       Result Value    Sodium 138      Potassium 5.0      Chloride 102      Carbon Dioxide (CO2) 14 (*)     Anion Gap 22 (*)     Urea Nitrogen 48.8 (*)     Creatinine 1.34 (*)     Calcium 10.1      Glucose 363 (*)     GFR Estimate 42 (*)    HEPATIC FUNCTION PANEL - Abnormal    Protein Total 6.9      Albumin 4.1      Bilirubin Total 0.5      Alkaline Phosphatase 269 (*)     AST 50 (*)     ALT 39      Bilirubin Direct <0.20     TROPONIN T, HIGH SENSITIVITY - Abnormal    Troponin T, High Sensitivity 16 (*)    CBC WITH PLATELETS AND DIFFERENTIAL - Abnormal    WBC Count 11.4 (*)     RBC Count 4.35      Hemoglobin 12.8      Hematocrit 40.6      MCV 93      MCH 29.4      MCHC 31.5      RDW 14.1      Platelet Count 321      % Neutrophils 63      % Lymphocytes 26      % Monocytes 7      % Eosinophils 2      % Basophils 1      % Immature Granulocytes 1      NRBCs per 100 WBC 0      Absolute Neutrophils 7.3      Absolute Lymphocytes 3.0      Absolute Monocytes 0.8      Absolute Eosinophils 0.2      Absolute Basophils 0.1      Absolute Immature Granulocytes 0.1      Absolute NRBCs 0.0     TROPONIN T, HIGH SENSITIVITY - Abnormal    Troponin T, High Sensitivity 16 (*)    TROPONIN T, HIGH SENSITIVITY - Abnormal    Troponin T, High Sensitivity 34 (*)    MAGNESIUM - Normal    Magnesium 1.8     INR - Normal    INR 1.05     PARTIAL THROMBOPLASTIN TIME - Normal    aPTT 22     TSH WITH FREE T4 REFLEX - Normal    TSH 2.32     BLOOD GAS VENOUS   KETONE BETA-HYDROXYBUTYRATE QUANTITATIVE, RAPID   BASIC METABOLIC PANEL       RADIOLOGY:  CT Chest Pulmonary Embolism w Contrast   Final Result   IMPRESSION: No acute finding, such as PE, dissection or aneurysm.                 ECG:  EKG 1: Likely SVT vs  Afib/flutter, rate 166, rate related ST depression, underlying RBBB seen on previous sinus EKG  EKG 2: Sinus tach, rate 111, RBBB, similar to previous from 5/2/23, rate related ST depression has since resolved    I have independently reviewed and interpreted the EKG(s) documented above.     PROCEDURES:  PROCEDURE: Chemical Cardioversion    INDICATIONS: SVT   CARDIOVERSION TYPE: Chemical, Adenosine   CARDIOVERSION PROVIDER: Dr Henry Taveras   SEDATION: None   CONSENT: Risks, benefits and alternatives were discussed with and Verbal consent was obtained from Patient.   PROCEDURE SPECIFIC CHECKLIST COMPLETED: Yes   TIME OUT: Universal protocol was followed. TIME OUT conducted just prior to starting procedure confirmed patient identity, site/side, procedure, patient position, and availability of correct equipment. Yes.   MEDICATIONS GIVEN: 12 mg Adenosine, IV push, followed by rapid flush     MONITORING: Heart rate, cardiac monitor, continuous pulse oximeter, frequent blood pressure checks, level of consciousness checks, IV access, constant attendance by RN until patient is recovered and aconstant attendance by MD until patient is stable   RESPONSE: Vital signs stable, airway patent and O2 saturations remained >92%     Brief pause, then conversion to sinus tachycardia   COMPLICATIONS: Patient tolerated procedure well, without complication         FINAL IMPRESSION:    ICD-10-CM    1. SVT (supraventricular tachycardia) (H)  I47.1       2. Hyperglycemia due to diabetes mellitus (H)  E11.65       3. Elevated troponin  R77.8       4. Dehydration  E86.0           CRITICAL CARE  35 minutes of critical care time spent managing SVT with time spent interpreting labs, imaging, speaking with pt and documenting.  Pt received adenosine for cardioversion.  Time independent of any procedures performed.    I, Ton Mcknight, am serving as a scribe to document services personally performed by Dr. Rubin Taveras, based on my observations and  the provider's statements to me. I, Rubin Taveras, DO attest that Ton Mcknight is acting in a scribe capacity, has observed my performance of the services and has documented them in accordance with my direction.    Rubin Taveras DO  Emergency Medicine  [facility] EMERGENCY DEPARTMENT  8/18/2023  3:19 PM          Rubin Taveras MD  08/18/23 1847

## 2023-08-18 NOTE — ED NOTES
Lab called regarding outstanding labs that do not show as in process.   to fix and states results with be available shortly after tests are ran.

## 2023-08-18 NOTE — ED TRIAGE NOTES
Pt brought in by EMS from [facility].  EMS was going to give Adenosine, but refrained.  Pt suddenly felt lightheaded with a rapid heartrate while in the freezer section.  Pt had knee surgery recently and had similar episode when other knee replaced years ago.  Pt was diagnosed with SVT at that time.

## 2023-08-19 ENCOUNTER — APPOINTMENT (OUTPATIENT)
Dept: CARDIOLOGY | Facility: HOSPITAL | Age: 73
End: 2023-08-19
Attending: INTERNAL MEDICINE
Payer: MEDICARE

## 2023-08-19 VITALS
TEMPERATURE: 97.5 F | SYSTOLIC BLOOD PRESSURE: 171 MMHG | RESPIRATION RATE: 18 BRPM | HEIGHT: 63 IN | HEART RATE: 83 BPM | WEIGHT: 215 LBS | BODY MASS INDEX: 38.09 KG/M2 | DIASTOLIC BLOOD PRESSURE: 72 MMHG | OXYGEN SATURATION: 100 %

## 2023-08-19 LAB
ANION GAP SERPL CALCULATED.3IONS-SCNC: 12 MMOL/L (ref 7–15)
BUN SERPL-MCNC: 34.5 MG/DL (ref 8–23)
CALCIUM SERPL-MCNC: 9.6 MG/DL (ref 8.8–10.2)
CHLORIDE SERPL-SCNC: 108 MMOL/L (ref 98–107)
CREAT SERPL-MCNC: 1.02 MG/DL (ref 0.51–0.95)
DEPRECATED HCO3 PLAS-SCNC: 23 MMOL/L (ref 22–29)
GFR SERPL CREATININE-BSD FRML MDRD: 58 ML/MIN/1.73M2
GLUCOSE BLDC GLUCOMTR-MCNC: 122 MG/DL (ref 70–99)
GLUCOSE SERPL-MCNC: 120 MG/DL (ref 70–99)
LVEF ECHO: NORMAL
POTASSIUM SERPL-SCNC: 4.1 MMOL/L (ref 3.4–5.3)
SODIUM SERPL-SCNC: 143 MMOL/L (ref 136–145)
TROPONIN T SERPL HS-MCNC: 58 NG/L

## 2023-08-19 PROCEDURE — G0378 HOSPITAL OBSERVATION PER HR: HCPCS

## 2023-08-19 PROCEDURE — 250N000011 HC RX IP 250 OP 636: Mod: JZ | Performed by: INTERNAL MEDICINE

## 2023-08-19 PROCEDURE — 999N000208 ECHOCARDIOGRAM COMPLETE

## 2023-08-19 PROCEDURE — 80048 BASIC METABOLIC PNL TOTAL CA: CPT | Performed by: INTERNAL MEDICINE

## 2023-08-19 PROCEDURE — 36415 COLL VENOUS BLD VENIPUNCTURE: CPT | Performed by: INTERNAL MEDICINE

## 2023-08-19 PROCEDURE — 84484 ASSAY OF TROPONIN QUANT: CPT | Performed by: INTERNAL MEDICINE

## 2023-08-19 PROCEDURE — 255N000002 HC RX 255 OP 636: Performed by: INTERNAL MEDICINE

## 2023-08-19 PROCEDURE — 99238 HOSP IP/OBS DSCHRG MGMT 30/<: CPT | Performed by: INTERNAL MEDICINE

## 2023-08-19 PROCEDURE — 250N000013 HC RX MED GY IP 250 OP 250 PS 637: Performed by: INTERNAL MEDICINE

## 2023-08-19 PROCEDURE — 96372 THER/PROPH/DIAG INJ SC/IM: CPT | Performed by: INTERNAL MEDICINE

## 2023-08-19 PROCEDURE — 99222 1ST HOSP IP/OBS MODERATE 55: CPT | Mod: 25 | Performed by: INTERNAL MEDICINE

## 2023-08-19 PROCEDURE — 93306 TTE W/DOPPLER COMPLETE: CPT | Mod: 26 | Performed by: INTERNAL MEDICINE

## 2023-08-19 RX ORDER — DILTIAZEM HYDROCHLORIDE 180 MG/1
180 CAPSULE, COATED, EXTENDED RELEASE ORAL DAILY
Qty: 30 CAPSULE | Refills: 0 | Status: SHIPPED | OUTPATIENT
Start: 2023-08-20 | End: 2023-08-23

## 2023-08-19 RX ORDER — DILTIAZEM HYDROCHLORIDE 180 MG/1
180 CAPSULE, COATED, EXTENDED RELEASE ORAL DAILY
Status: DISCONTINUED | OUTPATIENT
Start: 2023-08-19 | End: 2023-08-19 | Stop reason: HOSPADM

## 2023-08-19 RX ADMIN — FAMOTIDINE 20 MG: 20 TABLET ORAL at 09:04

## 2023-08-19 RX ADMIN — ENOXAPARIN SODIUM 40 MG: 40 INJECTION SUBCUTANEOUS at 09:04

## 2023-08-19 RX ADMIN — ROSUVASTATIN CALCIUM 5 MG: 5 TABLET, FILM COATED ORAL at 09:04

## 2023-08-19 RX ADMIN — ALLOPURINOL 200 MG: 100 TABLET ORAL at 09:03

## 2023-08-19 RX ADMIN — DILTIAZEM HYDROCHLORIDE 180 MG: 180 CAPSULE, COATED, EXTENDED RELEASE ORAL at 09:55

## 2023-08-19 RX ADMIN — PERFLUTREN 2.5 ML: 6.52 INJECTION, SUSPENSION INTRAVENOUS at 11:09

## 2023-08-19 RX ADMIN — LEVOTHYROXINE SODIUM 125 MCG: 125 TABLET ORAL at 09:03

## 2023-08-19 ASSESSMENT — ACTIVITIES OF DAILY LIVING (ADL)
ADLS_ACUITY_SCORE: 24

## 2023-08-19 NOTE — DISCHARGE SUMMARY
"[facility]  Hospitalist Discharge Summary      Date of Admission:  8/18/2023  Date of Discharge:  8/19/2023  Discharging Provider: Farhana Mcbride MD  Discharge Service: Hospitalist Service    Discharge Diagnoses   Principal Problem:    SVT (supraventricular tachycardia) (H)  Active Problems:    Chronic kidney disease, stage III (moderate) (H)    Obstructive sleep apnea syndrome    Type 2 diabetes mellitus with stage 3b chronic kidney disease, with long-term current use of insulin (H)    Acquired hypothyroidism    Class 2 severe obesity due to excess calories with serious comorbidity in adult (H)    Dehydration    Elevated troponin    Hyperglycemia due to diabetes mellitus (H)    Esophageal reflux    Gout    Hyperlipidemia    Weight loss      Clinically Significant Risk Factors     # DMII: A1C = 7.1 % (Ref range: 0.0 - 5.6 %) within past 6 months    # Obesity: Estimated body mass index is 38.09 kg/m  as calculated from the following:    Height as of this encounter: 1.6 m (5' 3\").    Weight as of this encounter: 97.5 kg (215 lb).       Follow-ups Needed After Discharge   Follow-up Appointments     Follow-up and recommended labs and tests       Follow up with primary care provider, Nate Barcenas, within   3-5days, to evaluate medication change and for hospital follow- up. The   following labs/tests are recommended: Bmp, Mag    Cardiology arranging outpatient follow up with EP clinic .            Unresulted Labs Ordered in the Past 30 Days of this Admission       No orders found for last 31 day(s).            Discharge Disposition   Discharged to home  Condition at discharge: Stable    Hospital Course   71 yo F with h/o CKD3, DM2, ASHER not on CPAP after weight loss, hypothyroid, GERD, gout, and HLD who presents with a recurrent episode of SVT terminated with adenosine in the ED.  Patient had a similar episode 2 months ago.         Recurrent SVT (supraventricular tachycardia) -first " episode was in May 2023 approximately 5 weeks after right TKA was done.      Both episodes terminated with adenosine.    - discussed with cardiology and Echo obtained and plan for EP follow up as outpatient  - Metoprolol Changed to Diltiazem     Aortic stenosis mild  - echo done and mild on review     Consultations This Hospital Stay   CARDIOLOGY IP CONSULT    Code Status   Full Code    Time Spent on this Encounter          Farhana Mcbride MD  [facility]  [address] [place] [id number]  Phone: [phone]  Fax: [phone]  ______________________________________________________________________    Physical Exam   Vital Signs: Temp: 97.5  F (36.4  C) Temp src: Oral BP: (!) 171/72 Pulse: 83   Resp: 18 SpO2: 100 % O2 Device: None (Room air)    Weight: 215 lbs 0 oz  Physical Exam  Constitutional:       General: She is not in acute distress.     Appearance: She is obese. She is not ill-appearing.   Cardiovascular:      Rate and Rhythm: Normal rate and regular rhythm.      Pulses: Normal pulses.      Comments: 2/6 KARLA  Pulmonary:      Effort: Pulmonary effort is normal.      Breath sounds: Normal breath sounds.   Abdominal:      General: Bowel sounds are normal.      Palpations: Abdomen is soft.   Skin:     General: Skin is warm and dry.      Capillary Refill: Capillary refill takes less than 2 seconds.   Neurological:      General: No focal deficit present.      Mental Status: She is alert and oriented to person, place, and time. Mental status is at baseline.              Primary Care Physician   Nate Barcenas    Discharge Orders      Follow-Up with Cardiology EP      Reason for your hospital stay    Principal Problem:    SVT (supraventricular tachycardia) (H)  Active Problems:    Chronic kidney disease, stage III (moderate) (H)    Obstructive sleep apnea syndrome    Type 2 diabetes mellitus with stage 3b chronic kidney disease, with long-term current use of  insulin (H)    Acquired hypothyroidism    Class 2 severe obesity due to excess calories with serious comorbidity in adult (H)    Dehydration    Elevated troponin    Hyperglycemia due to diabetes mellitus (H)    Esophageal reflux    Gout    Hyperlipidemia    Weight loss     Follow-up and recommended labs and tests     Follow up with primary care provider, Nate Barcenas, within 3-5days, to evaluate medication change and for hospital follow- up. The following labs/tests are recommended: Bmp, Mag    Cardiology arranging outpatient follow up with EP clinic .     Activity    Your activity upon discharge: activity as tolerated     Diet    Follow this diet upon discharge: Orders Placed This Encounter      Combination Diet Regular Diet; Moderate Consistent Carb (60 g CHO per Meal) Diet       Significant Results and Procedures   Most Recent 3 CBC's:  Recent Labs   Lab Test 08/18/23  1521 06/27/23  1612 05/02/23  2049   WBC 11.4* 7.0 12.6*   HGB 12.8 13.1 12.8   MCV 93 93 93    254 331     Most Recent 3 BMP's:  Recent Labs   Lab Test 08/19/23  0659 08/19/23  0637 08/18/23 2028 08/18/23  1522   NA  --  143 143 138   POTASSIUM  --  4.1 4.4 5.0   CHLORIDE  --  108* 109* 102   CO2  --  23 20* 14*   BUN  --  34.5* 44.3* 48.8*   CR  --  1.02* 1.09* 1.34*   ANIONGAP  --  12 14 22*   HIEN  --  9.6 9.8 10.1   * 120* 106* 363*     Most Recent 2 LFT's:  Recent Labs   Lab Test 08/18/23  1522 06/27/23  1612   AST 50* 45   ALT 39 52*   ALKPHOS 269* 250*   BILITOTAL 0.5 0.5     Most Recent 3 Troponin's:No lab results found.,   Results for orders placed or performed during the hospital encounter of 08/18/23   CT Chest Pulmonary Embolism w Contrast    Narrative    EXAM: CT CHEST PULMONARY EMBOLISM W CONTRAST  LOCATION: [facility] [place]  DATE: 8/18/2023    INDICATION: Tachycardia, recent left knee replacement.  COMPARISON: None.  TECHNIQUE: CT chest pulmonary angiogram during arterial phase injection of IV  contrast. Multiplanar reformats and MIP reconstructions were performed. Dose reduction techniques were used.   CONTRAST: Isovue 370  75 ml    FINDINGS:  ANGIOGRAM CHEST: No pulmonary emboli seen. There is slight prominence of the central pulmonary artery measuring up to 3.4 cm in greatest radial dimension which can be associated changes of pulmonary arterial hypertension. Thoracic aorta is negative for   dissection. No CT evidence of right heart strain.    LUNGS AND PLEURA: Tiny benign lymph node in the minor fissure. Minimal dependent atelectasis in the lower lobes with no central airway obstruction.    MEDIASTINUM/AXILLAE: Small benign superior pericardial recess versus cyst anterior mediastinum.    CORONARY ARTERY CALCIFICATION: None.    UPPER ABDOMEN: The gallbladder is surgically absent.    MUSCULOSKELETAL: Findings of diffuse idiopathic skeletal hyperostosis with scattered degenerative disc disease in the spine.      Impression    IMPRESSION: No acute finding, such as PE, dissection or aneurysm.   Echocardiogram Complete     Value    LVEF  60-65%    Narrative    [id number]  [id number]  [id number]  [id number]^KODY^[place] [address]     Name: JACOBO SANTILLAN  MRN: [id number]  : 1950  Study Date: 2023 10:24 AM  Age: 72 yrs  Gender: Female  Patient Location: [facility]  Reason For Study: Syncope  Ordering Physician: MILEY GATES  Performed By: LAURA     BSA: 2.0 m2  Height: 63 in  Weight: 215 lb  HR: 86  BP: 167/79 mmHg  ______________________________________________________________________________  Procedure  Complete Portable Echo Adult. Definity (NDC #41132-962) given intravenously.  Lot 1343, Exp 1 .  ______________________________________________________________________________  Interpretation Summary     Left ventricular size, wall motion and function are normal. The ejection  fraction is 60-65%.  Normal right ventricle size and systolic  function.  Mild valvular aortic stenosis.  ______________________________________________________________________________  Left Ventricle  Left ventricular size, wall motion and function are normal. The ejection  fraction is 60-65%. Proximal septal thickening is noted. Echo findings are not  consistent with left ventricular outflow obstruction. Left ventricular  diastolic function is normal. No regional wall motion abnormalities noted.     Right Ventricle  Normal right ventricle size and systolic function.     Atria  Normal left atrial size. Right atrial size is normal. There is no color  Doppler evidence of an atrial shunt.     Mitral Valve  Mitral valve leaflets appear normal. There is no evidence of mitral stenosis  or clinically significant mitral regurgitation.     Tricuspid Valve  Tricuspid valve leaflets appear normal. There is trace to mild tricuspid  regurgitation. Right ventricular systolic pressure could not be approximated  due to inadequate tricuspid regurgitation.     Aortic Valve  Mild aortic valve calcification is present. The aortic valve is trileaflet.  There is trace aortic regurgitation. Mild valvular aortic stenosis.     Pulmonic Valve  The pulmonic valve is not well seen, but is grossly normal. This degree of  valvular regurgitation is within normal limits.     Vessels  The aorta root is normal. Normal size ascending aorta. IVC diameter <2.1 cm  collapsing >50% with sniff suggests a normal RA pressure of 3 mmHg.     Pericardium  There is no pericardial effusion.     ______________________________________________________________________________  MMode/2D Measurements & Calculations  IVSd: 0.85 cm  LVIDd: 5.1 cm  LVIDs: 3.1 cm  LVPWd: 0.90 cm  FS: 39.1 %     LV mass(C)d: 160.1 grams  LV mass(C)dI: 80.3 grams/m2  Ao root diam: 2.9 cm  LA dimension: 3.9 cm  asc Aorta Diam: 3.3 cm  LA/Ao: 1.3  LVOT diam: 1.8 cm  LVOT area: 2.5 cm2  LA Volume (BP): 54.9 ml  LA Volume Index (BP): 27.6 ml/m2     LA  Volume Indexed (AL/bp): 29.0 ml/m2  RV Base: 4.1 cm  RWT: 0.35  TAPSE: 2.8 cm     Time Measurements  MM HR: 86.0 BPM     Doppler Measurements & Calculations  MV E max jesus: 96.9 cm/sec  MV A max jesus: 88.6 cm/sec  MV E/A: 1.1  MV dec slope: 424.0 cm/sec2  MV dec time: 0.23 sec  Ao V2 max: 266.2 cm/sec  Ao max P.0 mmHg  Ao V2 mean: 193.0 cm/sec  Ao mean P.0 mmHg  Ao V2 VTI: 54.3 cm  LIANG(I,D): 1.6 cm2  LIANG(V,D): 1.5 cm2  LV V1 max P.9 mmHg  LV V1 max: 157.0 cm/sec  LV V1 VTI: 33.2 cm     SV(LVOT): 84.5 ml  SI(LVOT): 42.4 ml/m2  PA acc time: 0.11 sec  TR max jesus: 284.0 cm/sec  AV Jesus Ratio (DI): 0.59  LIANG Index (cm2/m2): 0.78  E/E': 10.1  E/E' avg: 10.1  Lateral E/e': 10.2  Medial E/e': 10.1  Peak E' Jesus: 9.6 cm/sec  RV S Jesus: 18.5 cm/sec     ______________________________________________________________________________  Report approved by: Cortes Alvarado 2023 12:36 PM             Discharge Medications   Current Discharge Medication List        START taking these medications    Details   diltiazem ER COATED BEADS (CARDIZEM CD/CARTIA XT) 180 MG 24 hr capsule Take 1 capsule (180 mg) by mouth daily for 30 days  Qty: 30 capsule, Refills: 0    Associated Diagnoses: SVT (supraventricular tachycardia) (H)           CONTINUE these medications which have NOT CHANGED    Details   acetaminophen (TYLENOL) 500 MG tablet Take 1,000 mg by mouth every 8 hours as needed      albuterol (PROAIR HFA/PROVENTIL HFA/VENTOLIN HFA) 108 (90 Base) MCG/ACT inhaler Inhale 2 puffs into the lungs every 6 hours as needed for shortness of breath / dyspnea or wheezing  Qty: 18 g, Refills: 3    Comments: Pharmacy may dispense brand covered by insurance (Proair, or proventil or ventolin or generic albuterol inhaler)  Associated Diagnoses: Asthmatic bronchitis , chronic (H)      albuterol (PROVENTIL) (2.5 MG/3ML) 0.083% neb solution Take 1 vial (2.5 mg) by nebulization every 6 hours as needed for shortness of breath / dyspnea or  wheezing  Qty: 75 mL, Refills: 3    Associated Diagnoses: Asthmatic bronchitis , chronic (H)      allopurinol (ZYLOPRIM) 100 MG tablet Take 2 tablets (200 mg) by mouth daily  Qty: 180 tablet, Refills: 3    Associated Diagnoses: Uric acid arthropathy      aspirin (ASA) 81 MG chewable tablet Take 81 mg by mouth At Bedtime      colchicine (COLCYRS) 0.6 MG tablet Take 1 tablet (0.6 mg) by mouth daily as needed for gout pain  Qty: 90 tablet, Refills: 3    Associated Diagnoses: Chronic tophaceous gout      famotidine (PEPCID) 20 MG tablet Take 20 mg by mouth daily      levothyroxine (SYNTHROID) 125 MCG tablet Take 1 tablet (125 mcg) by mouth daily  Qty: 90 tablet, Refills: 3    Associated Diagnoses: Acquired hypothyroidism      LORazepam (ATIVAN) 0.5 MG tablet Take 0.5 mg by mouth At Bedtime      Probiotic Product (PROBIOTIC-10 PO) Take 1 capsule by mouth daily Florastor       rosuvastatin (CRESTOR) 10 MG tablet Take 0.5 tablets (5 mg) by mouth three times a week  Qty: 90 tablet, Refills: 3    Associated Diagnoses: Essential hypertension      tirzepatide (MOUNJARO) 5 MG/0.5ML pen Inject 10 mg Subcutaneous every 7 days  Qty: 12 mL, Refills: 3    Associated Diagnoses: Type 2 diabetes mellitus with stage 3b chronic kidney disease, with long-term current use of insulin (H)      tiZANidine (ZANAFLEX) 4 MG capsule Take 4 mg by mouth At Bedtime      triamcinolone (KENALOG) 0.1 % external cream Apply topically 2 times daily  Qty: 30 g, Refills: 3    Associated Diagnoses: Irritant contact dermatitis due to drug in contact with skin      Vitamin D3 (CHOLECALCIFEROL) 25 mcg (1000 units) tablet Take 1 tablet by mouth daily      alcohol swab prep pads Use to swab area of injection/vicki as directed.  Qty: 100 each, Refills: 3    Associated Diagnoses: Type 2 diabetes, HbA1C goal < 8% (H)      !! blood glucose monitoring (ACCU-CHEK FASTCLIX) lancets Use to test blood sugar 4 times daily.  Qty: 204 each, Refills: 6    Associated  Diagnoses: Type 2 diabetes mellitus with stage 3b chronic kidney disease, with long-term current use of insulin (H)      blood glucose monitoring (NO BRAND SPECIFIED) meter device kit Use to test blood sugar 4 times daily or as directed. Preferred blood glucose meter OR supplies to accompany: Blood Glucose Monitor Brands: per insurance.  Qty: 1 kit, Refills: 0    Associated Diagnoses: Type 2 diabetes, HbA1C goal < 8% (H)      Continuous Blood Gluc  (FREESTYLE DIEGO 2 READER) [place] 1 each continuous Use to read blood sugars per 's instructions.  Qty: 1 each, Refills: 0    Associated Diagnoses: Type 2 diabetes mellitus with stage 3b chronic kidney disease, with long-term current use of insulin (H)      Continuous Blood Gluc Sensor (FREESTYLE DIEGO 2 SENSOR) MISC 1 each every 14 days To check blood sugars continuously  Qty: 2 each, Refills: 5    Associated Diagnoses: Type 2 diabetes mellitus with stage 3b chronic kidney disease, with long-term current use of insulin (H)      empagliflozin (JARDIANCE) 10 MG TABS tablet Take 1 tablet (10 mg) by mouth daily  Qty: 90 tablet, Refills: 3    Associated Diagnoses: Type 2 diabetes mellitus with stage 3b chronic kidney disease, with long-term current use of insulin (H)      !! thin (NO BRAND SPECIFIED) lancets Use with lanceting device. To accompany: Blood Glucose Monitor Brands: per insurance.  Qty: 200 each, Refills: 6    Associated Diagnoses: Type 2 diabetes, HbA1C goal < 8% (H)       !! - Potential duplicate medications found. Please discuss with provider.        STOP taking these medications       metoprolol succinate ER (TOPROL XL) 100 MG 24 hr tablet Comments:   Reason for Stopping:             Allergies   Allergies   Allergen Reactions    Codeine Sulfate Nausea    Metformin      Stomach pain     Oxycodone Nausea and Vomiting    Tape [Adhesive Tape] Blisters    Tetanus Toxoid

## 2023-08-19 NOTE — PLAN OF CARE
Problem: Plan of Care - These are the overarching goals to be used throughout the patient stay.    Goal: Readiness for Transition of Care  Outcome: Adequate for Care Transition   Goal Outcome Evaluation:    Vital signs stable. NSR. Denied pain and SOB. Up SBA. ECHO completed this AM. Metoprolol discontinued and started on PO diltiazem. Plan to follow-up outpatient with EP for ablation.    Discharge instructions given and reviewed with patient. Patient verbalized understanding. PCP follow-up appoinment scheduled. New prescription sent to patient's preferred pharmacy. Belongings packed. Discharging home with son to transport.

## 2023-08-19 NOTE — H&P
[facility]    History and Physical - Hospitalist Service       Date of Admission:  8/18/2023    Assessment & Plan      73 yo F with h/o CKD3, DM2, ASHER not on CPAP after weight loss, hypothyroid, GERD, gout, and HLD who presents with a recurrent episode of SVT terminated with adenosine in the ED.  Patient had a similar episode 2 months ago.  Will have cardiology assess and check echo in the AM.  Does have aortic murmur.  Follow on telemetry.  Troponin did bump slightly, likely stress induced but follow.    Principal Problem:    Recurrent SVT (supraventricular tachycardia) -first episode was in May 2023 approximately 5 weeks after right TKA was done.  She had a left TKA done about 4 weeks ago and now has this recurrent SVT episode.  Both episodes terminated with adenosine.  EKG does suggest some possible lateral ST depression while in the tachycardia and her troponin did bump slightly so we will have cardiology assess her.  She is currently asymptomatic.  Check echo and recheck troponin in a.m.    Elevated troponin -her EKG when she was in the SVT did show some ST changes which may just be strain related but she may benefit from stress testing of her heart.  Defer to cardiology.  Check echo.  Aspirin and statin and Toprol    Active Problems:    Type 2 diabetes mellitus with hyperglycemia-she has just been using Mounjaro for this recently.  She had stopped her Lantus a couple months ago and so her PCP Dr. Barcenas had wanted her to start on Jardiance but she has been hesitant to start it.  Her glucoses were in the 300-400 range on presentation to the ED here today.  She states that her sugars have been running in the mid 100s occasionally up in the 200s since her surgery with just the Mounjaro though she does states she has not been wearing her glucose monitor the last few days.  We will need to watch her sugars overnight and if they climb high again then either she will need to go back on the  "Lantus or start the Jardiance now.    Acquired hypothyroidism -continue levothyroxine as at home    Morbid obesity - Body mass index is 38.42 kg/m .    Dehydration -improved with hydration    Esophageal reflux -continue Pepcid as at home    Gout -continue allopurinol as at home    Hyperlipidemia -continue Crestor as at home    Weight loss -she has lost about 100 pounds since starting Mounjaro last year    Chronic kidney disease, stage III (moderate) -stable    Obstructive sleep apnea syndrome -she has stopped using CPAP since she lost about 100 pounds in the past year         Diet:  Diabetic  DVT Prophylaxis: Enoxaparin (Lovenox) SQ  Adrian Catheter: Not present  Lines: None     Cardiac Monitoring: None  Code Status:  Full    Clinically Significant Risk Factors Present on Admission             # Anion Gap Metabolic Acidosis: Highest Anion Gap = 22 mmol/L in last 2 days, will monitor and treat as appropriate    # Drug Induced Platelet Defect: home medication list includes an antiplatelet medication       # DMII: A1C = 7.1 % (Ref range: 0.0 - 5.6 %) within past 6 months    # Obesity: Estimated body mass index is 38.42 kg/m  as calculated from the following:    Height as of this encounter: 1.6 m (5' 3\").    Weight as of this encounter: 98.4 kg (216 lb 14.4 oz).              Disposition Plan      Expected Discharge Date: 08/20/2023                  Alverto Silvestre MD  Hospitalist Service  [facility]  Securely message with [employer] (more info)  Text page via [employer] Paging/Directory     ______________________________________________________________________    Chief Complaint   Lightheadedness with palpitations    History is obtained from the patient and electronic health record    History of Present Illness   73 yo F with h/o CKD3, DM2, ASHER not on CPAP after weight loss, hypothyroid, GERD, gout, and HLD who presents with a recurrent episode of SVT terminated with adenosine in the ED.  Patient had a " similar episode 2 months ago.  She had a right TKA done at the end of March of this year and then had this episode of SVT in early May of this year.  She came to the ED and it was terminated with adenosine.  She was sent home and then had her left TKA done in mid July about a month ago and then today when she was walking in [facility] she suddenly felt lightheaded and her heart beating fast so she came to the ED.  And she was found to be in SVT and it was terminated with adenosine in the ED here.  She denies any chest pains.  No dyspnea.  No cold or flu symptoms recently.  No fevers or chills or sweats or nausea vomiting or diarrhea.  No melena or hematochezia.  Her legs are slightly more edematous than usual.  CT of the chest PE run was negative in the ED today.    She has just been taking the Mounjaro for her diabetes over the last month or so.  Her PCP had wanted her to start on Jardiance after she stopped Lantus a couple months ago but she has been hesitant to start the Jardiance.  She states her sugars on her glucose monitor have been running in the mid 100s to sometimes in the 200s but she has not been wearing the monitor the last couple days so she does not want no what they have been doing but they were significantly elevated when she came to the ED today.      Past Medical History    Past Medical History:   Diagnosis Date    Asthma     Chronic kidney failure, stage 3 (moderate) (H)     Chronic use of benzodiazepine for therapeutic purpose     Esophageal reflux     Fibroadenoma of LEFT breast, with fibrocystic changes 05/14/2012    Gout     Hyperlipidemia     Hypothyroid     Mammographic microcalcification 05/01/2012    Left    Migraine with aura     Obesity     ASHER (obstructive sleep apnea)     uses CPAP    Osteoporosis     Primary osteoarthritis of both knees 09/28/2020    BB     Screening for colon cancer 11/29/2021    Type 2 diabetes mellitus (H)        Past Surgical History   Past Surgical History:    Procedure Laterality Date    BIOPSY BREAST      D & C  ,     DILATE CERVIX, ABLATE ENDOMETRIUM, COMBINED  2005    ENDOMETRIAL ABLATION      HC BIOPSY OF BREAST, OPEN INCISIONAL  1972    Left    HC LAPAROSCOPY, SURGICAL; CHOLECYSTECTOMY  1998    LAPAROSCOPIC CHOLECYSTECTOMY  1999    LUMBAR LAMINECTOMY  1998    Description: Laminectomy Lumbar    LUMPECTOMY BREAST      Microdiscectomy  1998       Prior to Admission Medications   Prior to Admission Medications   Prescriptions Last Dose Informant Patient Reported? Taking?   Continuous Blood Gluc  (FREESTYLE DIEGO 2 READER) [place]   No No   Si each continuous Use to read blood sugars per 's instructions.   Continuous Blood Gluc Sensor (FREESTYLE DIEGO 2 SENSOR) [facility]   No No   Si each every 14 days To check blood sugars continuously   LORazepam (ATIVAN) 0.5 MG tablet 2023  Yes Yes   Sig: Take 0.5 mg by mouth At Bedtime   Probiotic Product (PROBIOTIC-10 PO) 2023  Yes Yes   Sig: Take 1 capsule by mouth daily Florastor    Vitamin D3 (CHOLECALCIFEROL) 25 mcg (1000 units) tablet 2023  Yes Yes   Sig: Take 1 tablet by mouth daily   acetaminophen (TYLENOL) 500 MG tablet Unknown  Yes Yes   Sig: Take 1,000 mg by mouth every 8 hours as needed   albuterol (PROAIR HFA/PROVENTIL HFA/VENTOLIN HFA) 108 (90 Base) MCG/ACT inhaler Unknown  No Yes   Sig: Inhale 2 puffs into the lungs every 6 hours as needed for shortness of breath / dyspnea or wheezing   albuterol (PROVENTIL) (2.5 MG/3ML) 0.083% neb solution Unknown  No Yes   Sig: Take 1 vial (2.5 mg) by nebulization every 6 hours as needed for shortness of breath / dyspnea or wheezing   alcohol swab prep pads   No No   Sig: Use to swab area of injection/vicki as directed.   allopurinol (ZYLOPRIM) 100 MG tablet 2023  No Yes   Sig: Take 2 tablets (200 mg) by mouth daily   aspirin (ASA) 81 MG chewable tablet 2023  Yes Yes   Sig: Take 81 mg by mouth  At Bedtime   blood glucose monitoring (ACCU-CHEK FASTCLIX) lancets   No No   Sig: Use to test blood sugar 4 times daily.   blood glucose monitoring (NO BRAND SPECIFIED) meter device kit   No No   Sig: Use to test blood sugar 4 times daily or as directed. Preferred blood glucose meter OR supplies to accompany: Blood Glucose Monitor Brands: per insurance.   colchicine (COLCYRS) 0.6 MG tablet Unknown  Yes Yes   Sig: Take 1 tablet (0.6 mg) by mouth daily as needed for gout pain   empagliflozin (JARDIANCE) 10 MG TABS tablet   No No   Sig: Take 1 tablet (10 mg) by mouth daily   famotidine (PEPCID) 20 MG tablet 8/18/2023  Yes Yes   Sig: Take 20 mg by mouth daily   levothyroxine (SYNTHROID) 125 MCG tablet 8/18/2023  No Yes   Sig: Take 1 tablet (125 mcg) by mouth daily   metoprolol succinate ER (TOPROL XL) 100 MG 24 hr tablet 8/18/2023  No Yes   Sig: Take 1 tablet (100 mg) by mouth daily   rosuvastatin (CRESTOR) 10 MG tablet Past Week  Yes Yes   Sig: Take 0.5 tablets (5 mg) by mouth three times a week   thin (NO BRAND SPECIFIED) lancets   No No   Sig: Use with lanceting device. To accompany: Blood Glucose Monitor Brands: per insurance.   tiZANidine (ZANAFLEX) 4 MG capsule 8/17/2023  Yes Yes   Sig: Take 4 mg by mouth At Bedtime   tirzepatide (MOUNJARO) 5 MG/0.5ML pen 8/17/2023  No Yes   Sig: Inject 10 mg Subcutaneous every 7 days   triamcinolone (KENALOG) 0.1 % external cream Unknown  No Yes   Sig: Apply topically 2 times daily   Patient taking differently: Apply topically 2 times daily as needed      Facility-Administered Medications: None        Review of Systems    The 10 point Review of Systems is negative other than noted in the HPI     Social History   I have reviewed this patient's social history and updated it with pertinent information if needed.  Social History     Tobacco Use    Smoking status: Never    Smokeless tobacco: Never   Vaping Use    Vaping Use: Never used   Substance Use Topics    Alcohol use: No    Drug  use: No         Family History   I have reviewed this patient's family history and updated it with pertinent information if needed.  Family History   Problem Relation Age of Onset    Autoimmune Disease No family hx of     Hypertension Mother         alive in her 90s    Atrial fibrillation Father         alive in his 90s    LUNG DISEASE Sister         interstitial lung disease    Heart Disease Sister         Physical Exam   Vital Signs: Temp: 97.7  F (36.5  C) Temp src: Oral BP: (!) 172/80 Pulse: 90   Resp: 20 SpO2: 100 % O2 Device: None (Room air)    Weight: 216 lbs 14.4 oz    General Appearance:  Older female in no acute distress   Head:    Normocephalic, without obvious abnormality, atraumatic   Eyes:    PERRL, conjunctiva/corneas clear, EOM's intact,both eyes    Ears:    Normal external ear canals no drainage or erythema bilat.   Nose:   Nares normal by gross inspection,  mucosa normal, no drainage or sinus tenderness   Throat:   Lips, mucosa, and tongue normal; teeth and gums normal   Neck:   Supple, symmetrical, trachea midline, no adenopathy;        thyroid:  No enlargement/tenderness/nodules   Back:     Symmetric, no curvature, ROM normal, no CVA tenderness   Lungs:   Occasional rhonchi   Chest wall:    No tenderness or deformity   Heart:    Regular rate and rhythm, S1 and S2 normal, II/VI systolic murmur, no rubs, no JVD, 1+ edema   Abdomen:     Soft, non-tender, bowel sounds active all four quadrants,     no masses, no hepatosplenomegaly   Musculoskeletal:   Extremities are warm and non-tender, atraumatic, no joint swelling or tenderness   Pulses:   2+ and symmetric all extremities   Skin:   Skin color, texture, turgor normal, no rashes or lesions on exposed areas, please see nursing assessment for full skin assessment   Neurologic:      Psychiatric: Alert, oriented, cranial nerves II through XII intact, motor 5 out of 5 upper lower extremity symmetric, sensory grossly intact to light touch    Affect is  calm and normal   '    Medical Decision Making       80 MINUTES SPENT BY ME on the date of service doing chart review, history, exam, documentation & further activities per the note.      Data     I have personally reviewed the following data over the past 24 hrs:    11.4 (H)  \   12.8   / 321     143 109 (H) 44.3 (H) /  106 (H)   4.4 20 (L) 1.09 (H) \     ALT: 39 AST: 50 (H) AP: 269 (H) TBILI: 0.5   ALB: 4.1 TOT PROTEIN: 6.9 LIPASE: N/A     Trop: 34 (H) BNP: N/A     TSH: 2.32 T4: N/A A1C: N/A     INR:  1.05 PTT:  22   D-dimer:  N/A Fibrinogen:  N/A       Imaging results reviewed over the past 24 hrs:   Recent Results (from the past 24 hour(s))   CT Chest Pulmonary Embolism w Contrast    Narrative    EXAM: CT CHEST PULMONARY EMBOLISM W CONTRAST  LOCATION: [facility]  DATE: 8/18/2023    INDICATION: Tachycardia, recent left knee replacement.  COMPARISON: None.  TECHNIQUE: CT chest pulmonary angiogram during arterial phase injection of IV contrast. Multiplanar reformats and MIP reconstructions were performed. Dose reduction techniques were used.   CONTRAST: Isovue 370  75 ml    FINDINGS:  ANGIOGRAM CHEST: No pulmonary emboli seen. There is slight prominence of the central pulmonary artery measuring up to 3.4 cm in greatest radial dimension which can be associated changes of pulmonary arterial hypertension. Thoracic aorta is negative for   dissection. No CT evidence of right heart strain.    LUNGS AND PLEURA: Tiny benign lymph node in the minor fissure. Minimal dependent atelectasis in the lower lobes with no central airway obstruction.    MEDIASTINUM/AXILLAE: Small benign superior pericardial recess versus cyst anterior mediastinum.    CORONARY ARTERY CALCIFICATION: None.    UPPER ABDOMEN: The gallbladder is surgically absent.    MUSCULOSKELETAL: Findings of diffuse idiopathic skeletal hyperostosis with scattered degenerative disc disease in the spine.      Impression    IMPRESSION: No acute  finding, such as PE, dissection or aneurysm.

## 2023-08-19 NOTE — UTILIZATION REVIEW
"Admission Status; Secondary Review Determination         Under the authority of the Utilization Management Committee, the utilization review process indicated a secondary review on the above patient.  The review outcome is based on review of the medical records, discussions with staff, and applying clinical experience noted on the date of the review.          (x) Observation Status Appropriate - This patient does not meet hospital inpatient criteria and is placed in observation status. If this patient's primary payer is Medicare and was admitted as an inpatient, Condition Code 44 should be used and patient status changed to \"observation\".     RATIONALE FOR DETERMINATION:  72 year old female who was brought to the emergency room a after developing a sudden onset of lightheadedness and heart palpitations.  Appears related to wide QRS complex tachycardia felt likely SVT with aberrancy that is symptomatic.  Cardiology recommended ablation but she doesn't want the procedure.  She is reportedly feeling better and tentative plan after adjusting medications and checking echo for her to discharge later today per cardiology eval this AM.  If she should develop more symptoms and have to stay/undergo procedure would reconsider inpatient status.    The severity of illness, intensity of service provided, expected LOS and risk for adverse outcome make the care appropriate for further observation; however, doesn't meet criteria for hospital inpatient admission. Dr. Mcbride notified of this determination.    The information on this document is developed by the utilization review team in order for the business office to ensure compliance.  This only denotes the appropriateness of proper admission status and does not reflect the quality of care rendered.         The definitions of Inpatient Status and Observation Status used in making the determination above are those provided in the CMS Coverage Manual, Chapter 1 and Chapter 6, " section 70.4.      Sincerely,    Ke Peng DO, [facility]  Utilization Review  Physician Advisor

## 2023-08-19 NOTE — PLAN OF CARE
Problem: Plan of Care - These are the overarching goals to be used throughout the patient stay.    Goal: Plan of Care Review  Description: The Plan of Care Review/Shift note should be completed every shift.  The Outcome Evaluation is a brief statement about your assessment that the patient is improving, declining, or no change.  This information will be displayed automatically on your shift note.  Outcome: Progressing  Flowsheets (Taken 8/18/2023 2229)  Plan of Care Reviewed With: patient     Problem: Pain Acute  Goal: Optimal Pain Control and Function  Outcome: Progressing     Problem: Fluid Volume Deficit  Goal: Fluid Balance  Outcome: Progressing     Problem: Dysrhythmia  Goal: Normalized Cardiac Rhythm  Outcome: Progressing  Intervention: Monitor and Manage Cardiac Rhythm Effect  Recent Flowsheet Documentation  Taken 8/18/2023 2041 by Hayden Alva RN  VTE Prevention/Management: (took off on right leg) compression stockings off   Goal Outcome Evaluation:      Plan of Care Reviewed With: patient      Pt came from ED at 2015. Pt alert and oriented times 4. BP was 172/80 with HR of 90. Recheck BP was 161/74 later. Denied any pain, SOB or light headed. Lung sounds clear. Pt 100% on RA. Tele NSR and HR has been mostly 80's and 90's. Bilateral legs edema. Jerry stocking taken out from right leg for bedtime. Pt voided twice without any problem. Last BM today per pt.

## 2023-08-19 NOTE — CONSULTS
"  Thank you,  No ref. provider found, for asking the [facility] Care team to see Meggan Everett to evaluate .      Assessment/Recommendations   Assessment:    Wide QRS complex tachycardia likely SVT with aberrancy, recurrent, symptomatic  Aortic stenosis, probably mild to moderate based on exam  Diabetes mellitus  Chronic kidney disease, mild, stable  Sleep apnea on CPAP  DJD status post right knee replacement    Plan:  We discussed treatment of SVTs again.  I strongly encouraged her to reconsider ablation.  We will see if calcium blocker works better than beta-blocker.  We will switch her from metoprolol to diltiazem.  Echo to assess severity of aortic valve disease  She can be discharged home later today.  She was advised to follow-up with electrophysiologist for ablation       History of Present Illness/Subjective    Ms. Meggan Everett is a 72 year old female who was brought to the emergency room a after developing a sudden onset of lightheadedness and heart palpitations.  She was shopping at a nearby [employer] when she started feeling very lightheaded and nearly passed out.  The paramedics arrived.  She was hypotensive and tachycardic.  She was brought to the emergency room and was found to be in sustained tachycardia.  Vagal maneuvers were unsuccessful.  She was given adenosine with restoration of normal sinus rhythm.  She has longstanding history of \"tachycardia \".  Early in the spring of this year she developed brief episode of syncope associated with tachyarrhythmias.  She was also given adenosine at a time to terminate tachycardia.  She was seen by electrophysiologist who recommended ablation.  After talking to her internist she decided against it.  She has no history of known coronary artery disease.  She denies exertional chest pains.  She states that she has had heart murmur for most of her life.  She is not known to have severe valvular heart disease    ECG: Personally reviewed.  #1.  Wide " "QRS tachycardia with a ventricular rate of 166 bpm there is no clear-cut P waves #2 normal sinus rhythm right bundle branch block    ECHO (personnaly Reviewed): Pending    CT chest: Negative  No coronary calcification     Physical Examination Review of Systems   /63 (BP Location: Left arm)   Pulse 81   Temp 97.8  F (36.6  C) (Oral)   Resp 18   Ht 1.6 m (5' 3\")   Wt 97.5 kg (215 lb)   LMP 06/27/2007 (Approximate)   SpO2 99%   BMI 38.09 kg/m    Body mass index is 38.09 kg/m .  Wt Readings from Last 3 Encounters:   08/19/23 97.5 kg (215 lb)   06/27/23 98.3 kg (216 lb 12.8 oz)   05/03/23 98.9 kg (218 lb)       Intake/Output Summary (Last 24 hours) at 8/19/2023 0903  Last data filed at 8/19/2023 0749  Gross per 24 hour   Intake 720 ml   Output --   Net 720 ml     General Appearance:   Alert, cooperative, no distress, appears stated age   Head/ENT: Normocephalic, without obvious abnormality. Membranes moist.      EYES:  No scleral icterus   Neck: Supple, symmetrical, trachea midline, no adenopathy, thyroid: not enlarged, symmetric, no carotid bruit or JVD   Chest/Lungs:   lungs are clear to auscultation, respirations unlabored. No tenderness or deformity   Cardiovascular:   Regular rhythm, S1, S2 normal, no murmur, rub or gallop.   Abdomen:  Soft, non-tender, bowel sounds active all four quadrants,  no masses, no organomegaly   Extremities: no cyanosis or clubbing. No edema   Skin: Skin color, texture, turgor normal, no rashes or lesions.    Neurologic: Alert and oriented x 3, moving all four extremities.    Psychiatric: Normal affect.      10 system review of systems completed see history of present illness and inpatient H&P (reviewed) for further details.          Lab Results    Chemistry/lipid CBC Cardiac Enzymes/BNP/TSH/INR   Recent Labs   Lab Test 08/19/22  1325   CHOL 132   HDL 39*   LDL 65   TRIG 138     Recent Labs   Lab Test 08/19/22  1325 07/28/21  1147 08/27/19  1133   LDL 65 113 128     Recent " Labs   Lab Test 08/19/23  0659 08/19/23  0637   NA  --  143   POTASSIUM  --  4.1   CHLORIDE  --  108*   CO2  --  23   * 120*   BUN  --  34.5*   CR  --  1.02*   GFRESTIMATED  --  58*   HIEN  --  9.6     Recent Labs   Lab Test 08/19/23  0637 08/18/23  2028 08/18/23  1522   CR 1.02* 1.09* 1.34*     Recent Labs   Lab Test 06/27/23  1612 03/07/23  1207 11/17/22  1213   A1C 7.1* 7.0* 7.1*          Recent Labs   Lab Test 08/18/23  1521   WBC 11.4*   HGB 12.8   HCT 40.6   MCV 93        Recent Labs   Lab Test 08/18/23  1521 06/27/23  1612 05/02/23  2049   HGB 12.8 13.1 12.8    No results for input(s): TROPONINI in the last 56295 hours.  No results for input(s): BNP, NTBNPI, NTBNP in the last 39656 hours.  Recent Labs   Lab Test 08/18/23  1522   TSH 2.32     Recent Labs   Lab Test 08/18/23  1521   INR 1.05        Medical History  Surgical History Family History Social History   Past Medical History:   Diagnosis Date    Asthma     Chronic kidney failure, stage 3 (moderate) (H)     Chronic use of benzodiazepine for therapeutic purpose     Esophageal reflux     Fibroadenoma of LEFT breast, with fibrocystic changes 05/14/2012    Gout     Hyperlipidemia     Hypothyroid     Mammographic microcalcification 05/01/2012    Left    Migraine with aura     Obesity     ASHER (obstructive sleep apnea)     uses CPAP    Osteoporosis     Primary osteoarthritis of both knees 09/28/2020    RBBB     Screening for colon cancer 11/29/2021    Type 2 diabetes mellitus (H)      Past Surgical History:   Procedure Laterality Date    BIOPSY BREAST      D & C  2000, 2002    DILATE CERVIX, ABLATE ENDOMETRIUM, COMBINED  01/01/2005    ENDOMETRIAL ABLATION      HC BIOPSY OF BREAST, OPEN INCISIONAL  01/01/1972    Left    HC LAPAROSCOPY, SURGICAL; CHOLECYSTECTOMY  01/01/1998    LAPAROSCOPIC CHOLECYSTECTOMY  01/01/1999    LUMBAR LAMINECTOMY  01/01/1998    Description: Laminectomy Lumbar    LUMPECTOMY BREAST      Microdiscectomy  01/01/1998     No  premature CAD, SCD,cardiomyopathy   Social History     Socioeconomic History    Marital status:      Spouse name: Not on file    Number of children: Not on file    Years of education: Not on file    Highest education level: Not on file   Occupational History    Occupation: Retired   Tobacco Use    Smoking status: Never    Smokeless tobacco: Never   Vaping Use    Vaping Use: Never used   Substance and Sexual Activity    Alcohol use: No    Drug use: No    Sexual activity: Never   Other Topics Concern    Not on file   Social History Narrative    She is  and is retired. She lives with her son and his family on the lower level of their house.  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  She has 5 grandchildren.  She does not smoke cigarettes or drink alcohol.     Social Determinants of Health     Financial Resource Strain: Not on file   Food Insecurity: Not on file   Transportation Needs: Not on file   Physical Activity: Not on file   Stress: Not on file   Social Connections: Not on file   Intimate Partner Violence: Not on file   Housing Stability: Not on file         Medications  Allergies   Current Facility-Administered Medications Ordered in Epic   Medication Dose Route Frequency Provider Last Rate Last Admin    acetaminophen (TYLENOL) tablet 1,000 mg  1,000 mg Oral Q8H PRN Alverto Silvestre MD        albuterol (PROVENTIL HFA/VENTOLIN HFA) inhaler  2 puff Inhalation Q6H PRN Alverto Silvestre MD        allopurinol (ZYLOPRIM) tablet 200 mg  200 mg Oral Daily Alverto Silvestre MD        aspirin (ASA) chewable tablet 81 mg  81 mg Oral At Bedtime Alverto Silvestre MD   81 mg at 08/18/23 9695    diltiazem ER COATED BEADS (CARDIZEM CD/CARTIA XT) 24 hr capsule 180 mg  180 mg Oral Daily Jerry Fernandez MD        enoxaparin ANTICOAGULANT (LOVENOX) injection 40 mg  40 mg Subcutaneous Q24H Alverto Silvestre MD        famotidine (PEPCID) tablet 20 mg  20 mg Oral Daily Konrad  MD Alverto        levothyroxine (SYNTHROID/LEVOTHROID) tablet 125 mcg  125 mcg Oral Daily Alverto Silvestre MD        lidocaine (LMX4) cream   Topical Q1H PRN Alverto Silvestre MD        lidocaine 1 % 0.1-1 mL  0.1-1 mL Other Q1H PRN Alverto Silvestre MD        LORazepam (ATIVAN) tablet 0.5 mg  0.5 mg Oral At Bedtime Alverto Silvestre MD   0.5 mg at 08/18/23 2213    melatonin tablet 1 mg  1 mg Oral At Bedtime PRN Alverto Silvestre MD        ondansetron (ZOFRAN ODT) ODT tab 4 mg  4 mg Oral Q6H PRN Alverto Silvestre MD        Or    ondansetron (ZOFRAN) injection 4 mg  4 mg Intravenous Q6H PRN Alverto Silvestre MD        rosuvastatin (CRESTOR) tablet 5 mg  5 mg Oral Once per day on Mon Wed Fri Alverto Silvestre MD        sodium chloride (PF) 0.9% PF flush 3 mL  3 mL Intracatheter Q8H Alverto Silvestre MD   3 mL at 08/18/23 2213    sodium chloride (PF) 0.9% PF flush 3 mL  3 mL Intracatheter q1 min prn Alverto Silvestre MD        tiZANidine (ZANAFLEX) tablet 4 mg  4 mg Oral At Bedtime Alverto Silvestre MD   4 mg at 08/18/23 2213     No current Epic-ordered outpatient medications on file.       Allergies   Allergen Reactions    Codeine Sulfate Nausea    Metformin      Stomach pain     Oxycodone Nausea and Vomiting    Tape [Adhesive Tape] Blisters    Tetanus Toxoid

## 2023-08-19 NOTE — ED NOTES
"[facility] ED Handoff Report    ED Chief Complaint: Tachycardia    ED Diagnosis:  (I47.1) SVT (supraventricular tachycardia) (H)  Comment: Pt arrived to ER via EMS from [facility] as pt suddenly reported feeling lightheaded and tachycardic. Pt reports having previous episode of SVT post first knee surgery, and recently just had her other knee surgery. 12mg adenosine was given and SVT resolved.  Plan: HR currently in high 80s, NSR, troponin is elevated and thus will admit to monitor and reevaluate SVT.    (E11.65) Hyperglycemia due to diabetes mellitus (H)  Comment: Pt states she has been on oral Jardiance and another injection medication for weight loss. She has reported better BS since but currently labs show high sugars with anion gap of 22.  Plan: Admission to evaluate BMP and make sure pt is not going in DKA.    (R77.8) Elevated troponin  Comment:   Plan:     (E86.0) Dehydration  Comment:   Plan:        PMH:    Past Medical History:   Diagnosis Date    Asthma     Chronic kidney failure, stage 3 (moderate) (H)     Chronic use of benzodiazepine for therapeutic purpose     Esophageal reflux     Fibroadenoma of LEFT breast, with fibrocystic changes 05/14/2012    Gout     Hyperlipidemia     Hypothyroid     Mammographic microcalcification 05/01/2012    Left    Migraine with aura     Obesity     ASHER (obstructive sleep apnea)     uses CPAP    Osteoporosis     Primary osteoarthritis of both knees 09/28/2020    RBBB     Screening for colon cancer 11/29/2021    Type 2 diabetes mellitus (H)         Code Status:  No Order     Falls Risk: Yes Band: Applied    Current Living Situation/Residence: lives with a significant other     Elimination Status: Continent: Yes     Activity Level: SBA    Patients Preferred Language:  English     Needed: No    Vital Signs:  BP (!) 145/64   Pulse 102   Temp 97.9  F (36.6  C) (Oral)   Resp 18   Ht 1.6 m (5' 3\")   Wt 94.3 kg (208 lb)   LMP 06/27/2007 (Approximate)   SpO2 " 98%   BMI 36.85 kg/m       Cardiac Rhythm: NSR    Pain Score: 0/10    Is the Patient Confused:  No    Last Food or Drink: 08/18/23 at 1800    Focused Assessment:  Cardiac - SVT upon arrival, resolved at this time.    Tests Performed: Done: Labs and Imaging    Treatments Provided:  adenosine and lactated ringers    Family Dynamics/Concerns: No    Family Updated On Visitor Policy: Yes    Plan of Care Communicated to Family: Yes    Who Was Updated about Plan of Care: DaughterPrince: [phone]    Belongings Checklist Done and Signed by Patient: Yes    Medications sent with patient: N/A    Covid: asymptomatic    Additional Information:     RN: Bala Batista RN   8/18/2023 7:56 PM

## 2023-08-21 LAB
ATRIAL RATE - MUSE: 111 BPM
ATRIAL RATE - MUSE: 156 BPM
DIASTOLIC BLOOD PRESSURE - MUSE: 68 MMHG
DIASTOLIC BLOOD PRESSURE - MUSE: NORMAL MMHG
INTERPRETATION ECG - MUSE: NORMAL
INTERPRETATION ECG - MUSE: NORMAL
P AXIS - MUSE: 77 DEGREES
P AXIS - MUSE: NORMAL DEGREES
PR INTERVAL - MUSE: 164 MS
PR INTERVAL - MUSE: NORMAL MS
QRS DURATION - MUSE: 110 MS
QRS DURATION - MUSE: 118 MS
QT - MUSE: 296 MS
QT - MUSE: 348 MS
QTC - MUSE: 473 MS
QTC - MUSE: 491 MS
R AXIS - MUSE: 79 DEGREES
R AXIS - MUSE: 92 DEGREES
SYSTOLIC BLOOD PRESSURE - MUSE: 130 MMHG
SYSTOLIC BLOOD PRESSURE - MUSE: NORMAL MMHG
T AXIS - MUSE: 33 DEGREES
T AXIS - MUSE: 55 DEGREES
VENTRICULAR RATE- MUSE: 111 BPM
VENTRICULAR RATE- MUSE: 166 BPM

## 2023-08-22 ENCOUNTER — PATIENT OUTREACH (OUTPATIENT)
Dept: CARE COORDINATION | Facility: CLINIC | Age: 73
End: 2023-08-22
Payer: MEDICARE

## 2023-08-22 DIAGNOSIS — I47.10 SVT (SUPRAVENTRICULAR TACHYCARDIA) (H): Primary | ICD-10-CM

## 2023-08-22 RX ORDER — SODIUM CHLORIDE 9 MG/ML
100 INJECTION, SOLUTION INTRAVENOUS CONTINUOUS
Status: CANCELLED | OUTPATIENT
Start: 2023-08-22

## 2023-08-22 RX ORDER — LIDOCAINE 40 MG/G
CREAM TOPICAL
Status: CANCELLED | OUTPATIENT
Start: 2023-08-22

## 2023-08-22 NOTE — PROGRESS NOTES
[facility]    Background: Transitional Care Management program identified per system criteria and reviewed by [facility] team for possible outreach.    Assessment:  Upon chart review, patient is in communication with a clinic team member, nurse or provider within [facility] for reason of discussing hospital follow up plan of care and answering questions patient may have related to discharge instructions. [facility] Team member will update episode status of Transitional Care Management program to enrolled per system workflows.     [facility] made first outreach attempt on 8/21/23 to reach patient for hospital follow up and left message and that time.    Patient has active communication with a nurse, provider or care team for reason of post-hospital follow up plan.  Outreach call by [facility] team not indicated to minimize duplicative efforts.     Patient is in active communication today with a provider in Cardiology from [facility]. Patient's active communication is appropriate for ED/Hospital discharge and follow-up. No [facility] outreach call needed at this time.     Plan: [facility] will make no additional outreach attempts to minimize duplicative efforts and reduce potential confusion for patient.      LAURENCE Sims  [phone]  [facility]     *[facility] Team does NOT follow patient ongoing. Referrals are identified based on internal discharge reports and the outreach is to ensure patient has an understanding of their discharge instructions.

## 2023-08-23 ENCOUNTER — VIRTUAL VISIT (OUTPATIENT)
Dept: INTERNAL MEDICINE | Facility: CLINIC | Age: 73
End: 2023-08-23
Payer: MEDICARE

## 2023-08-23 DIAGNOSIS — J44.89 ASTHMATIC BRONCHITIS , CHRONIC (H): ICD-10-CM

## 2023-08-23 DIAGNOSIS — E03.9 ACQUIRED HYPOTHYROIDISM: ICD-10-CM

## 2023-08-23 DIAGNOSIS — I47.10 SVT (SUPRAVENTRICULAR TACHYCARDIA) (H): Primary | ICD-10-CM

## 2023-08-23 PROBLEM — R79.89 ELEVATED TROPONIN: Status: RESOLVED | Noted: 2023-08-18 | Resolved: 2023-08-23

## 2023-08-23 PROBLEM — E86.0 DEHYDRATION: Status: RESOLVED | Noted: 2023-08-18 | Resolved: 2023-08-23

## 2023-08-23 PROCEDURE — 99496 TRANSJ CARE MGMT HIGH F2F 7D: CPT | Mod: VID | Performed by: INTERNAL MEDICINE

## 2023-08-23 RX ORDER — METOPROLOL SUCCINATE 100 MG/1
100 TABLET, EXTENDED RELEASE ORAL DAILY
Qty: 90 TABLET | Refills: 3 | Status: SHIPPED | OUTPATIENT
Start: 2023-08-23 | End: 2023-09-11

## 2023-08-23 NOTE — PROGRESS NOTES
Meggan is a 72 year old female contacting the clinic today via video, who will use the platform: [employer] for the visit.  Phone # for [employer], or if Amwell drops:   Telephone Information:   Mobile [phone]          ASSESSMENT and PLAN:  1. SVT (supraventricular tachycardia) (H)  Second episode with dramatic symptoms, tachycardia and hypotension.  No need to change from metoprolol to diltiazem.  Recommend proceeding with catheter ablation  - metoprolol succinate ER (TOPROL XL) 100 MG 24 hr tablet; Take 1 tablet (100 mg) by mouth daily  Dispense: 90 tablet; Refill: 3    2. Acquired hypothyroidism  Stable and not contributing to SVT    3. Asthmatic bronchitis , chronic (H)  Stable.  Minimize inhalers       Patient Instructions   Recommend proceeding with SVT ablation    Do not fill diltiazem    Stay on metoprolol 100 mg daily    Minimize use of albuterol inhaler    Recommend flu and COVID-vaccine this fall    Hold on RSV vaccine            Return in about 4 months (around 12/23/2023) for using a video visit.       CHIEF COMPLAINT:  Chief Complaint   Patient presents with    Hospital F/U     Pt is folloing up on hosp visit for svt           8/23/2023     3:59 PM   Additional Questions   Roomed by blanca ramirez   Accompanied by alone         8/23/2023     3:59 PM   Patient Reported Additional Medications   Patient reports taking the following new medications yes       HISTORY OF PRESENT ILLNESS:  Meggan is a 72 year old female contacting the clinic today via video for review of second episode of SVT.  She was shopping with her 10-year-old grandson when she suddenly developed a recurrence of her supraventricular tachycardia.  She felt weak, dizzy, and lightheaded.  She was unable to move or walk.  Paramedics were summoned and she was taken to the hospital.  She had to leave her grandson at [facility] and is slightly traumatized by the event.  While in the hospital she was converted to normal sinus rhythm with adenosine.  This  "marks the second episode.  She has been offered catheter ablation and is concerned with several questions    She underwent knee replacement and is doing well    She is having some breathing trouble and we discussed that inhalers could make some of her symptoms worse    Hospital Follow-up Visit:    Hospital/Nursing Home/IP Rehab Facility: [facility]  Date of Admission: 8/18/2023  Date of Discharge: 8/19/2023  Reason(s) for Admission: Supraventricular tachycardia      Was your hospitalization related to COVID-19? No   Problems taking medications regularly:  None  Medication changes since discharge: None  Problems adhering to non-medication therapy:  None    Discharge summary reviewed  Diagnostic Tests/Treatments reviewed.  Follow up needed: Cardiology for ablation  Update since discharge: stable.   Post Discharge Medication Reconciliation: discharge medications reconciled and changed, per note/orders.  Plan of care communicated with patient         HPI    REVIEW OF SYSTEMS:   Improving knee pain    PFSH:  Social History     Social History Narrative    She is  and is retired. She lives with her son and his family on the lower level of their house.  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  She has 5 grandchildren.  She does not smoke cigarettes or drink alcohol.       TOBACCO USE:  History   Smoking Status    Never   Smokeless Tobacco    Never       VITALS:  There were no vitals filed for this visit.  LMP 06/27/2007 (Approximate)   Breastfeeding No  Estimated body mass index is 38.09 kg/m  as calculated from the following:    Height as of 8/18/23: 1.6 m (5' 3\").    Weight as of 8/19/23: 97.5 kg (215 lb).    PHYSICAL EXAM:  (observations via Video)  Alert and oriented in her kitchen    MEDICATIONS:   Current Outpatient Medications   Medication Sig Dispense Refill    acetaminophen (TYLENOL) 500 MG tablet Take 1,000 mg by mouth every " 8 hours as needed      albuterol (PROAIR HFA/PROVENTIL HFA/VENTOLIN HFA) 108 (90 Base) MCG/ACT inhaler Inhale 2 puffs into the lungs every 6 hours as needed for shortness of breath / dyspnea or wheezing 18 g 3    albuterol (PROVENTIL) (2.5 MG/3ML) 0.083% neb solution Take 1 vial (2.5 mg) by nebulization every 6 hours as needed for shortness of breath / dyspnea or wheezing 75 mL 3    alcohol swab prep pads Use to swab area of injection/vicki as directed. 100 each 3    allopurinol (ZYLOPRIM) 100 MG tablet Take 2 tablets (200 mg) by mouth daily 180 tablet 3    aspirin (ASA) 81 MG chewable tablet Take 81 mg by mouth At Bedtime      blood glucose monitoring (ACCU-CHEK FASTCLIX) lancets Use to test blood sugar 4 times daily. 204 each 6    blood glucose monitoring (NO BRAND SPECIFIED) meter device kit Use to test blood sugar 4 times daily or as directed. Preferred blood glucose meter OR supplies to accompany: Blood Glucose Monitor Brands: per insurance. 1 kit 0    colchicine (COLCYRS) 0.6 MG tablet Take 1 tablet (0.6 mg) by mouth daily as needed for gout pain 90 tablet 3    Continuous Blood Gluc  (FREESTYLE DIEGO 2 READER) [place] 1 each continuous Use to read blood sugars per 's instructions. 1 each 0    Continuous Blood Gluc Sensor (FREESTYLE DIEGO 2 SENSOR) [facility] 1 each every 14 days To check blood sugars continuously 2 each 5    empagliflozin (JARDIANCE) 10 MG TABS tablet Take 1 tablet (10 mg) by mouth daily 90 tablet 3    famotidine (PEPCID) 20 MG tablet Take 20 mg by mouth daily      levothyroxine (SYNTHROID) 125 MCG tablet Take 1 tablet (125 mcg) by mouth daily 90 tablet 3    LORazepam (ATIVAN) 0.5 MG tablet Take 0.5 mg by mouth At Bedtime      metoprolol succinate ER (TOPROL XL) 100 MG 24 hr tablet Take 1 tablet (100 mg) by mouth daily 90 tablet 3    Probiotic Product (PROBIOTIC-10 PO) Take 1 capsule by mouth daily Florastor       rosuvastatin (CRESTOR) 10 MG tablet Take 0.5 tablets (5 mg) by mouth  three times a week 90 tablet 3    thin (NO BRAND SPECIFIED) lancets Use with lanceting device. To accompany: Blood Glucose Monitor Brands: per insurance. 200 each 6    tirzepatide (MOUNJARO) 5 MG/0.5ML pen Inject 10 mg Subcutaneous every 7 days 12 mL 3    tiZANidine (ZANAFLEX) 4 MG capsule Take 4 mg by mouth At Bedtime      triamcinolone (KENALOG) 0.1 % external cream Apply topically 2 times daily 30 g 3    Vitamin D3 (CHOLECALCIFEROL) 25 mcg (1000 units) tablet Take 1 tablet by mouth daily         Outside Notes summarized: Hospital discharge summary.  Emergency room note and vitals  Labs, x-rays, cardiology, GI tests reviewed: EKG, echocardiogram, TSH  Recent Labs   Lab Test 08/19/23  0637 08/18/23 2028 08/18/23  1522 08/18/23  1521 06/27/23  1612 05/02/23  2049 03/07/23  1207 11/17/22  1213 08/25/22  1423 08/19/22  1325 04/29/22  1144 04/29/22  1144   HGB  --   --   --  12.8 13.1 12.8 13.1  --   --  13.2   < >  --    WBC  --   --   --  11.4* 7.0 12.6* 10.1  --   --  7.6   < >  --     143 138  --  137 134* 139 138   < > 138  --  142   POTASSIUM 4.1 4.4 5.0  --  5.0 5.4* 4.6 4.3   < > 4.7  --  4.5   CR 1.02* 1.09* 1.34*  --  1.09* 1.55* 1.35* 1.14*   < > 2.87*  --  1.47*   A1C  --   --   --   --  7.1*  --  7.0* 7.1*  --  7.2*  --  8.7*   URIC  --   --   --   --   --   --   --  4.5  --  5.1  --  6.7   B12  --   --   --   --   --   --   --   --   --   --   --  787   TSH  --   --  2.32  --   --  1.60 0.37 0.25*  --  0.87   < >  --    VITDT  --   --   --   --   --   --   --   --   --   --   --  70   SED  --   --   --   --   --   --   --  20  --  41*  --   --     < > = values in this interval not displayed.     No results found for: [id number]  Lab Results   Component Value Date    CHOL 132 08/19/2022     New orders: No orders of the defined types were placed in this encounter.      Independent review of:     Nate Barcenas MD  [facility]    Video-Visit Details  Type of service:   Video Visit  Patient has given verbal consent to a Video visit?  Yes  How would you like to obtain your AVS?  MyChart  Will anyone else be joining your video visit, giving supplemental history? No    Originating location (pt location): Home    Distant Location (provider location):  Off-site    Video Start Time: 5:10 PM  Video End time:  5:45 PM  Face to face plus orders:  35 minutes  Documentation time:  3 minutes     The visit lasted a total of 38 minutes

## 2023-08-23 NOTE — PATIENT INSTRUCTIONS
Recommend proceeding with SVT ablation    Do not fill diltiazem    Stay on metoprolol 100 mg daily    Minimize use of albuterol inhaler    Recommend flu and COVID-vaccine this fall    Hold on RSV vaccine

## 2023-08-23 NOTE — PROGRESS NOTES
"Meggan is a 72 year old who is being evaluated via a billable video visit.      How would you like to obtain your AVS? MyChart  If the video visit is dropped, the invitation should be resent by: Text to cell phone: [phone]  Will anyone else be joining your video visit? No  {If patient encounters technical issues they should call [phone] :041155}        {PROVIDER CHARTING PREFERENCE:515575}    Subjective   Meggan is a 72 year old, presenting for the following health issues:  Hospital F/U (Pt is folloing up on hosp visit for svt)      8/23/2023     3:59 PM   Additional Questions   Roomed by blanca ramirez   Accompanied by alone         8/23/2023     3:59 PM   Patient Reported Additional Medications   Patient reports taking the following new medications yes       HPI       Hospital Follow-up Visit:    Hospital/Nursing Home/IP Rehab Facility: [facility]  Date of Admission: 8/18/2023   Date of Discharge: 08\19\2023  Reason(s) for Admission:   SVT (supraventricular tachycardia) (H   Was your hospitalization related to COVID-19? No   Problems taking medications regularly:  None  Medication changes since discharge: None  Problems adhering to non-medication therapy:  None    Summary of hospitalization:  {:464317}  Diagnostic Tests/Treatments reviewed.  Follow up needed: {:963480:}  Other Healthcare Providers Involved in Patient s Care:         {those currently involved after discharge:525833::\"None\"}  Update since discharge: {:484675} {TIP  Include information from family/caregivers, SNF, Care Coordination :614619}        Plan of care communicated with {:588676}     {Reference  Coding guidelines- Moderate Complexity F2F/Video within 7 - 14 days of discharge [id number], High Complexity F2F/Video within 7 days [id number] or [id number] days [id number] :393708}      {additonal problems for provider to add (Optional):887055}      Review of Systems   {ROS COMP (Optional):579254}      Objective     "       Vitals:  No vitals were obtained today due to virtual visit.    Physical Exam   {video visit exam brief selected:572199}    {Diagnostic Test Results (Optional):916312}    {AMBULATORY ATTESTATION (Optional):432526}        Video-Visit Details    Type of service:  Video Visit   Video Start Time: {video visit start/end time for provider to select:147718}  Video End Time:{video visit start/end time for provider to select:227617}    Originating Location (pt. Location): Home  {PROVIDER LOCATION On-site should be selected for visits conducted from your clinic location or adjoining [facility] hospital, academic office, or other nearby [facility] building. Off-site should be selected for all other provider locations, including home:113747}  Distant Location (provider location):  Off-site  Platform used for Video Visit: Phillip

## 2023-09-01 ENCOUNTER — MYC MEDICAL ADVICE (OUTPATIENT)
Dept: INTERNAL MEDICINE | Facility: CLINIC | Age: 73
End: 2023-09-01
Payer: MEDICARE

## 2023-09-01 DIAGNOSIS — M25.50 ARTHRALGIA, UNSPECIFIED JOINT: ICD-10-CM

## 2023-09-01 DIAGNOSIS — E03.9 ACQUIRED HYPOTHYROIDISM: ICD-10-CM

## 2023-09-01 DIAGNOSIS — I47.10 SVT (SUPRAVENTRICULAR TACHYCARDIA) (H): ICD-10-CM

## 2023-09-01 RX ORDER — TIZANIDINE HYDROCHLORIDE 4 MG/1
4 CAPSULE, GELATIN COATED ORAL AT BEDTIME
Qty: 90 CAPSULE | Refills: 3 | Status: SHIPPED | OUTPATIENT
Start: 2023-09-01

## 2023-09-01 RX ORDER — CELECOXIB 200 MG/1
200 CAPSULE ORAL DAILY
Qty: 30 CAPSULE | Refills: 11 | Status: SHIPPED | OUTPATIENT
Start: 2023-09-01 | End: 2023-09-01

## 2023-09-01 RX ORDER — LEVOTHYROXINE SODIUM 125 UG/1
125 TABLET ORAL DAILY
Qty: 90 TABLET | Refills: 3 | Status: SHIPPED | OUTPATIENT
Start: 2023-09-01 | End: 2023-10-17

## 2023-09-01 RX ORDER — CELECOXIB 200 MG/1
200 CAPSULE ORAL DAILY
Qty: 30 CAPSULE | Refills: 11 | Status: SHIPPED | OUTPATIENT
Start: 2023-09-01 | End: 2023-11-22

## 2023-09-01 RX ORDER — TIZANIDINE HYDROCHLORIDE 4 MG/1
4 CAPSULE, GELATIN COATED ORAL AT BEDTIME
Qty: 90 CAPSULE | Refills: 3 | Status: SHIPPED | OUTPATIENT
Start: 2023-09-01 | End: 2023-09-01

## 2023-09-01 NOTE — TELEPHONE ENCOUNTER
"Routing refill request to provider for review/approval because:  Pt requesting refills - pended for MD review    Levothyroxine (SYNTHROID) 125 MCG tablet  Last Written Prescription Date:  12/06/22  Last Fill Quantity: 90 tablets,  # refills: 3   Last office visit provider:  08/23/23    iZANidine (ZANAFLEX) 4 MG capsule  Last Written Prescription Date:  ???  Last Fill Quantity: ???,  # refills: ???  Last office visit provider:  08/23/23     celecoxib (CELEBREX) 200 MG capsule  Last Written Prescription Date:  05/26/23  Last Fill Quantity: 30 capsule,  # refills: 1  Last office visit provider:  08/23/23     Requested Prescriptions   Pending Prescriptions Disp Refills    levothyroxine (SYNTHROID) 125 MCG tablet 90 tablet 3     Sig: Take 1 tablet (125 mcg) by mouth daily       Thyroid Protocol Passed - 9/1/2023 12:09 PM        Passed - Patient is 12 years or older        Passed - Recent (12 mo) or future (30 days) visit within the authorizing provider's specialty     Patient has had an office visit with the authorizing provider or a provider within the authorizing providers department within the previous 12 mos or has a future within next 30 days. See \"Patient Info\" tab in inbasket, or \"Choose Columns\" in Meds & Orders section of the refill encounter.              Passed - Medication is active on med list        Passed - Normal TSH on file in past 12 months     Recent Labs   Lab Test 08/18/23  1522   TSH 2.32              Passed - No active pregnancy on record     If patient is pregnant or has had a positive pregnancy test, please check TSH.          Passed - No positive pregnancy test in past 12 months     If patient is pregnant or has had a positive pregnancy test, please check TSH.            tiZANidine (ZANAFLEX) 4 MG capsule       Sig: Take 1 capsule (4 mg) by mouth At Bedtime       There is no refill protocol information for this order       celecoxib (CELEBREX) 200 MG capsule 30 capsule 1     Sig: Take 1 capsule " "(200 mg) by mouth daily       NSAID Medications Failed - 9/1/2023 12:09 PM        Failed - Blood pressure under 140/90 in past 12 months     BP Readings from Last 3 Encounters:   08/19/23 (!) 171/72   06/27/23 (!) 144/72   05/03/23 116/60                 Failed - Normal AST on file in past 12 months     Recent Labs   Lab Test 08/18/23  1522   AST 50*             Failed - Patient is age 6-64 years        Failed - Normal CBC on file in past 12 months     Recent Labs   Lab Test 08/18/23  1521   WBC 11.4*   RBC 4.35   HGB 12.8   HCT 40.6                    Failed - Medication is active on med list        Failed - Normal serum creatinine on file in past 12 months     Recent Labs   Lab Test 08/19/23  0637   CR 1.02*       Ok to refill medication if creatinine is low          Passed - Normal ALT on file in past 12 months     Recent Labs   Lab Test 08/18/23  1522   ALT 39             Passed - Recent (12 mo) or future (30 days) visit within the authorizing provider's specialty     Patient has had an office visit with the authorizing provider or a provider within the authorizing providers department within the previous 12 mos or has a future within next 30 days. See \"Patient Info\" tab in inbasket, or \"Choose Columns\" in Meds & Orders section of the refill encounter.              Passed - No active pregnancy on record        Passed - No positive pregnancy test in past 12 months             Lakshmi Deluca RN 09/01/23 12:10 PM  "

## 2023-09-08 ENCOUNTER — MYC MEDICAL ADVICE (OUTPATIENT)
Dept: INTERNAL MEDICINE | Facility: CLINIC | Age: 73
End: 2023-09-08
Payer: MEDICARE

## 2023-09-08 DIAGNOSIS — I47.10 SVT (SUPRAVENTRICULAR TACHYCARDIA) (H): ICD-10-CM

## 2023-09-11 RX ORDER — METOPROLOL SUCCINATE 100 MG/1
100 TABLET, EXTENDED RELEASE ORAL DAILY
Qty: 90 TABLET | Refills: 3 | Status: ON HOLD | OUTPATIENT
Start: 2023-09-11 | End: 2023-10-05

## 2023-09-18 ENCOUNTER — OFFICE VISIT (OUTPATIENT)
Dept: CARDIOLOGY | Facility: CLINIC | Age: 73
End: 2023-09-18
Payer: MEDICARE

## 2023-09-18 VITALS
DIASTOLIC BLOOD PRESSURE: 74 MMHG | SYSTOLIC BLOOD PRESSURE: 119 MMHG | BODY MASS INDEX: 36.49 KG/M2 | RESPIRATION RATE: 16 BRPM | OXYGEN SATURATION: 98 % | HEART RATE: 105 BPM | WEIGHT: 206 LBS

## 2023-09-18 DIAGNOSIS — I47.10 SVT (SUPRAVENTRICULAR TACHYCARDIA) (H): Primary | ICD-10-CM

## 2023-09-18 PROCEDURE — 99205 OFFICE O/P NEW HI 60 MIN: CPT | Performed by: INTERNAL MEDICINE

## 2023-09-18 NOTE — PATIENT INSTRUCTIONS
[facility]  Cardiac Electrophysiology  [address]  [place] [id number]   Office: [phone]  Fax: [phone]       Thank you for seeing us in clinic today - it is a pleasure to be a part of your care team.  Below is a summary of our plan from today's visit.      You have had episodes of supraventricular tachycardia (SVT).  We reviewed SVT mechanisms and reviewed treatment options including electrophysiology study and ablation vs continued medical therapy.  We reviewed the nature of EP studies and ablation for SVT, success rates depending on inducibility and specific SVT mechanism, procedural risks and recovery expectations.  We will plan for the following:  - we are scheduled for an electrophysiology study and ablation for SVT on 10/7/2023.  We will plan to hold metoprolol XL for 7 days prior to procedure    Please do not hesitate to be in touch with our office at [phone] with any questions that may arise.      Thank you for trusting us with your care,    Mitch Sullivan MD  Clinical Cardiac Electrophysiology  [facility]  [address]  [place] [address]   Office: [phone]  Fax: [phone]

## 2023-09-18 NOTE — PROGRESS NOTES
[facility]  Cardiac Electrophysiology  [address]  [place] [address]   Office: [phone]  Fax: [phone]     Cardiac Electrophysiology Consultation    Patient: Meggan Everett   : 1950     Referring Provider: Nate Barcenas MD  Primary Care Provider: Nate Barcenas MD    CHIEF COMPLAINT/REASON FOR CONSULTATION  Supraventricular tachycardia    Assessment/Recommendations   Meggan Everett is a 72 year old female with supraventricular tachycardia, T2DM, CKD, ASHER, asthma, obesity referred by Dr. Barcenas for consultation regarding SVT.    Supraventricular tachycardia - symptomatic with .  Adenosine sensitive.  Likely AVNRT, though AVRT and AT are possible.  We reviewed SVT mechanisms and reviewed treatment options including electrophysiology study and ablation vs continued medical therapy.  We reviewed the nature of EP studies and ablation for SVT, success rates depending on inducibility and specific SVT mechanism, procedural risks (including groin hematoma, tamponade, heart block, stroke) and recovery expectations.  She would prefer catheter ablation  - EP study and possible ablation for SVT (scheduled 10/5/2023), MAC, hold metoprolol XL 7 days prior  - continue metoprolol XL 100mg daily for now    Follow up: as above         History of Present Illness   Meggan Everett is a 72 year old female with supraventricular tachycardia, T2DM, CKD, ASHER, asthma, obesity referred by Dr. Barcenas for consultation regarding SVT.    Mrs. Everett' SVT history is as summarized below:  Symptoms: palpitations, lightheadedness, syncope  Symptom onset date: brief palpitations since her 30s, sustained palpitations 2023  Diagnosis date: 2023  Prior medical therapies: metoprolol XL (since she was in her 40s)  Prior DCCVs: none  Admissions/ER visits: 2023 - adenosine terminated, 2023 - adenosine terminated  Prior ablations: none    She notes no recurrent  flakitojerrod siddhartha 8/18/2023.  She denies chest pain.       Physical Examination  Review of Systems   VITALS: /74 (BP Location: Right arm, Patient Position: Sitting, Cuff Size: Adult Large)   Pulse 105   Resp 16   Wt 93.4 kg (206 lb)   SpO2 98%   BMI 36.49 kg/m    Wt Readings from Last 3 Encounters:   08/19/23 97.5 kg (215 lb)   06/27/23 98.3 kg (216 lb 12.8 oz)   05/03/23 98.9 kg (218 lb)     CONSTITUTIONAL: well nourished, comfortable, no distress  EYES:  Conjunctivae pink, sclerae clear.    E/N/T:  Oral mucosa pink  RESPIRATORY:  Respiratory effort is normal  CARDIOVASCULAR:  normal S1 and S2  GASTROINTESTINAL:  Abdomen without masses or tenderness  EXTREMITIES:  No clubbing or cyanosis.    MUSCULOSKELETAL:  Overall grossly normal muscle strength  SKIN:  Overall, skin warm and dry, no lesions.  NEURO/PSYCH:  Oriented x 3 with normal affect.   Constitutional:  No weight loss or loss of appetite    Eyes:  No difficulty with vision, no double vision, no dry eyes  ENT:  No sore throat, difficulty swallowing; changes in hearing or tinnitus  Cardiovascular: As detailed above  Respiratory:  No cough  Musculoskeletal  No joint pain, muscle aches  Neurologic:  No syncope, lightheadedness, fainting spells   Hematologic: No easy bruising, excessive bleeding tendency   Gastrointestinal:  No jaundice, abdominal pain or abdominal bloating  Genitourinary: No changes in urinary habits, no trouble urinating    Psychiatric: No anxiety or depression      Medical History  Surgical History   Past Medical History:   Diagnosis Date     Asthma      Chronic kidney failure, stage 3 (moderate) (H)      Chronic use of benzodiazepine for therapeutic purpose      Esophageal reflux      Fibroadenoma of LEFT breast, with fibrocystic changes 05/14/2012     Gout      Hyperlipidemia      Hypothyroid      Mammographic microcalcification 05/01/2012    Left     Migraine with aura      Obesity      ASHER (obstructive sleep apnea)     uses CPAP      Osteoporosis      Primary osteoarthritis of both knees 09/28/2020     RBBB      Screening for colon cancer 11/29/2021     Type 2 diabetes mellitus (H)     Past Surgical History:   Procedure Laterality Date     BIOPSY BREAST       D & C  2000, 2002     DILATE CERVIX, ABLATE ENDOMETRIUM, COMBINED  01/01/2005     ENDOMETRIAL ABLATION       HC BIOPSY OF BREAST, OPEN INCISIONAL  01/01/1972    Left     HC LAPAROSCOPY, SURGICAL; CHOLECYSTECTOMY  01/01/1998     LAPAROSCOPIC CHOLECYSTECTOMY  01/01/1999     LUMBAR LAMINECTOMY  01/01/1998    Description: Laminectomy Lumbar     LUMPECTOMY BREAST       Microdiscectomy  01/01/1998         Family History Social History   Family History   Problem Relation Age of Onset     Autoimmune Disease No family hx of      Hypertension Mother         alive in her 90s     Atrial fibrillation Father         alive in his 90s     LUNG DISEASE Sister         interstitial lung disease     Heart Disease Sister         Social History     Tobacco Use     Smoking status: Never     Smokeless tobacco: Never   Vaping Use     Vaping Use: Never used   Substance Use Topics     Alcohol use: No     Drug use: No         Medications  Allergies     Current Outpatient Medications:      metoprolol succinate ER (TOPROL XL) 100 MG 24 hr tablet, Take 1 tablet (100 mg) by mouth daily, Disp: 90 tablet, Rfl: 3     acetaminophen (TYLENOL) 500 MG tablet, Take 1,000 mg by mouth every 8 hours as needed, Disp: , Rfl:      albuterol (PROAIR HFA/PROVENTIL HFA/VENTOLIN HFA) 108 (90 Base) MCG/ACT inhaler, Inhale 2 puffs into the lungs every 6 hours as needed for shortness of breath / dyspnea or wheezing, Disp: 18 g, Rfl: 3     albuterol (PROVENTIL) (2.5 MG/3ML) 0.083% neb solution, Take 1 vial (2.5 mg) by nebulization every 6 hours as needed for shortness of breath / dyspnea or wheezing, Disp: 75 mL, Rfl: 3     alcohol swab prep pads, Use to swab area of injection/vicki as directed., Disp: 100 each, Rfl: 3     allopurinol  (ZYLOPRIM) 100 MG tablet, Take 2 tablets (200 mg) by mouth daily, Disp: 180 tablet, Rfl: 3     aspirin (ASA) 81 MG chewable tablet, Take 81 mg by mouth At Bedtime, Disp: , Rfl:      blood glucose monitoring (ACCU-CHEK FASTCLIX) lancets, Use to test blood sugar 4 times daily., Disp: 204 each, Rfl: 6     blood glucose monitoring (NO BRAND SPECIFIED) meter device kit, Use to test blood sugar 4 times daily or as directed. Preferred blood glucose meter OR supplies to accompany: Blood Glucose Monitor Brands: per insurance., Disp: 1 kit, Rfl: 0     celecoxib (CELEBREX) 200 MG capsule, Take 1 capsule (200 mg) by mouth daily, Disp: 30 capsule, Rfl: 11     colchicine (COLCYRS) 0.6 MG tablet, Take 1 tablet (0.6 mg) by mouth daily as needed for gout pain, Disp: 90 tablet, Rfl: 3     Continuous Blood Gluc  (FREESTYLE DIEGO 2 READER) [facility], 1 each continuous Use to read blood sugars per 's instructions., Disp: 1 each, Rfl: 0     Continuous Blood Gluc Sensor (FREESTYLE DIEGO 2 SENSOR) [facility], 1 each every 14 days To check blood sugars continuously, Disp: 2 each, Rfl: 5     empagliflozin (JARDIANCE) 10 MG TABS tablet, Take 1 tablet (10 mg) by mouth daily, Disp: 90 tablet, Rfl: 3     famotidine (PEPCID) 20 MG tablet, Take 20 mg by mouth daily, Disp: , Rfl:      levothyroxine (SYNTHROID) 125 MCG tablet, Take 1 tablet (125 mcg) by mouth daily, Disp: 90 tablet, Rfl: 3     LORazepam (ATIVAN) 0.5 MG tablet, Take 0.5 mg by mouth At Bedtime, Disp: , Rfl:      Probiotic Product (PROBIOTIC-10 PO), Take 1 capsule by mouth daily Florastor , Disp: , Rfl:      rosuvastatin (CRESTOR) 10 MG tablet, Take 0.5 tablets (5 mg) by mouth three times a week, Disp: 90 tablet, Rfl: 3     thin (NO BRAND SPECIFIED) lancets, Use with lanceting device. To accompany: Blood Glucose Monitor Brands: per insurance., Disp: 200 each, Rfl: 6     tirzepatide (MOUNJARO) 5 MG/0.5ML pen, Inject 10 mg Subcutaneous every 7 days, Disp: 12 mL, Rfl: 3      tiZANidine (ZANAFLEX) 4 MG capsule, Take 1 capsule (4 mg) by mouth At Bedtime, Disp: 90 capsule, Rfl: 3     triamcinolone (KENALOG) 0.1 % external cream, Apply topically 2 times daily, Disp: 30 g, Rfl: 3     Vitamin D3 (CHOLECALCIFEROL) 25 mcg (1000 units) tablet, Take 1 tablet by mouth daily, Disp: , Rfl:      Allergies   Allergen Reactions     Codeine Sulfate Nausea     Metformin      Stomach pain      Oxycodone Nausea and Vomiting     Tape [Adhesive Tape] Blisters     Tetanus Toxoid           Lab Results    Chemistry CBC Cardiac Enzymes/BNP/TSH/INR   Recent Labs   Lab Test 08/19/23  0659 08/19/23  0637   NA  --  143   POTASSIUM  --  4.1   CHLORIDE  --  108*   CO2  --  23   * 120*   BUN  --  34.5*   CR  --  1.02*   GFRESTIMATED  --  58*   HIEN  --  9.6     Recent Labs   Lab Test 08/19/23  0637 08/18/23  2028 08/18/23  1522   CR 1.02* 1.09* 1.34*          Recent Labs   Lab Test 08/18/23  1521   WBC 11.4*   HGB 12.8   HCT 40.6   MCV 93        Recent Labs   Lab Test 08/18/23  1521 06/27/23  1612 05/02/23  2049   HGB 12.8 13.1 12.8    No results for input(s): TROPONINI in the last 72554 hours.  No results for input(s): BNP, NTBNPI, NTBNP in the last 58013 hours.  Recent Labs   Lab Test 08/18/23  1522   TSH 2.32     Recent Labs   Lab Test 08/18/23  1521   INR 1.05         Data Review    ECGs (tracings independently reviewed)  8/18/2023 - SR 111bpm, MO 164ms, RBBB QRS 118ms  8/18/2023 - regular NCT 166bpm, likely short RP      8/19/2023 TTE  Left ventricular size, wall motion and function are normal. The ejection  fraction is 60-65%.  Normal right ventricle size and systolic function.  Mild valvular aortic stenosis.       Cc: Nate Barcenas MD Amila Dilusha William, MD  9/18/2023  4:07 PM

## 2023-09-18 NOTE — H&P (VIEW-ONLY)
[facility]  Cardiac Electrophysiology  [address]  [place] [address]   Office: [phone]  Fax: [phone]     Cardiac Electrophysiology Consultation    Patient: Meggan Everett   : 1950     Referring Provider: Nate Barcenas MD  Primary Care Provider: Nate Barcenas MD    CHIEF COMPLAINT/REASON FOR CONSULTATION  Supraventricular tachycardia    Assessment/Recommendations   Meggan Everett is a 72 year old female with supraventricular tachycardia, T2DM, CKD, ASHER, asthma, obesity referred by Dr. Barcenas for consultation regarding SVT.    Supraventricular tachycardia - symptomatic with .  Adenosine sensitive.  Likely AVNRT, though AVRT and AT are possible.  We reviewed SVT mechanisms and reviewed treatment options including electrophysiology study and ablation vs continued medical therapy.  We reviewed the nature of EP studies and ablation for SVT, success rates depending on inducibility and specific SVT mechanism, procedural risks (including groin hematoma, tamponade, heart block, stroke) and recovery expectations.  She would prefer catheter ablation  - EP study and possible ablation for SVT (scheduled 10/5/2023), MAC, hold metoprolol XL 7 days prior  - continue metoprolol XL 100mg daily for now    Follow up: as above         History of Present Illness   Meggan Everett is a 72 year old female with supraventricular tachycardia, T2DM, CKD, ASHER, asthma, obesity referred by Dr. Barcenas for consultation regarding SVT.    Mrs. Everett' SVT history is as summarized below:  Symptoms: palpitations, lightheadedness, syncope  Symptom onset date: brief palpitations since her 30s, sustained palpitations 2023  Diagnosis date: 2023  Prior medical therapies: metoprolol XL (since she was in her 40s)  Prior DCCVs: none  Admissions/ER visits: 2023 - adenosine terminated, 2023 - adenosine terminated  Prior ablations: none    She notes no recurrent  flakitojerrod siddhartha 8/18/2023.  She denies chest pain.       Physical Examination  Review of Systems   VITALS: /74 (BP Location: Right arm, Patient Position: Sitting, Cuff Size: Adult Large)   Pulse 105   Resp 16   Wt 93.4 kg (206 lb)   SpO2 98%   BMI 36.49 kg/m    Wt Readings from Last 3 Encounters:   08/19/23 97.5 kg (215 lb)   06/27/23 98.3 kg (216 lb 12.8 oz)   05/03/23 98.9 kg (218 lb)     CONSTITUTIONAL: well nourished, comfortable, no distress  EYES:  Conjunctivae pink, sclerae clear.    E/N/T:  Oral mucosa pink  RESPIRATORY:  Respiratory effort is normal  CARDIOVASCULAR:  normal S1 and S2  GASTROINTESTINAL:  Abdomen without masses or tenderness  EXTREMITIES:  No clubbing or cyanosis.    MUSCULOSKELETAL:  Overall grossly normal muscle strength  SKIN:  Overall, skin warm and dry, no lesions.  NEURO/PSYCH:  Oriented x 3 with normal affect.   Constitutional:  No weight loss or loss of appetite    Eyes:  No difficulty with vision, no double vision, no dry eyes  ENT:  No sore throat, difficulty swallowing; changes in hearing or tinnitus  Cardiovascular: As detailed above  Respiratory:  No cough  Musculoskeletal  No joint pain, muscle aches  Neurologic:  No syncope, lightheadedness, fainting spells   Hematologic: No easy bruising, excessive bleeding tendency   Gastrointestinal:  No jaundice, abdominal pain or abdominal bloating  Genitourinary: No changes in urinary habits, no trouble urinating    Psychiatric: No anxiety or depression      Medical History  Surgical History   Past Medical History:   Diagnosis Date    Asthma     Chronic kidney failure, stage 3 (moderate) (H)     Chronic use of benzodiazepine for therapeutic purpose     Esophageal reflux     Fibroadenoma of LEFT breast, with fibrocystic changes 05/14/2012    Gout     Hyperlipidemia     Hypothyroid     Mammographic microcalcification 05/01/2012    Left    Migraine with aura     Obesity     ASHER (obstructive sleep apnea)     uses CPAP     Osteoporosis     Primary osteoarthritis of both knees 09/28/2020    RBBB     Screening for colon cancer 11/29/2021    Type 2 diabetes mellitus (H)     Past Surgical History:   Procedure Laterality Date    BIOPSY BREAST      D & C  2000, 2002    DILATE CERVIX, ABLATE ENDOMETRIUM, COMBINED  01/01/2005    ENDOMETRIAL ABLATION      HC BIOPSY OF BREAST, OPEN INCISIONAL  01/01/1972    Left    HC LAPAROSCOPY, SURGICAL; CHOLECYSTECTOMY  01/01/1998    LAPAROSCOPIC CHOLECYSTECTOMY  01/01/1999    LUMBAR LAMINECTOMY  01/01/1998    Description: Laminectomy Lumbar    LUMPECTOMY BREAST      Microdiscectomy  01/01/1998         Family History Social History   Family History   Problem Relation Age of Onset    Autoimmune Disease No family hx of     Hypertension Mother         alive in her 90s    Atrial fibrillation Father         alive in his 90s    LUNG DISEASE Sister         interstitial lung disease    Heart Disease Sister         Social History     Tobacco Use    Smoking status: Never    Smokeless tobacco: Never   Vaping Use    Vaping Use: Never used   Substance Use Topics    Alcohol use: No    Drug use: No         Medications  Allergies     Current Outpatient Medications:     metoprolol succinate ER (TOPROL XL) 100 MG 24 hr tablet, Take 1 tablet (100 mg) by mouth daily, Disp: 90 tablet, Rfl: 3    acetaminophen (TYLENOL) 500 MG tablet, Take 1,000 mg by mouth every 8 hours as needed, Disp: , Rfl:     albuterol (PROAIR HFA/PROVENTIL HFA/VENTOLIN HFA) 108 (90 Base) MCG/ACT inhaler, Inhale 2 puffs into the lungs every 6 hours as needed for shortness of breath / dyspnea or wheezing, Disp: 18 g, Rfl: 3    albuterol (PROVENTIL) (2.5 MG/3ML) 0.083% neb solution, Take 1 vial (2.5 mg) by nebulization every 6 hours as needed for shortness of breath / dyspnea or wheezing, Disp: 75 mL, Rfl: 3    alcohol swab prep pads, Use to swab area of injection/vicki as directed., Disp: 100 each, Rfl: 3    allopurinol (ZYLOPRIM) 100 MG tablet, Take 2  tablets (200 mg) by mouth daily, Disp: 180 tablet, Rfl: 3    aspirin (ASA) 81 MG chewable tablet, Take 81 mg by mouth At Bedtime, Disp: , Rfl:     blood glucose monitoring (ACCU-CHEK FASTCLIX) lancets, Use to test blood sugar 4 times daily., Disp: 204 each, Rfl: 6    blood glucose monitoring (NO BRAND SPECIFIED) meter device kit, Use to test blood sugar 4 times daily or as directed. Preferred blood glucose meter OR supplies to accompany: Blood Glucose Monitor Brands: per insurance., Disp: 1 kit, Rfl: 0    celecoxib (CELEBREX) 200 MG capsule, Take 1 capsule (200 mg) by mouth daily, Disp: 30 capsule, Rfl: 11    colchicine (COLCYRS) 0.6 MG tablet, Take 1 tablet (0.6 mg) by mouth daily as needed for gout pain, Disp: 90 tablet, Rfl: 3    Continuous Blood Gluc  (FREESTYLE DIEGO 2 READER) [place], 1 each continuous Use to read blood sugars per 's instructions., Disp: 1 each, Rfl: 0    Continuous Blood Gluc Sensor (FREESTYLE DIEGO 2 SENSOR) [facility], 1 each every 14 days To check blood sugars continuously, Disp: 2 each, Rfl: 5    empagliflozin (JARDIANCE) 10 MG TABS tablet, Take 1 tablet (10 mg) by mouth daily, Disp: 90 tablet, Rfl: 3    famotidine (PEPCID) 20 MG tablet, Take 20 mg by mouth daily, Disp: , Rfl:     levothyroxine (SYNTHROID) 125 MCG tablet, Take 1 tablet (125 mcg) by mouth daily, Disp: 90 tablet, Rfl: 3    LORazepam (ATIVAN) 0.5 MG tablet, Take 0.5 mg by mouth At Bedtime, Disp: , Rfl:     Probiotic Product (PROBIOTIC-10 PO), Take 1 capsule by mouth daily Florastor , Disp: , Rfl:     rosuvastatin (CRESTOR) 10 MG tablet, Take 0.5 tablets (5 mg) by mouth three times a week, Disp: 90 tablet, Rfl: 3    thin (NO BRAND SPECIFIED) lancets, Use with lanceting device. To accompany: Blood Glucose Monitor Brands: per insurance., Disp: 200 each, Rfl: 6    tirzepatide (MOUNJARO) 5 MG/0.5ML pen, Inject 10 mg Subcutaneous every 7 days, Disp: 12 mL, Rfl: 3    tiZANidine (ZANAFLEX) 4 MG capsule, Take 1 capsule (4  mg) by mouth At Bedtime, Disp: 90 capsule, Rfl: 3    triamcinolone (KENALOG) 0.1 % external cream, Apply topically 2 times daily, Disp: 30 g, Rfl: 3    Vitamin D3 (CHOLECALCIFEROL) 25 mcg (1000 units) tablet, Take 1 tablet by mouth daily, Disp: , Rfl:      Allergies   Allergen Reactions    Codeine Sulfate Nausea    Metformin      Stomach pain     Oxycodone Nausea and Vomiting    Tape [Adhesive Tape] Blisters    Tetanus Toxoid           Lab Results    Chemistry CBC Cardiac Enzymes/BNP/TSH/INR   Recent Labs   Lab Test 08/19/23  0659 08/19/23  0637   NA  --  143   POTASSIUM  --  4.1   CHLORIDE  --  108*   CO2  --  23   * 120*   BUN  --  34.5*   CR  --  1.02*   GFRESTIMATED  --  58*   HIEN  --  9.6     Recent Labs   Lab Test 08/19/23  0637 08/18/23  2028 08/18/23  1522   CR 1.02* 1.09* 1.34*          Recent Labs   Lab Test 08/18/23  1521   WBC 11.4*   HGB 12.8   HCT 40.6   MCV 93        Recent Labs   Lab Test 08/18/23  1521 06/27/23  1612 05/02/23  2049   HGB 12.8 13.1 12.8    No results for input(s): TROPONINI in the last 69964 hours.  No results for input(s): BNP, NTBNPI, NTBNP in the last 49712 hours.  Recent Labs   Lab Test 08/18/23  1522   TSH 2.32     Recent Labs   Lab Test 08/18/23  1521   INR 1.05         Data Review    ECGs (tracings independently reviewed)  8/18/2023 - SR 111bpm, WA 164ms, RBBB QRS 118ms  8/18/2023 - regular NCT 166bpm, likely short RP      8/19/2023 TTE  Left ventricular size, wall motion and function are normal. The ejection  fraction is 60-65%.  Normal right ventricle size and systolic function.  Mild valvular aortic stenosis.       Cc: Nate Barcenas MD Amila Dilusha William, MD  9/18/2023  4:07 PM

## 2023-09-18 NOTE — LETTER
2023    Nate Barcenas MD  [address] [address]    RE: Meggan Everett       Dear Colleague,     I had the pleasure of seeing Meggan Everett in the [facility].     [facility]  Cardiac Electrophysiology  [address]  [place] [address]   Office: [phone]  Fax: [phone]     Cardiac Electrophysiology Consultation    Patient: Meggan Everett   : 1950     Referring Provider: Nate Barcenas MD  Primary Care Provider: Nate Barcenas MD    CHIEF COMPLAINT/REASON FOR CONSULTATION  Supraventricular tachycardia    Assessment/Recommendations   Meggan Everett is a 72 year old female with supraventricular tachycardia, T2DM, CKD, ASHER, asthma, obesity referred by Dr. Barcenas for consultation regarding SVT.    Supraventricular tachycardia - symptomatic with .  Adenosine sensitive.  Likely AVNRT, though AVRT and AT are possible.  We reviewed SVT mechanisms and reviewed treatment options including electrophysiology study and ablation vs continued medical therapy.  We reviewed the nature of EP studies and ablation for SVT, success rates depending on inducibility and specific SVT mechanism, procedural risks (including groin hematoma, tamponade, heart block, stroke) and recovery expectations.  She would prefer catheter ablation  - EP study and possible ablation for SVT (scheduled 10/5/2023), MAC, hold metoprolol XL 7 days prior  - continue metoprolol XL 100mg daily for now    Follow up: as above         History of Present Illness   Meggan Everett is a 72 year old female with supraventricular tachycardia, T2DM, CKD, ASHER, asthma, obesity referred by Dr. Barcenas for consultation regarding SVT.    Mrs. Everett' SVT history is as summarized below:  Symptoms: palpitations, lightheadedness, syncope  Symptom onset date: brief palpitations since her 30s, sustained palpitations 2023  Diagnosis date: 2023  Prior  medical therapies: metoprolol XL (since she was in her 40s)  Prior DCCVs: none  Admissions/ER visits: 5/2/2023 - adenosine terminated, 8/18/2023 - adenosine terminated  Prior ablations: none    She notes no recurrent epsiodes insce 8/18/2023.  She denies chest pain.       Physical Examination  Review of Systems   VITALS: /74 (BP Location: Right arm, Patient Position: Sitting, Cuff Size: Adult Large)   Pulse 105   Resp 16   Wt 93.4 kg (206 lb)   SpO2 98%   BMI 36.49 kg/m    Wt Readings from Last 3 Encounters:   08/19/23 97.5 kg (215 lb)   06/27/23 98.3 kg (216 lb 12.8 oz)   05/03/23 98.9 kg (218 lb)     CONSTITUTIONAL: well nourished, comfortable, no distress  EYES:  Conjunctivae pink, sclerae clear.    E/N/T:  Oral mucosa pink  RESPIRATORY:  Respiratory effort is normal  CARDIOVASCULAR:  normal S1 and S2  GASTROINTESTINAL:  Abdomen without masses or tenderness  EXTREMITIES:  No clubbing or cyanosis.    MUSCULOSKELETAL:  Overall grossly normal muscle strength  SKIN:  Overall, skin warm and dry, no lesions.  NEURO/PSYCH:  Oriented x 3 with normal affect.   Constitutional:  No weight loss or loss of appetite    Eyes:  No difficulty with vision, no double vision, no dry eyes  ENT:  No sore throat, difficulty swallowing; changes in hearing or tinnitus  Cardiovascular: As detailed above  Respiratory:  No cough  Musculoskeletal  No joint pain, muscle aches  Neurologic:  No syncope, lightheadedness, fainting spells   Hematologic: No easy bruising, excessive bleeding tendency   Gastrointestinal:  No jaundice, abdominal pain or abdominal bloating  Genitourinary: No changes in urinary habits, no trouble urinating    Psychiatric: No anxiety or depression      Medical History  Surgical History   Past Medical History:   Diagnosis Date    Asthma     Chronic kidney failure, stage 3 (moderate) (H)     Chronic use of benzodiazepine for therapeutic purpose     Esophageal reflux     Fibroadenoma of LEFT breast, with  fibrocystic changes 05/14/2012    Gout     Hyperlipidemia     Hypothyroid     Mammographic microcalcification 05/01/2012    Left    Migraine with aura     Obesity     ASHER (obstructive sleep apnea)     uses CPAP    Osteoporosis     Primary osteoarthritis of both knees 09/28/2020    RBBB     Screening for colon cancer 11/29/2021    Type 2 diabetes mellitus (H)     Past Surgical History:   Procedure Laterality Date    BIOPSY BREAST      D & C  2000, 2002    DILATE CERVIX, ABLATE ENDOMETRIUM, COMBINED  01/01/2005    ENDOMETRIAL ABLATION      HC BIOPSY OF BREAST, OPEN INCISIONAL  01/01/1972    Left    HC LAPAROSCOPY, SURGICAL; CHOLECYSTECTOMY  01/01/1998    LAPAROSCOPIC CHOLECYSTECTOMY  01/01/1999    LUMBAR LAMINECTOMY  01/01/1998    Description: Laminectomy Lumbar    LUMPECTOMY BREAST      Microdiscectomy  01/01/1998         Family History Social History   Family History   Problem Relation Age of Onset    Autoimmune Disease No family hx of     Hypertension Mother         alive in her 90s    Atrial fibrillation Father         alive in his 90s    LUNG DISEASE Sister         interstitial lung disease    Heart Disease Sister         Social History     Tobacco Use    Smoking status: Never    Smokeless tobacco: Never   Vaping Use    Vaping Use: Never used   Substance Use Topics    Alcohol use: No    Drug use: No         Medications  Allergies     Current Outpatient Medications:     metoprolol succinate ER (TOPROL XL) 100 MG 24 hr tablet, Take 1 tablet (100 mg) by mouth daily, Disp: 90 tablet, Rfl: 3    acetaminophen (TYLENOL) 500 MG tablet, Take 1,000 mg by mouth every 8 hours as needed, Disp: , Rfl:     albuterol (PROAIR HFA/PROVENTIL HFA/VENTOLIN HFA) 108 (90 Base) MCG/ACT inhaler, Inhale 2 puffs into the lungs every 6 hours as needed for shortness of breath / dyspnea or wheezing, Disp: 18 g, Rfl: 3    albuterol (PROVENTIL) (2.5 MG/3ML) 0.083% neb solution, Take 1 vial (2.5 mg) by nebulization every 6 hours as needed for  shortness of breath / dyspnea or wheezing, Disp: 75 mL, Rfl: 3    alcohol swab prep pads, Use to swab area of injection/vicki as directed., Disp: 100 each, Rfl: 3    allopurinol (ZYLOPRIM) 100 MG tablet, Take 2 tablets (200 mg) by mouth daily, Disp: 180 tablet, Rfl: 3    aspirin (ASA) 81 MG chewable tablet, Take 81 mg by mouth At Bedtime, Disp: , Rfl:     blood glucose monitoring (ACCU-CHEK FASTCLIX) lancets, Use to test blood sugar 4 times daily., Disp: 204 each, Rfl: 6    blood glucose monitoring (NO BRAND SPECIFIED) meter device kit, Use to test blood sugar 4 times daily or as directed. Preferred blood glucose meter OR supplies to accompany: Blood Glucose Monitor Brands: per insurance., Disp: 1 kit, Rfl: 0    celecoxib (CELEBREX) 200 MG capsule, Take 1 capsule (200 mg) by mouth daily, Disp: 30 capsule, Rfl: 11    colchicine (COLCYRS) 0.6 MG tablet, Take 1 tablet (0.6 mg) by mouth daily as needed for gout pain, Disp: 90 tablet, Rfl: 3    Continuous Blood Gluc  (FREESTYLE DIEGO 2 READER) [place], 1 each continuous Use to read blood sugars per 's instructions., Disp: 1 each, Rfl: 0    Continuous Blood Gluc Sensor (FREESTYLE DIEGO 2 SENSOR) [facility], 1 each every 14 days To check blood sugars continuously, Disp: 2 each, Rfl: 5    empagliflozin (JARDIANCE) 10 MG TABS tablet, Take 1 tablet (10 mg) by mouth daily, Disp: 90 tablet, Rfl: 3    famotidine (PEPCID) 20 MG tablet, Take 20 mg by mouth daily, Disp: , Rfl:     levothyroxine (SYNTHROID) 125 MCG tablet, Take 1 tablet (125 mcg) by mouth daily, Disp: 90 tablet, Rfl: 3    LORazepam (ATIVAN) 0.5 MG tablet, Take 0.5 mg by mouth At Bedtime, Disp: , Rfl:     Probiotic Product (PROBIOTIC-10 PO), Take 1 capsule by mouth daily Florastor , Disp: , Rfl:     rosuvastatin (CRESTOR) 10 MG tablet, Take 0.5 tablets (5 mg) by mouth three times a week, Disp: 90 tablet, Rfl: 3    thin (NO BRAND SPECIFIED) lancets, Use with lanceting device. To accompany: Blood Glucose  Monitor Brands: per insurance., Disp: 200 each, Rfl: 6    tirzepatide (MOUNJARO) 5 MG/0.5ML pen, Inject 10 mg Subcutaneous every 7 days, Disp: 12 mL, Rfl: 3    tiZANidine (ZANAFLEX) 4 MG capsule, Take 1 capsule (4 mg) by mouth At Bedtime, Disp: 90 capsule, Rfl: 3    triamcinolone (KENALOG) 0.1 % external cream, Apply topically 2 times daily, Disp: 30 g, Rfl: 3    Vitamin D3 (CHOLECALCIFEROL) 25 mcg (1000 units) tablet, Take 1 tablet by mouth daily, Disp: , Rfl:      Allergies   Allergen Reactions    Codeine Sulfate Nausea    Metformin      Stomach pain     Oxycodone Nausea and Vomiting    Tape [Adhesive Tape] Blisters    Tetanus Toxoid           Lab Results    Chemistry CBC Cardiac Enzymes/BNP/TSH/INR   Recent Labs   Lab Test 08/19/23  0659 08/19/23  0637   NA  --  143   POTASSIUM  --  4.1   CHLORIDE  --  108*   CO2  --  23   * 120*   BUN  --  34.5*   CR  --  1.02*   GFRESTIMATED  --  58*   HIEN  --  9.6     Recent Labs   Lab Test 08/19/23  0637 08/18/23  2028 08/18/23  1522   CR 1.02* 1.09* 1.34*          Recent Labs   Lab Test 08/18/23  1521   WBC 11.4*   HGB 12.8   HCT 40.6   MCV 93        Recent Labs   Lab Test 08/18/23  1521 06/27/23  1612 05/02/23  2049   HGB 12.8 13.1 12.8    No results for input(s): TROPONINI in the last 26102 hours.  No results for input(s): BNP, NTBNPI, NTBNP in the last 73398 hours.  Recent Labs   Lab Test 08/18/23  1522   TSH 2.32     Recent Labs   Lab Test 08/18/23  1521   INR 1.05         Data Review    ECGs (tracings independently reviewed)  8/18/2023 - SR 111bpm, MA 164ms, RBBB QRS 118ms  8/18/2023 - regular NCT 166bpm, likely short RP      8/19/2023 TTE  Left ventricular size, wall motion and function are normal. The ejection  fraction is 60-65%.  Normal right ventricle size and systolic function.  Mild valvular aortic stenosis.       Cc: Nate Barcenas MD Amila Dilusha William, MD  9/18/2023  4:07 PM      Thank you for allowing me to participate in the care of  your patient.      Sincerely,     Mitch Sullivan MD     [facility]  cc:   Jerry Fernandez MD  [address]  [place] [address]

## 2023-09-19 ENCOUNTER — TELEPHONE (OUTPATIENT)
Dept: CARDIOLOGY | Facility: CLINIC | Age: 73
End: 2023-09-19
Payer: MEDICARE

## 2023-09-19 NOTE — TELEPHONE ENCOUNTER
Pre-Procedure Education    Procedure: SVT Abaltion with Dr Sullivan on 10/5/23 with arrival time 10:00 am    COVID: COVID policy- if pt develops COVID like symptoms prior to procedure, he/she would need to complete an at home with a rapid antigen COVID test 1-2 days prior to your procedure date. If COVID + pt is aware the procedure will need to be rescheduled, and to contact CV scheduling as soon as possible    Pre-Op H&P: Completed- Available in Epic    Education:   Contact: Reviewed via phone with pt  Pre-Procedure Instruction: NPO after midnight pre procedure, Defined NPO with pt, Remove all jewelry and leave all valuables at home, Shower prior to arrival, Sedation plan/orders, Transportation requirements and arrangements post procedure, Post-procedure follow up process, Post-procedure restrictions/expectations, and Pre-procedure letter sent- letter tab  Risks:  Cardiac Catheter Ablation  <1% risk for the following: hypotension, hemorrhage, vascular injury including perforation of vein, artery or heart, thrombophlebitis, systemic or pulmonic emboli; cardiac perforation, (tamponade), infection, pneumothorax, arrhythmias, proarrhythmic effects of drugs, radiation exposure.  1-2% complete heart block (for AVNRT or septol accessory pathway).  <0.5% CVA or MI  <0.1% death  If external defibrillation is needed, 75% risk for superficial burn.  1-2% tamponade and aortic puncture with left sided transeptal approach for left side GARY - increase risk of CVA to <2%.  Late arrhythmia recurrences depends upon the primary rhythm disturbance.      Medication:   Instructions regarding anticoagulants: Pt not on AC- N/A  Instructions regarding antiarrhythmic medication: Beta Blocker; Pt instructed to hold 7 days prior to procedure  Instructions for medication, other than anticoagulants and antiarrhythmics listed above, given to pt: to hold Januvia the morning of procedure, and to take remaining medications with small sips of water    Pt also taking Mounjaro Injections wkly, pt was advised to hold on 9/28 (7 days prior to procedure), she will take her scheduled dose on 10/5 after returning home from procedure    Important patient information for staff: None    9/19/2023 9:19 AM  Floresita Givens RN

## 2023-09-30 ENCOUNTER — HEALTH MAINTENANCE LETTER (OUTPATIENT)
Age: 73
End: 2023-09-30

## 2023-10-04 ENCOUNTER — ANESTHESIA EVENT (OUTPATIENT)
Dept: CARDIOLOGY | Facility: HOSPITAL | Age: 73
End: 2023-10-04
Payer: MEDICARE

## 2023-10-05 ENCOUNTER — HOSPITAL ENCOUNTER (OUTPATIENT)
Facility: HOSPITAL | Age: 73
Discharge: HOME | End: 2023-10-05
Attending: INTERNAL MEDICINE | Admitting: INTERNAL MEDICINE
Payer: MEDICARE

## 2023-10-05 ENCOUNTER — ANESTHESIA (OUTPATIENT)
Dept: CARDIOLOGY | Facility: HOSPITAL | Age: 73
End: 2023-10-05
Payer: MEDICARE

## 2023-10-05 VITALS
RESPIRATION RATE: 16 BRPM | OXYGEN SATURATION: 96 % | WEIGHT: 206 LBS | SYSTOLIC BLOOD PRESSURE: 127 MMHG | BODY MASS INDEX: 36.49 KG/M2 | DIASTOLIC BLOOD PRESSURE: 63 MMHG | TEMPERATURE: 96.2 F | HEART RATE: 87 BPM

## 2023-10-05 DIAGNOSIS — I47.10 SVT (SUPRAVENTRICULAR TACHYCARDIA) (H): ICD-10-CM

## 2023-10-05 LAB
ANION GAP SERPL CALCULATED.3IONS-SCNC: 18 MMOL/L (ref 7–15)
BUN SERPL-MCNC: 36.6 MG/DL (ref 8–23)
CALCIUM SERPL-MCNC: 10.7 MG/DL (ref 8.8–10.2)
CHLORIDE SERPL-SCNC: 106 MMOL/L (ref 98–107)
CREAT SERPL-MCNC: 1.25 MG/DL (ref 0.51–0.95)
DEPRECATED HCO3 PLAS-SCNC: 17 MMOL/L (ref 22–29)
EGFRCR SERPLBLD CKD-EPI 2021: 45 ML/MIN/1.73M2
ERYTHROCYTE [DISTWIDTH] IN BLOOD BY AUTOMATED COUNT: 14.4 % (ref 10–15)
GLUCOSE BLDC GLUCOMTR-MCNC: 127 MG/DL (ref 70–99)
GLUCOSE BLDC GLUCOMTR-MCNC: 164 MG/DL (ref 70–99)
GLUCOSE SERPL-MCNC: 227 MG/DL (ref 70–99)
HCT VFR BLD AUTO: 41.8 % (ref 35–47)
HGB BLD-MCNC: 13.9 G/DL (ref 11.7–15.7)
MCH RBC QN AUTO: 30.1 PG (ref 26.5–33)
MCHC RBC AUTO-ENTMCNC: 33.3 G/DL (ref 31.5–36.5)
MCV RBC AUTO: 91 FL (ref 78–100)
PLATELET # BLD AUTO: 269 10E3/UL (ref 150–450)
POTASSIUM SERPL-SCNC: 3.9 MMOL/L (ref 3.4–5.3)
RBC # BLD AUTO: 4.62 10E6/UL (ref 3.8–5.2)
SODIUM SERPL-SCNC: 141 MMOL/L (ref 135–145)
WBC # BLD AUTO: 7.3 10E3/UL (ref 4–11)

## 2023-10-05 PROCEDURE — 80048 BASIC METABOLIC PNL TOTAL CA: CPT | Performed by: INTERNAL MEDICINE

## 2023-10-05 PROCEDURE — 258N000003 HC RX IP 258 OP 636: Performed by: INTERNAL MEDICINE

## 2023-10-05 PROCEDURE — 258N000003 HC RX IP 258 OP 636: Performed by: NURSE ANESTHETIST, CERTIFIED REGISTERED

## 2023-10-05 PROCEDURE — 36415 COLL VENOUS BLD VENIPUNCTURE: CPT | Performed by: INTERNAL MEDICINE

## 2023-10-05 PROCEDURE — C1733 CATH, EP, OTHR THAN COOL-TIP: HCPCS | Performed by: INTERNAL MEDICINE

## 2023-10-05 PROCEDURE — C1759 CATH, INTRA ECHOCARDIOGRAPHY: HCPCS | Performed by: INTERNAL MEDICINE

## 2023-10-05 PROCEDURE — 93623 PRGRMD STIMJ&PACG IV RX NFS: CPT | Mod: 26 | Performed by: INTERNAL MEDICINE

## 2023-10-05 PROCEDURE — 85014 HEMATOCRIT: CPT | Performed by: INTERNAL MEDICINE

## 2023-10-05 PROCEDURE — 93623 PRGRMD STIMJ&PACG IV RX NFS: CPT | Performed by: INTERNAL MEDICINE

## 2023-10-05 PROCEDURE — 93662 INTRACARDIAC ECG (ICE): CPT | Mod: 26 | Performed by: INTERNAL MEDICINE

## 2023-10-05 PROCEDURE — C1730 CATH, EP, 19 OR FEW ELECT: HCPCS | Performed by: INTERNAL MEDICINE

## 2023-10-05 PROCEDURE — 250N000013 HC RX MED GY IP 250 OP 250 PS 637: Performed by: NURSE PRACTITIONER

## 2023-10-05 PROCEDURE — 250N000011 HC RX IP 250 OP 636: Performed by: NURSE ANESTHETIST, CERTIFIED REGISTERED

## 2023-10-05 PROCEDURE — 370N000017 HC ANESTHESIA TECHNICAL FEE, PER MIN: Performed by: INTERNAL MEDICINE

## 2023-10-05 PROCEDURE — C1732 CATH, EP, DIAG/ABL, 3D/VECT: HCPCS | Performed by: INTERNAL MEDICINE

## 2023-10-05 PROCEDURE — 93653 COMPRE EP EVAL TX SVT: CPT | Performed by: INTERNAL MEDICINE

## 2023-10-05 PROCEDURE — 93005 ELECTROCARDIOGRAM TRACING: CPT

## 2023-10-05 PROCEDURE — 272N000001 HC OR GENERAL SUPPLY STERILE: Performed by: INTERNAL MEDICINE

## 2023-10-05 PROCEDURE — 250N000009 HC RX 250: Performed by: INTERNAL MEDICINE

## 2023-10-05 PROCEDURE — 93662 INTRACARDIAC ECG (ICE): CPT | Performed by: INTERNAL MEDICINE

## 2023-10-05 PROCEDURE — 82962 GLUCOSE BLOOD TEST: CPT

## 2023-10-05 PROCEDURE — 999N000054 HC STATISTIC EKG NON-CHARGEABLE

## 2023-10-05 PROCEDURE — 250N000009 HC RX 250: Performed by: NURSE ANESTHETIST, CERTIFIED REGISTERED

## 2023-10-05 RX ORDER — ONDANSETRON 4 MG/1
4 TABLET, ORALLY DISINTEGRATING ORAL EVERY 30 MIN PRN
Status: DISCONTINUED | OUTPATIENT
Start: 2023-10-05 | End: 2023-10-05 | Stop reason: HOSPADM

## 2023-10-05 RX ORDER — SODIUM CHLORIDE, SODIUM LACTATE, POTASSIUM CHLORIDE, CALCIUM CHLORIDE 600; 310; 30; 20 MG/100ML; MG/100ML; MG/100ML; MG/100ML
INJECTION, SOLUTION INTRAVENOUS CONTINUOUS
Status: DISCONTINUED | OUTPATIENT
Start: 2023-10-05 | End: 2023-10-05 | Stop reason: HOSPADM

## 2023-10-05 RX ORDER — SODIUM CHLORIDE, SODIUM LACTATE, POTASSIUM CHLORIDE, CALCIUM CHLORIDE 600; 310; 30; 20 MG/100ML; MG/100ML; MG/100ML; MG/100ML
INJECTION, SOLUTION INTRAVENOUS CONTINUOUS PRN
Status: DISCONTINUED | OUTPATIENT
Start: 2023-10-05 | End: 2023-10-05

## 2023-10-05 RX ORDER — FENTANYL CITRATE 50 UG/ML
INJECTION, SOLUTION INTRAMUSCULAR; INTRAVENOUS PRN
Status: DISCONTINUED | OUTPATIENT
Start: 2023-10-05 | End: 2023-10-05

## 2023-10-05 RX ORDER — LIDOCAINE 40 MG/G
CREAM TOPICAL
Status: DISCONTINUED | OUTPATIENT
Start: 2023-10-05 | End: 2023-10-05 | Stop reason: HOSPADM

## 2023-10-05 RX ORDER — FENTANYL CITRATE 50 UG/ML
25 INJECTION, SOLUTION INTRAMUSCULAR; INTRAVENOUS
Status: DISCONTINUED | OUTPATIENT
Start: 2023-10-05 | End: 2023-10-05 | Stop reason: HOSPADM

## 2023-10-05 RX ORDER — PROPOFOL 10 MG/ML
INJECTION, EMULSION INTRAVENOUS CONTINUOUS PRN
Status: DISCONTINUED | OUTPATIENT
Start: 2023-10-05 | End: 2023-10-05

## 2023-10-05 RX ORDER — NALOXONE HYDROCHLORIDE 0.4 MG/ML
0.2 INJECTION, SOLUTION INTRAMUSCULAR; INTRAVENOUS; SUBCUTANEOUS
Status: DISCONTINUED | OUTPATIENT
Start: 2023-10-05 | End: 2023-10-05 | Stop reason: HOSPADM

## 2023-10-05 RX ORDER — ACETAMINOPHEN 325 MG/1
650 TABLET ORAL EVERY 4 HOURS PRN
Status: DISCONTINUED | OUTPATIENT
Start: 2023-10-05 | End: 2023-10-05 | Stop reason: HOSPADM

## 2023-10-05 RX ORDER — ONDANSETRON 2 MG/ML
INJECTION INTRAMUSCULAR; INTRAVENOUS PRN
Status: DISCONTINUED | OUTPATIENT
Start: 2023-10-05 | End: 2023-10-05

## 2023-10-05 RX ORDER — TRAMADOL HYDROCHLORIDE 50 MG/1
50 TABLET ORAL ONCE
Status: COMPLETED | OUTPATIENT
Start: 2023-10-05 | End: 2023-10-05

## 2023-10-05 RX ORDER — ONDANSETRON 2 MG/ML
4 INJECTION INTRAMUSCULAR; INTRAVENOUS EVERY 30 MIN PRN
Status: DISCONTINUED | OUTPATIENT
Start: 2023-10-05 | End: 2023-10-05 | Stop reason: HOSPADM

## 2023-10-05 RX ORDER — ONDANSETRON 2 MG/ML
4 INJECTION INTRAMUSCULAR; INTRAVENOUS EVERY 6 HOURS PRN
Status: DISCONTINUED | OUTPATIENT
Start: 2023-10-05 | End: 2023-10-05 | Stop reason: HOSPADM

## 2023-10-05 RX ORDER — LIDOCAINE HYDROCHLORIDE 10 MG/ML
INJECTION, SOLUTION INFILTRATION; PERINEURAL PRN
Status: DISCONTINUED | OUTPATIENT
Start: 2023-10-05 | End: 2023-10-05

## 2023-10-05 RX ORDER — PROPOFOL 10 MG/ML
INJECTION, EMULSION INTRAVENOUS PRN
Status: DISCONTINUED | OUTPATIENT
Start: 2023-10-05 | End: 2023-10-05

## 2023-10-05 RX ORDER — OXYCODONE HYDROCHLORIDE 5 MG/1
5 TABLET ORAL
Status: DISCONTINUED | OUTPATIENT
Start: 2023-10-05 | End: 2023-10-05 | Stop reason: HOSPADM

## 2023-10-05 RX ORDER — IBUPROFEN 600 MG/1
600 TABLET, FILM COATED ORAL EVERY 6 HOURS PRN
Status: DISCONTINUED | OUTPATIENT
Start: 2023-10-05 | End: 2023-10-05 | Stop reason: HOSPADM

## 2023-10-05 RX ORDER — OXYCODONE HYDROCHLORIDE 5 MG/1
10 TABLET ORAL
Status: DISCONTINUED | OUTPATIENT
Start: 2023-10-05 | End: 2023-10-05 | Stop reason: HOSPADM

## 2023-10-05 RX ORDER — ONDANSETRON 4 MG/1
4 TABLET, ORALLY DISINTEGRATING ORAL EVERY 6 HOURS PRN
Status: DISCONTINUED | OUTPATIENT
Start: 2023-10-05 | End: 2023-10-05 | Stop reason: HOSPADM

## 2023-10-05 RX ORDER — NALOXONE HYDROCHLORIDE 0.4 MG/ML
0.4 INJECTION, SOLUTION INTRAMUSCULAR; INTRAVENOUS; SUBCUTANEOUS
Status: DISCONTINUED | OUTPATIENT
Start: 2023-10-05 | End: 2023-10-05 | Stop reason: HOSPADM

## 2023-10-05 RX ORDER — SODIUM CHLORIDE 9 MG/ML
100 INJECTION, SOLUTION INTRAVENOUS CONTINUOUS
Status: DISCONTINUED | OUTPATIENT
Start: 2023-10-05 | End: 2023-10-05 | Stop reason: HOSPADM

## 2023-10-05 RX ADMIN — ONDANSETRON 4 MG: 2 INJECTION INTRAMUSCULAR; INTRAVENOUS at 15:18

## 2023-10-05 RX ADMIN — SODIUM CHLORIDE 100 ML/HR: 9 INJECTION, SOLUTION INTRAVENOUS at 13:21

## 2023-10-05 RX ADMIN — PROPOFOL 50 MG: 10 INJECTION, EMULSION INTRAVENOUS at 14:07

## 2023-10-05 RX ADMIN — PROPOFOL 20 MG: 10 INJECTION, EMULSION INTRAVENOUS at 14:29

## 2023-10-05 RX ADMIN — PROPOFOL 20 MG: 10 INJECTION, EMULSION INTRAVENOUS at 14:08

## 2023-10-05 RX ADMIN — LIDOCAINE HYDROCHLORIDE 3 ML: 10 INJECTION, SOLUTION INFILTRATION; PERINEURAL at 14:07

## 2023-10-05 RX ADMIN — SODIUM CHLORIDE, POTASSIUM CHLORIDE, SODIUM LACTATE AND CALCIUM CHLORIDE: 600; 310; 30; 20 INJECTION, SOLUTION INTRAVENOUS at 15:17

## 2023-10-05 RX ADMIN — Medication 20 MCG: at 14:04

## 2023-10-05 RX ADMIN — FENTANYL CITRATE 25 MCG: 50 INJECTION INTRAMUSCULAR; INTRAVENOUS at 14:39

## 2023-10-05 RX ADMIN — PHENYLEPHRINE HYDROCHLORIDE 0.3 MCG/KG/MIN: 10 INJECTION INTRAVENOUS at 14:35

## 2023-10-05 RX ADMIN — PHENYLEPHRINE HYDROCHLORIDE 100 MCG: 10 INJECTION INTRAVENOUS at 14:32

## 2023-10-05 RX ADMIN — PROPOFOL 75 MCG/KG/MIN: 10 INJECTION, EMULSION INTRAVENOUS at 14:07

## 2023-10-05 RX ADMIN — FENTANYL CITRATE 25 MCG: 50 INJECTION INTRAMUSCULAR; INTRAVENOUS at 14:20

## 2023-10-05 RX ADMIN — TRAMADOL HYDROCHLORIDE 50 MG: 50 TABLET ORAL at 16:17

## 2023-10-05 RX ADMIN — SODIUM CHLORIDE, POTASSIUM CHLORIDE, SODIUM LACTATE AND CALCIUM CHLORIDE: 600; 310; 30; 20 INJECTION, SOLUTION INTRAVENOUS at 14:00

## 2023-10-05 RX ADMIN — PHENYLEPHRINE HYDROCHLORIDE 100 MCG: 10 INJECTION INTRAVENOUS at 14:42

## 2023-10-05 ASSESSMENT — ACTIVITIES OF DAILY LIVING (ADL)
ADLS_ACUITY_SCORE: 35

## 2023-10-05 ASSESSMENT — ENCOUNTER SYMPTOMS: DYSRHYTHMIAS: 1

## 2023-10-05 NOTE — Clinical Note
Astrid Med system 12 lead EKG, hemodynamics 5 lead, pulse oximetery, NIBP, Physiocontrol hands off defibrillator/external pacer, with 3 monitoring leads to patient. Baseline assessment done.

## 2023-10-05 NOTE — ANESTHESIA POSTPROCEDURE EVALUATION
Patient: Meggan Everett    Procedure: Procedure(s):  Ablation Supraventricular Tachycardia       Anesthesia Type:  MAC    Note:  Disposition: Outpatient   Postop Pain Control: Uneventful            Sign Out: Well controlled pain   PONV: No   Neuro/Psych: Uneventful            Sign Out: Acceptable/Baseline neuro status   Airway/Respiratory: Uneventful            Sign Out: Acceptable/Baseline resp. status   CV/Hemodynamics: Uneventful            Sign Out: Acceptable CV status; No obvious hypovolemia; No obvious fluid overload   Other NRE: NONE   DID A NON-ROUTINE EVENT OCCUR?            Last vitals:  Vitals Value Taken Time   /58 10/05/23 1600   Temp 35.7  C (96.2  F) 10/05/23 1533   Pulse 93 10/05/23 1606   Resp 18 10/05/23 1606   SpO2 100 % 10/05/23 1606   Vitals shown include unvalidated device data.    Electronically Signed By: Nate Howard MD  October 5, 2023  4:08 PM

## 2023-10-05 NOTE — ANESTHESIA PREPROCEDURE EVALUATION
Anesthesia Pre-Procedure Evaluation    Patient: Meggan Everett   MRN: [id number] : 1950        Procedure : Procedure(s):  Ablation Supraventricular Tachycardia          Past Medical History:   Diagnosis Date    Asthma     Chronic kidney failure, stage 3 (moderate) (H)     Chronic use of benzodiazepine for therapeutic purpose     Esophageal reflux     Fibroadenoma of LEFT breast, with fibrocystic changes 2012    Gout     Hyperlipidemia     Hypothyroid     Mammographic microcalcification 2012    Left    Migraine with aura     Obesity     ASHER (obstructive sleep apnea)     uses CPAP    Osteoporosis     Primary osteoarthritis of both knees 2020    RBBB     Screening for colon cancer 2021    Type 2 diabetes mellitus (H)       Past Surgical History:   Procedure Laterality Date    BIOPSY BREAST      D & C  ,     DILATE CERVIX, ABLATE ENDOMETRIUM, COMBINED  2005    ENDOMETRIAL ABLATION      HC BIOPSY OF BREAST, OPEN INCISIONAL  1972    Left    HC LAPAROSCOPY, SURGICAL; CHOLECYSTECTOMY  1998    LAPAROSCOPIC CHOLECYSTECTOMY  1999    LUMBAR LAMINECTOMY  1998    Description: Laminectomy Lumbar    LUMPECTOMY BREAST      Microdiscectomy  1998      Allergies   Allergen Reactions    Codeine Sulfate Nausea    Metformin      Stomach pain     Oxycodone Nausea and Vomiting    Tape [Adhesive Tape] Blisters    Tetanus Toxoid       Social History     Tobacco Use    Smoking status: Never    Smokeless tobacco: Never   Substance Use Topics    Alcohol use: No      Wt Readings from Last 1 Encounters:   10/05/23 93.4 kg (206 lb)        Anesthesia Evaluation   Pt has had prior anesthetic.     History of anesthetic complications  - PONV.      ROS/MED HX  ENT/Pulmonary:     (+) sleep apnea,                    asthma                  Neurologic:  - neg neurologic ROS     Cardiovascular:     (+) Dyslipidemia - -   -  - -   Taking blood thinners        MAN.              dysrhythmias, Other,             METS/Exercise Tolerance: >4 METS    Hematologic:  - neg hematologic  ROS     Musculoskeletal:   (+)  arthritis,             GI/Hepatic:     (+) GERD, Asymptomatic on medication,                  Renal/Genitourinary:     (+) renal disease, type: CRI,            Endo:     (+)  type II DM,        thyroid problem, hypothyroidism,    Obesity,       Psychiatric/Substance Use:  - neg psychiatric ROS     Infectious Disease:  - neg infectious disease ROS     Malignancy:  - neg malignancy ROS     Other:  - neg other ROS          Physical Exam    Airway  airway exam normal      Mallampati: II   TM distance: > 3 FB   Neck ROM: full   Mouth opening: > 3 cm    Respiratory Devices and Support         Dental  no notable dental history         Cardiovascular   cardiovascular exam normal          Pulmonary   pulmonary exam normal                OUTSIDE LABS:  CBC:   Lab Results   Component Value Date    WBC 7.3 10/05/2023    WBC 11.4 (H) 08/18/2023    HGB 13.9 10/05/2023    HGB 12.8 08/18/2023    HCT 41.8 10/05/2023    HCT 40.6 08/18/2023     10/05/2023     08/18/2023     BMP:   Lab Results   Component Value Date     10/05/2023     08/19/2023    POTASSIUM 3.9 10/05/2023    POTASSIUM 4.1 08/19/2023    CHLORIDE 106 10/05/2023    CHLORIDE 108 (H) 08/19/2023    CO2 17 (L) 10/05/2023    CO2 23 08/19/2023    BUN 36.6 (H) 10/05/2023    BUN 34.5 (H) 08/19/2023    CR 1.25 (H) 10/05/2023    CR 1.02 (H) 08/19/2023     (H) 10/05/2023     (H) 08/19/2023     COAGS:   Lab Results   Component Value Date    PTT 22 08/18/2023    INR 1.05 08/18/2023     POC: No results found for: BGM, HCG, HCGS  HEPATIC:   Lab Results   Component Value Date    ALBUMIN 4.1 08/18/2023    PROTTOTAL 6.9 08/18/2023    ALT 39 08/18/2023    AST 50 (H) 08/18/2023    ALKPHOS 269 (H) 08/18/2023    BILITOTAL 0.5 08/18/2023     OTHER:   Lab Results   Component Value Date    A1C 7.1 (H) 06/27/2023    HIEN 10.7  (H) 10/05/2023    PHOS 2.5 02/19/2019    MAG 1.8 08/18/2023    TSH 2.32 08/18/2023    T4 1.02 02/19/2019    SED 20 11/17/2022       Anesthesia Plan    ASA Status:  3    NPO Status:  NPO Appropriate    Anesthesia Type: MAC.     - Reason for MAC: straight local not clinically adequate   Induction: Propofol.   Maintenance: TIVA.        Consents    Anesthesia Plan(s) and associated risks, benefits, and realistic alternatives discussed. Questions answered and patient/representative(s) expressed understanding.     - Discussed:     - Discussed with:  Patient      - Extended Intubation/Ventilatory Support Discussed: No.      - Patient is DNR/DNI Status: No     Use of blood products discussed: No .     Postoperative Care    Pain management: IV analgesics, Multi-modal analgesia, Oral pain medications.   PONV prophylaxis: Ondansetron (or other 5HT-3)     Comments:                Nate Howard MD

## 2023-10-05 NOTE — ANESTHESIA CARE TRANSFER NOTE
Patient: Meggan Everett    Procedure: Procedure(s):  Ablation Supraventricular Tachycardia       Diagnosis: Supraventricular tachycardia  Diagnosis Additional Information: No value filed.    Anesthesia Type:   MAC     Note:    Oropharynx: oropharynx clear of all foreign objects and spontaneously breathing  Level of Consciousness: awake  Oxygen Supplementation: face mask  Level of Supplemental Oxygen (L/min / FiO2): 5  Independent Airway: airway patency satisfactory and stable  Dentition: dentition unchanged  Vital Signs Stable: post-procedure vital signs reviewed and stable  Report to RN Given: handoff report given  Patient transferred to: Cardiac Special Care      Vitals:  Vitals Value Taken Time   /53 10/05/23 1530   Temp 35.7  C (96.2  F) 10/05/23 1533   Pulse 91 10/05/23 1538   Resp 17 10/05/23 1538   SpO2 98 % 10/05/23 1538   Vitals shown include unvalidated device data.    Electronically Signed By: LLUVIA Forbes CRNA  October 5, 2023  3:40 PM

## 2023-10-05 NOTE — DISCHARGE INSTRUCTIONS
[facility]  Cardiac Electrophysiology  [address]  [place] [address]   Office: [phone]  Fax: [phone]       Cardiac Electrophysiology - Post Ablation Discharge Instructions      PROCEDURE   Electrophysiology study and ablation for atrioventricular edu reentrant tachycardia (AVNRT)         MEDICATION INSTRUCTIONS   You may remain off of metoprolol.  Continue taking your prior to procedure medications.         DISCHARGE INSTRUCTIONS   General instructions  Have an adult stay with you until tomorrow.  You may resume your normal diet.    You may shower tomorrow.  Do NOT take a bath, or use a hot tub or pool for at least 1 week. Do not scrub the site. Do not use lotion or powder near the puncture site.    Groin care instructions  For the first 24 hrs - check the puncture site every 1-2 hours while awake.  You may keep a bandaid over the puncture sites for 1 or 2 days post-procedure and thereafter may keep these sites uncovered.  Change the bandaid daily.  If there is minor oozing, apply another bandaid and remove it after 12 hours.  For 2 days, when you cough, sneeze, laugh or move your bowels, hold your hand over the puncture site and press firmly.  Mild bruising at the access sites is normal.  If you notice increased swelling, external bleeding, or have other concerns regarding your access sites please consider emergency department evaluation and call your electrophysiology team's office    Activity recommendations  Do not drive for 3 days.  Avoid stooping or squatting more than 90 degrees at the hips for 7 days  Avoid repetitive motions such as loading , vacuuming, raking or shoveling  Avoid heavy lifting (greater than 25lbs) for 1 week    Post ablation instructions  You may have some irregular heartbeats following your ablation.  These may feel very strong initially.  These episodes should occur less frequently over time.  Pleuritic chest discomfort  (chest pain worse with taking deep breaths, worse with laying flat on your back) can occur after ablation, usually coming about within the first 24-48hrs post ablation.  If this occurs and is severe enough to be troublesome to you, please call us and consider starting a course of ibuprofen 400mg three times daily for 5 to 7 days    Things to watch for  As with any type of procedure, please be more attentive to unusual symptoms post ablation (eg. fever, neurologic changes, pain with swallowing, loss of consciousness, etc) - we recommend ER evaluation for any such symptoms in the first few weeks post procedure.    Consider ER evaluation for the following:  Severe chest pain not relieved by Tylenol or Ibuprofen  You have chills or a fever greater than 101 F (38 C)  Neurologic changes (eg. leg, arm or face weakness or numbness, difficulties with speech or word finding, problems walking or with your balance, vision changes)  Severe difficulty swallowing and/or you are coughing up blood  Shortness of breath  Increased groin pain or a large or growing hard lump around the site  Groin is red, swollen, hot or tender  Blood or fluid is draining from the groin site  Any numbness, coolness or changes in color in your extremities  Groin pain not relieved by Tylenol or Advil  Sustained rapid heart rates with or without additional concerning symptoms.    Our office will have a follow-up visit scheduled for you in approximately 6 weeks.  Please do not hesitate to call us before that time should issues arise.        [facility]    [phone]    If you are calling after hours, please listen to the entire voicemail,   a live  will answer at the end of the message.    [phone] to reach the EP nurses working with Dr Sullivan

## 2023-10-06 LAB
ATRIAL RATE - MUSE: 92 BPM
DIASTOLIC BLOOD PRESSURE - MUSE: NORMAL MMHG
INTERPRETATION ECG - MUSE: NORMAL
P AXIS - MUSE: 59 DEGREES
PR INTERVAL - MUSE: 162 MS
QRS DURATION - MUSE: 118 MS
QT - MUSE: 414 MS
QTC - MUSE: 511 MS
R AXIS - MUSE: 59 DEGREES
SYSTOLIC BLOOD PRESSURE - MUSE: NORMAL MMHG
T AXIS - MUSE: 27 DEGREES
VENTRICULAR RATE- MUSE: 92 BPM

## 2023-10-06 PROCEDURE — 93010 ELECTROCARDIOGRAM REPORT: CPT | Mod: HOP | Performed by: INTERNAL MEDICINE

## 2023-10-16 ENCOUNTER — MYC MEDICAL ADVICE (OUTPATIENT)
Dept: INTERNAL MEDICINE | Facility: CLINIC | Age: 73
End: 2023-10-16
Payer: MEDICARE

## 2023-10-16 DIAGNOSIS — E03.9 ACQUIRED HYPOTHYROIDISM: ICD-10-CM

## 2023-10-17 RX ORDER — LEVOTHYROXINE SODIUM 125 UG/1
125 TABLET ORAL DAILY
Qty: 90 TABLET | Refills: 0 | Status: SHIPPED | OUTPATIENT
Start: 2023-10-17 | End: 2024-01-03

## 2023-10-24 RX ORDER — LEVOTHYROXINE SODIUM 125 UG/1
125 TABLET ORAL DAILY
Qty: 30 TABLET | Refills: 0 | Status: SHIPPED | OUTPATIENT
Start: 2023-10-24 | End: 2024-03-19

## 2023-10-24 NOTE — TELEPHONE ENCOUNTER
Pt only has 4 pillls left and it take a while to come in the mail.    Please send a 30 day to [facility]    levothyroxine (SYNTHROID) 125 MCG tablet

## 2023-11-16 ENCOUNTER — OFFICE VISIT (OUTPATIENT)
Dept: CARDIOLOGY | Facility: CLINIC | Age: 73
End: 2023-11-16
Payer: MEDICARE

## 2023-11-16 VITALS
HEIGHT: 63 IN | DIASTOLIC BLOOD PRESSURE: 60 MMHG | BODY MASS INDEX: 36.14 KG/M2 | SYSTOLIC BLOOD PRESSURE: 136 MMHG | WEIGHT: 204 LBS | HEART RATE: 90 BPM | RESPIRATION RATE: 16 BRPM

## 2023-11-16 DIAGNOSIS — I47.10 SVT (SUPRAVENTRICULAR TACHYCARDIA) (H): Primary | ICD-10-CM

## 2023-11-16 DIAGNOSIS — Z86.79 STATUS POST CATHETER ABLATION OF SLOW PATHWAY: ICD-10-CM

## 2023-11-16 DIAGNOSIS — G47.33 OBSTRUCTIVE SLEEP APNEA SYNDROME: ICD-10-CM

## 2023-11-16 DIAGNOSIS — I10 BENIGN ESSENTIAL HYPERTENSION: ICD-10-CM

## 2023-11-16 DIAGNOSIS — Z98.890 STATUS POST CATHETER ABLATION OF SLOW PATHWAY: ICD-10-CM

## 2023-11-16 PROCEDURE — 99214 OFFICE O/P EST MOD 30 MIN: CPT | Performed by: NURSE PRACTITIONER

## 2023-11-16 NOTE — LETTER
11/16/2023    Nate Barcenas MD  [address] [address]    RE: Meggan Everett       Dear Colleague,     I had the pleasure of seeing Meggan Everett in the [facility].       [facility]  Cardiac Electrophysiology  [address]  [place] [address]   Office: [phone]  Fax: [phone]     HEART CARE ELECTROPHYSIOLOGY FOLLOW UP    Primary Care: Nate Barcenas MD    Assessment/Recommendations     AVNRT: Status post slow pathway AVNRT ablation 10/5/2023. She has had no complications subsequent to catheter ablation procedure.  She has brief self-limiting episodes of tachycardia and tachypalpitations, most often in the morning.  Symptoms not consistent with prior SVT and likely attributable to discontinuation of beta blockade. We deferred reinstitution of metoprolol for now due to orthopnea.    ASHER: No longer using CPAP, significant improvement following weight loss    Plan:  -24 hour Holter  -Continue interval monitoring via smart watch  -Further EP follow-up pending results       History of Present Illness/Subjective    Meggan Everett is a 73 year old female who is seen today for follow up status post SVT ablation. She has a past medical history significant for hypertension, hyperlipidemia, type 2 diabetes, CKD, ASHER, BLE lymphedema, hypothyroidism.  She had occasional palpitations occurring since age 29s, developing sustained palpitations and presyncope 5/2/2023 and 8/18/2023 with documentation of SVT, terminating with adenosine. She underwent slow pathway AVNRT catheter ablation 10/5/2023.  Toprol was discontinued 1 week prior to procedure.  She wears an Apple Watch and notes elevated heart rates up to 120s with activity in the morning with sensation of heart pounding, heart rates 70-80s throughout the rest of the day.  She tends to get dizzy with changing positions including seated to standing. She denies sustained  Stop zantac  Start prilosec 1 tab in morning before breakfast  Return visit 2 months        "tachypalpitations or racing heart, chest discomfort, or syncope. She denies groin site issues or heartburn, changes in activity tolerance or fatigue. She exercises at a gym including biking and swimming.      Data Review     EKG:   10/5/2023: SR 92 bpm, IVCD  8/18/2023:  bpm  Personally reviewed.     TTE: 8/19/2023  Left ventricular size, wall motion and function are normal. The ejection  fraction is 60-65%.  Normal right ventricle size and systolic function.  Mild valvular aortic stenosis.    I have reviewed and updated the patient's past medical history, allergy list and medication list.                Physical Examination Review of Systems   Vitals: /60 (BP Location: Left arm, Patient Position: Sitting, Cuff Size: Adult Large)   Pulse 90   Resp 16   Ht 1.6 m (5' 3\")   Wt 92.5 kg (204 lb)   BMI 36.14 kg/m      BMI= Body mass index is 36.14 kg/m .    Wt Readings from Last 3 Encounters:   11/16/23 92.5 kg (204 lb)   10/05/23 93.4 kg (206 lb)   09/18/23 93.4 kg (206 lb)       General   Appearance:   Alert and oriented, in no acute distress.    HEENT:  Normocephalic and atraumatic. Conjunctiva and sclera are clear. Moist oral mucosa.    Neck: No JVP, carotid bruit or obvious thyromegaly.   Lungs:   Respirations unlabored. Clear bilaterally with no rales, rhonchi, or wheezes.     Cardiovascular:   Rhythm is regular. S1 and S2 are normal. No significant murmur is present. Lower extremities demonstrate chronic nonpitting edema, wearing compression stockings. Posterior tibial pulses are intact bilaterally.   Extremities: No cyanosis or clubbing   Skin: Skin is warm, dry, and otherwise intact.   Neurologic: Gait not assessed. Mood and affect appropriate.    A 12 point comprehensive review of systems was  negative except as noted.      Medical History  Surgical History Family History Social History   Past Medical History:   Diagnosis Date    Asthma     Chronic kidney failure, stage 3 (moderate) (H)     " Chronic use of benzodiazepine for therapeutic purpose     Esophageal reflux     Fibroadenoma of LEFT breast, with fibrocystic changes 05/14/2012    Gout     Hyperlipidemia     Hypothyroid     Mammographic microcalcification 05/01/2012    Left    Migraine with aura     Obesity     ASHER (obstructive sleep apnea)     uses CPAP    Osteoporosis     Primary osteoarthritis of both knees 09/28/2020    RBBB     Screening for colon cancer 11/29/2021    Type 2 diabetes mellitus (H)     Past Surgical History:   Procedure Laterality Date    BIOPSY BREAST      D & C  2000, 2002    DILATE CERVIX, ABLATE ENDOMETRIUM, COMBINED  01/01/2005    ENDOMETRIAL ABLATION      EP ABLATION SVT N/A 10/5/2023    Procedure: Ablation Supraventricular Tachycardia;  Surgeon: Mitch Sullivan MD;  Location: [facility]    HC BIOPSY OF BREAST, OPEN INCISIONAL  01/01/1972    Left    HC LAPAROSCOPY, SURGICAL; CHOLECYSTECTOMY  01/01/1998    LAPAROSCOPIC CHOLECYSTECTOMY  01/01/1999    LUMBAR LAMINECTOMY  01/01/1998    Description: Laminectomy Lumbar    LUMPECTOMY BREAST      Microdiscectomy  01/01/1998    Family History   Problem Relation Age of Onset    Autoimmune Disease No family hx of     Hypertension Mother         alive in her 90s    Atrial fibrillation Father         alive in his 90s    LUNG DISEASE Sister         interstitial lung disease    Heart Disease Sister     Social History     Socioeconomic History    Marital status:      Spouse name: Not on file    Number of children: Not on file    Years of education: Not on file    Highest education level: Not on file   Occupational History    Occupation: Retired   Tobacco Use    Smoking status: Never    Smokeless tobacco: Never   Vaping Use    Vaping Use: Never used   Substance and Sexual Activity    Alcohol use: No    Drug use: No    Sexual activity: Never   Other Topics Concern    Not on file   Social History Narrative    She is  and is retired. She lives with her son and  his family on the lower level of their house.  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  She has 5 grandchildren.  She does not smoke cigarettes or drink alcohol.     Social Determinants of Health     Financial Resource Strain: Not on file   Food Insecurity: Not on file   Transportation Needs: Not on file   Physical Activity: Not on file   Stress: Not on file   Social Connections: Not on file   Interpersonal Safety: Not on file   Housing Stability: Not on file          Medications  Allergies   Scheduled Meds:  Current Outpatient Medications   Medication Sig Dispense Refill    albuterol (PROAIR HFA/PROVENTIL HFA/VENTOLIN HFA) 108 (90 Base) MCG/ACT inhaler Inhale 2 puffs into the lungs every 6 hours as needed for shortness of breath / dyspnea or wheezing 18 g 3    albuterol (PROVENTIL) (2.5 MG/3ML) 0.083% neb solution Take 1 vial (2.5 mg) by nebulization every 6 hours as needed for shortness of breath / dyspnea or wheezing 75 mL 3    alcohol swab prep pads Use to swab area of injection/vicki as directed. 100 each 3    allopurinol (ZYLOPRIM) 100 MG tablet Take 2 tablets (200 mg) by mouth daily 180 tablet 3    aspirin (ASA) 81 MG chewable tablet Take 81 mg by mouth At Bedtime      blood glucose monitoring (ACCU-CHEK FASTCLIX) lancets Use to test blood sugar 4 times daily. 204 each 6    blood glucose monitoring (NO BRAND SPECIFIED) meter device kit Use to test blood sugar 4 times daily or as directed. Preferred blood glucose meter OR supplies to accompany: Blood Glucose Monitor Brands: per insurance. 1 kit 0    celecoxib (CELEBREX) 200 MG capsule Take 1 capsule (200 mg) by mouth daily 30 capsule 11    colchicine (COLCYRS) 0.6 MG tablet Take 1 tablet (0.6 mg) by mouth daily as needed for gout pain 90 tablet 3    Continuous Blood Gluc  (FREESTYLE DIEGO 2 READER) [place] 1 each continuous Use to read blood sugars per 's instructions. 1 each 0     Continuous Blood Gluc Sensor (FREESTYLE DIEGO 2 SENSOR) [facility] [place] [place] 1 each every 14 days To check blood sugars continuously 2 each 5    empagliflozin (JARDIANCE) 10 MG TABS tablet Take 1 tablet (10 mg) by mouth daily 90 tablet 3    famotidine (PEPCID) 20 MG tablet Take 20 mg by mouth daily      levothyroxine (SYNTHROID) 125 MCG tablet Take 1 tablet (125 mcg) by mouth daily 90 tablet 0    levothyroxine (SYNTHROID/LEVOTHROID) 125 MCG tablet Take 1 tablet (125 mcg) by mouth daily 30 tablet 0    LORazepam (ATIVAN) 0.5 MG tablet Take 0.5 mg by mouth At Bedtime      Probiotic Product (PROBIOTIC-10 PO) Take 1 capsule by mouth daily Florastor       rosuvastatin (CRESTOR) 10 MG tablet Take 0.5 tablets (5 mg) by mouth three times a week 90 tablet 3    thin (NO BRAND SPECIFIED) lancets Use with lanceting device. To accompany: Blood Glucose Monitor Brands: per insurance. 200 each 6    tirzepatide (MOUNJARO) 5 MG/0.5ML pen Inject 10 mg Subcutaneous every 7 days 12 mL 3    tiZANidine (ZANAFLEX) 4 MG capsule Take 1 capsule (4 mg) by mouth At Bedtime 90 capsule 3    triamcinolone (KENALOG) 0.1 % external cream Apply topically 2 times daily (Patient taking differently: Apply topically as needed) 30 g 3    Vitamin D3 (CHOLECALCIFEROL) 25 mcg (1000 units) tablet Take 1 tablet by mouth daily      Allergies   Allergen Reactions    Codeine Sulfate Nausea    Metformin      Stomach pain     Oxycodone Nausea and Vomiting    Tape [Adhesive Tape] Blisters    Tetanus Toxoid          Lab Results    Chemistry/lipid CBC Cardiac Enzymes/BNP/TSH/INR   Lab Results   Component Value Date    CHOL 132 08/19/2022    HDL 39 (L) 08/19/2022    TRIG 138 08/19/2022    BUN 36.6 (H) 10/05/2023     10/05/2023    CO2 17 (L) 10/05/2023    Lab Results   Component Value Date    WBC 7.3 10/05/2023    HGB 13.9 10/05/2023    HCT 41.8 10/05/2023    MCV 91 10/05/2023     10/05/2023    @RESUFAST(BMP,CBC,BNP,TSH,  INR)@      33 minutes spent reviewing prior records  (including documentation, laboratory studies, cardiac testing/imaging), history and physical exam, planning, and subsequent documentation.     This note has been dictated using voice recognition software. Any grammatical, typographical, or context distortions are unintentional and inherent to the software.    Marcie Pena CNP  Clinical Cardiac Electrophysiology  [facility]  Clinic and [phone]  Fax: [phone]  Electrophysiology Nurses: [phone]        Thank you for allowing me to participate in the care of your patient.      Sincerely,     LLUVIA MINER CNP     [facility]  cc:   Mitch Sullivan MD  [address]  [place] [address]

## 2023-11-16 NOTE — PROGRESS NOTES
[facility]  Cardiac Electrophysiology  [address]  [place] [address]   Office: [phone]  Fax: [phone]     HEART CARE ELECTROPHYSIOLOGY FOLLOW UP    Primary Care: Nate Barcenas MD    Assessment/Recommendations     AVNRT: Status post slow pathway AVNRT ablation 10/5/2023. She has had no complications subsequent to catheter ablation procedure.  She has brief self-limiting episodes of tachycardia and tachypalpitations, most often in the morning.  Symptoms not consistent with prior SVT and likely attributable to discontinuation of beta blockade. We deferred reinstitution of metoprolol for now due to orthopnea.    ASHER: No longer using CPAP, significant improvement following weight loss    Plan:  -24 hour Holter  -Continue interval monitoring via smart watch  -Further EP follow-up pending results       History of Present Illness/Subjective    Meggan Everett is a 73 year old female who is seen today for follow up status post SVT ablation. She has a past medical history significant for hypertension, hyperlipidemia, type 2 diabetes, CKD, ASHER, BLE lymphedema, hypothyroidism.  She had occasional palpitations occurring since age 30s, developing sustained palpitations and presyncope 5/2/2023 and 8/18/2023 with documentation of SVT, terminating with adenosine. She underwent slow pathway AVNRT catheter ablation 10/5/2023.  Toprol was discontinued 1 week prior to procedure.  She wears an Apple Watch and notes elevated heart rates up to 120s with activity in the morning with sensation of heart pounding, heart rates 70-80s throughout the rest of the day.  She tends to get dizzy with changing positions including seated to standing. She denies sustained tachypalpitations or racing heart, chest discomfort, or syncope. She denies groin site issues or heartburn, changes in activity tolerance or fatigue. She exercises at a gym including biking and swimming.      Data Review  "    EKG:   10/5/2023: SR 92 bpm, IVCD  8/18/2023:  bpm  Personally reviewed.     TTE: 8/19/2023  Left ventricular size, wall motion and function are normal. The ejection  fraction is 60-65%.  Normal right ventricle size and systolic function.  Mild valvular aortic stenosis.    I have reviewed and updated the patient's past medical history, allergy list and medication list.                Physical Examination Review of Systems   Vitals: /60 (BP Location: Left arm, Patient Position: Sitting, Cuff Size: Adult Large)   Pulse 90   Resp 16   Ht 1.6 m (5' 3\")   Wt 92.5 kg (204 lb)   BMI 36.14 kg/m      BMI= Body mass index is 36.14 kg/m .    Wt Readings from Last 3 Encounters:   11/16/23 92.5 kg (204 lb)   10/05/23 93.4 kg (206 lb)   09/18/23 93.4 kg (206 lb)       General   Appearance:   Alert and oriented, in no acute distress.    HEENT:  Normocephalic and atraumatic. Conjunctiva and sclera are clear. Moist oral mucosa.    Neck: No JVP, carotid bruit or obvious thyromegaly.   Lungs:   Respirations unlabored. Clear bilaterally with no rales, rhonchi, or wheezes.     Cardiovascular:   Rhythm is regular. S1 and S2 are normal. No significant murmur is present. Lower extremities demonstrate chronic nonpitting edema, wearing compression stockings. Posterior tibial pulses are intact bilaterally.   Extremities: No cyanosis or clubbing   Skin: Skin is warm, dry, and otherwise intact.   Neurologic: Gait not assessed. Mood and affect appropriate.    A 12 point comprehensive review of systems was  negative except as noted.      Medical History  Surgical History Family History Social History   Past Medical History:   Diagnosis Date    Asthma     Chronic kidney failure, stage 3 (moderate) (H)     Chronic use of benzodiazepine for therapeutic purpose     Esophageal reflux     Fibroadenoma of LEFT breast, with fibrocystic changes 05/14/2012    Gout     Hyperlipidemia     Hypothyroid     Mammographic microcalcification " 05/01/2012    Left    Migraine with aura     Obesity     ASHER (obstructive sleep apnea)     uses CPAP    Osteoporosis     Primary osteoarthritis of both knees 09/28/2020    RBBB     Screening for colon cancer 11/29/2021    Type 2 diabetes mellitus (H)     Past Surgical History:   Procedure Laterality Date    BIOPSY BREAST      D & C  2000, 2002    DILATE CERVIX, ABLATE ENDOMETRIUM, COMBINED  01/01/2005    ENDOMETRIAL ABLATION      EP ABLATION SVT N/A 10/5/2023    Procedure: Ablation Supraventricular Tachycardia;  Surgeon: Mitch Sullivan MD;  [facility]: [facility]    HC BIOPSY OF BREAST, OPEN INCISIONAL  01/01/1972    Left    HC LAPAROSCOPY, SURGICAL; CHOLECYSTECTOMY  01/01/1998    LAPAROSCOPIC CHOLECYSTECTOMY  01/01/1999    LUMBAR LAMINECTOMY  01/01/1998    Description: Laminectomy Lumbar    LUMPECTOMY BREAST      Microdiscectomy  01/01/1998    Family History   Problem Relation Age of Onset    Autoimmune Disease No family hx of     Hypertension Mother         alive in her 90s    Atrial fibrillation Father         alive in his 90s    LUNG DISEASE Sister         interstitial lung disease    Heart Disease Sister     Social History     Socioeconomic History    Marital status:      Spouse name: Not on file    Number of children: Not on file    Years of education: Not on file    Highest education level: Not on file   Occupational History    Occupation: Retired   Tobacco Use    Smoking status: Never    Smokeless tobacco: Never   Vaping Use    Vaping Use: Never used   Substance and Sexual Activity    Alcohol use: No    Drug use: No    Sexual activity: Never   Other Topics Concern    Not on file   Social History Narrative    She is  and is retired. She lives with her son and his family on the lower level of their house.  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  She has 5 grandchildren.  She does not smoke cigarettes or drink alcohol.      Social Determinants of Health     Financial Resource Strain: Not on file   Food Insecurity: Not on file   Transportation Needs: Not on file   Physical Activity: Not on file   Stress: Not on file   Social Connections: Not on file   Interpersonal Safety: Not on file   Housing Stability: Not on file          Medications  Allergies   Scheduled Meds:  Current Outpatient Medications   Medication Sig Dispense Refill    albuterol (PROAIR HFA/PROVENTIL HFA/VENTOLIN HFA) 108 (90 Base) MCG/ACT inhaler Inhale 2 puffs into the lungs every 6 hours as needed for shortness of breath / dyspnea or wheezing 18 g 3    albuterol (PROVENTIL) (2.5 MG/3ML) 0.083% neb solution Take 1 vial (2.5 mg) by nebulization every 6 hours as needed for shortness of breath / dyspnea or wheezing 75 mL 3    alcohol swab prep pads Use to swab area of injection/vicki as directed. 100 each 3    allopurinol (ZYLOPRIM) 100 MG tablet Take 2 tablets (200 mg) by mouth daily 180 tablet 3    aspirin (ASA) 81 MG chewable tablet Take 81 mg by mouth At Bedtime      blood glucose monitoring (ACCU-CHEK FASTCLIX) lancets Use to test blood sugar 4 times daily. 204 each 6    blood glucose monitoring (NO BRAND SPECIFIED) meter device kit Use to test blood sugar 4 times daily or as directed. Preferred blood glucose meter OR supplies to accompany: Blood Glucose Monitor Brands: per insurance. 1 kit 0    celecoxib (CELEBREX) 200 MG capsule Take 1 capsule (200 mg) by mouth daily 30 capsule 11    colchicine (COLCYRS) 0.6 MG tablet Take 1 tablet (0.6 mg) by mouth daily as needed for gout pain 90 tablet 3    Continuous Blood Gluc  (FREESTYLE DIEGO 2 READER) [place] 1 each continuous Use to read blood sugars per 's instructions. 1 each 0    Continuous Blood Gluc Sensor (FREESTYLE DIEGO 2 SENSOR) [facility] 1 each every 14 days To check blood sugars continuously 2 each 5    empagliflozin (JARDIANCE) 10 MG TABS tablet Take 1 tablet (10 mg) by mouth daily 90 tablet 3     famotidine (PEPCID) 20 MG tablet Take 20 mg by mouth daily      levothyroxine (SYNTHROID) 125 MCG tablet Take 1 tablet (125 mcg) by mouth daily 90 tablet 0    levothyroxine (SYNTHROID/LEVOTHROID) 125 MCG tablet Take 1 tablet (125 mcg) by mouth daily 30 tablet 0    LORazepam (ATIVAN) 0.5 MG tablet Take 0.5 mg by mouth At Bedtime      Probiotic Product (PROBIOTIC-10 PO) Take 1 capsule by mouth daily Florastor       rosuvastatin (CRESTOR) 10 MG tablet Take 0.5 tablets (5 mg) by mouth three times a week 90 tablet 3    thin (NO BRAND SPECIFIED) lancets Use with lanceting device. To accompany: Blood Glucose Monitor Brands: per insurance. 200 each 6    tirzepatide (MOUNJARO) 5 MG/0.5ML pen Inject 10 mg Subcutaneous every 7 days 12 mL 3    tiZANidine (ZANAFLEX) 4 MG capsule Take 1 capsule (4 mg) by mouth At Bedtime 90 capsule 3    triamcinolone (KENALOG) 0.1 % external cream Apply topically 2 times daily (Patient taking differently: Apply topically as needed) 30 g 3    Vitamin D3 (CHOLECALCIFEROL) 25 mcg (1000 units) tablet Take 1 tablet by mouth daily      Allergies   Allergen Reactions    Codeine Sulfate Nausea    Metformin      Stomach pain     Oxycodone Nausea and Vomiting    Tape [Adhesive Tape] Blisters    Tetanus Toxoid          Lab Results    Chemistry/lipid CBC Cardiac Enzymes/BNP/TSH/INR   Lab Results   Component Value Date    CHOL 132 08/19/2022    HDL 39 (L) 08/19/2022    TRIG 138 08/19/2022    BUN 36.6 (H) 10/05/2023     10/05/2023    CO2 17 (L) 10/05/2023    Lab Results   Component Value Date    WBC 7.3 10/05/2023    HGB 13.9 10/05/2023    HCT 41.8 10/05/2023    MCV 91 10/05/2023     10/05/2023    @RESUFAST(BMP,CBC,BNP,TSH,  INR)@      33 minutes spent reviewing prior records (including documentation, laboratory studies, cardiac testing/imaging), history and physical exam, planning, and subsequent documentation.     This note has been dictated using voice recognition software. Any grammatical,  typographical, or context distortions are unintentional and inherent to the software.    Marcie Pena CNP  Clinical Cardiac Electrophysiology  [facility]  Clinic and [phone]  Fax: [phone]  Electrophysiology Nurses: [phone]

## 2023-11-17 ENCOUNTER — HOSPITAL ENCOUNTER (OUTPATIENT)
Dept: CARDIOLOGY | Facility: HOSPITAL | Age: 73
Discharge: HOME | End: 2023-11-17
Attending: NURSE PRACTITIONER | Admitting: NURSE PRACTITIONER
Payer: MEDICARE

## 2023-11-17 DIAGNOSIS — I47.10 SVT (SUPRAVENTRICULAR TACHYCARDIA) (H): ICD-10-CM

## 2023-11-17 DIAGNOSIS — Z86.79 STATUS POST CATHETER ABLATION OF SLOW PATHWAY: ICD-10-CM

## 2023-11-17 DIAGNOSIS — Z98.890 STATUS POST CATHETER ABLATION OF SLOW PATHWAY: ICD-10-CM

## 2023-11-17 PROCEDURE — 93226 XTRNL ECG REC<48 HR SCAN A/R: CPT

## 2023-11-20 PROCEDURE — 93227 XTRNL ECG REC<48 HR R&I: CPT | Performed by: INTERNAL MEDICINE

## 2023-11-22 ENCOUNTER — MYC MEDICAL ADVICE (OUTPATIENT)
Dept: INTERNAL MEDICINE | Facility: CLINIC | Age: 73
End: 2023-11-22
Payer: MEDICARE

## 2023-11-22 DIAGNOSIS — M25.50 ARTHRALGIA, UNSPECIFIED JOINT: ICD-10-CM

## 2023-11-22 RX ORDER — CELECOXIB 200 MG/1
200 CAPSULE ORAL DAILY
Qty: 30 CAPSULE | Refills: 0 | Status: SHIPPED | OUTPATIENT
Start: 2023-11-22 | End: 2023-12-22

## 2023-11-27 ENCOUNTER — NURSE TRIAGE (OUTPATIENT)
Dept: INTERNAL MEDICINE | Facility: CLINIC | Age: 73
End: 2023-11-27
Payer: MEDICARE

## 2023-11-27 ENCOUNTER — MYC MEDICAL ADVICE (OUTPATIENT)
Dept: INTERNAL MEDICINE | Facility: CLINIC | Age: 73
End: 2023-11-27
Payer: MEDICARE

## 2023-11-27 DIAGNOSIS — H10.31 ACUTE BACTERIAL CONJUNCTIVITIS OF RIGHT EYE: Primary | ICD-10-CM

## 2023-11-27 RX ORDER — TOBRAMYCIN 3 MG/ML
1-2 SOLUTION/ DROPS OPHTHALMIC
Qty: 5 ML | Refills: 1 | Status: SHIPPED | OUTPATIENT
Start: 2023-11-27 | End: 2024-03-19

## 2023-11-27 NOTE — TELEPHONE ENCOUNTER
Nurse Triage SBAR    Is this a 2nd Level Triage? YES, LICENSED PRACTITIONER REVIEW IS REQUIRED    Situation: Patient reports mild right upper and lower eyelid swelling and redness. Reports the sclera of the eye is slightly irritated but not blood shot.     Background: Patient reports this is similar to a previous eye infection where she was prescribed eye drops.     Assessment: Patient reports using an old mascara and then experiencing eye lid swelling on the upper and lower eye lid with redness and mild irritation to touch. Reports clear discharge with mild blurry vision due to discharge. Denies this affecting her left eye. Report she has been keeping eye clean and uses over the counter eye drops for dry eye but with flushing eyes out, right eyelid swelling and redness has failed to improve. Denies fever. Report runny nose however states her nose is always running during the winter season.     Protocol Recommended Disposition:   See in Office Today    Recommendation: No available appointment today. Patient requesting message be sent to PCP due to having symptoms like this previously that were treated with eye drops.     Routed to provider    Does the patient meet one of the following criteria for ADS visit consideration? 16+ years old, with an [facility] PCP     TIP  Providers, please consider if this condition is appropriate for management at one of our Acute and Diagnostic Services sites.     If patient is a good candidate, please use dotphrase <dot>triageresponse and select Refer to ADS to document.    Reason for Disposition   Eyelid is red and painful (or tender to touch)    Additional Information   Negative: Unresponsive, passed out or very weak   Negative: Difficulty breathing or wheezing   Negative: Difficulty swallowing or slurred speech and sudden onset   Negative: Sounds like a life-threatening emergency to the triager   Negative: Chemical got in the eye   Negative: Recent injury to the eye   Negative: Entire  "face is swollen   Negative: Sacs of clear fluid (blisters) on whites of eyes (allergic cysts)   Negative: Contact with pollen, other allergic substance or eyedrops   Negative: Bee sting and within last 24 hours   Negative: Insect bite suspected   Negative: Yellow or green discharge (pus) in the eye   Negative: Redness of white area (sclera) of eye(s)   Negative: SEVERE eyelid swelling (i.e., shut or almost) and fever   Negative: Eyelid (outer) is very red and fever   Negative: SEVERE eyelid swelling on one side that is red and painful (or tender to touch)   Negative: Patient sounds very sick or weak to the triager   Negative: Pregnant 20 or more weeks and sudden weight gain (e.g., more than 3 lbs or 1.4 kg in one week)   Negative: Postpartum (from 0 to 6 weeks after delivery) and sudden weight gain (e.g., more than 3 lbs or 1.4 kg in one week)   Negative: SEVERE eyelid swelling (i.e., shut or almost) and involves both eyes   Negative: SEVERE eyelid swelling and taking an ACE Inhibitor medication (e.g., benazepril/LOTENSIN, captopril/CAPOTEN, enalapril/VASOTEC, lisinopril/ZESTRIL)   Negative: SEVERE eyelid swelling on one side and sinus pain or pressure   Negative: Fever   Negative: Painful rash and multiple small blisters grouped together (i.e., dermatomal distribution or \"band\" or \"stripe\")   Negative: MODERATE to SEVERE eyelid swelling on one side  (Exception: Due to a mosquito bite.)    Answer Assessment - Initial Assessment Questions  1. ONSET: \"When did the swelling start?\" (e.g., minutes, hours, days)      2 days ago   2. LOCATION: \"What part of the eyelids is swollen?\"      Upper and lower.  3. SEVERITY: \"How swollen is it?\"      Can see appropriately. Becomes puffy overnight but improves slightly throughout the day.   4. ITCHING: \"Is there any itching?\" If Yes, ask: \"How much?\"   (Scale 1-10; mild, moderate or severe)      Itching- Mild  5. PAIN: \"Is the swelling painful to touch?\" If Yes, ask: \"How painful " "is it?\"   (Scale 1-10; mild, moderate or severe)      Painful to touch- mild. Reports it is irritating.   6. FEVER: \"Do you have a fever?\" If Yes, ask: \"What is it, how was it measured, and when did it start?\"       Denies   7. CAUSE: \"What do you think is causing the swelling?\"      Irritation  8. RECURRENT SYMPTOM: \"Have you had eyelid swelling before?\" If Yes, ask: \"When was the last time?\" \"What happened that time?\"      Reports this has occurred before. Prescribed Eye drops.   9. OTHER SYMPTOMS: \"Do you have any other symptoms?\" (e.g., blurred vision, eye discharge, rash, runny nose)      Eye discharge (clear), Runny nose per patient baseline. Vision cloudy due to eye discharge.    10. PREGNANCY: \"Is there any chance you are pregnant?\" \"When was your last menstrual period?\"        NA    Protocols used: Eye - Swelling-A-OH    "

## 2023-11-28 DIAGNOSIS — J01.00 ACUTE NON-RECURRENT MAXILLARY SINUSITIS: Primary | ICD-10-CM

## 2023-11-28 RX ORDER — GUAIFENESIN 600 MG/1
600 TABLET, EXTENDED RELEASE ORAL 2 TIMES DAILY
Qty: 60 TABLET | Refills: 2 | Status: SHIPPED | OUTPATIENT
Start: 2023-11-28 | End: 2024-03-19

## 2023-11-28 NOTE — CONFIDENTIAL NOTE
Responded via [employer].  Tobramycin sent to local [facility]

## 2023-12-22 DIAGNOSIS — M25.50 ARTHRALGIA, UNSPECIFIED JOINT: ICD-10-CM

## 2023-12-22 RX ORDER — CELECOXIB 200 MG/1
200 CAPSULE ORAL DAILY
Qty: 30 CAPSULE | Refills: 0 | Status: SHIPPED | OUTPATIENT
Start: 2023-12-22 | End: 2023-12-26

## 2023-12-26 ENCOUNTER — TELEPHONE (OUTPATIENT)
Dept: NURSING | Facility: CLINIC | Age: 73
End: 2023-12-26
Payer: MEDICARE

## 2023-12-26 DIAGNOSIS — M25.50 ARTHRALGIA, UNSPECIFIED JOINT: ICD-10-CM

## 2023-12-26 RX ORDER — CELECOXIB 200 MG/1
200 CAPSULE ORAL DAILY
Qty: 90 CAPSULE | Refills: 3 | Status: SHIPPED | OUTPATIENT
Start: 2023-12-26 | End: 2024-03-19

## 2023-12-26 RX ORDER — CELECOXIB 200 MG/1
200 CAPSULE ORAL DAILY
Qty: 30 CAPSULE | Refills: 11 | Status: SHIPPED | OUTPATIENT
Start: 2023-12-26 | End: 2023-12-26

## 2023-12-26 NOTE — TELEPHONE ENCOUNTER
Pt calling for a refill.     Medication: Celebrex    She states that it is taking 6 weeks for her mail order pharmacy to send her medications. She is aware that her PCP sent a refill to the [facility] for a 30 day supply, but she does not think that will be enough and is asking for one additional refill.     She also needs a 1 year supply sent to her mail-order pharmacy. They need a new prescription.    Routing to the clinic.     Elizabeth Garcia RN  [facility] Nurse Advisor 7:49 AM 12/26/2023

## 2024-01-02 ENCOUNTER — MYC MEDICAL ADVICE (OUTPATIENT)
Dept: INTERNAL MEDICINE | Facility: CLINIC | Age: 74
End: 2024-01-02
Payer: MEDICARE

## 2024-01-02 DIAGNOSIS — E03.9 ACQUIRED HYPOTHYROIDISM: ICD-10-CM

## 2024-01-03 RX ORDER — LEVOTHYROXINE SODIUM 125 UG/1
125 TABLET ORAL DAILY
Qty: 90 TABLET | Refills: 3 | Status: SHIPPED | OUTPATIENT
Start: 2024-01-03 | End: 2024-09-20

## 2024-01-24 ENCOUNTER — MYC MEDICAL ADVICE (OUTPATIENT)
Dept: INTERNAL MEDICINE | Facility: CLINIC | Age: 74
End: 2024-01-24
Payer: MEDICARE

## 2024-01-24 DIAGNOSIS — J44.89 ASTHMATIC BRONCHITIS , CHRONIC (H): ICD-10-CM

## 2024-01-25 RX ORDER — LORAZEPAM 0.5 MG/1
0.5 TABLET ORAL AT BEDTIME
Status: CANCELLED | OUTPATIENT
Start: 2024-01-25

## 2024-01-25 RX ORDER — ALBUTEROL SULFATE 90 UG/1
2 AEROSOL, METERED RESPIRATORY (INHALATION) EVERY 6 HOURS PRN
Qty: 18 G | Refills: 3 | Status: SHIPPED | OUTPATIENT
Start: 2024-01-25

## 2024-01-25 RX ORDER — ALBUTEROL SULFATE 0.83 MG/ML
2.5 SOLUTION RESPIRATORY (INHALATION) EVERY 6 HOURS PRN
Qty: 75 ML | Refills: 3 | Status: SHIPPED | OUTPATIENT
Start: 2024-01-25 | End: 2024-09-04

## 2024-01-25 NOTE — TELEPHONE ENCOUNTER
"Routing refill request to provider for review/approval because:  Labs not current:  ACT    Albuterol inhaler  Last Written Prescription Date:  8/19/2022  Last Fill Quantity: 18 g,  # refills: 3   Last office visit provider:  8/23/23     Albuterol neb solution  Last Written Prescription Date:  9/29/2021/75 mL  Last Fill Quantity: 75 mL,  # refills: 3   Last office visit provider:  8/23/23     Requested Prescriptions   Pending Prescriptions Disp Refills    albuterol (PROAIR HFA/PROVENTIL HFA/VENTOLIN HFA) 108 (90 Base) MCG/ACT inhaler 18 g 3     Sig: Inhale 2 puffs into the lungs every 6 hours as needed for shortness of breath or wheezing       Asthma Maintenance Inhalers - Anticholinergics Failed - 1/25/2024  9:39 AM        Failed - Asthma control assessment score within normal limits in last 6 months     Please review ACT score.           Passed - Patient is age 12 years or older        Passed - Medication is active on med list        Passed - Recent (6 mo) or future (30 days) visit within the authorizing provider's specialty     Patient had office visit in the last 6 months or has a visit in the next 30 days with authorizing provider or within the authorizing provider's specialty.  See \"Patient Info\" tab in inbasket, or \"Choose Columns\" in Meds & Orders section of the refill encounter.           Short-Acting Beta Agonist Inhalers Protocol  Failed - 1/25/2024  9:39 AM        Failed - Asthma control assessment score within normal limits in last 6 months     Please review ACT score.           Passed - Patient is age 12 or older        Passed - Medication is active on med list        Passed - Recent (6 mo) or future (30 days) visit within the authorizing provider's specialty     Patient had office visit in the last 6 months or has a visit in the next 30 days with authorizing provider or within the authorizing provider's specialty.  See \"Patient Info\" tab in inbasket, or \"Choose Columns\" in Meds & Orders section of the " "refill encounter.              albuterol (PROVENTIL) (2.5 MG/3ML) 0.083% neb solution 75 mL 3     Sig: Take 1 vial (2.5 mg) by nebulization every 6 hours as needed for shortness of breath or wheezing       Asthma Maintenance Inhalers - Anticholinergics Failed - 1/25/2024  9:39 AM        Failed - Asthma control assessment score within normal limits in last 6 months     Please review ACT score.           Passed - Patient is age 12 years or older        Passed - Medication is active on med list        Passed - Recent (6 mo) or future (30 days) visit within the authorizing provider's specialty     Patient had office visit in the last 6 months or has a visit in the next 30 days with authorizing provider or within the authorizing provider's specialty.  See \"Patient Info\" tab in inbasket, or \"Choose Columns\" in Meds & Orders section of the refill encounter.           Short-Acting Beta Agonist Inhalers Protocol  Failed - 1/25/2024  9:39 AM        Failed - Asthma control assessment score within normal limits in last 6 months     Please review ACT score.           Passed - Patient is age 12 or older        Passed - Medication is active on med list        Passed - Recent (6 mo) or future (30 days) visit within the authorizing provider's specialty     Patient had office visit in the last 6 months or has a visit in the next 30 days with authorizing provider or within the authorizing provider's specialty.  See \"Patient Info\" tab in inbasket, or \"Choose Columns\" in Meds & Orders section of the refill encounter.                 Fernando Angulo RN 01/25/24 9:39 AM  "

## 2024-01-26 DIAGNOSIS — G43.109 VERTIGINOUS MIGRAINE: Primary | ICD-10-CM

## 2024-01-26 RX ORDER — LORAZEPAM 0.5 MG/1
0.5 TABLET ORAL AT BEDTIME
Qty: 90 TABLET | Refills: 1 | Status: SHIPPED | OUTPATIENT
Start: 2024-01-26 | End: 2024-05-23

## 2024-02-16 ENCOUNTER — TRANSFERRED RECORDS (OUTPATIENT)
Dept: HEALTH INFORMATION MANAGEMENT | Facility: CLINIC | Age: 74
End: 2024-02-16
Payer: MEDICARE

## 2024-02-16 LAB — RETINOPATHY: POSITIVE

## 2024-02-17 ENCOUNTER — HEALTH MAINTENANCE LETTER (OUTPATIENT)
Age: 74
End: 2024-02-17

## 2024-02-19 ENCOUNTER — PATIENT OUTREACH (OUTPATIENT)
Dept: CARE COORDINATION | Facility: CLINIC | Age: 74
End: 2024-02-19
Payer: MEDICARE

## 2024-03-15 SDOH — HEALTH STABILITY: PHYSICAL HEALTH: ON AVERAGE, HOW MANY DAYS PER WEEK DO YOU ENGAGE IN MODERATE TO STRENUOUS EXERCISE (LIKE A BRISK WALK)?: 4 DAYS

## 2024-03-15 SDOH — HEALTH STABILITY: PHYSICAL HEALTH: ON AVERAGE, HOW MANY MINUTES DO YOU ENGAGE IN EXERCISE AT THIS LEVEL?: 60 MIN

## 2024-03-15 ASSESSMENT — SOCIAL DETERMINANTS OF HEALTH (SDOH): HOW OFTEN DO YOU GET TOGETHER WITH FRIENDS OR RELATIVES?: TWICE A WEEK

## 2024-03-18 ENCOUNTER — PATIENT OUTREACH (OUTPATIENT)
Dept: CARE COORDINATION | Facility: CLINIC | Age: 74
End: 2024-03-18
Payer: MEDICARE

## 2024-03-19 ENCOUNTER — OFFICE VISIT (OUTPATIENT)
Dept: INTERNAL MEDICINE | Facility: CLINIC | Age: 74
End: 2024-03-19
Payer: MEDICARE

## 2024-03-19 VITALS
RESPIRATION RATE: 16 BRPM | HEIGHT: 63 IN | BODY MASS INDEX: 34.87 KG/M2 | OXYGEN SATURATION: 98 % | WEIGHT: 196.8 LBS | TEMPERATURE: 98.1 F | SYSTOLIC BLOOD PRESSURE: 138 MMHG | HEART RATE: 105 BPM | DIASTOLIC BLOOD PRESSURE: 72 MMHG

## 2024-03-19 DIAGNOSIS — R74.8 ALKALINE PHOSPHATASE ELEVATION: ICD-10-CM

## 2024-03-19 DIAGNOSIS — E11.22 TYPE 2 DIABETES MELLITUS WITH STAGE 3B CHRONIC KIDNEY DISEASE, WITH LONG-TERM CURRENT USE OF INSULIN (H): ICD-10-CM

## 2024-03-19 DIAGNOSIS — M25.50 ARTHRALGIA, UNSPECIFIED JOINT: ICD-10-CM

## 2024-03-19 DIAGNOSIS — E66.01 CLASS 2 SEVERE OBESITY DUE TO EXCESS CALORIES WITH SERIOUS COMORBIDITY AND BODY MASS INDEX (BMI) OF 38.0 TO 38.9 IN ADULT (H): ICD-10-CM

## 2024-03-19 DIAGNOSIS — E03.9 ACQUIRED HYPOTHYROIDISM: ICD-10-CM

## 2024-03-19 DIAGNOSIS — I89.0 LYMPHEDEMA OF BOTH LOWER EXTREMITIES: ICD-10-CM

## 2024-03-19 DIAGNOSIS — I10 ESSENTIAL HYPERTENSION: ICD-10-CM

## 2024-03-19 DIAGNOSIS — Z00.00 MEDICARE ANNUAL WELLNESS VISIT, SUBSEQUENT: Primary | ICD-10-CM

## 2024-03-19 DIAGNOSIS — Z79.4 TYPE 2 DIABETES MELLITUS WITH STAGE 3B CHRONIC KIDNEY DISEASE, WITH LONG-TERM CURRENT USE OF INSULIN (H): ICD-10-CM

## 2024-03-19 DIAGNOSIS — M10.9 URIC ACID ARTHROPATHY: ICD-10-CM

## 2024-03-19 DIAGNOSIS — N18.32 TYPE 2 DIABETES MELLITUS WITH STAGE 3B CHRONIC KIDNEY DISEASE, WITH LONG-TERM CURRENT USE OF INSULIN (H): ICD-10-CM

## 2024-03-19 DIAGNOSIS — E83.52 HYPERCALCEMIA: ICD-10-CM

## 2024-03-19 DIAGNOSIS — E66.812 CLASS 2 SEVERE OBESITY DUE TO EXCESS CALORIES WITH SERIOUS COMORBIDITY AND BODY MASS INDEX (BMI) OF 38.0 TO 38.9 IN ADULT (H): ICD-10-CM

## 2024-03-19 PROBLEM — I47.10 PAROXYSMAL SUPRAVENTRICULAR TACHYCARDIA (H): Status: ACTIVE | Noted: 2023-08-18

## 2024-03-19 PROBLEM — J45.20 MILD INTERMITTENT ASTHMA WITHOUT COMPLICATION: Status: ACTIVE | Noted: 2022-04-07

## 2024-03-19 PROBLEM — E11.65 HYPERGLYCEMIA DUE TO DIABETES MELLITUS (H): Status: RESOLVED | Noted: 2023-08-18 | Resolved: 2024-03-19

## 2024-03-19 LAB
BASOPHILS # BLD AUTO: 0.1 10E3/UL (ref 0–0.2)
BASOPHILS NFR BLD AUTO: 1 %
EOSINOPHIL # BLD AUTO: 0.1 10E3/UL (ref 0–0.7)
EOSINOPHIL NFR BLD AUTO: 1 %
ERYTHROCYTE [DISTWIDTH] IN BLOOD BY AUTOMATED COUNT: 14.7 % (ref 10–15)
HBA1C MFR BLD: 7.2 % (ref 0–5.6)
HCT VFR BLD AUTO: 49.9 % (ref 35–47)
HGB BLD-MCNC: 15.9 G/DL (ref 11.7–15.7)
IMM GRANULOCYTES # BLD: 0 10E3/UL
IMM GRANULOCYTES NFR BLD: 0 %
LYMPHOCYTES # BLD AUTO: 2.2 10E3/UL (ref 0.8–5.3)
LYMPHOCYTES NFR BLD AUTO: 31 %
MCH RBC QN AUTO: 28.8 PG (ref 26.5–33)
MCHC RBC AUTO-ENTMCNC: 31.9 G/DL (ref 31.5–36.5)
MCV RBC AUTO: 90 FL (ref 78–100)
MONOCYTES # BLD AUTO: 0.4 10E3/UL (ref 0–1.3)
MONOCYTES NFR BLD AUTO: 6 %
NEUTROPHILS # BLD AUTO: 4.3 10E3/UL (ref 1.6–8.3)
NEUTROPHILS NFR BLD AUTO: 61 %
PLATELET # BLD AUTO: 222 10E3/UL (ref 150–450)
RBC # BLD AUTO: 5.53 10E6/UL (ref 3.8–5.2)
WBC # BLD AUTO: 7 10E3/UL (ref 4–11)

## 2024-03-19 PROCEDURE — 82043 UR ALBUMIN QUANTITATIVE: CPT | Performed by: INTERNAL MEDICINE

## 2024-03-19 PROCEDURE — 84550 ASSAY OF BLOOD/URIC ACID: CPT | Performed by: INTERNAL MEDICINE

## 2024-03-19 PROCEDURE — 99214 OFFICE O/P EST MOD 30 MIN: CPT | Mod: 25 | Performed by: INTERNAL MEDICINE

## 2024-03-19 PROCEDURE — 82977 ASSAY OF GGT: CPT | Performed by: INTERNAL MEDICINE

## 2024-03-19 PROCEDURE — 86381 MITOCHONDRIAL ANTIBODY EACH: CPT | Performed by: INTERNAL MEDICINE

## 2024-03-19 PROCEDURE — 83036 HEMOGLOBIN GLYCOSYLATED A1C: CPT | Performed by: INTERNAL MEDICINE

## 2024-03-19 PROCEDURE — 82607 VITAMIN B-12: CPT | Performed by: INTERNAL MEDICINE

## 2024-03-19 PROCEDURE — G0439 PPPS, SUBSEQ VISIT: HCPCS | Performed by: INTERNAL MEDICINE

## 2024-03-19 PROCEDURE — 82570 ASSAY OF URINE CREATININE: CPT | Performed by: INTERNAL MEDICINE

## 2024-03-19 PROCEDURE — 80053 COMPREHEN METABOLIC PANEL: CPT | Performed by: INTERNAL MEDICINE

## 2024-03-19 PROCEDURE — 84443 ASSAY THYROID STIM HORMONE: CPT | Performed by: INTERNAL MEDICINE

## 2024-03-19 PROCEDURE — 80061 LIPID PANEL: CPT | Performed by: INTERNAL MEDICINE

## 2024-03-19 PROCEDURE — 85025 COMPLETE CBC W/AUTO DIFF WBC: CPT | Performed by: INTERNAL MEDICINE

## 2024-03-19 PROCEDURE — 36415 COLL VENOUS BLD VENIPUNCTURE: CPT | Performed by: INTERNAL MEDICINE

## 2024-03-19 RX ORDER — METOPROLOL SUCCINATE 25 MG/1
25 TABLET, EXTENDED RELEASE ORAL DAILY
Qty: 90 TABLET | Refills: 3 | Status: SHIPPED | OUTPATIENT
Start: 2024-03-19 | End: 2025-03-19

## 2024-03-19 RX ORDER — ALLOPURINOL 100 MG/1
200 TABLET ORAL DAILY
Qty: 180 TABLET | Refills: 3 | Status: SHIPPED | OUTPATIENT
Start: 2024-03-19

## 2024-03-19 RX ORDER — CELECOXIB 200 MG/1
200 CAPSULE ORAL DAILY
Qty: 90 CAPSULE | Refills: 3 | Status: SHIPPED | OUTPATIENT
Start: 2024-03-19

## 2024-03-19 RX ORDER — RESPIRATORY SYNCYTIAL VIRUS VACCINE 120MCG/0.5
0.5 KIT INTRAMUSCULAR ONCE
Qty: 1 EACH | Refills: 0 | Status: CANCELLED | OUTPATIENT
Start: 2024-03-19 | End: 2024-03-19

## 2024-03-19 ASSESSMENT — PAIN SCALES - GENERAL: PAINLEVEL: NO PAIN (0)

## 2024-03-19 NOTE — PROGRESS NOTES
Preventive Care Visit  [facility]  Nate Barcenas MD, Internal Medicine  Mar 19, 2024  {Provider  Link to SmartSet :849284}    {PROVIDER CHARTING PREFERENCE:362460}    Izabella Broderick is a 73 year old, presenting for the following:  Recheck Medication and Physical        3/19/2024     2:15 PM   Additional Questions   Roomed by zackery anthony   Accompanied by self     {ROOMER if patient is in their first year of Medicare a vision screen is required click here to document the Vison screen and then refresh the note to pull in results  :893439}    Via the Health Maintenance questionnaire, the patient has reported the following services have been completed -Mammogram, this information has been sent to the abstraction team.  Health Care Directive  Patient does not have a Health Care Directive or Living Will: {ADVANCE_DIRECTIVE_STATUS:244094}    HPI  ***  {MA/LPN/RN Pre-Provider Visit Orders- hCG/UA/Strep (Optional):326601}  {SUPERLIST (Optional):309081}  {additonal problems for provider to add (Optional):300778}      3/15/2024   General Health   How would you rate your overall physical health? Good   Feel stress (tense, anxious, or unable to sleep) Only a little   (!) STRESS CONCERN      3/15/2024   Nutrition   Diet: Diabetic         3/15/2024   Exercise   Days per week of moderate/strenous exercise 4 days   Average minutes spent exercising at this level 60 min         3/15/2024   Social Factors   Frequency of gathering with friends or relatives Twice a week   Worry food won't last until get money to buy more No   Food not last or not have enough money for food? No   Do you have housing?  Yes   Are you worried about losing your housing? No   Lack of transportation? No   Unable to get utilities (heat,electricity)? No         3/15/2024   Fall Risk   Fallen 2 or more times in the past year? No   Trouble with walking or balance? No          3/15/2024   Activities of Daily Living- Home Safety   Needs  help with the following daily activites None of the above   Safety concerns in the home None of the above         3/15/2024   Dental   Dentist two times every year? Yes         3/15/2024   Hearing Screening   Hearing concerns? None of the above         3/15/2024   Driving Risk Screening   Patient/family members have concerns about driving No         3/15/2024   General Alertness/Fatigue Screening   Have you been more tired than usual lately? No         3/15/2024   Urinary Incontinence Screening   Bothered by leaking urine in past 6 months No         3/15/2024   TB Screening   Were you born outside of the [place]? No         Today's PHQ-2 Score:       3/19/2024     2:12 PM   PHQ-2 ( 1999 Pf[employer])   Q1: Little interest or pleasure in doing things 0   Q2: Feeling down, depressed or hopeless 0   PHQ-2 Score 0   Q1: Little interest or pleasure in doing things Not at all   Q2: Feeling down, depressed or hopeless Not at all   PHQ-2 Score 0           3/15/2024   Substance Use   Alcohol more than 3/day or more than 7/wk No   Do you have a current opioid prescription? No   How severe/bad is pain from 1 to 10? 1/10   Do you use any other substances recreationally? No     Social History     Tobacco Use    Smoking status: Never    Smokeless tobacco: Never   Vaping Use    Vaping Use: Never used   Substance Use Topics    Alcohol use: No    Drug use: No     {Provider  If there are gaps in the social history shown above, please follow the link to update and then refresh the note Link to Social and Substance History :836521}      3/13/2023   LAST FHS-7 RESULTS   1st degree relative breast or ovarian cancer No   Any relative bilateral breast cancer No   Any male have breast cancer No   Any ONE woman have BOTH breast AND ovarian cancer No   Any woman with breast cancer before 50yrs No   2 or more relatives with breast AND/OR ovarian cancer No   2 or more relatives with breast AND/OR bowel cancer No     {If any of the questions to the FHS7  are answered yes, consider referral for genetic counseling.    Additional indications for genetic referral include personal history of breast or ovarian cancer, genetic mutation in 1st degree relative which increases risk of breast cancer including BRCA1, BRCA2, BOBBY, PALB 2, TP53, CHEK2, PTEN, CDH1, STK11 (per ACS) and/or 1st degree relative with history of pancreatic or high-risk prostate cancer (per NCCN):605223}   {Mammogram Decision Support (Optional):848397}    ASCVD Risk   The 10-year ASCVD risk score (Stuart CLEMENTS, et al., 2019) is: 28.4%    Values used to calculate the score:      Age: 73 years      Sex: Female      Is Non- : No      Diabetic: Yes      Tobacco smoker: No      Systolic Blood Pressure: 138 mmHg      Is BP treated: No      HDL Cholesterol: 39 mg/dL      Total Cholesterol: 132 mg/dL    {Link to Fracture Risk Assessment Tool (Optional):576828}    {Provider  Use the storyboard to review patient history, after sections have been marked as reviewed, refresh note to capture documentation:375966}  {Provider   REQUIRED AWV use this link to review and update sexual activity history  after section has been marked as reviewed, refresh note to capture documentation:728342}  Reviewed and updated as needed this visit by Provider                    {HISTORY OPTIONS (Optional):644829}  Current providers sharing in care for this patient include:  Patient Care Team:  Nate Barcenas MD as PCP - General (Internal Medicine)  Stacey Kelly MD as Referring Physician (Internal Medicine)  Trevor Hastings, PharmD as Pharmacist (Pharmacist)  Nate Barcenas MD as Assigned PCP  Srinivas Cisneros RPH as Pharmacist  Srinivas Cisneros RPH as Pharmacist (Pharmacist)  Mitch Sullivan MD as MD (Cardiovascular Disease)  Marcie Pena APRN CNP as Assigned Heart and Vascular Provider    The following health maintenance items are reviewed in Epic and correct as of  "today:  Health Maintenance   Topic Date Due    DIABETIC FOOT EXAM  Never done    ZOSTER IMMUNIZATION (1 of 2) Never done    RSV VACCINE (Pregnancy & 60+) (1 - 1-dose 60+ series) Never done    MEDICARE ANNUAL WELLNESS VISIT  08/19/2023    LIPID  08/19/2023    ANNUAL REVIEW OF HM ORDERS  08/19/2023    A1C  12/27/2023    MAMMO SCREENING  03/20/2024    COVID-19 Vaccine (7 - 2023-24 season) 08/20/2024 (Originally 11/16/2023)    TSH W/FREE T4 REFLEX  08/18/2024    BMP  10/05/2024    HEMOGLOBIN  10/05/2024    EYE EXAM  02/16/2025    FALL RISK ASSESSMENT  03/19/2025    COLORECTAL CANCER SCREENING  02/24/2026    ADVANCE CARE PLANNING  08/19/2027    DEXA  01/19/2033    PARATHYROID  Completed    PHOSPHORUS  Completed    HEPATITIS C SCREENING  Completed    PHQ-2 (once per calendar year)  Completed    INFLUENZA VACCINE  Completed    Pneumococcal Vaccine: 65+ Years  Completed    URINALYSIS  Completed    ALK PHOS  Completed    IPV IMMUNIZATION  Aged Out    HPV IMMUNIZATION  Aged Out    MENINGITIS IMMUNIZATION  Aged Out    RSV MONOCLONAL ANTIBODY  Aged Out    MICROALBUMIN  Discontinued    DTAP/TDAP/TD IMMUNIZATION  Discontinued       {ROS Picklists (Optional):909696}     Objective    Exam  /72 (BP Location: Left arm, Patient Position: Sitting, Cuff Size: Adult Large)   Pulse 105   Temp 98.1  F (36.7  C)   Resp 16   Ht 1.6 m (5' 3\")   Wt 89.3 kg (196 lb 12.8 oz)   LMP  (LMP Unknown)   SpO2 98%   BMI 34.86 kg/m     Estimated body mass index is 34.86 kg/m  as calculated from the following:    Height as of this encounter: 1.6 m (5' 3\").    Weight as of this encounter: 89.3 kg (196 lb 12.8 oz).    Physical Exam  {Exam Choices (Optional):385401}        3/19/2024   Mini Cog   Clock Draw Score 2 Normal   3 Item Recall 3 objects recalled   Mini Cog Total Score 5     {A Mini-Cog total score of 0-2 suggests the possibility of dementia, score of 3-5 suggests no dementia:476643}         Signed Electronically by: Nate PAEZ" MD Narinder  {Email feedback regarding this note to [email or web address]   :796481}

## 2024-03-19 NOTE — PATIENT INSTRUCTIONS
We can increase mounjaro to 12 or 15, but you need 15.  Leave same for now    We can increase jardiance to 25 for better control of kidney and fluid and diabetes.  Finish 10's taking 2 per day    We can increase thyroid if room, to 137    Update labs    Resume metoprolol 25 mgs    Cologuard last year, good for 3, due 2026    Mammograms scheduled    Shots look good

## 2024-03-19 NOTE — PROGRESS NOTES
ANNUAL WELLNESS VISIT--Face to Face    Assessment and Plan:     ASSESSMENT and PLAN:  1. Medicare annual wellness visit, subsequent  - REVIEW OF HEALTH MAINTENANCE PROTOCOL ORDERS    2. Type 2 diabetes mellitus with stage 3b chronic kidney disease, with long-term current use of insulin (H)  Increase Jardiance.  Update labs.  Discussed timing of increased Mounjaro  - empagliflozin (JARDIANCE) 25 MG TABS tablet; Take 1 tablet (25 mg) by mouth daily  Dispense: 90 tablet; Refill: 3  - Lipid panel reflex to direct LDL Non-fasting; Future  - HEMOGLOBIN A1C; Future  - Comprehensive metabolic panel; Future  - tirzepatide (MOUNJARO) 5 MG/0.5ML pen; Inject 10 mg Subcutaneous every 7 days  Dispense: 12 mL; Refill: 3  - Albumin Random Urine Quantitative with Creat Ratio; Future  - Lipid panel reflex to direct LDL Non-fasting  - HEMOGLOBIN A1C  - Comprehensive metabolic panel  - Albumin Random Urine Quantitative with Creat Ratio    3. Class 2 severe obesity due to excess calories with serious comorbidity and body mass index (BMI) of 38.0 to 38.9 in adult (H)  Weight decreased from maximum of 308 pounds to today's weight of 196 pounds.  Still losing weight on current dose of Mounjaro.  Optimize thyroid.  Total weight loss 114 pounds    4. Uric acid arthropathy  Recheck on lower dose allopurinol  - allopurinol (ZYLOPRIM) 100 MG tablet; Take 2 tablets (200 mg) by mouth daily  Dispense: 180 tablet; Refill: 3  - Uric acid; Future  - Uric acid    5. Arthralgia, unspecified joint  - celecoxib (CELEBREX) 200 MG capsule; Take 1 capsule (200 mg) by mouth daily  Dispense: 90 capsule; Refill: 3  - Vitamin B12; Future  - Vitamin B12    6. Acquired hypothyroidism  Optimize.  Increase to 4 oh  - TSH; Future  - TSH    7. Lymphedema of both lower extremities  Increase Jardiance  - CBC with Platelets & Differential; Future  - CBC with Platelets & Differential    8. Essential hypertension  Resume metoprolol after ablation  - Comprehensive  metabolic panel; Future  - metoprolol succinate ER (TOPROL XL) 25 MG 24 hr tablet; Take 1 tablet (25 mg) by mouth daily  Dispense: 90 tablet; Refill: 3  - Albumin Random Urine Quantitative with Creat Ratio; Future  - Comprehensive metabolic panel  - Albumin Random Urine Quantitative with Creat Ratio       Patient Instructions   We can increase mounjaro to 12 or 15, but you need 15.  Leave same for now    We can increase jardiance to 25 for better control of kidney and fluid and diabetes.  Finish 10's taking 2 per day    We can increase thyroid if room, to 137    Update labs    Resume metoprolol 25 mgs    Cologuard last year, good for 3, due 2026    Mammograms scheduled    Shots look good            Return in about 6 months (around 9/19/2024) for using a video visit.         Subjective:   Meggan is a 73 year old female who presents to the clinic today for an Annual Wellness Visit.    HPI: When we spoke last in January she had suffered a second episode of supraventricular tachycardia.  Referred to cardiology, and underwent ablation 1 month later.  Metoprolol discontinued completely however resting heart rate since then .  Has been on metoprolol for many many years, and not just for SVT.  Feels better when heart rate is lower.  Does think asthma may have been worsened on metoprolol and slightly better without    Total weight loss greater than 100 pounds on Mounjaro.  Still losing weight on current dose    Still has peripheral edema from lymphedema.  Discussed dose of Jardiance which was initiated 2 years ago    Feels cold and tired.  Discussed increasing thyroid if renal    Review of Systems    Review of Systems: Stable peripheral edema lymphedema treated with support stockings.  Arthritis taking Celebrex.  Asthma and breathing stable, worsened when ill but otherwise does not use inhalers    Current Outpatient Medications   Medication Sig Dispense Refill     albuterol (PROAIR HFA/PROVENTIL HFA/VENTOLIN HFA) 108  (90 Base) MCG/ACT inhaler Inhale 2 puffs into the lungs every 6 hours as needed for shortness of breath or wheezing 18 g 3     albuterol (PROVENTIL) (2.5 MG/3ML) 0.083% neb solution Take 1 vial (2.5 mg) by nebulization every 6 hours as needed for shortness of breath or wheezing 75 mL 3     alcohol swab prep pads Use to swab area of injection/vicki as directed. 100 each 3     allopurinol (ZYLOPRIM) 100 MG tablet Take 2 tablets (200 mg) by mouth daily 180 tablet 3     aspirin (ASA) 81 MG chewable tablet Take 81 mg by mouth At Bedtime       blood glucose monitoring (ACCU-CHEK FASTCLIX) lancets Use to test blood sugar 4 times daily. 204 each 6     blood glucose monitoring (NO BRAND SPECIFIED) meter device kit Use to test blood sugar 4 times daily or as directed. Preferred blood glucose meter OR supplies to accompany: Blood Glucose Monitor Brands: per insurance. 1 kit 0     celecoxib (CELEBREX) 200 MG capsule Take 1 capsule (200 mg) by mouth daily 90 capsule 3     colchicine (COLCYRS) 0.6 MG tablet Take 1 tablet (0.6 mg) by mouth daily as needed for gout pain 90 tablet 3     Continuous Blood Gluc  (FREESTYLE DIEGO 2 READER) [place] 1 each continuous Use to read blood sugars per 's instructions. 1 each 0     Continuous Blood Gluc Sensor (FREESTYLE DIEGO 2 SENSOR) [facility] [place] 1 each every 14 days To check blood sugars continuously 2 each 5     empagliflozin (JARDIANCE) 25 MG TABS tablet Take 1 tablet (25 mg) by mouth daily 90 tablet 3     famotidine (PEPCID) 20 MG tablet Take 20 mg by mouth daily       levothyroxine (SYNTHROID) 125 MCG tablet Take 1 tablet (125 mcg) by mouth daily 90 tablet 3     LORazepam (ATIVAN) 0.5 MG tablet Take 1 tablet (0.5 mg) by mouth at bedtime for 180 days 90 tablet 1     metoprolol succinate ER (TOPROL XL) 25 MG 24 hr tablet Take 1 tablet (25 mg) by mouth daily 90 tablet 3     Probiotic Product (PROBIOTIC-10 PO) Take 1 capsule by mouth daily Florastor        rosuvastatin (CRESTOR) 10  "MG tablet Take 0.5 tablets (5 mg) by mouth three times a week 90 tablet 3     thin (NO BRAND SPECIFIED) lancets Use with lanceting device. To accompany: Blood Glucose Monitor Brands: per insurance. 200 each 6     tirzepatide (MOUNJARO) 5 MG/0.5ML pen Inject 10 mg Subcutaneous every 7 days 12 mL 3     tiZANidine (ZANAFLEX) 4 MG capsule Take 1 capsule (4 mg) by mouth At Bedtime 90 capsule 3     triamcinolone (KENALOG) 0.1 % external cream Apply topically 2 times daily (Patient taking differently: Apply topically as needed) 30 g 3     Vitamin D3 (CHOLECALCIFEROL) 25 mcg (1000 units) tablet Take 1 tablet by mouth daily         Objective:   Exam  /72 (BP Location: Left arm, Patient Position: Sitting, Cuff Size: Adult Large)   Pulse 105   Temp 98.1  F (36.7  C)   Resp 16   Ht 1.6 m (5' 3\")   Wt 89.3 kg (196 lb 12.8 oz)   LMP  (LMP Unknown)   SpO2 98%   BMI 34.86 kg/m     Estimated body mass index is 34.86 kg/m  as calculated from the following:    Height as of this encounter: 1.6 m (5' 3\").    Weight as of this encounter: 89.3 kg (196 lb 12.8 oz).    PHYSICAL EXAM:  Wearing mask when entering room?  No  Constitutional:  Reveals pleasant quiet woman who ambulates slowly but without assistance  Palpation of radial pulse regular, regular but slightly fast  Thyroid:  Non palpable   Cardiac:  Regular rate and rhythm   Lungs: Clear.  Respiratory effort normal.  Edema of Legs: Trace bilaterally  Psychiatric:  Alert and oriented               3/19/2024   Mini Cog   Clock Draw Score 2 Normal   3 Item Recall 3 objects recalled   Mini Cog Total Score 5              Cognitive Screening   Completely normal to conversational questioning        Recent Labs   Lab Test 10/05/23  1630 10/05/23  1357 08/25/22  1423 08/19/22  1325 04/29/22  1144   CHOL  --   --   --  132  --    * 164*   < > 170* 248*   VITDT  --   --   --   --  70    < > = values in this interval not displayed.       Patient Active Problem List "   Diagnosis     Fibroadenoma of LEFT breast, with fibrocystic changes     Senile osteoporosis     Chronic kidney disease, stage III (moderate) (H)     Chronic use of benzodiazepine for therapeutic purpose     Primary osteoarthritis of both knees     Obstructive sleep apnea syndrome     Type 2 diabetes mellitus with stage 3b chronic kidney disease, with long-term current use of insulin (H)     Screening for colon cancer     Mild intermittent asthma without complication     Acquired hypothyroidism     Vertiginous migraine     Lymphedema of both lower extremities     Status post total right knee replacement     Class 2 severe obesity due to excess calories with serious comorbidity in adult (H)     Status post total left knee replacement     Paroxysmal supraventricular tachycardia     Esophageal reflux     Gout     Hyperlipidemia     Weight loss     Status post catheter ablation of slow pathway     Past Medical History:   Diagnosis Date     Asthma      Chronic kidney failure, stage 3 (moderate) (H)      Chronic use of benzodiazepine for therapeutic purpose      Esophageal reflux      Fibroadenoma of LEFT breast, with fibrocystic changes 05/14/2012     Gout      Hyperlipidemia      Hypothyroid      Mammographic microcalcification 05/01/2012    Left     Migraine with aura      Obesity      ASHER (obstructive sleep apnea)     uses CPAP     Osteoporosis      Primary osteoarthritis of both knees 09/28/2020     RBBB      Screening for colon cancer 11/29/2021     Type 2 diabetes mellitus (H)       Past Surgical History:   Procedure Laterality Date     BIOPSY BREAST       D & C  2000, 2002     DILATE CERVIX, ABLATE ENDOMETRIUM, COMBINED  01/01/2005     ENDOMETRIAL ABLATION       EP ABLATION SVT N/A 10/5/2023    Procedure: Ablation Supraventricular Tachycardia;  Surgeon: Mitch Sullivan MD;  Location: [facility] BIOPSY OF BREAST, OPEN INCISIONAL  01/01/1972    Left      LAPAROSCOPY, SURGICAL;  CHOLECYSTECTOMY  01/01/1998     LAPAROSCOPIC CHOLECYSTECTOMY  01/01/1999     LUMBAR LAMINECTOMY  01/01/1998    Description: Laminectomy Lumbar     LUMPECTOMY BREAST       Microdiscectomy  01/01/1998      Family History   Problem Relation Age of Onset     Autoimmune Disease No family hx of      Hypertension Mother         alive in her 90s     Atrial fibrillation Father         alive in his 90s     LUNG DISEASE Sister         interstitial lung disease     Heart Disease Sister       Social History     Socioeconomic History     Marital status:      Spouse name: Not on file     Number of children: Not on file     Years of education: Not on file     Highest education level: Not on file   Occupational History     Occupation: Retired   Tobacco Use     Smoking status: Never     Smokeless tobacco: Never   Vaping Use     Vaping Use: Never used   Substance and Sexual Activity     Alcohol use: No     Drug use: No     Sexual activity: Never   Other Topics Concern     Not on file   Social History Narrative    She is  and is retired. She lives with her son and his family on the lower level of their house.  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  She has 5 grandchildren.  She does not smoke cigarettes or drink alcohol.     Social Determinants of Health     Financial Resource Strain: Low Risk  (3/15/2024)    Financial Resource Strain      Within the past 12 months, have you or your family members you live with been unable to get utilities (heat, electricity) when it was really needed?: No   Food Insecurity: Low Risk  (3/15/2024)    Food Insecurity      Within the past 12 months, did you worry that your food would run out before you got money to buy more?: No      Within the past 12 months, did the food you bought just not last and you didn t have money to get more?: No   Transportation Needs: Low Risk  (3/15/2024)    Transportation Needs      Within the past 12  months, has lack of transportation kept you from medical appointments, getting your medicines, non-medical meetings or appointments, work, or from getting things that you need?: No   Physical Activity: Sufficiently Active (3/15/2024)    Exercise Vital Sign      Days of Exercise per Week: 4 days      Minutes of Exercise per Session: 60 min   Stress: No Stress Concern Present (3/15/2024)    [employer] [employer] [place] [employer] - Occupational Stress Questionnaire      Feeling of Stress : Only a little   Social Connections: Unknown (3/15/2024)    Social Connection and Isolation Panel [NHANES]      Frequency of Communication with Friends and Family: Not on file      Frequency of Social Gatherings with Friends and Family: Twice a week      Attends Adventism Services: Not on file      Active Member of Clubs or Organizations: Not on file      Attends Club or Organization Meetings: Not on file      Marital Status: Not on file   Interpersonal Safety: Low Risk  (3/19/2024)    Interpersonal Safety      Do you feel physically and emotionally safe where you currently live?: Yes      Within the past 12 months, have you been hit, slapped, kicked or otherwise physically hurt by someone?: No      Within the past 12 months, have you been humiliated or emotionally abused in other ways by your partner or ex-partner?: No   Housing Stability: Low Risk  (3/15/2024)    Housing Stability      Do you have housing? : Yes      Are you worried about losing your housing?: No        Preventive Care Visit  Mar 19, 2024    Izabella Broderick is a 73 year old, presenting for the following:  Recheck Medication and Physical        3/19/2024     2:15 PM   Additional Questions   Roomed by zackery anthony   Accompanied by self         Via the Health Maintenance questionnaire, the patient has reported the following services have been completed -Mammogram, this information has been sent to the abstraction team.  Health Care Directive  Patient does not have a  Health Care Directive or Living Will: Discussed advance care planning with patient; information given to patient to review.          3/15/2024   General Health   How would you rate your overall physical health? Good   Feel stress (tense, anxious, or unable to sleep) Only a little   (!) STRESS CONCERN      3/15/2024   Nutrition   Diet: Diabetic         3/15/2024   Exercise   Days per week of moderate/strenous exercise 4 days   Average minutes spent exercising at this level 60 min         3/15/2024   Social Factors   Frequency of gathering with friends or relatives Twice a week   Worry food won't last until get money to buy more No   Food not last or not have enough money for food? No   Do you have housing?  Yes   Are you worried about losing your housing? No   Lack of transportation? No   Unable to get utilities (heat,electricity)? No         3/15/2024   Fall Risk   Fallen 2 or more times in the past year? No   Trouble with walking or balance? No          3/15/2024   Activities of Daily Living- Home Safety   Needs help with the following daily activites None of the above   Safety concerns in the home None of the above         3/15/2024   Dental   Dentist two times every year? Yes         3/15/2024   Hearing Screening   Hearing concerns? None of the above         3/15/2024   Driving Risk Screening   Patient/family members have concerns about driving No         3/15/2024   General Alertness/Fatigue Screening   Have you been more tired than usual lately? No         3/15/2024   Urinary Incontinence Screening   Bothered by leaking urine in past 6 months No         3/15/2024   TB Screening   Were you born outside of the [place]? No         Today's PHQ-2 Score:       3/19/2024     2:12 PM   PHQ-2 ( 1999 Pfizer)   Q1: Little interest or pleasure in doing things 0   Q2: Feeling down, depressed or hopeless 0   PHQ-2 Score 0   Q1: Little interest or pleasure in doing things Not at all   Q2: Feeling down, depressed or hopeless Not at  all   PHQ-2 Score 0           3/15/2024   Substance Use   Alcohol more than 3/day or more than 7/wk No   Do you have a current opioid prescription? No   How severe/bad is pain from 1 to 10? 1/10   Do you use any other substances recreationally? No     Social History     Tobacco Use     Smoking status: Never     Smokeless tobacco: Never   Vaping Use     Vaping Use: Never used   Substance Use Topics     Alcohol use: No     Drug use: No           3/13/2023   LAST FHS-7 RESULTS   1st degree relative breast or ovarian cancer No   Any relative bilateral breast cancer No   Any male have breast cancer No   Any ONE woman have BOTH breast AND ovarian cancer No   Any woman with breast cancer before 50yrs No   2 or more relatives with breast AND/OR ovarian cancer No   2 or more relatives with breast AND/OR bowel cancer No            ASCVD Risk   The 10-year ASCVD risk score (Stuart CLEMENTS, et al., 2019) is: 36.5%    Values used to calculate the score:      Age: 73 years      Sex: Female      Is Non- : No      Diabetic: Yes      Tobacco smoker: No      Systolic Blood Pressure: 138 mmHg      Is BP treated: Yes      HDL Cholesterol: 39 mg/dL      Total Cholesterol: 132 mg/dL            Reviewed and updated as needed this visit by Provider     Meds                Current providers sharing in care for this patient include:  Patient Care Team:  Nate Barcenas MD as PCP - General (Internal Medicine)  Stacey Kelly MD as Referring Physician (Internal Medicine)  Trevor Hastings, PharmD as Pharmacist (Pharmacist)  Nate Barcenas MD as Assigned PCP  Srinivas Cisneros RPH as Pharmacist  Srinivas Cisneros RPH as Pharmacist (Pharmacist)  Mitch Sullivan MD as MD (Cardiovascular Disease)  Marcie Pena APRN CNP as Assigned Heart and Vascular Provider    The following health maintenance items are reviewed in Epic and correct as of today:  Health Maintenance   Topic Date Due      DIABETIC FOOT EXAM  Never done     ZOSTER IMMUNIZATION (1 of 2) Never done     ASTHMA CONTROL TEST  01/22/2022     ASTHMA ACTION PLAN  07/22/2022     LIPID  08/19/2023     MAMMO SCREENING  03/20/2024     COVID-19 Vaccine (7 - 2023-24 season) 08/20/2024 (Originally 11/16/2023)     TSH W/FREE T4 REFLEX  08/18/2024     A1C  09/19/2024     BMP  10/05/2024     EYE EXAM  02/16/2025     MEDICARE ANNUAL WELLNESS VISIT  03/19/2025     ANNUAL REVIEW OF HM ORDERS  03/19/2025     FALL RISK ASSESSMENT  03/19/2025     HEMOGLOBIN  03/19/2025     COLORECTAL CANCER SCREENING  02/24/2026     ADVANCE CARE PLANNING  08/19/2027     DEXA  01/19/2033     PARATHYROID  Completed     PHOSPHORUS  Completed     HEPATITIS C SCREENING  Completed     PHQ-2 (once per calendar year)  Completed     INFLUENZA VACCINE  Completed     Pneumococcal Vaccine: 65+ Years  Completed     URINALYSIS  Completed     ALK PHOS  Completed     IPV IMMUNIZATION  Aged Out     HPV IMMUNIZATION  Aged Out     MENINGITIS IMMUNIZATION  Aged Out     RSV MONOCLONAL ANTIBODY  Aged Out     MICROALBUMIN  Discontinued     DTAP/TDAP/TD IMMUNIZATION  Discontinued     RSV VACCINE (Pregnancy & 60+)  Discontinued       ROOMING STAFF:    VisionScreening:    Eye doctor rice last month    Reviewed and updated as needed this visit by clinical staff   Tobacco  Allergies  Meds              Start Time: 2:37 PM  End time:  3:09 PM  Face to face plus orders: 32 minutes  Documentation time:  3 minutes    The visit lasted a total of 35 minutes     Nate Barcenas MD  [facility]

## 2024-03-20 DIAGNOSIS — Z79.4 TYPE 2 DIABETES MELLITUS WITH STAGE 3B CHRONIC KIDNEY DISEASE, WITH LONG-TERM CURRENT USE OF INSULIN (H): ICD-10-CM

## 2024-03-20 DIAGNOSIS — N18.32 TYPE 2 DIABETES MELLITUS WITH STAGE 3B CHRONIC KIDNEY DISEASE, WITH LONG-TERM CURRENT USE OF INSULIN (H): ICD-10-CM

## 2024-03-20 DIAGNOSIS — E11.22 TYPE 2 DIABETES MELLITUS WITH STAGE 3B CHRONIC KIDNEY DISEASE, WITH LONG-TERM CURRENT USE OF INSULIN (H): ICD-10-CM

## 2024-03-20 LAB
ALBUMIN SERPL BCG-MCNC: 4.6 G/DL (ref 3.5–5.2)
ALP SERPL-CCNC: 297 U/L (ref 40–150)
ALT SERPL W P-5'-P-CCNC: 85 U/L (ref 0–50)
ANION GAP SERPL CALCULATED.3IONS-SCNC: 15 MMOL/L (ref 7–15)
AST SERPL W P-5'-P-CCNC: 41 U/L (ref 0–45)
BILIRUB SERPL-MCNC: 0.7 MG/DL
BUN SERPL-MCNC: 38.8 MG/DL (ref 8–23)
CALCIUM SERPL-MCNC: 10.4 MG/DL (ref 8.8–10.2)
CHLORIDE SERPL-SCNC: 102 MMOL/L (ref 98–107)
CHOLEST SERPL-MCNC: 224 MG/DL
CREAT SERPL-MCNC: 1.14 MG/DL (ref 0.51–0.95)
CREAT UR-MCNC: 33.1 MG/DL
DEPRECATED HCO3 PLAS-SCNC: 20 MMOL/L (ref 22–29)
EGFRCR SERPLBLD CKD-EPI 2021: 50 ML/MIN/1.73M2
FASTING STATUS PATIENT QL REPORTED: YES
GLUCOSE SERPL-MCNC: 127 MG/DL (ref 70–99)
HDLC SERPL-MCNC: 77 MG/DL
LDLC SERPL CALC-MCNC: 122 MG/DL
MICROALBUMIN UR-MCNC: <12 MG/L
MICROALBUMIN/CREAT UR: NORMAL MG/G{CREAT}
NONHDLC SERPL-MCNC: 147 MG/DL
POTASSIUM SERPL-SCNC: 5 MMOL/L (ref 3.4–5.3)
PROT SERPL-MCNC: 7.5 G/DL (ref 6.4–8.3)
SODIUM SERPL-SCNC: 137 MMOL/L (ref 135–145)
TRIGL SERPL-MCNC: 123 MG/DL
TSH SERPL DL<=0.005 MIU/L-ACNC: 0.57 UIU/ML (ref 0.3–4.2)
URATE SERPL-MCNC: 4.5 MG/DL (ref 2.4–5.7)
VIT B12 SERPL-MCNC: 601 PG/ML (ref 232–1245)

## 2024-03-20 NOTE — TELEPHONE ENCOUNTER
PRESCRIPTIONS MUST BE WRITTEN THIS WAY TO MAKE THEM MEDICARE COMPLIANT    [employer] DIEGO 2 SENSORS  SIG: Change every 14 days.  QTY: 6  REFILLS: 1    -Prescriptions must be written after the clinical note date and will only be able to be used for 6 months from the date of the clinical notes. All prescriptions must be from same provider who patient had visit with. (We will be requesting new clinical notes and prescriptions every 6 months to meet Medicare Guidelines.)        Please contact us at [phone] (this number is for clinics only) with any questions. Please only give [phone] to patients.

## 2024-03-21 DIAGNOSIS — Z13.820 SCREENING FOR OSTEOPOROSIS: Primary | ICD-10-CM

## 2024-03-21 DIAGNOSIS — R74.8 ALKALINE PHOSPHATASE ELEVATION: Primary | ICD-10-CM

## 2024-03-21 DIAGNOSIS — E83.52 HYPERCALCEMIA: ICD-10-CM

## 2024-03-21 DIAGNOSIS — M81.0 AGE-RELATED OSTEOPOROSIS WITHOUT CURRENT PATHOLOGICAL FRACTURE: ICD-10-CM

## 2024-03-21 LAB — GGT SERPL-CCNC: 231 U/L (ref 5–36)

## 2024-03-22 ENCOUNTER — LAB (OUTPATIENT)
Dept: LAB | Facility: CLINIC | Age: 74
End: 2024-03-22
Payer: MEDICARE

## 2024-03-22 ENCOUNTER — ANCILLARY PROCEDURE (OUTPATIENT)
Dept: MAMMOGRAPHY | Facility: CLINIC | Age: 74
End: 2024-03-22
Attending: INTERNAL MEDICINE
Payer: MEDICARE

## 2024-03-22 DIAGNOSIS — R74.8 ALKALINE PHOSPHATASE ELEVATION: ICD-10-CM

## 2024-03-22 DIAGNOSIS — E83.52 HYPERCALCEMIA: ICD-10-CM

## 2024-03-22 DIAGNOSIS — Z12.31 VISIT FOR SCREENING MAMMOGRAM: ICD-10-CM

## 2024-03-22 LAB — MITOCHONDRIA M2 IGG SER-ACNC: <1 U/ML

## 2024-03-22 PROCEDURE — 36415 COLL VENOUS BLD VENIPUNCTURE: CPT

## 2024-03-22 PROCEDURE — 77063 BREAST TOMOSYNTHESIS BI: CPT

## 2024-03-22 PROCEDURE — 83970 ASSAY OF PARATHORMONE: CPT

## 2024-03-23 LAB — PTH-INTACT SERPL-MCNC: 100 PG/ML (ref 15–65)

## 2024-03-25 DIAGNOSIS — E21.3 HYPERPARATHYROIDISM (H): ICD-10-CM

## 2024-03-25 DIAGNOSIS — E83.52 HYPERCALCEMIA: Primary | ICD-10-CM

## 2024-03-25 DIAGNOSIS — M81.0 AGE-RELATED OSTEOPOROSIS WITHOUT CURRENT PATHOLOGICAL FRACTURE: ICD-10-CM

## 2024-04-01 ENCOUNTER — HOSPITAL ENCOUNTER (OUTPATIENT)
Dept: BONE DENSITY | Facility: HOSPITAL | Age: 74
Discharge: HOME | End: 2024-04-01
Attending: INTERNAL MEDICINE | Admitting: INTERNAL MEDICINE
Payer: MEDICARE

## 2024-04-01 DIAGNOSIS — M81.0 AGE-RELATED OSTEOPOROSIS WITHOUT CURRENT PATHOLOGICAL FRACTURE: ICD-10-CM

## 2024-04-01 DIAGNOSIS — Z13.820 SCREENING FOR OSTEOPOROSIS: ICD-10-CM

## 2024-04-01 DIAGNOSIS — E83.52 HYPERCALCEMIA: ICD-10-CM

## 2024-04-01 PROCEDURE — 77080 DXA BONE DENSITY AXIAL: CPT

## 2024-04-01 PROCEDURE — 77091 TBS TECHL CALCULATION ONLY: CPT

## 2024-04-02 ENCOUNTER — HOSPITAL ENCOUNTER (OUTPATIENT)
Dept: NUCLEAR MEDICINE | Facility: HOSPITAL | Age: 74
Discharge: HOME | End: 2024-04-02
Attending: INTERNAL MEDICINE
Payer: MEDICARE

## 2024-04-02 ENCOUNTER — HOSPITAL ENCOUNTER (OUTPATIENT)
Dept: ULTRASOUND IMAGING | Facility: HOSPITAL | Age: 74
Discharge: HOME | End: 2024-04-02
Attending: INTERNAL MEDICINE
Payer: MEDICARE

## 2024-04-02 DIAGNOSIS — E83.52 HYPERCALCEMIA: ICD-10-CM

## 2024-04-02 DIAGNOSIS — E21.3 HYPERPARATHYROIDISM (H): ICD-10-CM

## 2024-04-02 DIAGNOSIS — M81.0 AGE-RELATED OSTEOPOROSIS WITHOUT CURRENT PATHOLOGICAL FRACTURE: ICD-10-CM

## 2024-04-02 DIAGNOSIS — E21.3 HYPERPARATHYROIDISM (H): Primary | ICD-10-CM

## 2024-04-02 PROCEDURE — 343N000001 HC RX 343: Performed by: INTERNAL MEDICINE

## 2024-04-02 PROCEDURE — 76536 US EXAM OF HEAD AND NECK: CPT

## 2024-04-02 PROCEDURE — 78071 PARATHYRD PLANAR W/WO SUBTRJ: CPT | Mod: MG

## 2024-04-02 PROCEDURE — A9500 TC99M SESTAMIBI: HCPCS | Performed by: INTERNAL MEDICINE

## 2024-04-02 RX ADMIN — Medication 26.2 MILLICURIE: at 10:35

## 2024-04-03 DIAGNOSIS — M81.0 AGE-RELATED OSTEOPOROSIS WITHOUT CURRENT PATHOLOGICAL FRACTURE: ICD-10-CM

## 2024-04-03 DIAGNOSIS — E83.52 HYPERCALCEMIA: ICD-10-CM

## 2024-04-03 DIAGNOSIS — E21.3 HYPERPARATHYROIDISM (H): Primary | ICD-10-CM

## 2024-04-18 ENCOUNTER — MYC MEDICAL ADVICE (OUTPATIENT)
Dept: INTERNAL MEDICINE | Facility: CLINIC | Age: 74
End: 2024-04-18
Payer: MEDICARE

## 2024-04-23 ENCOUNTER — TRANSFERRED RECORDS (OUTPATIENT)
Dept: HEALTH INFORMATION MANAGEMENT | Facility: CLINIC | Age: 74
End: 2024-04-23

## 2024-05-15 ENCOUNTER — TRANSFERRED RECORDS (OUTPATIENT)
Dept: HEALTH INFORMATION MANAGEMENT | Facility: CLINIC | Age: 74
End: 2024-05-15
Payer: MEDICARE

## 2024-05-23 ENCOUNTER — MYC MEDICAL ADVICE (OUTPATIENT)
Dept: INTERNAL MEDICINE | Facility: CLINIC | Age: 74
End: 2024-05-23

## 2024-05-23 DIAGNOSIS — G43.109 VERTIGINOUS MIGRAINE: ICD-10-CM

## 2024-05-23 RX ORDER — LORAZEPAM 0.5 MG/1
0.5 TABLET ORAL AT BEDTIME
Qty: 90 TABLET | Refills: 1 | Status: SHIPPED | OUTPATIENT
Start: 2024-05-23

## 2024-06-04 DIAGNOSIS — E11.22 TYPE 2 DIABETES MELLITUS WITH STAGE 3B CHRONIC KIDNEY DISEASE, WITH LONG-TERM CURRENT USE OF INSULIN (H): ICD-10-CM

## 2024-06-04 DIAGNOSIS — Z79.4 TYPE 2 DIABETES MELLITUS WITH STAGE 3B CHRONIC KIDNEY DISEASE, WITH LONG-TERM CURRENT USE OF INSULIN (H): ICD-10-CM

## 2024-06-04 DIAGNOSIS — M81.0 AGE-RELATED OSTEOPOROSIS WITHOUT CURRENT PATHOLOGICAL FRACTURE: ICD-10-CM

## 2024-06-04 DIAGNOSIS — N18.32 TYPE 2 DIABETES MELLITUS WITH STAGE 3B CHRONIC KIDNEY DISEASE, WITH LONG-TERM CURRENT USE OF INSULIN (H): ICD-10-CM

## 2024-06-04 DIAGNOSIS — E21.3 HYPERPARATHYROIDISM (H): Primary | ICD-10-CM

## 2024-06-04 DIAGNOSIS — E83.52 HYPERCALCEMIA: ICD-10-CM

## 2024-06-07 ENCOUNTER — LAB (OUTPATIENT)
Dept: LAB | Facility: CLINIC | Age: 74
End: 2024-06-07
Payer: MEDICARE

## 2024-06-07 DIAGNOSIS — N18.32 TYPE 2 DIABETES MELLITUS WITH STAGE 3B CHRONIC KIDNEY DISEASE, WITH LONG-TERM CURRENT USE OF INSULIN (H): ICD-10-CM

## 2024-06-07 DIAGNOSIS — E11.22 TYPE 2 DIABETES MELLITUS WITH STAGE 3B CHRONIC KIDNEY DISEASE, WITH LONG-TERM CURRENT USE OF INSULIN (H): ICD-10-CM

## 2024-06-07 DIAGNOSIS — M81.0 AGE-RELATED OSTEOPOROSIS WITHOUT CURRENT PATHOLOGICAL FRACTURE: ICD-10-CM

## 2024-06-07 DIAGNOSIS — Z79.4 TYPE 2 DIABETES MELLITUS WITH STAGE 3B CHRONIC KIDNEY DISEASE, WITH LONG-TERM CURRENT USE OF INSULIN (H): ICD-10-CM

## 2024-06-07 DIAGNOSIS — E83.52 HYPERCALCEMIA: ICD-10-CM

## 2024-06-07 DIAGNOSIS — E21.3 HYPERPARATHYROIDISM (H): ICD-10-CM

## 2024-06-07 PROCEDURE — 80048 BASIC METABOLIC PNL TOTAL CA: CPT

## 2024-06-07 PROCEDURE — 83970 ASSAY OF PARATHORMONE: CPT | Performed by: FAMILY MEDICINE

## 2024-06-07 PROCEDURE — 36415 COLL VENOUS BLD VENIPUNCTURE: CPT | Performed by: FAMILY MEDICINE

## 2024-06-07 PROCEDURE — 84443 ASSAY THYROID STIM HORMONE: CPT

## 2024-06-07 PROCEDURE — 82306 VITAMIN D 25 HYDROXY: CPT

## 2024-06-08 LAB
ANION GAP SERPL CALCULATED.3IONS-SCNC: 15 MMOL/L (ref 7–15)
BUN SERPL-MCNC: 35.8 MG/DL (ref 8–23)
CALCIUM SERPL-MCNC: 8 MG/DL (ref 8.8–10.2)
CHLORIDE SERPL-SCNC: 106 MMOL/L (ref 98–107)
CREAT SERPL-MCNC: 1.27 MG/DL (ref 0.51–0.95)
DEPRECATED HCO3 PLAS-SCNC: 19 MMOL/L (ref 22–29)
EGFRCR SERPLBLD CKD-EPI 2021: 44 ML/MIN/1.73M2
GLUCOSE SERPL-MCNC: 185 MG/DL (ref 70–99)
POTASSIUM SERPL-SCNC: 4.2 MMOL/L (ref 3.4–5.3)
PTH-INTACT SERPL-MCNC: 66 PG/ML (ref 15–65)
SODIUM SERPL-SCNC: 140 MMOL/L (ref 135–145)
TSH SERPL DL<=0.005 MIU/L-ACNC: 6.56 UIU/ML (ref 0.3–4.2)
VIT D+METAB SERPL-MCNC: 61 NG/ML (ref 20–50)

## 2024-06-12 ENCOUNTER — E-VISIT (OUTPATIENT)
Dept: INTERNAL MEDICINE | Facility: CLINIC | Age: 74
End: 2024-06-12
Payer: MEDICARE

## 2024-06-12 DIAGNOSIS — M54.50 ACUTE RIGHT-SIDED LOW BACK PAIN WITHOUT SCIATICA: Primary | ICD-10-CM

## 2024-06-12 PROCEDURE — 99421 OL DIG E/M SVC 5-10 MIN: CPT | Performed by: INTERNAL MEDICINE

## 2024-06-12 RX ORDER — BACLOFEN 10 MG/1
10 TABLET ORAL 3 TIMES DAILY PRN
Qty: 20 TABLET | Refills: 1 | Status: SHIPPED | OUTPATIENT
Start: 2024-06-12 | End: 2024-09-04

## 2024-06-12 RX ORDER — GABAPENTIN 300 MG/1
300 CAPSULE ORAL 2 TIMES DAILY
Qty: 60 CAPSULE | Refills: 11 | Status: SHIPPED | OUTPATIENT
Start: 2024-06-12 | End: 2024-09-04

## 2024-06-12 RX ORDER — PREDNISONE 20 MG/1
20 TABLET ORAL
Qty: 4 TABLET | Refills: 0 | Status: SHIPPED | OUTPATIENT
Start: 2024-06-12 | End: 2024-06-16

## 2024-06-12 RX ORDER — MELOXICAM 15 MG/1
15 TABLET ORAL DAILY
Qty: 30 TABLET | Refills: 11 | Status: SHIPPED | OUTPATIENT
Start: 2024-06-12 | End: 2024-09-04

## 2024-09-04 ENCOUNTER — VIRTUAL VISIT (OUTPATIENT)
Dept: INTERNAL MEDICINE | Facility: CLINIC | Age: 74
End: 2024-09-04
Payer: MEDICARE

## 2024-09-04 DIAGNOSIS — M81.0 AGE-RELATED OSTEOPOROSIS WITHOUT CURRENT PATHOLOGICAL FRACTURE: ICD-10-CM

## 2024-09-04 DIAGNOSIS — E83.52 HYPERCALCEMIA: ICD-10-CM

## 2024-09-04 DIAGNOSIS — E03.9 ACQUIRED HYPOTHYROIDISM: Primary | ICD-10-CM

## 2024-09-04 DIAGNOSIS — I47.10 SVT (SUPRAVENTRICULAR TACHYCARDIA) (H): ICD-10-CM

## 2024-09-04 DIAGNOSIS — N18.32 TYPE 2 DIABETES MELLITUS WITH STAGE 3B CHRONIC KIDNEY DISEASE, WITH LONG-TERM CURRENT USE OF INSULIN (H): ICD-10-CM

## 2024-09-04 DIAGNOSIS — R74.8 ALKALINE PHOSPHATASE ELEVATION: ICD-10-CM

## 2024-09-04 DIAGNOSIS — J44.89 ASTHMATIC BRONCHITIS , CHRONIC (H): ICD-10-CM

## 2024-09-04 DIAGNOSIS — E21.3 HYPERPARATHYROIDISM (H): ICD-10-CM

## 2024-09-04 DIAGNOSIS — Z79.4 TYPE 2 DIABETES MELLITUS WITH STAGE 3B CHRONIC KIDNEY DISEASE, WITH LONG-TERM CURRENT USE OF INSULIN (H): ICD-10-CM

## 2024-09-04 DIAGNOSIS — G47.33 OBSTRUCTIVE SLEEP APNEA SYNDROME: ICD-10-CM

## 2024-09-04 DIAGNOSIS — R53.83 FATIGUE, UNSPECIFIED TYPE: ICD-10-CM

## 2024-09-04 DIAGNOSIS — E11.22 TYPE 2 DIABETES MELLITUS WITH STAGE 3B CHRONIC KIDNEY DISEASE, WITH LONG-TERM CURRENT USE OF INSULIN (H): ICD-10-CM

## 2024-09-04 PROCEDURE — G2211 COMPLEX E/M VISIT ADD ON: HCPCS | Mod: 95 | Performed by: INTERNAL MEDICINE

## 2024-09-04 PROCEDURE — 99214 OFFICE O/P EST MOD 30 MIN: CPT | Mod: 95 | Performed by: INTERNAL MEDICINE

## 2024-09-04 RX ORDER — SACCHAROMYCES BOULARDII 250 MG
CAPSULE ORAL
COMMUNITY
Start: 2024-05-10

## 2024-09-04 RX ORDER — ALBUTEROL SULFATE 0.83 MG/ML
2.5 SOLUTION RESPIRATORY (INHALATION) EVERY 6 HOURS PRN
Qty: 75 ML | Refills: 3 | Status: SHIPPED | OUTPATIENT
Start: 2024-09-04

## 2024-09-04 ASSESSMENT — ASTHMA QUESTIONNAIRES
QUESTION_4 LAST FOUR WEEKS HOW OFTEN HAVE YOU USED YOUR RESCUE INHALER OR NEBULIZER MEDICATION (SUCH AS ALBUTEROL): ONCE A WEEK OR LESS
ACT_TOTALSCORE: 22
QUESTION_1 LAST FOUR WEEKS HOW MUCH OF THE TIME DID YOUR ASTHMA KEEP YOU FROM GETTING AS MUCH DONE AT WORK, SCHOOL OR AT HOME: A LITTLE OF THE TIME
QUESTION_5 LAST FOUR WEEKS HOW WOULD YOU RATE YOUR ASTHMA CONTROL: WELL CONTROLLED
QUESTION_2 LAST FOUR WEEKS HOW OFTEN HAVE YOU HAD SHORTNESS OF BREATH: NOT AT ALL
ACT_TOTALSCORE: 22
QUESTION_3 LAST FOUR WEEKS HOW OFTEN DID YOUR ASTHMA SYMPTOMS (WHEEZING, COUGHING, SHORTNESS OF BREATH, CHEST TIGHTNESS OR PAIN) WAKE YOU UP AT NIGHT OR EARLIER THAN USUAL IN THE MORNING: NOT AT ALL

## 2024-09-04 NOTE — PROGRESS NOTES
Meggan is a 73 year old who is being evaluated via a billable video visit.    How would you like to obtain your AVS? MyChart  If the video visit is dropped, the invitation should be resent by: Text to cell phone: [phone]  Will anyone else be joining your video visit? No  {If patient encounters technical issues they should call [phone] :648956}    {PROVIDER CHARTING PREFERENCE:430259}    Subjective   Meggan is a 73 year old, presenting for the following health issues:  Recheck Medication (Wants to talk about calcium dosage/Wants to talk about ozempic/jardiance dosage. She is still getting 160/170 AM BG readings prior to meal) and Refill Request (Shola sensor refills, )        9/4/2024    10:20 AM   Additional Questions   Roomed by ELLA Taylor         9/4/2024    10:26 AM   Patient Reported Additional Medications   Patient reports taking the following new medications Vit D/ Calcium combo vitamin     Video Start Time: {video visit start/end time for provider to select:541616}    History of Present Illness       Reason for visit:  Recheck medications    She eats 4 or more servings of fruits and vegetables daily.She consumes 0 sweetened beverage(s) daily.She exercises with enough effort to increase her heart rate 30 to 60 minutes per day.  She exercises with enough effort to increase her heart rate 7 days per week.   She is taking medications regularly.       {SUPERLIST (Optional):093946}  {additonal problems for provider to add (Optional):209484}    {ROS Picklists (Optional):845890}      Objective    Vitals - Patient Reported  Systolic (Patient Reported): 127  Diastolic (Patient Reported): 66  Weight (Patient Reported): 89.8 kg (198 lb)        Physical Exam   {video visit exam brief selected:905044}    {Diagnostic Test Results (Optional):387811}      Video-Visit Details    Type of service:  Video Visit   Video End Time:{video visit start/end time for provider to select:124498}  Originating Location (pt.  "Location): {video visit patient location:701516::\"Home\"}  {PROVIDER LOCATION On-site should be selected for visits conducted from your clinic location or adjoining [facility] hospital, academic office, or other nearby [facility] building. Off-site should be selected for all other provider locations, including home:606956}  Distant Location (provider location):  {virtual location provider:743744}  Platform used for Video Visit: {Virtual Visit Platforms:691991::\"[employer]\"}  Signed Electronically by: Nate Barcenas MD  {Email feedback regarding this note to [email or web address]   :551197}  "

## 2024-09-04 NOTE — PATIENT INSTRUCTIONS
Update labs    Medicine list corrected    Refill albuterol inhaler    Obtain records regarding parathyroidectomy    Hold metoprolol 1 week

## 2024-09-04 NOTE — PROGRESS NOTES
OFFICE VISIT--Video    Meggan is a 73 year old female contacting the clinic today via video, who will use the platform: [employer] for the visit.  Phone # for [employer], or if Amwell drops:   Telephone Information:   Mobile [phone]          ASSESSMENT and PLAN:  1. Asthmatic bronchitis , chronic (H)  Okay to refill  - albuterol (PROVENTIL) (2.5 MG/3ML) 0.083% neb solution; Take 1 vial (2.5 mg) by nebulization every 6 hours as needed for shortness of breath or wheezing.  Dispense: 75 mL; Refill: 3    2. Acquired hypothyroidism  Recheck  - TSH; Future    3. Hyperparathyroidism (H24)  Status post surgery  - Parathyroid Hormone Intact; Future  - CBC with Platelets & Differential; Future    4. Hypercalcemia  - Parathyroid Hormone Intact; Future    5. Age-related osteoporosis without current pathological fracture  - CRP inflammation; Future  - Erythrocyte sedimentation rate auto; Future  - Vitamin D Deficiency; Future    6. Alkaline phosphatase elevation  Who continue to monitor    7. Type 2 diabetes mellitus with stage 3b chronic kidney disease, with long-term current use of insulin (H)  - Hemoglobin A1c; Future    8. Fatigue, unspecified type  - TSH; Future  - CBC with Platelets & Differential; Future  - Comprehensive metabolic panel; Future    9. Obstructive sleep apnea syndrome  Encourage repeat trial of mask    10. SVT (supraventricular tachycardia) (H24)  Consider trial without metoprolol       Patient Instructions   Update labs    Medicine list corrected    Refill albuterol inhaler    Obtain records regarding parathyroidectomy    Hold metoprolol 1 week            Return in about 4 months (around 1/4/2025) for using a video visit.       CHIEF COMPLAINT:  Chief Complaint   Patient presents with    Recheck Medication     Wants to talk about calcium dosage post parathyroid surgery  Wants to talk about ozempic/jardiance dosage. She is still getting 160/170 AM BG readings prior to meal    Refill Request     Shola sensor  "refills,        HISTORY OF PRESENT ILLNESS:  Megagn is a 73 year old female contacting the clinic today via video for complaints of general malaise.  She reports just not feeling well.  Metoprolol was initiated in March    At March visit calcium was found to be elevated.  Follow-up testing showed elevated parathyroid.  Nuclear medicine and ultrasound showed no obvious parathyroid adenoma but she was referred to general surgery.  She took herself to [place] where she went to a parathyroid center.  There they took out 3-1/2 out of her 4 parathyroid glands.  She suffered from hide \"calcium Leigh for a few weeks which then resolved.  She had an episode a few weeks ago that felt similar    With addition of metoprolol her heart rate walking increases to 110 resting 70    Overall just feels fatigued    History of Present Illness       Reason for visit:  Recheck medications    She eats 4 or more servings of fruits and vegetables daily.She consumes 0 sweetened beverage(s) daily.She exercises with enough effort to increase her heart rate 30 to 60 minutes per day.  She exercises with enough effort to increase her heart rate 7 days per week.   She is taking medications regularly.    REVIEW OF SYSTEMS:   Stable weight    Today's PHQ-2 Score:       3/19/2024     2:12 PM   PHQ-2 ( 1999 [employer])   Q1: Little interest or pleasure in doing things 0   Q2: Feeling down, depressed or hopeless 0   PHQ-2 Score 0   Q1: Little interest or pleasure in doing things Not at all   Q2: Feeling down, depressed or hopeless Not at all   PHQ-2 Score 0       PFSH:  Social History     Social History Narrative    She is  and is retired. She lives with her son and his family on the lower level of their house.  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  She has 5 grandchildren.  She does not smoke cigarettes or drink alcohol.       TOBACCO USE:  History   Smoking Status    Never   Smokeless " "Tobacco    Never       VITALS:  There were no vitals filed for this visit.  There were no vitals taken for this visit. Estimated body mass index is 34.86 kg/m  as calculated from the following:    Height as of 3/19/24: 1.6 m (5' 3\").    Weight as of 3/19/24: 89.3 kg (196 lb 12.8 oz).    PHYSICAL EXAM:  (observations via Video)  Alert and oriented.  Appears tired    MEDICATIONS:   Current Outpatient Medications   Medication Sig Dispense Refill    albuterol (PROAIR HFA/PROVENTIL HFA/VENTOLIN HFA) 108 (90 Base) MCG/ACT inhaler Inhale 2 puffs into the lungs every 6 hours as needed for shortness of breath or wheezing 18 g 3    albuterol (PROVENTIL) (2.5 MG/3ML) 0.083% neb solution Take 1 vial (2.5 mg) by nebulization every 6 hours as needed for shortness of breath or wheezing. 75 mL 3    alcohol swab prep pads Use to swab area of injection/vicki as directed. 100 each 3    allopurinol (ZYLOPRIM) 100 MG tablet Take 2 tablets (200 mg) by mouth daily 180 tablet 3    aspirin (ASA) 81 MG chewable tablet Take 81 mg by mouth At Bedtime      blood glucose monitoring (ACCU-CHEK FASTCLIX) lancets Use to test blood sugar 4 times daily. 204 each 6    blood glucose monitoring (NO BRAND SPECIFIED) meter device kit Use to test blood sugar 4 times daily or as directed. Preferred blood glucose meter OR supplies to accompany: Blood Glucose Monitor Brands: per insurance. 1 kit 0    celecoxib (CELEBREX) 200 MG capsule Take 1 capsule (200 mg) by mouth daily 90 capsule 3    colchicine (COLCYRS) 0.6 MG tablet Take 1 tablet (0.6 mg) by mouth daily as needed for gout pain 90 tablet 3    Continuous Blood Gluc  (FREESTYLE DIEGO 2 READER) PHOENIX 1 each continuous Use to read blood sugars per 's instructions. 1 each 0    Continuous Blood Gluc Sensor (FREESTYLE DIEGO 2 SENSOR) MISC 1 each every 14 days To check blood sugars continuously 2 each 5    empagliflozin (JARDIANCE) 25 MG TABS tablet Take 1 tablet (25 mg) by mouth daily 90 " tablet 3    levothyroxine (SYNTHROID) 125 MCG tablet Take 1 tablet (125 mcg) by mouth daily 90 tablet 3    LORazepam (ATIVAN) 0.5 MG tablet Take 1 tablet (0.5 mg) by mouth at bedtime 90 tablet 1    metoprolol succinate ER (TOPROL XL) 25 MG 24 hr tablet Take 1 tablet (25 mg) by mouth daily (Patient taking differently: Take 12.5 mg by mouth daily.) 90 tablet 3    Probiotic Product (PROBIOTIC-10 PO) Take 1 capsule by mouth daily Florastor       rosuvastatin (CRESTOR) 10 MG tablet Take 0.5 tablets (5 mg) by mouth three times a week 90 tablet 3    saccharomyces boulardii (FLORASTOR) 250 MG capsule Take by mouth.      thin (NO BRAND SPECIFIED) lancets Use with lanceting device. To accompany: Blood Glucose Monitor Brands: per insurance. 200 each 6    tirzepatide (MOUNJARO) 5 MG/0.5ML pen Inject 10 mg Subcutaneous every 7 days 12 mL 3    tiZANidine (ZANAFLEX) 4 MG capsule Take 1 capsule (4 mg) by mouth At Bedtime 90 capsule 3    triamcinolone (KENALOG) 0.1 % external cream Apply topically 2 times daily (Patient taking differently: Apply topically as needed.) 30 g 3    famotidine (PEPCID) 20 MG tablet Take 20 mg by mouth daily (Patient not taking: Reported on 9/4/2024)      Vitamin D3 (CHOLECALCIFEROL) 25 mcg (1000 units) tablet Take 1 tablet by mouth daily         Outside Notes summarized:   Labs, x-rays, cardiology, GI tests reviewed: Elevated calcium.  Elevated parathyroid.  Negative scans  Recent Labs   Lab Test 06/07/24  0834 03/19/24  1519 10/05/23  1044 08/18/23  1521 06/27/23  1612 03/07/23  1207 11/17/22  1213   HGB  --  15.9* 13.9   < > 13.1   < >  --    WBC  --  7.0 7.3   < > 7.0   < >  --     137 141   < > 137   < > 138   POTASSIUM 4.2 5.0 3.9   < > 5.0   < > 4.3   CR 1.27* 1.14* 1.25*   < > 1.09*   < > 1.14*   A1C  --  7.2*  --   --  7.1*   < > 7.1*   URIC  --  4.5  --   --   --   --  4.5   B12  --  601  --   --   --   --   --    TSH 6.56* 0.57  --    < >  --    < > 0.25*   VITDT 61*  --   --   --   --    "--   --    SED  --   --   --   --   --   --  20   CRPI  --   --   --   --   --   --  3.27    < > = values in this interval not displayed.     No results found for: \"ITZJA68YRW\"  Lab Results   Component Value Date    CHOL 224 03/19/2024     New orders:   Orders Placed This Encounter   Procedures    Parathyroid Hormone Intact    TSH    Hemoglobin A1c    Comprehensive metabolic panel    CRP inflammation    Erythrocyte sedimentation rate auto    Vitamin D Deficiency    CBC with Platelets & Differential       Independent review of:   Patient would like to receive their AVS by thereNow    Video-Visit Details  Type of service:  Video Visit      9/4/2024    10:20 AM   Additional Questions   Roomed by ELLA Taylor         9/4/2024    10:26 AM   Patient Reported Additional Medications   Patient reports taking the following new medications Vit D/ Calcium combo vitamin     Patient has given verbal consent to a Video visit?  Yes  How would you like to obtain your AVS?  thereNow  Will anyone else be joining your video visit, giving supplemental history? No    Originating location (pt location): Home    Distant Location (provider location):  Off-site    Video Start Time: 12:26 PM  Video End time:  12:50 PM  Face to face plus orders: 24 minutes  Documentation time:  3 minutes     The visit lasted a total of 25 minutes    Nate Barcenas MD  Internal Medicine  [facility]     "

## 2024-09-06 ENCOUNTER — LAB (OUTPATIENT)
Dept: LAB | Facility: CLINIC | Age: 74
End: 2024-09-06
Payer: MEDICARE

## 2024-09-06 DIAGNOSIS — M81.0 AGE-RELATED OSTEOPOROSIS WITHOUT CURRENT PATHOLOGICAL FRACTURE: ICD-10-CM

## 2024-09-06 DIAGNOSIS — N18.32 TYPE 2 DIABETES MELLITUS WITH STAGE 3B CHRONIC KIDNEY DISEASE, WITH LONG-TERM CURRENT USE OF INSULIN (H): ICD-10-CM

## 2024-09-06 DIAGNOSIS — R53.83 FATIGUE, UNSPECIFIED TYPE: ICD-10-CM

## 2024-09-06 DIAGNOSIS — E11.22 TYPE 2 DIABETES MELLITUS WITH STAGE 3B CHRONIC KIDNEY DISEASE, WITH LONG-TERM CURRENT USE OF INSULIN (H): ICD-10-CM

## 2024-09-06 DIAGNOSIS — E03.9 ACQUIRED HYPOTHYROIDISM: ICD-10-CM

## 2024-09-06 DIAGNOSIS — E21.3 HYPERPARATHYROIDISM (H): ICD-10-CM

## 2024-09-06 DIAGNOSIS — E83.52 HYPERCALCEMIA: ICD-10-CM

## 2024-09-06 DIAGNOSIS — Z79.4 TYPE 2 DIABETES MELLITUS WITH STAGE 3B CHRONIC KIDNEY DISEASE, WITH LONG-TERM CURRENT USE OF INSULIN (H): ICD-10-CM

## 2024-09-06 LAB
ALBUMIN SERPL BCG-MCNC: 4.4 G/DL (ref 3.5–5.2)
ALP SERPL-CCNC: 170 U/L (ref 40–150)
ALT SERPL W P-5'-P-CCNC: 34 U/L (ref 0–50)
ANION GAP SERPL CALCULATED.3IONS-SCNC: 10 MMOL/L (ref 7–15)
AST SERPL W P-5'-P-CCNC: 31 U/L (ref 0–45)
BASOPHILS # BLD AUTO: 0.1 10E3/UL (ref 0–0.2)
BASOPHILS NFR BLD AUTO: 1 %
BILIRUB SERPL-MCNC: 0.6 MG/DL
BUN SERPL-MCNC: 48.1 MG/DL (ref 8–23)
CALCIUM SERPL-MCNC: 9 MG/DL (ref 8.8–10.4)
CHLORIDE SERPL-SCNC: 103 MMOL/L (ref 98–107)
CREAT SERPL-MCNC: 1.27 MG/DL (ref 0.51–0.95)
CRP SERPL-MCNC: 4.36 MG/L
EGFRCR SERPLBLD CKD-EPI 2021: 44 ML/MIN/1.73M2
EOSINOPHIL # BLD AUTO: 0.1 10E3/UL (ref 0–0.7)
EOSINOPHIL NFR BLD AUTO: 2 %
ERYTHROCYTE [DISTWIDTH] IN BLOOD BY AUTOMATED COUNT: 14.1 % (ref 10–15)
ERYTHROCYTE [SEDIMENTATION RATE] IN BLOOD BY WESTERGREN METHOD: 10 MM/HR (ref 0–30)
GLUCOSE SERPL-MCNC: 180 MG/DL (ref 70–99)
HBA1C MFR BLD: 8 % (ref 0–5.6)
HCO3 SERPL-SCNC: 24 MMOL/L (ref 22–29)
HCT VFR BLD AUTO: 47.5 % (ref 35–47)
HGB BLD-MCNC: 15.2 G/DL (ref 11.7–15.7)
IMM GRANULOCYTES # BLD: 0 10E3/UL
IMM GRANULOCYTES NFR BLD: 0 %
LYMPHOCYTES # BLD AUTO: 1.8 10E3/UL (ref 0.8–5.3)
LYMPHOCYTES NFR BLD AUTO: 33 %
MCH RBC QN AUTO: 29 PG (ref 26.5–33)
MCHC RBC AUTO-ENTMCNC: 32 G/DL (ref 31.5–36.5)
MCV RBC AUTO: 91 FL (ref 78–100)
MONOCYTES # BLD AUTO: 0.4 10E3/UL (ref 0–1.3)
MONOCYTES NFR BLD AUTO: 7 %
NEUTROPHILS # BLD AUTO: 3.2 10E3/UL (ref 1.6–8.3)
NEUTROPHILS NFR BLD AUTO: 57 %
PLATELET # BLD AUTO: 247 10E3/UL (ref 150–450)
POTASSIUM SERPL-SCNC: 5 MMOL/L (ref 3.4–5.3)
PROT SERPL-MCNC: 7.2 G/DL (ref 6.4–8.3)
PTH-INTACT SERPL-MCNC: 54 PG/ML (ref 15–65)
RBC # BLD AUTO: 5.24 10E6/UL (ref 3.8–5.2)
SODIUM SERPL-SCNC: 137 MMOL/L (ref 135–145)
TSH SERPL DL<=0.005 MIU/L-ACNC: 1.09 UIU/ML (ref 0.3–4.2)
VIT D+METAB SERPL-MCNC: 60 NG/ML (ref 20–50)
WBC # BLD AUTO: 5.7 10E3/UL (ref 4–11)

## 2024-09-06 PROCEDURE — 85025 COMPLETE CBC W/AUTO DIFF WBC: CPT

## 2024-09-06 PROCEDURE — 83970 ASSAY OF PARATHORMONE: CPT

## 2024-09-06 PROCEDURE — 82306 VITAMIN D 25 HYDROXY: CPT

## 2024-09-06 PROCEDURE — 36415 COLL VENOUS BLD VENIPUNCTURE: CPT

## 2024-09-06 PROCEDURE — 85652 RBC SED RATE AUTOMATED: CPT

## 2024-09-06 PROCEDURE — 86140 C-REACTIVE PROTEIN: CPT

## 2024-09-06 PROCEDURE — 80053 COMPREHEN METABOLIC PANEL: CPT

## 2024-09-06 PROCEDURE — 84443 ASSAY THYROID STIM HORMONE: CPT

## 2024-09-06 PROCEDURE — 83036 HEMOGLOBIN GLYCOSYLATED A1C: CPT

## 2024-09-20 ENCOUNTER — MYC REFILL (OUTPATIENT)
Dept: PHARMACY | Facility: CLINIC | Age: 74
End: 2024-09-20
Payer: MEDICARE

## 2024-09-20 ENCOUNTER — MYC REFILL (OUTPATIENT)
Dept: INTERNAL MEDICINE | Facility: CLINIC | Age: 74
End: 2024-09-20
Payer: MEDICARE

## 2024-09-20 DIAGNOSIS — E11.22 TYPE 2 DIABETES MELLITUS WITH STAGE 3B CHRONIC KIDNEY DISEASE, WITH LONG-TERM CURRENT USE OF INSULIN (H): ICD-10-CM

## 2024-09-20 DIAGNOSIS — E03.9 ACQUIRED HYPOTHYROIDISM: ICD-10-CM

## 2024-09-20 DIAGNOSIS — Z79.4 TYPE 2 DIABETES MELLITUS WITH STAGE 3B CHRONIC KIDNEY DISEASE, WITH LONG-TERM CURRENT USE OF INSULIN (H): ICD-10-CM

## 2024-09-20 DIAGNOSIS — N18.32 TYPE 2 DIABETES MELLITUS WITH STAGE 3B CHRONIC KIDNEY DISEASE, WITH LONG-TERM CURRENT USE OF INSULIN (H): ICD-10-CM

## 2024-09-20 RX ORDER — LEVOTHYROXINE SODIUM 125 UG/1
125 TABLET ORAL DAILY
Qty: 90 TABLET | Refills: 3 | OUTPATIENT
Start: 2024-09-20

## 2024-09-20 RX ORDER — LEVOTHYROXINE SODIUM 125 MCG
125 TABLET ORAL DAILY
Qty: 90 TABLET | Refills: 0 | Status: SHIPPED | OUTPATIENT
Start: 2024-09-20

## 2024-09-23 RX ORDER — LANCETS
EACH MISCELLANEOUS
Qty: 204 EACH | Refills: 6 | Status: SHIPPED | OUTPATIENT
Start: 2024-09-23

## 2024-09-24 DIAGNOSIS — E11.22 TYPE 2 DIABETES MELLITUS WITH STAGE 3B CHRONIC KIDNEY DISEASE, WITH LONG-TERM CURRENT USE OF INSULIN (H): Primary | ICD-10-CM

## 2024-09-24 DIAGNOSIS — E66.812 CLASS 2 SEVERE OBESITY DUE TO EXCESS CALORIES WITH SERIOUS COMORBIDITY AND BODY MASS INDEX (BMI) OF 38.0 TO 38.9 IN ADULT (H): ICD-10-CM

## 2024-09-24 DIAGNOSIS — Z79.4 TYPE 2 DIABETES MELLITUS WITH STAGE 3B CHRONIC KIDNEY DISEASE, WITH LONG-TERM CURRENT USE OF INSULIN (H): Primary | ICD-10-CM

## 2024-09-24 DIAGNOSIS — Z79.4 TYPE 2 DIABETES MELLITUS WITH STAGE 3B CHRONIC KIDNEY DISEASE, WITH LONG-TERM CURRENT USE OF INSULIN (H): ICD-10-CM

## 2024-09-24 DIAGNOSIS — E66.01 CLASS 2 SEVERE OBESITY DUE TO EXCESS CALORIES WITH SERIOUS COMORBIDITY AND BODY MASS INDEX (BMI) OF 38.0 TO 38.9 IN ADULT (H): ICD-10-CM

## 2024-09-24 DIAGNOSIS — E11.22 TYPE 2 DIABETES MELLITUS WITH STAGE 3B CHRONIC KIDNEY DISEASE, WITH LONG-TERM CURRENT USE OF INSULIN (H): ICD-10-CM

## 2024-09-24 DIAGNOSIS — N18.32 TYPE 2 DIABETES MELLITUS WITH STAGE 3B CHRONIC KIDNEY DISEASE, WITH LONG-TERM CURRENT USE OF INSULIN (H): Primary | ICD-10-CM

## 2024-09-24 DIAGNOSIS — N18.32 TYPE 2 DIABETES MELLITUS WITH STAGE 3B CHRONIC KIDNEY DISEASE, WITH LONG-TERM CURRENT USE OF INSULIN (H): ICD-10-CM

## 2024-12-08 ENCOUNTER — MYC MEDICAL ADVICE (OUTPATIENT)
Dept: INTERNAL MEDICINE | Facility: CLINIC | Age: 74
End: 2024-12-08
Payer: MEDICARE

## 2024-12-08 ENCOUNTER — MYC REFILL (OUTPATIENT)
Dept: INTERNAL MEDICINE | Facility: CLINIC | Age: 74
End: 2024-12-08
Payer: MEDICARE

## 2024-12-08 DIAGNOSIS — M25.50 ARTHRALGIA, UNSPECIFIED JOINT: ICD-10-CM

## 2024-12-08 DIAGNOSIS — E66.01 CLASS 2 SEVERE OBESITY DUE TO EXCESS CALORIES WITH SERIOUS COMORBIDITY AND BODY MASS INDEX (BMI) OF 38.0 TO 38.9 IN ADULT (H): ICD-10-CM

## 2024-12-08 DIAGNOSIS — G43.109 VERTIGINOUS MIGRAINE: ICD-10-CM

## 2024-12-08 DIAGNOSIS — N18.32 TYPE 2 DIABETES MELLITUS WITH STAGE 3B CHRONIC KIDNEY DISEASE, WITH LONG-TERM CURRENT USE OF INSULIN (H): ICD-10-CM

## 2024-12-08 DIAGNOSIS — E66.812 CLASS 2 SEVERE OBESITY DUE TO EXCESS CALORIES WITH SERIOUS COMORBIDITY AND BODY MASS INDEX (BMI) OF 38.0 TO 38.9 IN ADULT (H): ICD-10-CM

## 2024-12-08 DIAGNOSIS — E03.9 ACQUIRED HYPOTHYROIDISM: ICD-10-CM

## 2024-12-08 DIAGNOSIS — Z79.4 TYPE 2 DIABETES MELLITUS WITH STAGE 3B CHRONIC KIDNEY DISEASE, WITH LONG-TERM CURRENT USE OF INSULIN (H): ICD-10-CM

## 2024-12-08 DIAGNOSIS — E11.22 TYPE 2 DIABETES MELLITUS WITH STAGE 3B CHRONIC KIDNEY DISEASE, WITH LONG-TERM CURRENT USE OF INSULIN (H): ICD-10-CM

## 2024-12-09 RX ORDER — LORAZEPAM 0.5 MG/1
0.5 TABLET ORAL AT BEDTIME
Qty: 90 TABLET | Refills: 1 | Status: SHIPPED | OUTPATIENT
Start: 2024-12-09

## 2024-12-09 RX ORDER — TIZANIDINE HYDROCHLORIDE 4 MG/1
4 CAPSULE, GELATIN COATED ORAL AT BEDTIME
Qty: 90 CAPSULE | Refills: 3 | Status: SHIPPED | OUTPATIENT
Start: 2024-12-09

## 2024-12-09 RX ORDER — LEVOTHYROXINE SODIUM 125 UG/1
125 TABLET ORAL DAILY
Qty: 90 TABLET | Refills: 0 | Status: SHIPPED | OUTPATIENT
Start: 2024-12-09

## 2024-12-10 ENCOUNTER — MYC REFILL (OUTPATIENT)
Dept: INTERNAL MEDICINE | Facility: CLINIC | Age: 74
End: 2024-12-10
Payer: MEDICARE

## 2024-12-10 DIAGNOSIS — Z79.4 TYPE 2 DIABETES MELLITUS WITH STAGE 3B CHRONIC KIDNEY DISEASE, WITH LONG-TERM CURRENT USE OF INSULIN (H): ICD-10-CM

## 2024-12-10 DIAGNOSIS — M10.9 URIC ACID ARTHROPATHY: ICD-10-CM

## 2024-12-10 DIAGNOSIS — N18.32 TYPE 2 DIABETES MELLITUS WITH STAGE 3B CHRONIC KIDNEY DISEASE, WITH LONG-TERM CURRENT USE OF INSULIN (H): ICD-10-CM

## 2024-12-10 DIAGNOSIS — E11.22 TYPE 2 DIABETES MELLITUS WITH STAGE 3B CHRONIC KIDNEY DISEASE, WITH LONG-TERM CURRENT USE OF INSULIN (H): ICD-10-CM

## 2024-12-10 DIAGNOSIS — I10 ESSENTIAL HYPERTENSION: ICD-10-CM

## 2024-12-10 RX ORDER — ALLOPURINOL 100 MG/1
200 TABLET ORAL DAILY
Qty: 180 TABLET | Refills: 3 | OUTPATIENT
Start: 2024-12-10

## 2024-12-10 RX ORDER — METOPROLOL SUCCINATE 25 MG/1
25 TABLET, EXTENDED RELEASE ORAL DAILY
Qty: 90 TABLET | Refills: 3 | OUTPATIENT
Start: 2024-12-10

## 2025-01-15 ENCOUNTER — MYC REFILL (OUTPATIENT)
Dept: INTERNAL MEDICINE | Facility: CLINIC | Age: 75
End: 2025-01-15
Payer: MEDICARE

## 2025-01-15 DIAGNOSIS — E03.9 ACQUIRED HYPOTHYROIDISM: ICD-10-CM

## 2025-01-15 RX ORDER — LEVOTHYROXINE SODIUM 125 UG/1
125 TABLET ORAL DAILY
Qty: 90 TABLET | Refills: 0 | Status: SHIPPED | OUTPATIENT
Start: 2025-01-15

## 2025-02-03 ENCOUNTER — MYC REFILL (OUTPATIENT)
Dept: INTERNAL MEDICINE | Facility: CLINIC | Age: 75
End: 2025-02-03
Payer: MEDICARE

## 2025-02-03 DIAGNOSIS — Z79.4 TYPE 2 DIABETES MELLITUS WITH STAGE 3B CHRONIC KIDNEY DISEASE, WITH LONG-TERM CURRENT USE OF INSULIN (H): ICD-10-CM

## 2025-02-03 DIAGNOSIS — E11.22 TYPE 2 DIABETES MELLITUS WITH STAGE 3B CHRONIC KIDNEY DISEASE, WITH LONG-TERM CURRENT USE OF INSULIN (H): ICD-10-CM

## 2025-02-03 DIAGNOSIS — E66.01 CLASS 2 SEVERE OBESITY DUE TO EXCESS CALORIES WITH SERIOUS COMORBIDITY AND BODY MASS INDEX (BMI) OF 38.0 TO 38.9 IN ADULT (H): ICD-10-CM

## 2025-02-03 DIAGNOSIS — N18.32 TYPE 2 DIABETES MELLITUS WITH STAGE 3B CHRONIC KIDNEY DISEASE, WITH LONG-TERM CURRENT USE OF INSULIN (H): ICD-10-CM

## 2025-02-03 DIAGNOSIS — E66.812 CLASS 2 SEVERE OBESITY DUE TO EXCESS CALORIES WITH SERIOUS COMORBIDITY AND BODY MASS INDEX (BMI) OF 38.0 TO 38.9 IN ADULT (H): ICD-10-CM

## 2025-02-17 DIAGNOSIS — Z79.4 TYPE 2 DIABETES MELLITUS WITH STAGE 3B CHRONIC KIDNEY DISEASE, WITH LONG-TERM CURRENT USE OF INSULIN (H): Primary | ICD-10-CM

## 2025-02-17 DIAGNOSIS — N18.32 TYPE 2 DIABETES MELLITUS WITH STAGE 3B CHRONIC KIDNEY DISEASE, WITH LONG-TERM CURRENT USE OF INSULIN (H): Primary | ICD-10-CM

## 2025-02-17 DIAGNOSIS — E11.22 TYPE 2 DIABETES MELLITUS WITH STAGE 3B CHRONIC KIDNEY DISEASE, WITH LONG-TERM CURRENT USE OF INSULIN (H): Primary | ICD-10-CM

## 2025-02-18 ENCOUNTER — TRANSFERRED RECORDS (OUTPATIENT)
Dept: HEALTH INFORMATION MANAGEMENT | Facility: CLINIC | Age: 75
End: 2025-02-18
Payer: MEDICARE

## 2025-02-18 LAB — RETINOPATHY: POSITIVE

## 2025-03-03 ENCOUNTER — VIRTUAL VISIT (OUTPATIENT)
Dept: EDUCATION SERVICES | Facility: CLINIC | Age: 75
End: 2025-03-03
Attending: INTERNAL MEDICINE
Payer: MEDICARE

## 2025-03-03 DIAGNOSIS — N18.32 TYPE 2 DIABETES MELLITUS WITH STAGE 3B CHRONIC KIDNEY DISEASE, WITH LONG-TERM CURRENT USE OF INSULIN (H): ICD-10-CM

## 2025-03-03 DIAGNOSIS — E11.22 TYPE 2 DIABETES MELLITUS WITH STAGE 3B CHRONIC KIDNEY DISEASE, WITH LONG-TERM CURRENT USE OF INSULIN (H): ICD-10-CM

## 2025-03-03 DIAGNOSIS — Z79.4 TYPE 2 DIABETES MELLITUS WITH STAGE 3B CHRONIC KIDNEY DISEASE, WITH LONG-TERM CURRENT USE OF INSULIN (H): ICD-10-CM

## 2025-03-03 PROCEDURE — G0108 DIAB MANAGE TRN  PER INDIV: HCPCS | Mod: 95 | Performed by: DIETITIAN, REGISTERED

## 2025-03-03 NOTE — PATIENT INSTRUCTIONS
Try eatings 2-3 spoonfuls of Greek yogurt with cinnamon for breakfast    2. Add a few carbohydrates with dinner and snack at night.    3. Changes in sleep or stress may impact blood glucose. Try to stop watching CNN late a night and find ways to de-stress (deep breathing, end the nigh with a funny video or book, playing with your dog or going for a short walk if nice out, etc).    4. If you can move around for 10-15 minutes after eating meals, this sometimes can help your body become more sensitive to insulin.    5. For right now, please continue the 12.5 mg weekly Mounjaro and 25 mg Jardiance daily in the morning. Let's give the above 1-2 weeks to see if your body responds to the changes. If you are not seeing a difference, please reach out and we can try the 15 mg Mounjaro weekly injection.  -Other options if the Mounjaro does not work since this would be the maximum dose: a sulfonylurea (glipizide or amaryl) or low dose of insulin 1 time a day (will help lower blood glucose all day)    FOLLOW UP APPOINTMENT: Video visit follow up on Monday, March 31st at 4:30pm.    Sophia Pate RDN, LD, [facility]   [phone]

## 2025-03-03 NOTE — PROGRESS NOTES
Diabetes Self-Management Education & Support    Presents for: Individual review    Type of service:  Video Visit    If the video visit is dropped, the video visit invitation should be resent by: Text to cell phone: [phone]    Originating Location (pt. Location): Home  Distant Location (provider location): Offsite  Mode of Communication:  Video Conference via textPlus Start Time:  1:59pm  Video End Time (time video stopped): 2:33pm    How would patient like to obtain AVS? MyChart      Assessment  Meggan is seeing time in range <70% and says her blood glucose have been worsening the past 3-4 months, but has not made any changes to eating. Says activity is less with winter but eating is the same. She tried eating lower carbohydrate and protein before bed but not seeing improvement in blood glucose. Suggested she try adding some carbohydrates back at night and before bed if seeing blood glucose rise overnight and early morning. She is also willing to try something small for breakfast to see if this helps and feels she can eat a 2-3 spoonfuls of yogurt with cinnamon.  Hoping if rise in blood glucose is from liver production of glucose, this should help with lower blood glucose values.    If above not work, suggested working on stress reduction and improving sleep. She has had more stress with mom's health. Discussed ideas for stress management as lowering stress may also help with blood glucose.     Will give above changes 1-2 weeks to see if blood glucose improving. If not seem to help, can try the highest Mounjaro dose of 15 mg a week. If still not enough to get blood glucose to goal, may consider adding in a sulfonylurea or basal insulin. Pt verbalized understanding of concepts discussed and recommendations provided.       See CGM Reports in Monitoring section below.      Glucose Patterns & Trends:  Time in range of  mg/dL, 61% of the time. Patient not meeting ADA Time in Range Targets. and  Time below range of 70 mg/dL, 0% of the time.    Care Plan and Education Provided:  Healthy Eating: Balanced meals, Being Active: Finding a physical activity routine that works for you and Relationship of activity to glucose, Monitoring: Individual glucose targets and Log and interpret results, Taking Medication: Action of prescribed medication(s), Side effects of prescribed medication(s), and When to take medication(s), and Healthy Coping: Benefits of making appropriate lifestyle changes, Identifying helpful resources, Methods of coping with stress, and Utilize support systems    Patient verbalized understanding of diabetes self-management education concepts discussed, opportunities for ongoing education and support, and recommendations provided today.    Plan    -Try adding back some carbohydrates at dinner and snacks. Try light breakfast  -Work on ways to reduce stress at night    Topics to cover at upcoming visits: Taking Medication, Problem Solving, and Reducing Risks    Follow-up: 3/31/25      See Care Plan for co-developed, patient-state behavior change goals.    Education Materials Provided:  No new materials provided today      Subjective/Objective  Meggan is an 74 year old year old, presenting for the following diabetes education related to: Presents for: Individual review  Accompanied by: Self  Diabetes education in the past 24mo: (Patient-Rptd) No  Focus of Visit: Assistance w/ making life changes  Diabetes type: (Patient-Rptd) Type 2  Disease course: (Patient-Rptd) Stable  How confident are you filling out medical forms by yourself:: (Patient-Rptd) Extremely  Diabetes management related comments/concerns: Says she is on Jardiance and started Mounjaro 2-3 years ago. Says was on insulin in the past, both basal and bolus, but got off of them after starting jardiance and mounjaro. She lost 125 lbs. In the past 3-4 month, has seen BG rising. Says her TIR the 30 days was at 63%. Says she has not made changes  "to her food.    Says before bed, blood glucose tend to be 130-140's but rising overnight. Before eating lunch today, blood glucose was 200 mg/dL without food as she cannot really stomach anything in the morning.     She tied snacks before bed to lower blood glucose such as peanuts, cheese, cottage cheese, eggs but not seem to help. Says she has been trying to cut back on carbohydrates.     She loves fruit and veggies but has to limit her fruit. Also cut out bread and rice due to rise in blood glucose.    Did have a gym membership- was biking and swimming. Would walk the dog in summer. Not as active in winter.     Says mom is 97 and lives in [place] and struggles with dementia. Has had more stress with family and news.    Difficulty affording diabetes medication?: No  Difficulty affording diabetes testing supplies?: No  Other concerns:: Glasses  Cultural Influences/Ethnic Background:  Not  or       Diabetes Symptoms & Complications:  Diabetes Related Symptoms: (Patient-Rptd) None  Weight trend: (Patient-Rptd) Stable  Symptom course: (Patient-Rptd) Improving  Disease course: (Patient-Rptd) Stable  Complications assessed today?: Yes  Nephropathy: Yes    Patient Problem List and Family Medical History reviewed for relevant medical history, current medical status, and diabetes risk factors.    Vitals:  There were no vitals taken for this visit.  Estimated body mass index is 34.86 kg/m  as calculated from the following:    Height as of 3/19/24: 1.6 m (5' 3\").    Weight as of 3/19/24: 89.3 kg (196 lb 12.8 oz).   Last 3 BP:   BP Readings from Last 3 Encounters:   03/19/24 138/72   11/16/23 136/60   10/05/23 127/63       History   Smoking Status    Never   Smokeless Tobacco    Never       Labs:  Lab Results   Component Value Date    A1C 8.0 09/06/2024    A1C 7.4 01/18/2019     Lab Results   Component Value Date     09/06/2024     10/05/2023     04/29/2022     02/19/2019     Lab " "Results   Component Value Date     03/19/2024    LDL 70.0 09/17/2012     Direct Measure HDL   Date Value Ref Range Status   03/19/2024 77 >=50 mg/dL Final   ]  GFR Estimate   Date Value Ref Range Status   09/06/2024 44 (L) >60 mL/min/1.73m2 Final     Comment:     eGFR calculated using 2021 CKD-EPI equation.   10/28/2019 44 (L) >60 mL/min/1.73m2 Final   07/30/2019 49 (L) >60 mL/min/[1.73_m2] Final     Comment:     Non  GFR Calc  Starting 12/18/2018, serum creatinine based estimated GFR (eGFR) will be   calculated using the Chronic Kidney Disease Epidemiology Collaboration   (CKD-EPI) equation.       GFR Estimate If Black   Date Value Ref Range Status   10/28/2019 53 (L) >60 mL/min/1.73m2 Final   07/30/2019 57 (L) >60 mL/min/[1.73_m2] Final     Comment:      GFR Calc  Starting 12/18/2018, serum creatinine based estimated GFR (eGFR) will be   calculated using the Chronic Kidney Disease Epidemiology Collaboration   (CKD-EPI) equation.       Lab Results   Component Value Date    CR 1.27 09/06/2024    CR 1.14 07/30/2019     No results found for: \"MICROALBUMIN\"    Healthy Eating:  Healthy Eating Assessed Today: Yes  Cultural/Church diet restrictions?: (Patient-Rptd) No  Meal planning/habits: Low carb  Who cooks/prepares meals for you?: Self  Who purchases food in  your home?: Self  How many times a week on average do you eat food made away from home (restaurant/take-out)?: (Patient-Rptd) 1  Meals include: (Patient-Rptd) Lunch, Dinner, Evening Snack  Breakfast: None- not hungry  Lunch: [place] cabbage soup with MB OR cottage cheese with cucumber and tomato and olive oil, sometimes blueberry and cuties (cut out other fruit due to the rise in BG)  Dinner: 4:30-5pm: protein (chicken or fish, sometimes pork)- usually in air fryer with salad OR vegetable OR 1/2 sweet potato OR 2 oz pasta (precooked) OR beans or chick peas OR soup (chicken mehta or veggie soup)  Other: HS: cheese cubes, " olives, celery, peanuts, cottage cheese with SF jello and cool whip  Beverages: (Patient-Rptd) Water    Being Active:  Being Active Assessed Today: Yes  Exercise:: Currently not exercising  Barrier to exercise: (Patient-Rptd) None    Monitoring:  Monitoring Assessed Today: Yes  Did patient bring glucose meter to appointment? : Yes  Blood Glucose Meter: CGM (Shola 2)  Times checking blood sugar at home (number): (Patient-Rptd) 5+  Times checking blood sugar at home (per): (Patient-Rptd) Day    Shola 2 CGM data:                Taking Medications:  Diabetes Medication(s)       Sodium-Glucose Co-Transporter 2 (SGLT2) Inhibitors       empagliflozin (JARDIANCE) 25 MG TABS tablet Take 1 tablet (25 mg) by mouth daily       Incretin Mimetic Agents       tirzepatide (MOUNJARO) 5 MG/0.5ML pen Inject 10 mg Subcutaneous every 7 days     Tirzepatide 12.5 MG/0.5ML SOAJ Inject 0.5 mLs (12.5 mg) subcutaneously every 7 days.        **Patient taking 12.5 mg weekly Mounjaro so removed 10 mg dose as requested**    Taking Medication Assessed Today: Yes  Current Treatments: (Patient-Rptd) Diet, Non-insulin Injectables, Oral Medication (taken by mouth)  Problems taking diabetes medications regularly?: No  Diabetes medication side effects?: Yes (Reduced appetite. Unable to eat or drink breakfast.)    Healthy Coping:  Healthy Coping Assessed Today: Yes  Emotional response to diabetes: Ready to learn  Informal Support system:: (Patient-Rptd) Children, Friends  Stage of change: ACTION (Actively working towards change)    Sophia Pate RD  Time Spent: 34 minutes  Encounter Type: Individual    Any diabetes medication dose changes were made via the CDCES Standing Orders under the patient's referring provider.

## 2025-03-03 NOTE — LETTER
3/3/2025         RE: Meggan Everett  [address] [facility] [address]        Dear Colleague,    Thank you for referring your patient, Meggan Everett, to the [facility]. Please see a copy of my visit note below.    Diabetes Self-Management Education & Support    Presents for: Individual review    Type of service:  Video Visit    If the video visit is dropped, the video visit invitation should be resent by: Text to cell phone: [phone]    Originating Location (pt. Location): Home  Distant Location (provider location): Offsite  Mode of Communication:  Video Conference via Covestor Start Time:  1:59pm  Video End Time (time video stopped): 2:33pm    How would patient like to obtain AVS? MyChart      Assessment  Meggan is seeing time in range <70% and says her blood glucose have been worsening the past 3-4 months, but has not made any changes to eating. Says activity is less with winter but eating is the same. She tried eating lower carbohydrate and protein before bed but not seeing improvement in blood glucose. Suggested she try adding some carbohydrates back at night and before bed if seeing blood glucose rise overnight and early morning. She is also willing to try something small for breakfast to see if this helps and feels she can eat a 2-3 spoonfuls of yogurt with cinnamon.  Hoping if rise in blood glucose is from liver production of glucose, this should help with lower blood glucose values.    If above not work, suggested working on stress reduction and improving sleep. She has had more stress with mom's health. Discussed ideas for stress management as lowering stress may also help with blood glucose.     Will give above changes 1-2 weeks to see if blood glucose improving. If not seem to help, can try the highest Mounjaro dose of 15 mg a week. If still not enough to get blood glucose to goal, may consider adding in a sulfonylurea or basal insulin. Pt  verbalized understanding of concepts discussed and recommendations provided.       See CGM Reports in Monitoring section below.      Glucose Patterns & Trends:  Time in range of  mg/dL, 61% of the time. Patient not meeting ADA Time in Range Targets. and Time below range of 70 mg/dL, 0% of the time.    Care Plan and Education Provided:  Healthy Eating: Balanced meals, Being Active: Finding a physical activity routine that works for you and Relationship of activity to glucose, Monitoring: Individual glucose targets and Log and interpret results, Taking Medication: Action of prescribed medication(s), Side effects of prescribed medication(s), and When to take medication(s), and Healthy Coping: Benefits of making appropriate lifestyle changes, Identifying helpful resources, Methods of coping with stress, and Utilize support systems    Patient verbalized understanding of diabetes self-management education concepts discussed, opportunities for ongoing education and support, and recommendations provided today.    Plan    -Try adding back some carbohydrates at dinner and snacks. Try light breakfast  -Work on ways to reduce stress at night    Topics to cover at upcoming visits: Taking Medication, Problem Solving, and Reducing Risks    Follow-up: 3/31/25      See Care Plan for co-developed, patient-state behavior change goals.    Education Materials Provided:  No new materials provided today      Subjective/Objective  Meggan is an 74 year old year old, presenting for the following diabetes education related to: Presents for: Individual review  Accompanied by: Self  Diabetes education in the past 24mo: (Patient-Rptd) No  Focus of Visit: Assistance w/ making life changes  Diabetes type: (Patient-Rptd) Type 2  Disease course: (Patient-Rptd) Stable  How confident are you filling out medical forms by yourself:: (Patient-Rptd) Extremely  Diabetes management related comments/concerns: Says she is on Jardiance and started  "Mounjaro 2-3 years ago. Says was on insulin in the past, both basal and bolus, but got off of them after starting jardiance and mounjaro. She lost 125 lbs. In the past 3-4 month, has seen BG rising. Says her TIR the 30 days was at 63%. Says she has not made changes to her food.    Says before bed, blood glucose tend to be 130-140's but rising overnight. Before eating lunch today, blood glucose was 200 mg/dL without food as she cannot really stomach anything in the morning.     She tied snacks before bed to lower blood glucose such as peanuts, cheese, cottage cheese, eggs but not seem to help. Says she has been trying to cut back on carbohydrates.     She loves fruit and veggies but has to limit her fruit. Also cut out bread and rice due to rise in blood glucose.    Did have a gym membership- was biking and swimming. Would walk the dog in summer. Not as active in winter.     Says mom is 97 and lives in [place] and struggles with dementia. Has had more stress with family and news.    Difficulty affording diabetes medication?: No  Difficulty affording diabetes testing supplies?: No  Other concerns:: Glasses  Cultural Influences/Ethnic Background:  Not  or       Diabetes Symptoms & Complications:  Diabetes Related Symptoms: (Patient-Rptd) None  Weight trend: (Patient-Rptd) Stable  Symptom course: (Patient-Rptd) Improving  Disease course: (Patient-Rptd) Stable  Complications assessed today?: Yes  Nephropathy: Yes    Patient Problem List and Family Medical History reviewed for relevant medical history, current medical status, and diabetes risk factors.    Vitals:  There were no vitals taken for this visit.  Estimated body mass index is 34.86 kg/m  as calculated from the following:    Height as of 3/19/24: 1.6 m (5' 3\").    Weight as of 3/19/24: 89.3 kg (196 lb 12.8 oz).   Last 3 BP:   BP Readings from Last 3 Encounters:   03/19/24 138/72   11/16/23 136/60   10/05/23 127/63       History   Smoking Status     " "Never   Smokeless Tobacco     Never       Labs:  Lab Results   Component Value Date    A1C 8.0 09/06/2024    A1C 7.4 01/18/2019     Lab Results   Component Value Date     09/06/2024     10/05/2023     04/29/2022     02/19/2019     Lab Results   Component Value Date     03/19/2024    LDL 70.0 09/17/2012     Direct Measure HDL   Date Value Ref Range Status   03/19/2024 77 >=50 mg/dL Final   ]  GFR Estimate   Date Value Ref Range Status   09/06/2024 44 (L) >60 mL/min/1.73m2 Final     Comment:     eGFR calculated using 2021 CKD-EPI equation.   10/28/2019 44 (L) >60 mL/min/1.73m2 Final   07/30/2019 49 (L) >60 mL/min/[1.73_m2] Final     Comment:     Non  GFR Calc  Starting 12/18/2018, serum creatinine based estimated GFR (eGFR) will be   calculated using the Chronic Kidney Disease Epidemiology Collaboration   (CKD-EPI) equation.       GFR Estimate If Black   Date Value Ref Range Status   10/28/2019 53 (L) >60 mL/min/1.73m2 Final   07/30/2019 57 (L) >60 mL/min/[1.73_m2] Final     Comment:      GFR Calc  Starting 12/18/2018, serum creatinine based estimated GFR (eGFR) will be   calculated using the Chronic Kidney Disease Epidemiology Collaboration   (CKD-EPI) equation.       Lab Results   Component Value Date    CR 1.27 09/06/2024    CR 1.14 07/30/2019     No results found for: \"MICROALBUMIN\"    Healthy Eating:  Healthy Eating Assessed Today: Yes  Cultural/Rastafarian diet restrictions?: (Patient-Rptd) No  Meal planning/habits: Low carb  Who cooks/prepares meals for you?: Self  Who purchases food in  your home?: Self  How many times a week on average do you eat food made away from home (restaurant/take-out)?: (Patient-Rptd) 1  Meals include: (Patient-Rptd) Lunch, Dinner, Evening Snack  Breakfast: None- not hungry  Lunch: [place] cabbage soup with MB OR cottage cheese with cucumber and tomato and olive oil, sometimes blueberry and cuties (cut out other fruit due " to the rise in BG)  Dinner: 4:30-5pm: protein (chicken or fish, sometimes pork)- usually in air fryer with salad OR vegetable OR 1/2 sweet potato OR 2 oz pasta (precooked) OR beans or chick peas OR soup (chicken mehta or veggie soup)  Other: HS: cheese cubes, olives, celery, peanuts, cottage cheese with SF jello and cool whip  Beverages: (Patient-Rptd) Water    Being Active:  Being Active Assessed Today: Yes  Exercise:: Currently not exercising  Barrier to exercise: (Patient-Rptd) None    Monitoring:  Monitoring Assessed Today: Yes  Did patient bring glucose meter to appointment? : Yes  Blood Glucose Meter: CGM (Shola 2)  Times checking blood sugar at home (number): (Patient-Rptd) 5+  Times checking blood sugar at home (per): (Patient-Rptd) Day    Shola 2 CGM data:                Taking Medications:  Diabetes Medication(s)       Sodium-Glucose Co-Transporter 2 (SGLT2) Inhibitors       empagliflozin (JARDIANCE) 25 MG TABS tablet Take 1 tablet (25 mg) by mouth daily       Incretin Mimetic Agents       tirzepatide (MOUNJARO) 5 MG/0.5ML pen Inject 10 mg Subcutaneous every 7 days     Tirzepatide 12.5 MG/0.5ML SOAJ Inject 0.5 mLs (12.5 mg) subcutaneously every 7 days.        **Patient taking 12.5 mg weekly Mounjaro so removed 10 mg dose as requested**    Taking Medication Assessed Today: Yes  Current Treatments: (Patient-Rptd) Diet, Non-insulin Injectables, Oral Medication (taken by mouth)  Problems taking diabetes medications regularly?: No  Diabetes medication side effects?: Yes (Reduced appetite. Unable to eat or drink breakfast.)    Healthy Coping:  Healthy Coping Assessed Today: Yes  Emotional response to diabetes: Ready to learn  Informal Support system:: (Patient-Rptd) Children, Friends  Stage of change: ACTION (Actively working towards change)    Sophia Pate RD  Time Spent: 34 minutes  Encounter Type: Individual    Any diabetes medication dose changes were made via the CDCES Standing Orders under the  patient's referring provider.

## 2025-03-05 ENCOUNTER — MYC REFILL (OUTPATIENT)
Dept: INTERNAL MEDICINE | Facility: CLINIC | Age: 75
End: 2025-03-05
Payer: MEDICARE

## 2025-03-05 DIAGNOSIS — M25.50 ARTHRALGIA, UNSPECIFIED JOINT: ICD-10-CM

## 2025-03-05 RX ORDER — CELECOXIB 200 MG/1
200 CAPSULE ORAL DAILY
Qty: 90 CAPSULE | Refills: 0 | Status: SHIPPED | OUTPATIENT
Start: 2025-03-05

## 2025-03-08 ENCOUNTER — HEALTH MAINTENANCE LETTER (OUTPATIENT)
Age: 75
End: 2025-03-08

## 2025-03-26 ENCOUNTER — ANCILLARY PROCEDURE (OUTPATIENT)
Dept: MAMMOGRAPHY | Facility: CLINIC | Age: 75
End: 2025-03-26
Attending: INTERNAL MEDICINE
Payer: MEDICARE

## 2025-03-26 DIAGNOSIS — Z12.31 VISIT FOR SCREENING MAMMOGRAM: ICD-10-CM

## 2025-03-26 PROCEDURE — 77063 BREAST TOMOSYNTHESIS BI: CPT

## 2025-03-29 SDOH — HEALTH STABILITY: PHYSICAL HEALTH: ON AVERAGE, HOW MANY MINUTES DO YOU ENGAGE IN EXERCISE AT THIS LEVEL?: 30 MIN

## 2025-03-29 SDOH — HEALTH STABILITY: PHYSICAL HEALTH: ON AVERAGE, HOW MANY DAYS PER WEEK DO YOU ENGAGE IN MODERATE TO STRENUOUS EXERCISE (LIKE A BRISK WALK)?: 5 DAYS

## 2025-03-29 ASSESSMENT — ASTHMA QUESTIONNAIRES
QUESTION_2 LAST FOUR WEEKS HOW OFTEN HAVE YOU HAD SHORTNESS OF BREATH: NOT AT ALL
QUESTION_4 LAST FOUR WEEKS HOW OFTEN HAVE YOU USED YOUR RESCUE INHALER OR NEBULIZER MEDICATION (SUCH AS ALBUTEROL): NOT AT ALL
QUESTION_5 LAST FOUR WEEKS HOW WOULD YOU RATE YOUR ASTHMA CONTROL: WELL CONTROLLED
QUESTION_3 LAST FOUR WEEKS HOW OFTEN DID YOUR ASTHMA SYMPTOMS (WHEEZING, COUGHING, SHORTNESS OF BREATH, CHEST TIGHTNESS OR PAIN) WAKE YOU UP AT NIGHT OR EARLIER THAN USUAL IN THE MORNING: NOT AT ALL
QUESTION_1 LAST FOUR WEEKS HOW MUCH OF THE TIME DID YOUR ASTHMA KEEP YOU FROM GETTING AS MUCH DONE AT WORK, SCHOOL OR AT HOME: NONE OF THE TIME
ACT_TOTALSCORE: 24

## 2025-03-29 ASSESSMENT — SOCIAL DETERMINANTS OF HEALTH (SDOH): HOW OFTEN DO YOU GET TOGETHER WITH FRIENDS OR RELATIVES?: ONCE A WEEK

## 2025-03-31 ENCOUNTER — VIRTUAL VISIT (OUTPATIENT)
Dept: EDUCATION SERVICES | Facility: CLINIC | Age: 75
End: 2025-03-31
Payer: MEDICARE

## 2025-03-31 ENCOUNTER — VIRTUAL VISIT (OUTPATIENT)
Dept: INTERNAL MEDICINE | Facility: CLINIC | Age: 75
End: 2025-03-31
Payer: MEDICARE

## 2025-03-31 DIAGNOSIS — E11.22 TYPE 2 DIABETES MELLITUS WITH STAGE 3B CHRONIC KIDNEY DISEASE, WITH LONG-TERM CURRENT USE OF INSULIN (H): ICD-10-CM

## 2025-03-31 DIAGNOSIS — R53.83 FATIGUE, UNSPECIFIED TYPE: ICD-10-CM

## 2025-03-31 DIAGNOSIS — Z98.890 S/P SUBTOTAL PARATHYROIDECTOMY: ICD-10-CM

## 2025-03-31 DIAGNOSIS — N18.32 TYPE 2 DIABETES MELLITUS WITH STAGE 3B CHRONIC KIDNEY DISEASE, WITH LONG-TERM CURRENT USE OF INSULIN (H): ICD-10-CM

## 2025-03-31 DIAGNOSIS — Z79.4 TYPE 2 DIABETES MELLITUS WITH STAGE 3B CHRONIC KIDNEY DISEASE, WITH LONG-TERM CURRENT USE OF INSULIN (H): ICD-10-CM

## 2025-03-31 DIAGNOSIS — F51.01 PRIMARY INSOMNIA: ICD-10-CM

## 2025-03-31 DIAGNOSIS — E66.09 CLASS 1 OBESITY DUE TO EXCESS CALORIES WITH SERIOUS COMORBIDITY AND BODY MASS INDEX (BMI) OF 32.0 TO 32.9 IN ADULT: ICD-10-CM

## 2025-03-31 DIAGNOSIS — I10 ESSENTIAL HYPERTENSION: ICD-10-CM

## 2025-03-31 DIAGNOSIS — M25.50 ARTHRALGIA, UNSPECIFIED JOINT: ICD-10-CM

## 2025-03-31 DIAGNOSIS — E21.3 HYPERPARATHYROIDISM: ICD-10-CM

## 2025-03-31 DIAGNOSIS — Z90.89 S/P SUBTOTAL PARATHYROIDECTOMY: ICD-10-CM

## 2025-03-31 DIAGNOSIS — E03.9 ACQUIRED HYPOTHYROIDISM: ICD-10-CM

## 2025-03-31 DIAGNOSIS — M10.9 URIC ACID ARTHROPATHY: ICD-10-CM

## 2025-03-31 DIAGNOSIS — E66.811 CLASS 1 OBESITY DUE TO EXCESS CALORIES WITH SERIOUS COMORBIDITY AND BODY MASS INDEX (BMI) OF 32.0 TO 32.9 IN ADULT: ICD-10-CM

## 2025-03-31 DIAGNOSIS — F41.1 GAD (GENERALIZED ANXIETY DISORDER): ICD-10-CM

## 2025-03-31 DIAGNOSIS — Z00.00 MEDICARE ANNUAL WELLNESS VISIT, SUBSEQUENT: Primary | ICD-10-CM

## 2025-03-31 PROBLEM — J44.89: Status: ACTIVE | Noted: 2025-03-31

## 2025-03-31 PROBLEM — I47.10 PAROXYSMAL SUPRAVENTRICULAR TACHYCARDIA: Status: RESOLVED | Noted: 2023-08-18 | Resolved: 2025-03-31

## 2025-03-31 PROBLEM — J44.89: Status: RESOLVED | Noted: 2025-03-31 | Resolved: 2025-03-31

## 2025-03-31 PROBLEM — E66.01 CLASS 2 SEVERE OBESITY DUE TO EXCESS CALORIES WITH SERIOUS COMORBIDITY IN ADULT (H): Status: RESOLVED | Noted: 2023-06-27 | Resolved: 2025-03-31

## 2025-03-31 PROBLEM — M17.0 PRIMARY OSTEOARTHRITIS OF BOTH KNEES: Status: RESOLVED | Noted: 2020-09-28 | Resolved: 2025-03-31

## 2025-03-31 PROBLEM — R63.4 WEIGHT LOSS: Status: RESOLVED | Noted: 2023-08-18 | Resolved: 2025-03-31

## 2025-03-31 PROBLEM — E66.812 CLASS 2 SEVERE OBESITY DUE TO EXCESS CALORIES WITH SERIOUS COMORBIDITY IN ADULT (H): Status: RESOLVED | Noted: 2023-06-27 | Resolved: 2025-03-31

## 2025-03-31 PROCEDURE — 1126F AMNT PAIN NOTED NONE PRSNT: CPT | Mod: 95 | Performed by: INTERNAL MEDICINE

## 2025-03-31 PROCEDURE — G0439 PPPS, SUBSEQ VISIT: HCPCS | Mod: 95 | Performed by: INTERNAL MEDICINE

## 2025-03-31 PROCEDURE — 98006 SYNCH AUDIO-VIDEO EST MOD 30: CPT | Mod: 25 | Performed by: INTERNAL MEDICINE

## 2025-03-31 PROCEDURE — G0108 DIAB MANAGE TRN  PER INDIV: HCPCS | Mod: 95 | Performed by: DIETITIAN, REGISTERED

## 2025-03-31 RX ORDER — PROPRANOLOL HYDROCHLORIDE 10 MG/1
10 TABLET ORAL EVERY 8 HOURS PRN
Qty: 20 TABLET | Refills: 3 | Status: SHIPPED | OUTPATIENT
Start: 2025-03-31

## 2025-03-31 RX ORDER — METOPROLOL SUCCINATE 25 MG/1
25 TABLET, EXTENDED RELEASE ORAL DAILY
Qty: 90 TABLET | Refills: 3 | Status: SHIPPED | OUTPATIENT
Start: 2025-03-31

## 2025-03-31 RX ORDER — HYDROXYZINE HYDROCHLORIDE 25 MG/1
25 TABLET, FILM COATED ORAL 3 TIMES DAILY PRN
Qty: 40 TABLET | Refills: 2 | Status: SHIPPED | OUTPATIENT
Start: 2025-03-31

## 2025-03-31 RX ORDER — LEVOTHYROXINE SODIUM 125 UG/1
125 TABLET ORAL DAILY
Qty: 90 TABLET | Refills: 3 | Status: SHIPPED | OUTPATIENT
Start: 2025-03-31

## 2025-03-31 RX ORDER — ALLOPURINOL 100 MG/1
200 TABLET ORAL DAILY
Qty: 180 TABLET | Refills: 3 | Status: SHIPPED | OUTPATIENT
Start: 2025-03-31

## 2025-03-31 RX ORDER — LORAZEPAM 0.5 MG/1
0.5 TABLET ORAL EVERY 8 HOURS PRN
Qty: 20 TABLET | Refills: 0 | Status: SHIPPED | OUTPATIENT
Start: 2025-03-31 | End: 2025-04-30

## 2025-03-31 RX ORDER — TRAZODONE HYDROCHLORIDE 50 MG/1
50 TABLET ORAL AT BEDTIME
Qty: 90 TABLET | Refills: 3 | Status: SHIPPED | OUTPATIENT
Start: 2025-03-31 | End: 2026-03-31

## 2025-03-31 RX ORDER — ROSUVASTATIN CALCIUM 10 MG/1
5 TABLET, COATED ORAL
Qty: 90 TABLET | Refills: 3 | Status: SHIPPED | OUTPATIENT
Start: 2025-03-31

## 2025-03-31 RX ORDER — CELECOXIB 200 MG/1
200 CAPSULE ORAL DAILY
Qty: 90 CAPSULE | Refills: 3 | Status: SHIPPED | OUTPATIENT
Start: 2025-03-31

## 2025-03-31 RX ORDER — BUSPIRONE HYDROCHLORIDE 10 MG/1
10 TABLET ORAL 3 TIMES DAILY PRN
Qty: 40 TABLET | Refills: 2 | Status: SHIPPED | OUTPATIENT
Start: 2025-03-31

## 2025-03-31 NOTE — PROGRESS NOTES
ANNUAL WELLNESS VISIT--Video  Mar 31, 2025    Meggan is a 74 year old female contacting the clinic today via video, who will use the platform: Ter[employer] for the visit.  Phone # for Doximity, or if Amwell drops:   Telephone Information:   Mobile [phone]          Assessment and Plan:     ASSESSMENT and PLAN:  1. Medicare annual wellness visit, subsequent (Primary)  - REVIEW OF HEALTH MAINTENANCE PROTOCOL ORDERS    2. Type 2 diabetes mellitus with stage 3b chronic kidney disease, with long-term current use of insulin (H)  Update labs.  Add back Jardiance which had fallen off the list  - Hemoglobin A1c; Future  - Lipid Profile; Future  - Albumin Random Urine Quantitative with Creat Ratio; Future    3. Acquired hypothyroidism  - TSH; Future  - levothyroxine (SYNTHROID) 125 MCG tablet; Take 1 tablet (125 mcg) by mouth daily.  Dispense: 90 tablet; Refill: 3    4. Uric acid arthropathy  - CRP inflammation; Future  - Erythrocyte sedimentation rate auto; Future  - Uric acid; Future  - allopurinol (ZYLOPRIM) 100 MG tablet; Take 2 tablets (200 mg) by mouth daily.  Dispense: 180 tablet; Refill: 3    5. Essential hypertension  - Comprehensive metabolic panel; Future  - metoprolol succinate ER (TOPROL XL) 25 MG 24 hr tablet; Take 1 tablet (25 mg) by mouth daily.  Dispense: 90 tablet; Refill: 3  - rosuvastatin (CRESTOR) 10 MG tablet; Take 0.5 tablets (5 mg) by mouth three times a week.  Dispense: 90 tablet; Refill: 3    6. Hyperparathyroidism  - Vitamin D Deficiency; Future  - Parathyroid Hormone Intact; Future    7. Arthralgia, unspecified joint  - celecoxib (CELEBREX) 200 MG capsule; Take 1 capsule (200 mg) by mouth daily.  Dispense: 90 capsule; Refill: 3    8. Asthmatic bronchitis , chronic (H)  Much milder.  Acute episodes have resolved    9. Class 2 severe obesity due to excess calories with serious comorbidity and body mass index (BMI) of 38.0 to 38.9 in adult (H)  Current BMI now 32.7 with current weight 185 pounds.  ICD  coated dose adjusted    10. Fatigue, unspecified type  Check labs  - CBC with Platelets & Differential; Future    11. S/P subtotal parathyroidectomy  Reviewed again.  Monitor parathyroid, calcium and thyroid    12. Primary insomnia  - busPIRone (BUSPAR) 10 MG tablet; Take 1 tablet (10 mg) by mouth 3 times daily as needed (anxiety).  Dispense: 40 tablet; Refill: 2  - hydrOXYzine HCl (ATARAX) 25 MG tablet; Take 1 tablet (25 mg) by mouth 3 times daily as needed for itching.  Dispense: 40 tablet; Refill: 2  - propranolol (INDERAL) 10 MG tablet; Take 1 tablet (10 mg) by mouth every 8 hours as needed (anxiety or palpitations).  Dispense: 20 tablet; Refill: 3  - LORazepam (ATIVAN) 0.5 MG tablet; Take 1 tablet (0.5 mg) by mouth every 8 hours as needed for anxiety.  Dispense: 20 tablet; Refill: 0  - traZODone (DESYREL) 50 MG tablet; Take 1 tablet (50 mg) by mouth at bedtime.  Dispense: 90 tablet; Refill: 3    13. SANNA (generalized anxiety disorder)  Weight neutral most important       Patient Instructions   Refill meds    Update labs    Labs for fatigue    Recommend shingles vaccine    Mammograms reviewed    Consider increase Mounjaro    Consider further holding rosuvastatin    Consider trial of prednisone for polymyalgia rheumatica    Trazodone 50 mg at bed    Hydroxyzine, lorazepam, propranolol, and BuSpar to try as needed            Return in about 6 months (around 9/30/2025) for using a video visit.         Subjective:   Meggan is a 74 year old female contacting the clinic via video today for an Annual Wellness Visit.    CHIEF COMPLAINT:  Chief Complaint   Patient presents with    Physical     Pt reports they are here for annual wellness exam and for med review.        HPI: Has lost weight and now stable.  Reports current weight 185 pounds.  With a height of 63 inches, that makes current BMI 32.77.  Has lost over 120 pounds.  Morning sugars however are frequently over 180    Underwent removal of 3.5 parathyroid adenomas  at a parathyroid center in [facility].  Discussed monitoring    Increased stress due to mother's illness and dementia    Generally achy, with morning nausea, insomnia, and anxiety.  Does not want to take any medicines and increased weight.  Thinking about increasing Mounjaro    Some trouble sleeping with both falling asleep and staying asleep    Review of Systems: No peripheral edema and when she does wear his lymphedema stockings    Current Outpatient Medications   Medication Sig Dispense Refill    albuterol (PROAIR HFA/PROVENTIL HFA/VENTOLIN HFA) 108 (90 Base) MCG/ACT inhaler Inhale 2 puffs into the lungs every 6 hours as needed for shortness of breath or wheezing 18 g 3    albuterol (PROVENTIL) (2.5 MG/3ML) 0.083% neb solution Take 1 vial (2.5 mg) by nebulization every 6 hours as needed for shortness of breath or wheezing. 75 mL 3    allopurinol (ZYLOPRIM) 100 MG tablet Take 2 tablets (200 mg) by mouth daily. 180 tablet 3    aspirin (ASA) 81 MG chewable tablet Take 81 mg by mouth At Bedtime      busPIRone (BUSPAR) 10 MG tablet Take 1 tablet (10 mg) by mouth 3 times daily as needed (anxiety). 40 tablet 2    celecoxib (CELEBREX) 200 MG capsule Take 1 capsule (200 mg) by mouth daily. 90 capsule 3    colchicine (COLCYRS) 0.6 MG tablet Take 1 tablet (0.6 mg) by mouth daily as needed for gout pain 90 tablet 3    hydrOXYzine HCl (ATARAX) 25 MG tablet Take 1 tablet (25 mg) by mouth 3 times daily as needed for itching. 40 tablet 2    levothyroxine (SYNTHROID) 125 MCG tablet Take 1 tablet (125 mcg) by mouth daily. 90 tablet 3    LORazepam (ATIVAN) 0.5 MG tablet Take 1 tablet (0.5 mg) by mouth every 8 hours as needed for anxiety. 20 tablet 0    LORazepam (ATIVAN) 0.5 MG tablet Take 1 tablet (0.5 mg) by mouth at bedtime. 90 tablet 1    metoprolol succinate ER (TOPROL XL) 25 MG 24 hr tablet Take 1 tablet (25 mg) by mouth daily. 90 tablet 3    Probiotic Product (PROBIOTIC-10 PO) Take 1 capsule by mouth daily Florastor    "    propranolol (INDERAL) 10 MG tablet Take 1 tablet (10 mg) by mouth every 8 hours as needed (anxiety or palpitations). 20 tablet 3    rosuvastatin (CRESTOR) 10 MG tablet Take 0.5 tablets (5 mg) by mouth three times a week. 90 tablet 3    Tirzepatide 12.5 MG/0.5ML SOAJ Inject 0.5 mLs (12.5 mg) subcutaneously every 7 days. 2 mL 2    tiZANidine (ZANAFLEX) 4 MG capsule Take 1 capsule (4 mg) by mouth at bedtime. 90 capsule 3    traZODone (DESYREL) 50 MG tablet Take 1 tablet (50 mg) by mouth at bedtime. 90 tablet 3    triamcinolone (KENALOG) 0.1 % external cream Apply topically 2 times daily (Patient taking differently: Apply topically as needed.) 30 g 3    alcohol swab prep pads Use to swab area of injection/vicki as directed. 100 each 3    blood glucose monitoring (ACCU-CHEK FASTCLIX) lancets Use to test blood sugar 4 times daily. 204 each 6    blood glucose monitoring (NO BRAND SPECIFIED) meter device kit Use to test blood sugar 4 times daily or as directed. Preferred blood glucose meter OR supplies to accompany: Blood Glucose Monitor Brands: per insurance. 1 kit 0    Continuous Blood Gluc  (FREESTYLE DIEGO 2 READER) [place] 1 each continuous Use to read blood sugars per 's instructions. 1 each 0    Continuous Glucose Sensor (FREESTYLE DIEGO 2 SENSOR) MISC 1 each every 14 days. To check blood sugars continuously 2 each 5    empagliflozin (JARDIANCE) 25 MG TABS tablet Take 1 tablet (25 mg) by mouth daily. 90 tablet 3    saccharomyces boulardii (FLORASTOR) 250 MG capsule Take by mouth.      thin (NO BRAND SPECIFIED) lancets Use with lanceting device. To accompany: Blood Glucose Monitor Brands: per insurance. 200 each 6        Objective:   There were no vitals taken for this visit.   Estimated body mass index is 34.86 kg/m  as calculated from the following:    Height as of 3/19/24: 1.6 m (5' 3\").    Weight as of 3/19/24: 89.3 kg (196 lb 12.8 oz).    PHYSICAL EXAM:  Alert and oriented.  Setting: At " home      No results found for this or any previous visit (from the past 720 hours).       No data to display                Cognitive Screening   Completely normal to conversational questioning            3/19/2024   Mini Cog   Clock Draw Score 2 Normal   3 Item Recall 3 objects recalled   Mini Cog Total Score 5              Recent Labs   Lab Test 09/06/24  1043 06/07/24  0834 03/19/24  1519   CHOL  --   --  224*   * 185* 127*   VITDT 60* 61*  --        Preventive Care Documentation    Patient Active Problem List   Diagnosis    Fibroadenoma of LEFT breast, with fibrocystic changes    Senile osteoporosis    Chronic kidney disease, stage III (moderate) (H)    Chronic use of benzodiazepine for therapeutic purpose    Essential hypertension    Type 2 diabetes mellitus with stage 3b chronic kidney disease, with long-term current use of insulin (H)    Screening for colon cancer    Mild intermittent asthma without complication    Acquired hypothyroidism    Vertiginous migraine    Lymphedema of both lower extremities    Status post total right knee replacement    Status post total left knee replacement    Esophageal reflux    Hyperlipidemia    S/P subtotal parathyroidectomy    SANNA (generalized anxiety disorder)     Past Medical History:   Diagnosis Date    Asthma     Asthmatic bronchitis , chronic (H) 03/31/2025    Chronic kidney failure, stage 3 (moderate) (H)     Chronic use of benzodiazepine for therapeutic purpose     Esophageal reflux     Fibroadenoma of LEFT breast, with fibrocystic changes 05/14/2012    Gout     Hyperlipidemia     Hypothyroid     Mammographic microcalcification 05/01/2012    Left    Migraine with aura     Obesity     ASHER (obstructive sleep apnea)     uses CPAP    Osteoporosis     Paroxysmal supraventricular tachycardia 08/18/2023    Primary osteoarthritis of both knees 09/28/2020    RBBB     Screening for colon cancer 11/29/2021    Type 2 diabetes mellitus (H)       Past Surgical History:    Procedure Laterality Date    BIOPSY BREAST      D & C  2000, 2002    DILATE CERVIX, ABLATE ENDOMETRIUM, COMBINED  01/01/2005    ENDOMETRIAL ABLATION      EP ABLATION SVT N/A 10/5/2023    Procedure: Ablation Supraventricular Tachycardia;  Surgeon: Mitch Sullivan MD;  Location: [facility] BIOPSY OF BREAST, OPEN INCISIONAL  01/01/1972    Left    HC LAPAROSCOPY, SURGICAL; CHOLECYSTECTOMY  01/01/1998    LAPAROSCOPIC CHOLECYSTECTOMY  01/01/1999    LUMBAR LAMINECTOMY  01/01/1998    Description: Laminectomy Lumbar    LUMPECTOMY BREAST      Microdiscectomy  01/01/1998      Family History   Problem Relation Age of Onset    Autoimmune Disease No family hx of     Hypertension Mother         alive in her 90s    Atrial fibrillation Father         alive in his 90s    LUNG DISEASE Sister         interstitial lung disease    Heart Disease Sister       Social History     Socioeconomic History    Marital status:      Spouse name: Not on file    Number of children: Not on file    Years of education: Not on file    Highest education level: Not on file   Occupational History    Occupation: Retired   Tobacco Use    Smoking status: Never    Smokeless tobacco: Never   Vaping Use    Vaping status: Never Used   Substance and Sexual Activity    Alcohol use: No    Drug use: No    Sexual activity: Never   Other Topics Concern    Not on file   Social History Narrative    She is  and is retired. She lives with her son and his family on the lower level of their house.  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  She has 5 grandchildren.  She does not smoke cigarettes or drink alcohol.     Social Drivers of Health     Financial Resource Strain: Low Risk  (3/29/2025)    Financial Resource Strain     Within the past 12 months, have you or your family members you live with been unable to get utilities (heat, electricity) when it was really needed?: No   Food  Insecurity: Low Risk  (3/29/2025)    Food Insecurity     Within the past 12 months, did you worry that your food would run out before you got money to buy more?: No     Within the past 12 months, did the food you bought just not last and you didn t have money to get more?: No   Transportation Needs: Low Risk  (3/29/2025)    Transportation Needs     Within the past 12 months, has lack of transportation kept you from medical appointments, getting your medicines, non-medical meetings or appointments, work, or from getting things that you need?: No   Physical Activity: Sufficiently Active (3/29/2025)    Exercise Vital Sign     Days of Exercise per Week: 5 days     Minutes of Exercise per Session: 30 min   Stress: Stress Concern Present (3/29/2025)    [employer] [place] [employer] - Occupational Stress Questionnaire     Feeling of Stress : To some extent   Social Connections: Unknown (3/29/2025)    Social Connection and Isolation Panel [NHANES]     Frequency of Communication with Friends and Family: Not on file     Frequency of Social Gatherings with Friends and Family: Once a week     Attends Zoroastrianism Services: Not on file     Active Member of Clubs or Organizations: Not on file     Attends Club or Organization Meetings: Not on file     Marital Status: Not on file   Interpersonal Safety: Low Risk  (3/19/2024)    Interpersonal Safety     Do you feel physically and emotionally safe where you currently live?: Yes     Within the past 12 months, have you been hit, slapped, kicked or otherwise physically hurt by someone?: No     Within the past 12 months, have you been humiliated or emotionally abused in other ways by your partner or ex-partner?: No   Housing Stability: Low Risk  (3/29/2025)    Housing Stability     Do you have housing? : Yes     Are you worried about losing your housing?: No            3/31/2025     2:15 PM   Additional Questions   Roomed by Denise   Accompanied by alone         3/31/2025     2:13  PM   Patient Reported Additional Medications   Patient reports taking the following new medications calcium and magnesium         Health Care Directive  Patient does not have a Health Care Directive: Advance Directive received and scanned. Click on Code in the patient header to view.        3/29/2025   Fall Risk   Fallen 2 or more times in the past year? No   Trouble with walking or balance? No            3/29/2025   General Health   How would you rate your overall physical health? Good   Feel stress (tense, anxious, or unable to sleep) To some extent   (!) STRESS CONCERN      3/29/2025   Nutrition   Diet: Diabetic    Breakfast skipped       Multiple values from one day are sorted in reverse-chronological order         3/29/2025   Exercise   Days per week of moderate/strenous exercise 5 days   Average minutes spent exercising at this level 30 min         3/29/2025   Social Factors   Frequency of gathering with friends or relatives Once a week   Worry food won't last until get money to buy more No   Food not last or not have enough money for food? No   Do you have housing? (Housing is defined as stable permanent housing and does not include staying ouside in a car, in a tent, in an abandoned building, in an overnight shelter, or couch-surfing.) Yes   Are you worried about losing your housing? No   Lack of transportation? No   Unable to get utilities (heat,electricity)? No             3/29/2025   Fall Risk   Fallen 2 or more times in the past year? No   Trouble with walking or balance? No          3/29/2025   Fall Risk   Fallen 2 or more times in the past year? No   Trouble with walking or balance? No           3/29/2025   Activities of Daily Living- Home Safety   Needs help with the following daily activites None of the above   Safety concerns in the home No grab bars in the bathroom         3/29/2025   Dental   Dentist two times every year? Yes         3/29/2025   Hearing Screening   Hearing concerns? None of the  above         3/29/2025   Driving Risk Screening   Patient/family members have concerns about driving No         3/29/2025   General Alertness/Fatigue Screening   Have you been more tired than usual lately? No         3/29/2025   Urinary Incontinence Screening   Bothered by leaking urine in past 6 months No           3/15/2024   TB Screening   Were you born outside of the [place]? No           Today's PHQ-2 Score:       3/31/2025     1:57 PM   PHQ-2 ( 1999 Pfizer)   Q1: Little interest or pleasure in doing things 0   Q2: Feeling down, depressed or hopeless 0   PHQ-2 Score 0    Q1: Little interest or pleasure in doing things Not at all   Q2: Feeling down, depressed or hopeless Not at all   PHQ-2 Score 0       Patient-reported           3/29/2025   Substance Use   Alcohol more than 3/day or more than 7/wk Not Applicable   Do you have a current opioid prescription? No   How severe/bad is pain from 1 to 10? 3/10   Do you use any other substances recreationally? (!) PRESCRIPTION DRUGS           3/26/2025   LAST FHS-7 RESULTS   1st degree relative breast or ovarian cancer No   Any relative bilateral breast cancer No   Any male have breast cancer No   Any ONE woman have BOTH breast AND ovarian cancer No   Any woman with breast cancer before 50yrs No   2 or more relatives with breast AND/OR ovarian cancer No   2 or more relatives with breast AND/OR bowel cancer No        Done earlier this month    ASCVD Risk   The ASCVD Risk score (Stuart CLEMENTS, et al., 2019) failed to calculate for the following reasons:    The systolic blood pressure is missing        Reviewed and updated as needed this visit by Provider     Meds  Problems               Current providers sharing in care for this patient include:  Patient Care Team:  Nate Bracenas MD as PCP - General (Internal Medicine)  Stacey Kelly MD as Referring Physician (Internal Medicine)  Trevor Hastings, PharmD as Pharmacist (Pharmacist)  Nate Barcenas MD as  Assigned PCP  Srinivas Cisneros RPH as Pharmacist  Srinivas Cisneros RPH as Pharmacist (Pharmacist)  Mitch Sullivan MD as MD (Cardiovascular Disease)  Marcie Pena APRN CNP as Assigned Heart and Vascular Provider  Alejandra Youssef MD as MD (Surgery)  Sophia Pate RD as Diabetes Educator (Dietitian, Registered)    The following health maintenance items are reviewed in Epic and correct as of today:  Health Maintenance   Topic Date Due    DIABETIC FOOT EXAM  Never done    ZOSTER IMMUNIZATION (1 of 2) Never done    ASTHMA ACTION PLAN  07/22/2022    A1C  03/06/2025    COVID-19 Vaccine (8 - 2024-25 season) 03/17/2025    LIPID  03/19/2025    URIC ACID  03/19/2025    BMP  09/06/2025    TSH W/FREE T4 REFLEX  09/06/2025    HEMOGLOBIN  09/06/2025    ASTHMA CONTROL TEST  09/30/2025    EYE EXAM  02/18/2026    COLORECTAL CANCER SCREENING  02/24/2026    MEDICARE ANNUAL WELLNESS VISIT  03/31/2026    ANNUAL REVIEW OF HM ORDERS  03/31/2026    FALL RISK ASSESSMENT  03/31/2026    ADVANCE CARE PLANNING  03/19/2029    DEXA  04/01/2039    PARATHYROID  Completed    PHOSPHORUS  Completed    HEPATITIS C SCREENING  Completed    PHQ-2 (once per calendar year)  Completed    INFLUENZA VACCINE  Completed    Pneumococcal Vaccine: 50+ Years  Completed    URINALYSIS  Completed    ALK PHOS  Completed    HPV IMMUNIZATION  Aged Out    MENINGITIS IMMUNIZATION  Aged Out    MICROALBUMIN  Discontinued    MAMMO SCREENING  Discontinued    DTAP/TDAP/TD IMMUNIZATION  Discontinued    RSV VACCINE  Discontinued     Reviewed and updated as needed this visit by clinical staff   Tobacco  Allergies  Meds  Problems             VisionScreening:  Last done yearly    Video-Visit Details  Type of service:  Video Visit  Patient has given verbal consent to a Video visit?  Yes  How would you like to obtain your AVS?  MyChart  Will anyone else be joining your video visit, giving supplemental history? No    Originating location (pt  location): Home    Distant Location (provider location):  Off-site    Video Start Time: 2:39 PM  Video End time:  3:07 PM  Face to face plus orders: 28 minutes  Documentation time:  3 minutes     The visit lasted a total of 31 minutes    Nate Barcenas MD  Internal Medicine  [facility]    The ongoing plan of care, for the conditions documented in the note above, were addressed during this visit.  Due to the chronic nature of the issues and uncertain outcome of changes made today, I will continue to support Meggan in the ongoing management of these conditions and continuity of care by access to OnDe[employer] messages, phone calls, and follow-up appointments.

## 2025-03-31 NOTE — PROGRESS NOTES
Preventive Care Visit  [facility]  Nate Barcenas MD, Internal Medicine  Mar 31, 2025  {Provider  Link to [facility] :[id number]}  Meggan is a 74 year old who is being evaluated via a billable video visit.    How would you like to obtain your AVS? MyChart  If the video visit is dropped, the invitation should be resent by: Send to e-mail at: [email or web address]  Will anyone else be joining your video visit? No  {If patient encounters technical issues they should call [phone] :475459}    {PROVIDER CHARTING PREFERENCE:049218}    Subjective   Meggan is a 74 year old, presenting for the following:  Physical (Pt reports they are here for annual wellness exam and for med review. )        3/31/2025     2:15 PM   Additional Questions   Roomed by Denise   Accompanied by alone         3/31/2025     2:13 PM   Patient Reported Additional Medications   Patient reports taking the following new medications calcium and magnesium     {ROOMER if patient is in their first year of Medicare a vision screen is required click here to document the Vison screen and then refresh the note to pull in results  :175904}    Video Start Time: {video visit start/end time for provider to select:717050}      HPI  ***   {SUPERLIST (Optional):298465}  {additonal problems for provider to add (Optional):704756}  Advance Care Planning  Patient does not have a Health Care Directive: {ADVANCE_DIRECTIVE_STATUS:001878}      3/29/2025   General Health   How would you rate your overall physical health? Good   Feel stress (tense, anxious, or unable to sleep) To some extent   (!) STRESS CONCERN      3/29/2025   Nutrition   Diet: Diabetic    Breakfast skipped       Multiple values from one day are sorted in reverse-chronological order         3/29/2025   Exercise   Days per week of moderate/strenous exercise 5 days   Average minutes spent exercising at this level 30 min         3/29/2025   Social Factors   Frequency of gathering with  friends or relatives Once a week   Worry food won't last until get money to buy more No   Food not last or not have enough money for food? No   Do you have housing? (Housing is defined as stable permanent housing and does not include staying ouside in a car, in a tent, in an abandoned building, in an overnight shelter, or couch-surfing.) Yes   Are you worried about losing your housing? No   Lack of transportation? No   Unable to get utilities (heat,electricity)? No         3/29/2025   Fall Risk   Fallen 2 or more times in the past year? No   Trouble with walking or balance? No          3/29/2025   Activities of Daily Living- Home Safety   Needs help with the following daily activites None of the above   Safety concerns in the home No grab bars in the bathroom         3/29/2025   Dental   Dentist two times every year? Yes         3/29/2025   Hearing Screening   Hearing concerns? None of the above         3/29/2025   Driving Risk Screening   Patient/family members have concerns about driving No         3/29/2025   General Alertness/Fatigue Screening   Have you been more tired than usual lately? No         3/29/2025   Urinary Incontinence Screening   Bothered by leaking urine in past 6 months No           3/15/2024   TB Screening   Were you born outside of the [place]? No           Today's PHQ-2 Score:       3/31/2025     1:57 PM   PHQ-2 ( 1999 Pfizer)   Q1: Little interest or pleasure in doing things 0   Q2: Feeling down, depressed or hopeless 0   PHQ-2 Score 0    Q1: Little interest or pleasure in doing things Not at all   Q2: Feeling down, depressed or hopeless Not at all   PHQ-2 Score 0       Patient-reported           3/29/2025   Substance Use   Alcohol more than 3/day or more than 7/wk Not Applicable   Do you have a current opioid prescription? No   How severe/bad is pain from 1 to 10? 3/10   Do you use any other substances recreationally? (!) PRESCRIPTION DRUGS     Social History     Tobacco Use    Smoking status:  Never    Smokeless tobacco: Never   Vaping Use    Vaping status: Never Used   Substance Use Topics    Alcohol use: No    Drug use: No     {Provider  If there are gaps in the social history shown above, please follow the link to update and then refresh the note Link to Social and Substance History :523373}      3/26/2025   LAST FHS-7 RESULTS   1st degree relative breast or ovarian cancer No   Any relative bilateral breast cancer No   Any male have breast cancer No   Any ONE woman have BOTH breast AND ovarian cancer No   Any woman with breast cancer before 50yrs No   2 or more relatives with breast AND/OR ovarian cancer No   2 or more relatives with breast AND/OR bowel cancer No     {If any of the questions to the FHS7 are answered yes, consider referral for genetic counseling.    Additional indications for genetic referral include personal history of breast or ovarian cancer, genetic mutation in 1st degree relative which increases risk of breast cancer including BRCA1, BRCA2, BOBBY, PALB 2, TP53, CHEK2, PTEN, CDH1, STK11 (per ACS) and/or 1st degree relative with history of pancreatic or high-risk prostate cancer (per NCCN):521810}   {Mammogram Decision Support (Optional):844940}    ASCVD Risk   The ASCVD Risk score (Stuart DK, et al., 2019) failed to calculate for the following reasons:    The systolic blood pressure is missing    {Link to Fracture Risk Assessment Tool (Optional):092117}    {Provider  REQUIRED FOR AWV Use the storyboard to review patient history, after sections have been marked as reviewed, refresh note to capture documentation:960644}  {Provider   REQUIRED AWV use this link to review and update sexual activity history  after section has been marked as reviewed, refresh note to capture documentation:957084}  Reviewed and updated as needed this visit by Provider                    {HISTORY OPTIONS (Optional):618601}  Current providers sharing in care for this patient include:  Patient Care  Team:  Nate Barcenas MD as PCP - General (Internal Medicine)  Stacey Kelly MD as Referring Physician (Internal Medicine)  Trevor Hastings, PharmD as Pharmacist (Pharmacist)  Nate Barcenas MD as Assigned PCP  Srinivas Cisneros RPH as Pharmacist  Srinivas Cisneros RPH as Pharmacist (Pharmacist)  Mitch Sullivan MD as MD (Cardiovascular Disease)  Marcie Pena APRN CNP as Assigned Heart and Vascular Provider  Alejandra Youssef MD as MD (Surgery)  Sophia Pate RD as Diabetes Educator (Dietitian, Registered)    The following health maintenance items are reviewed in Epic and correct as of today:  Health Maintenance   Topic Date Due    DIABETIC FOOT EXAM  Never done    ZOSTER IMMUNIZATION (1 of 2) Never done    ASTHMA ACTION PLAN  07/22/2022    A1C  03/06/2025    COVID-19 Vaccine (8 - 2024-25 season) 03/17/2025    LIPID  03/19/2025    ANNUAL REVIEW OF HM ORDERS  03/19/2025    URIC ACID  03/19/2025    MEDICARE ANNUAL WELLNESS VISIT  03/19/2025    BMP  09/06/2025    TSH W/FREE T4 REFLEX  09/06/2025    HEMOGLOBIN  09/06/2025    ASTHMA CONTROL TEST  09/30/2025    EYE EXAM  02/18/2026    COLORECTAL CANCER SCREENING  02/24/2026    FALL RISK ASSESSMENT  03/31/2026    ADVANCE CARE PLANNING  03/19/2029    DEXA  04/01/2039    PARATHYROID  Completed    PHOSPHORUS  Completed    HEPATITIS C SCREENING  Completed    PHQ-2 (once per calendar year)  Completed    INFLUENZA VACCINE  Completed    Pneumococcal Vaccine: 50+ Years  Completed    URINALYSIS  Completed    ALK PHOS  Completed    HPV IMMUNIZATION  Aged Out    MENINGITIS IMMUNIZATION  Aged Out    MICROALBUMIN  Discontinued    MAMMO SCREENING  Discontinued    DTAP/TDAP/TD IMMUNIZATION  Discontinued    RSV VACCINE  Discontinued       {ROS Picklists (Optional):259323}     Objective    Exam  There were no vitals taken for this visit.   Estimated body mass index is 34.86 kg/m  as calculated from the following:    Height as of 3/19/24: 1.6 m  "(5' 3\").    Weight as of 3/19/24: 89.3 kg (196 lb 12.8 oz).    Physical Exam  {Exam Choices (Optional):301232}  {Provider  The Mini-Cog is incomplete, use link to complete and refresh note Link to Mini-Cog :307865}      3/19/2024   Mini Cog   Clock Draw Score 2 Normal   3 Item Recall 3 objects recalled   Mini Cog Total Score 5     {A Mini-Cog total score of 0-2 suggests the possibility of dementia, score of 3-5 suggests no dementia:198950}       Video-Visit Details    Type of service:  Video Visit   Video End Time:{video visit start/end time for provider to select:082924}  Originating Location (pt. Location): {video visit patient location:500413::\"Home\"}  {PROVIDER LOCATION On-site should be selected for visits conducted from your clinic location or adjoining [facility] hospital, academic office, or other nearby [facility] building. Off-site should be selected for all other provider locations, including home:931020}  Distant Location (provider location):  {virtual location provider:747622}  Platform used for Video Visit: {Virtual Visit Platforms:244799::\"[employer]\"}  Signed Electronically by: Nate Barcenas MD  {Email feedback regarding this note to [email or web address]   :153041}  "

## 2025-03-31 NOTE — PATIENT INSTRUCTIONS
"Keep trying to get in something small for breakfast if it helps keep blood glucose more steady in the afternoon.   -Blood glucose looked good after eating some oatmeal this weekend so may be a food you do ok with!  -Glucerna has a [employer] \"Classic Vanilla\" shake that tastes pretty good!    2. Keep working on stress reduction daily. Along with less new, small periods of happiness playing with your puppy or taking deep breaths can be very helpful for blood glucose!    3. Keep eating small amount of pasta/carbohydrates with dinner- glad this is helping!  -Could also try small amount of carbohydrates with bedtime snack to see if helps or hinders    4. With the nicer weather, a small walk or playing outside in the evening may also help your body be more sensitive to insulin at night. If you do not see blood glucose lower directly from activity, see if overall blood glucose are lower later at night (or later in the day).    5. No change to medications today but I did reorder the 25 mg daily Jardiance for you - looks like the prescription  for it.     FOLLOW UP: Placed a follow up order in [employer] if you'd like to meet again after your A1C.  You can also call our Scheduling line at [phone] if easier.    Sophia Pate RDN, LD, [facility]         "

## 2025-03-31 NOTE — LETTER
3/31/2025         RE: Meggan Everett  [address] [place] [facility] [address]        Dear Colleague,    Thank you for referring your patient, Meggan Everett, to the [facility]. Please see a copy of my visit note below.    Diabetes Self-Management Education & Support    Presents for: Follow-up    Type of service:  Video Visit    If the video visit is dropped, the video visit invitation should be resent by: Text to cell phone: [phone]    Originating Location (pt. Location): Home  Distant Location (provider location): Offsite  Mode of Communication:  Video Conference via [employer]    Video Start Time:  4:35pm  Video End Time (time video stopped): 5:06pm    How would patient like to obtain AVS? MyChart      Assessment  Meggan is seeing improved blood glucose values with adding some carbohydrates with dinner (2 oz pasta) and with trying to reduce stress (avoiding news at night and getting out of house for her job have been helpful). She is still dealing with a high amount of stress while trying to figure out the care for her mom, and likely impacting blood glucose, but  continues to work on stress management the best she can. Commended Meggan on her changes and improved blood glucose and to keep it going.    She noticed better afternoon blood glucose after eating oatmeal and 1/2 protein shake this morning so tried to revisit some smaller snack ideas. She found the yogurt was not helpful for her blood glucose but was pleased the oatmeal was ok. Encouraged her to try to eat something small, when able, if this seems to help. She is not seeing lowering of blood glucose with activity  but reviewed how this may be a benefit happening after the exercise when the blood glucose replenish used glycogen stores. Suggested could try light activity at night to see if fasting blood glucose continue to improve. No change to medication recommended today as patient will continue to work on  lifestyle changes. Did reorder Jardiance as no longer listed as active medication since refill /ended 3/19/25. Pt verbalized understanding of concepts discussed and recommendations provided.       See CGM Reports in Monitoring section below.      Glucose Patterns & Trends:  Time in range of  mg/dL, 67% of the time. Patient not meeting ADA Time in Range Targets. and Time below range of 70 mg/dL, 0% of the time.    Care Plan and Education Provided:  Healthy Eating: Balanced meals, Being Active: Finding a physical activity routine that works for you and Relationship of activity to glucose, Taking Medication: Action of prescribed medication(s), and Healthy Coping: Benefits of making appropriate lifestyle changes, Identifying helpful resources, Methods of coping with stress, Recognize feelings about diagnosis, and Utilize support systems    Patient verbalized understanding of diabetes self-management education concepts discussed, opportunities for ongoing education and support, and recommendations provided today.    Plan    -Try to sustain changes to eat some carbohydrates with dinner and work on stress reduction  -Continue to try to find a light snack for breakfast to see if helps with blood glucose midday  -Can try adding light activity at night to see if fasting blood glucose continue to improve    Topics to cover at upcoming visits: Taking Medication and Problem Solving    Follow-up: PRN. Make follow up after A1C if not at goal.      See Care Plan for co-developed, patient-state behavior change goals.    Education Materials Provided:  No new materials provided today      Subjective/Objective  Meggan is an 74 year old year old, presenting for the following diabetes education related to: Presents for: Follow-up  Accompanied by: Self  Diabetes education in the past 24mo: Yes  Focus of Visit: Assistance w/ making life changes  Diabetes type: Type 2  Disease course: Stable  How confident are you filling out  "medical forms by yourself:: Extremely  Diabetes management related comments/concerns: Says she had a follow up with the doctor. Seeing FBG are coming down a bit but still high. Still struggling with her mom, who struggles with dementia. Looking into assisted living. Says trying to keep herself calm. Finds doing a job is helpful to get out of the house and be with other people. Helps with meals and snacks for the kids. Trying to not pay as much attention to the news.    Says fasting blood glucose is a little lower- seeing 144-160's instead of 180's.    Says blood glucose today was 145 mg/dL with 1/2 protein shake in the morning. Says the yogurt for breakfast and blood glucose spiked.     Working 3.5-4 hours a day and standing a lot during this. Has not gone to the gym the past couple of weeks. Went once a couple of weeks ago. Had been going M, W, F on exercise bike and water exercise bike. Walking th dog for 20 minutes when weather is nice.    Says notices blood glucose and heart rate not change much with walking or biking, which was disappointing because it helped lower blood glucose in the past.    Difficulty affording diabetes medication?: No  Difficulty affording diabetes testing supplies?: No  Other concerns:: Glasses  Cultural Influences/Ethnic Background:  Not  or       Diabetes Symptoms & Complications:  Diabetes Related Symptoms: None  Weight trend: Stable  Symptom course: Improving  Disease course: Stable  Complications assessed today?: Yes  Nephropathy: Yes    Patient Problem List and Family Medical History reviewed for relevant medical history, current medical status, and diabetes risk factors.    Vitals:  There were no vitals taken for this visit.  Estimated body mass index is 34.86 kg/m  as calculated from the following:    Height as of 3/19/24: 1.6 m (5' 3\").    Weight as of 3/19/24: 89.3 kg (196 lb 12.8 oz).   Last 3 BP:   BP Readings from Last 3 Encounters:   03/19/24 138/72   11/16/23 " "136/60   10/05/23 127/63       History   Smoking Status     Never   Smokeless Tobacco     Never       Labs:  Lab Results   Component Value Date    A1C 8.0 09/06/2024    A1C 7.4 01/18/2019     Lab Results   Component Value Date     09/06/2024     10/05/2023     04/29/2022     02/19/2019     Lab Results   Component Value Date     03/19/2024    LDL 70.0 09/17/2012     Direct Measure HDL   Date Value Ref Range Status   03/19/2024 77 >=50 mg/dL Final   ]  GFR Estimate   Date Value Ref Range Status   09/06/2024 44 (L) >60 mL/min/1.73m2 Final     Comment:     eGFR calculated using 2021 CKD-EPI equation.   10/28/2019 44 (L) >60 mL/min/1.73m2 Final   07/30/2019 49 (L) >60 mL/min/[1.73_m2] Final     Comment:     Non  GFR Calc  Starting 12/18/2018, serum creatinine based estimated GFR (eGFR) will be   calculated using the Chronic Kidney Disease Epidemiology Collaboration   (CKD-EPI) equation.       GFR Estimate If Black   Date Value Ref Range Status   10/28/2019 53 (L) >60 mL/min/1.73m2 Final   07/30/2019 57 (L) >60 mL/min/[1.73_m2] Final     Comment:      GFR Calc  Starting 12/18/2018, serum creatinine based estimated GFR (eGFR) will be   calculated using the Chronic Kidney Disease Epidemiology Collaboration   (CKD-EPI) equation.       Lab Results   Component Value Date    CR 1.27 09/06/2024    CR 1.14 07/30/2019     No results found for: \"MICROALBUMIN\"    Healthy Eating:  Healthy Eating Assessed Today: Yes  Cultural/Baptism diet restrictions?: No  Meal planning/habits: Low carb  Who cooks/prepares meals for you?: Self  Who purchases food in  your home?: Self  How many times a week on average do you eat food made away from home (restaurant/take-out)?: 1  Meals include: Lunch, Evening Snack, Dinner, Breakfast  Breakfast: 1/2 protein shake OR oatmeal with walnuts  Lunch: 1pm: [place] cabbage soup with MB OR cottage cheese with cucumber and tomato and olive oil, " sometimes blueberry and cuties (cut out other fruit due to the rise in BG)  Dinner: 4:30-5pm: protein (chicken or fish, sometimes pork)- usually in air fryer with salad and 2 oz pasta  Other: HS: cheese cubes, olives, celery, peanuts, cottage cheese with SF jello and cool whip  Beverages: Water    Being Active:  Being Active Assessed Today: Yes  Exercise:: Yes  Days per week of moderate to strenuous exercise (like a brisk walk): 3  On average, minutes per day of exercise at this level: 20  How intense was your typical exercise? : Moderate (like brisk walking)  Exercise Minutes per Week: 60  Barrier to exercise: None    Monitoring:  Monitoring Assessed Today: Yes  Did patient bring glucose meter to appointment? : Yes  Blood Glucose Meter: CGM (Shola 2)  Times checking blood sugar at home (number): 5+  Times checking blood sugar at home (per): Day    Shola 2 CGM:  Past 30 days since CGM Active Time >70% (better summary of blood glucose):      Past 2 weeks:          Taking Medications:  Diabetes Medication(s)       Incretin Mimetic Agents       Tirzepatide 12.5 MG/0.5ML SOAJ Inject 0.5 mLs (12.5 mg) subcutaneously every 7 days.        **Patient verifies still taking 25 mg Jardiance daily. Was able to find in medication history tab- looks like it had an end date of 3/19/25, so was no longer pulling into current medications. Reordered this for her.**    Taking Medication Assessed Today: Yes  Current Treatments: Diet, Non-insulin Injectables, Oral Medication (taken by mouth)  Problems taking diabetes medications regularly?: No  Diabetes medication side effects?: Yes (Reduced appetite. Unable to eat or drink breakfast.)    Sophia Pate, DAYANARA  Time Spent: 31 minutes  Encounter Type: Individual    Any diabetes medication dose changes were made via the CDCES Standing Orders under the patient's referring provider.

## 2025-03-31 NOTE — PATIENT INSTRUCTIONS
Refill meds    Update labs    Labs for fatigue    Recommend shingles vaccine    Mammograms reviewed    Consider increase Mounjaro    Consider further holding rosuvastatin    Consider trial of prednisone for polymyalgia rheumatica    Trazodone 50 mg at bed    Hydroxyzine, lorazepam, propranolol, and BuSpar to try as needed

## 2025-03-31 NOTE — PROGRESS NOTES
Diabetes Self-Management Education & Support    Presents for: Follow-up    Type of service:  Video Visit    If the video visit is dropped, the video visit invitation should be resent by: Text to cell phone: [phone]    Originating Location (pt. Location): Home  Distant Location (provider location): Offsite  Mode of Communication:  Video Conference via InPlace    Video Start Time:  4:35pm  Video End Time (time video stopped): 5:06pm    How would patient like to obtain AVS? MyChart      Assessment  Meggan is seeing improved blood glucose values with adding some carbohydrates with dinner (2 oz pasta) and with trying to reduce stress (avoiding news at night and getting out of house for her job have been helpful). She is still dealing with a high amount of stress while trying to figure out the care for her mom, and likely impacting blood glucose, but  continues to work on stress management the best she can. Commended Meggan on her changes and improved blood glucose and to keep it going.    She noticed better afternoon blood glucose after eating oatmeal and 1/2 protein shake this morning so tried to revisit some smaller snack ideas. She found the yogurt was not helpful for her blood glucose but was pleased the oatmeal was ok. Encouraged her to try to eat something small, when able, if this seems to help. She is not seeing lowering of blood glucose with activity  but reviewed how this may be a benefit happening after the exercise when the blood glucose replenish used glycogen stores. Suggested could try light activity at night to see if fasting blood glucose continue to improve. No change to medication recommended today as patient will continue to work on lifestyle changes. Did reorder Jardiance as no longer listed as active medication since refill /ended 3/19/25. Pt verbalized understanding of concepts discussed and recommendations provided.       See CGM Reports in Monitoring section below.      Glucose  Patterns & Trends:  Time in range of  mg/dL, 67% of the time. Patient not meeting ADA Time in Range Targets. and Time below range of 70 mg/dL, 0% of the time.    Care Plan and Education Provided:  Healthy Eating: Balanced meals, Being Active: Finding a physical activity routine that works for you and Relationship of activity to glucose, Taking Medication: Action of prescribed medication(s), and Healthy Coping: Benefits of making appropriate lifestyle changes, Identifying helpful resources, Methods of coping with stress, Recognize feelings about diagnosis, and Utilize support systems    Patient verbalized understanding of diabetes self-management education concepts discussed, opportunities for ongoing education and support, and recommendations provided today.    Plan    -Try to sustain changes to eat some carbohydrates with dinner and work on stress reduction  -Continue to try to find a light snack for breakfast to see if helps with blood glucose midday  -Can try adding light activity at night to see if fasting blood glucose continue to improve    Topics to cover at upcoming visits: Taking Medication and Problem Solving    Follow-up: PRN. Make follow up after A1C if not at goal.      See Care Plan for co-developed, patient-state behavior change goals.    Education Materials Provided:  No new materials provided today      Subjective/Objective  Meggan is an 74 year old year old, presenting for the following diabetes education related to: Presents for: Follow-up  Accompanied by: Self  Diabetes education in the past 24mo: Yes  Focus of Visit: Assistance w/ making life changes  Diabetes type: Type 2  Disease course: Stable  How confident are you filling out medical forms by yourself:: Extremely  Diabetes management related comments/concerns: Says she had a follow up with the doctor. Seeing FBG are coming down a bit but still high. Still struggling with her mom, who struggles with dementia. Looking into assisted  "living. Says trying to keep herself calm. Finds doing a job is helpful to get out of the house and be with other people. Helps with meals and snacks for the kids. Trying to not pay as much attention to the news.    Says fasting blood glucose is a little lower- seeing 144-160's instead of 180's.    Says blood glucose today was 145 mg/dL with 1/2 protein shake in the morning. Says the yogurt for breakfast and blood glucose spiked.     Working 3.5-4 hours a day and standing a lot during this. Has not gone to the gym the past couple of weeks. Went once a couple of weeks ago. Had been going M, W, F on exercise bike and water exercise bike. Walking th dog for 20 minutes when weather is nice.    Says notices blood glucose and heart rate not change much with walking or biking, which was disappointing because it helped lower blood glucose in the past.    Difficulty affording diabetes medication?: No  Difficulty affording diabetes testing supplies?: No  Other concerns:: Glasses  Cultural Influences/Ethnic Background:  Not  or       Diabetes Symptoms & Complications:  Diabetes Related Symptoms: None  Weight trend: Stable  Symptom course: Improving  Disease course: Stable  Complications assessed today?: Yes  Nephropathy: Yes    Patient Problem List and Family Medical History reviewed for relevant medical history, current medical status, and diabetes risk factors.    Vitals:  There were no vitals taken for this visit.  Estimated body mass index is 34.86 kg/m  as calculated from the following:    Height as of 3/19/24: 1.6 m (5' 3\").    Weight as of 3/19/24: 89.3 kg (196 lb 12.8 oz).   Last 3 BP:   BP Readings from Last 3 Encounters:   03/19/24 138/72   11/16/23 136/60   10/05/23 127/63       History   Smoking Status    Never   Smokeless Tobacco    Never       Labs:  Lab Results   Component Value Date    A1C 8.0 09/06/2024    A1C 7.4 01/18/2019     Lab Results   Component Value Date     09/06/2024     " "10/05/2023     04/29/2022     02/19/2019     Lab Results   Component Value Date     03/19/2024    LDL 70.0 09/17/2012     Direct Measure HDL   Date Value Ref Range Status   03/19/2024 77 >=50 mg/dL Final   ]  GFR Estimate   Date Value Ref Range Status   09/06/2024 44 (L) >60 mL/min/1.73m2 Final     Comment:     eGFR calculated using 2021 CKD-EPI equation.   10/28/2019 44 (L) >60 mL/min/1.73m2 Final   07/30/2019 49 (L) >60 mL/min/[1.73_m2] Final     Comment:     Non  GFR Calc  Starting 12/18/2018, serum creatinine based estimated GFR (eGFR) will be   calculated using the Chronic Kidney Disease Epidemiology Collaboration   (CKD-EPI) equation.       GFR Estimate If Black   Date Value Ref Range Status   10/28/2019 53 (L) >60 mL/min/1.73m2 Final   07/30/2019 57 (L) >60 mL/min/[1.73_m2] Final     Comment:      GFR Calc  Starting 12/18/2018, serum creatinine based estimated GFR (eGFR) will be   calculated using the Chronic Kidney Disease Epidemiology Collaboration   (CKD-EPI) equation.       Lab Results   Component Value Date    CR 1.27 09/06/2024    CR 1.14 07/30/2019     No results found for: \"MICROALBUMIN\"    Healthy Eating:  Healthy Eating Assessed Today: Yes  Cultural/[employer] diet restrictions?: No  Meal planning/habits: Low carb  Who cooks/prepares meals for you?: Self  Who purchases food in  your home?: Self  How many times a week on average do you eat food made away from home (restaurant/take-out)?: 1  Meals include: Lunch, Evening Snack, Dinner, Breakfast  Breakfast: 1/2 protein shake OR oatmeal with walnuts  Lunch: 1pm: [place] cabbage soup with MB OR cottage cheese with cucumber and tomato and olive oil, sometimes blueberry and cuties (cut out other fruit due to the rise in BG)  Dinner: 4:30-5pm: protein (chicken or fish, sometimes pork)- usually in air fryer with salad and 2 oz pasta  Other: HS: cheese cubes, olives, celery, peanuts, cottage cheese with SF " jello and cool whip  Beverages: Water    Being Active:  Being Active Assessed Today: Yes  Exercise:: Yes  Days per week of moderate to strenuous exercise (like a brisk walk): 3  On average, minutes per day of exercise at this level: 20  How intense was your typical exercise? : Moderate (like brisk walking)  Exercise Minutes per Week: 60  Barrier to exercise: None    Monitoring:  Monitoring Assessed Today: Yes  Did patient bring glucose meter to appointment? : Yes  Blood Glucose Meter: CGM (Shola 2)  Times checking blood sugar at home (number): 5+  Times checking blood sugar at home (per): Day    Shola 2 CGM:  Past 30 days since CGM Active Time >70% (better summary of blood glucose):      Past 2 weeks:          Taking Medications:  Diabetes Medication(s)       Incretin Mimetic Agents       Tirzepatide 12.5 MG/0.5ML SOAJ Inject 0.5 mLs (12.5 mg) subcutaneously every 7 days.        **Patient verifies still taking 25 mg Jardiance daily. Was able to find in medication history tab- looks like it had an end date of 3/19/25, so was no longer pulling into current medications. Reordered this for her.**    Taking Medication Assessed Today: Yes  Current Treatments: Diet, Non-insulin Injectables, Oral Medication (taken by mouth)  Problems taking diabetes medications regularly?: No  Diabetes medication side effects?: Yes (Reduced appetite. Unable to eat or drink breakfast.)    Sophia Pate RD  Time Spent: 31 minutes  Encounter Type: Individual    Any diabetes medication dose changes were made via the CDCES Standing Orders under the patient's referring provider.

## 2025-04-01 ENCOUNTER — TELEPHONE (OUTPATIENT)
Dept: INTERNAL MEDICINE | Facility: CLINIC | Age: 75
End: 2025-04-01
Payer: MEDICARE

## 2025-04-01 NOTE — TELEPHONE ENCOUNTER
Hello,     This is [facility] requesting the visit notes from 3/31/25  be addended to include the patient's daily insulin regimen and that patient's testing blood glucose via CGM in the notes portion of the visit. This is in regards to the patient's Freestyle Libr 2 plus supply and to be Medicare compliant for coverage.     This message is to inform you that we are in need of Medicare compliant prescriptions for Freestyle Shola 2 plus sensor.      Prescription must be written by: MD Nate Barcenas  Must include full supply name: Freestyle Shola 2 Plus Sensor  Quantity: 2  Refills: 5            SIG: Change every 15 days     As a reminder, Medicare requires patients to be seen every 3 months for insulin supplies and every 6 months for CGMs. The provider who sees the patient must be the provider on the prescription, must be written on the day of or after office visit date and office visit must include notes about diabetes and insulin regimen. The Diabetes Care Services team at [facility] may request new prescriptions before renewal dates of the prescription is filled over the allowable amount. Writing the order to match what is above will help ensure we will only need to request every 3 or 6 months.     Thank you!     [facility]  Diabetes Care Services Team  [address] [place] [address]  Provider Phone: [phone]  Patient Line: [phone]  Fax: [phone]  E-mail: [email or web address]

## 2025-04-01 NOTE — TELEPHONE ENCOUNTER
Hello,    This is [facility] requesting the visit notes from 3/31/25  be addended to include the patient's daily insulin regimen and that patient's testing blood glucose via CGM in the notes portion of the visit. This is in regards to the patient's Freestyle Libr 2 plus supply and to be Medicare compliant for coverage.    This message is to inform you that we are in need of Medicare compliant prescriptions for Freestyle Shola 2 plus sensor.     Prescription must be written by: MD Nate Barcenas  Must include full supply name: Freestyle Shola 2 Plus Sensor  Quantity: 2  Refills: 5   SIG: Change every 15 days    As a reminder, Medicare requires patients to be seen every 3 months for insulin supplies and every 6 months for CGMs. The provider who sees the patient must be the provider on the prescription, must be written on the day of or after office visit date and office visit must include notes about diabetes and insulin regimen. The Diabetes Care Services team at [facility] may request new prescriptions before renewal dates of the prescription is filled over the allowable amount. Writing the order to match what is above will help ensure we will only need to request every 3 or 6 months.    Thank you!    [facility]  Diabetes Care Services Team  [address] [place] [address]  Provider Phone: [phone]  Patient Line: [phone]  Fax: [phone]  E-mail: [email or web address]

## 2025-04-05 ENCOUNTER — LAB (OUTPATIENT)
Dept: LAB | Facility: HOSPITAL | Age: 75
End: 2025-04-05
Payer: MEDICARE

## 2025-04-05 DIAGNOSIS — N18.32 TYPE 2 DIABETES MELLITUS WITH STAGE 3B CHRONIC KIDNEY DISEASE, WITH LONG-TERM CURRENT USE OF INSULIN (H): ICD-10-CM

## 2025-04-05 DIAGNOSIS — E11.22 TYPE 2 DIABETES MELLITUS WITH STAGE 3B CHRONIC KIDNEY DISEASE, WITH LONG-TERM CURRENT USE OF INSULIN (H): ICD-10-CM

## 2025-04-05 DIAGNOSIS — E87.5 HYPERKALEMIA: ICD-10-CM

## 2025-04-05 DIAGNOSIS — Z79.4 TYPE 2 DIABETES MELLITUS WITH STAGE 3B CHRONIC KIDNEY DISEASE, WITH LONG-TERM CURRENT USE OF INSULIN (H): ICD-10-CM

## 2025-04-05 LAB
ANION GAP SERPL CALCULATED.3IONS-SCNC: 10 MMOL/L (ref 7–15)
BUN SERPL-MCNC: 34.8 MG/DL (ref 8–23)
CALCIUM SERPL-MCNC: 9.5 MG/DL (ref 8.8–10.4)
CHLORIDE SERPL-SCNC: 107 MMOL/L (ref 98–107)
CREAT SERPL-MCNC: 1.18 MG/DL (ref 0.51–0.95)
EGFRCR SERPLBLD CKD-EPI 2021: 48 ML/MIN/1.73M2
GLUCOSE SERPL-MCNC: 181 MG/DL (ref 70–99)
HCO3 SERPL-SCNC: 25 MMOL/L (ref 22–29)
POTASSIUM SERPL-SCNC: 4.8 MMOL/L (ref 3.4–5.3)
SODIUM SERPL-SCNC: 142 MMOL/L (ref 135–145)

## 2025-04-05 PROCEDURE — 36415 COLL VENOUS BLD VENIPUNCTURE: CPT

## 2025-04-05 PROCEDURE — 80048 BASIC METABOLIC PNL TOTAL CA: CPT

## 2025-04-07 ENCOUNTER — TELEPHONE (OUTPATIENT)
Dept: INTERNAL MEDICINE | Facility: CLINIC | Age: 75
End: 2025-04-07

## 2025-04-07 DIAGNOSIS — Z79.4 TYPE 2 DIABETES MELLITUS WITH STAGE 3B CHRONIC KIDNEY DISEASE, WITH LONG-TERM CURRENT USE OF INSULIN (H): Primary | ICD-10-CM

## 2025-04-07 DIAGNOSIS — E11.22 TYPE 2 DIABETES MELLITUS WITH STAGE 3B CHRONIC KIDNEY DISEASE, WITH LONG-TERM CURRENT USE OF INSULIN (H): Primary | ICD-10-CM

## 2025-04-07 DIAGNOSIS — N18.32 TYPE 2 DIABETES MELLITUS WITH STAGE 3B CHRONIC KIDNEY DISEASE, WITH LONG-TERM CURRENT USE OF INSULIN (H): Primary | ICD-10-CM

## 2025-04-07 RX ORDER — BLOOD-GLUCOSE SENSOR
1 EACH MISCELLANEOUS SEE ADMIN INSTRUCTIONS
Qty: 2 EACH | Refills: 5 | Status: SHIPPED | OUTPATIENT
Start: 2025-04-07

## 2025-04-07 NOTE — TELEPHONE ENCOUNTER
Updated FL2 sensors to FL2+ sensors per discontinuation.     Tony Ovalles, PharmD  [facility]

## 2025-04-16 ENCOUNTER — E-VISIT (OUTPATIENT)
Dept: INTERNAL MEDICINE | Facility: CLINIC | Age: 75
End: 2025-04-16
Payer: MEDICARE

## 2025-04-16 DIAGNOSIS — J98.8 VIRAL RESPIRATORY INFECTION: Primary | ICD-10-CM

## 2025-04-16 DIAGNOSIS — B34.9 VIRAL SYNDROME: ICD-10-CM

## 2025-04-16 DIAGNOSIS — B97.89 VIRAL RESPIRATORY INFECTION: Primary | ICD-10-CM

## 2025-04-16 RX ORDER — PREDNISONE 20 MG/1
20 TABLET ORAL EVERY MORNING
Qty: 4 TABLET | Refills: 0 | Status: SHIPPED | OUTPATIENT
Start: 2025-04-16 | End: 2025-04-20

## 2025-04-16 RX ORDER — GUAIFENESIN 600 MG/1
600 TABLET, EXTENDED RELEASE ORAL 2 TIMES DAILY
Qty: 60 TABLET | Refills: 2 | Status: SHIPPED | OUTPATIENT
Start: 2025-04-16 | End: 2026-04-16

## 2025-04-16 RX ORDER — LIDOCAINE HYDROCHLORIDE 20 MG/ML
15 SOLUTION OROPHARYNGEAL
Qty: 100 ML | Refills: 3 | Status: SHIPPED | OUTPATIENT
Start: 2025-04-16

## 2025-04-16 RX ORDER — CODEINE PHOSPHATE AND GUAIFENESIN 10; 100 MG/5ML; MG/5ML
1-2 SOLUTION ORAL EVERY 6 HOURS PRN
Qty: 240 ML | Refills: 0 | Status: SHIPPED | OUTPATIENT
Start: 2025-04-16

## 2025-04-16 RX ORDER — BENZONATATE 200 MG/1
200 CAPSULE ORAL 3 TIMES DAILY PRN
Qty: 20 CAPSULE | Refills: 1 | Status: SHIPPED | OUTPATIENT
Start: 2025-04-16

## 2025-05-04 ENCOUNTER — OFFICE VISIT (OUTPATIENT)
Dept: URGENT CARE | Facility: URGENT CARE | Age: 75
End: 2025-05-04
Payer: MEDICARE

## 2025-05-04 VITALS
HEIGHT: 63 IN | TEMPERATURE: 96.8 F | BODY MASS INDEX: 36.3 KG/M2 | RESPIRATION RATE: 18 BRPM | SYSTOLIC BLOOD PRESSURE: 155 MMHG | OXYGEN SATURATION: 96 % | HEART RATE: 91 BPM | WEIGHT: 204.9 LBS | DIASTOLIC BLOOD PRESSURE: 80 MMHG

## 2025-05-04 DIAGNOSIS — M79.662 PAIN OF LEFT CALF: Primary | ICD-10-CM

## 2025-05-04 PROCEDURE — 99213 OFFICE O/P EST LOW 20 MIN: CPT | Performed by: PHYSICIAN ASSISTANT

## 2025-05-04 PROCEDURE — 3079F DIAST BP 80-89 MM HG: CPT | Performed by: PHYSICIAN ASSISTANT

## 2025-05-04 PROCEDURE — 3077F SYST BP >= 140 MM HG: CPT | Performed by: PHYSICIAN ASSISTANT

## 2025-05-05 NOTE — PROGRESS NOTES
"  Assessment & Plan:      Problem List Items Addressed This Visit    None  Visit Diagnoses       Pain of left calf    -  Primary          Medical Decision Making  Patient presents with left calf pain for less than 24 hours.  No signs concerning for DVT or cellulitis.  Suspect likely muscle pains of the left calf.  Recommend rest, ice, compression, and elevation.  Discussed treatment and symptomatic care.  Allergies and medication interactions reviewed.  Discussed signs of worsening symptoms and when to follow-up with PCP if no symptom improvement.     Subjective:      Meggan Everett is a 74 year old female here for evaluation of left calf pain.  Onset of symptoms was last night.  Patient denies any known injury or trauma to the left calf.  Patient is concerned about possible blood clot.  Patient does wear compression stockings at home.     The following portions of the patient's history were reviewed and updated as appropriate: allergies, current medications, and problem list.     Review of Systems  Pertinent items are noted in HPI.    Allergies  Allergies   Allergen Reactions    Codeine Sulfate Nausea    Metformin      Stomach pain     Oxycodone Nausea and Vomiting    Tape [Adhesive Tape] Blisters    Tetanus Toxoid        Family History   Problem Relation Age of Onset    Autoimmune Disease No family hx of     Hypertension Mother         alive in her 90s    Atrial fibrillation Father         alive in his 90s    LUNG DISEASE Sister         interstitial lung disease    Heart Disease Sister        Social History     Tobacco Use    Smoking status: Never    Smokeless tobacco: Never   Substance Use Topics    Alcohol use: No        Objective:      BP (!) 155/80 (BP Location: Left arm, Patient Position: Sitting, Cuff Size: Adult Large)   Pulse 91   Temp 96.8  F (36  C) (Tympanic)   Resp 18   Ht 1.6 m (5' 3\")   Wt 92.9 kg (204 lb 14.4 oz)   SpO2 96%   BMI 36.30 kg/m    General appearance - alert, well appearing, and " in no distress and non-toxic  Extremities - left lower extremity: Tenderness to palpation of the left calf  Skin - left lower extremity: No erythema, ecchymosis, or swelling noted; skin is normal warmth to touch     Lab & Imaging Results    No results found for any visits on 05/04/25.    I personally reviewed these results and discussed findings with the patient.    The use of Dragon/SystemsNet dictation services was used to construct the content of this note; any grammatical errors are non-intentional. Please contact the author directly if you are in need of any clarification.

## 2025-05-20 ENCOUNTER — MYC REFILL (OUTPATIENT)
Dept: INTERNAL MEDICINE | Facility: CLINIC | Age: 75
End: 2025-05-20
Payer: MEDICARE

## 2025-05-20 DIAGNOSIS — G43.109 VERTIGINOUS MIGRAINE: ICD-10-CM

## 2025-05-20 RX ORDER — LORAZEPAM 0.5 MG/1
0.5 TABLET ORAL AT BEDTIME
Qty: 90 TABLET | Refills: 1 | Status: SHIPPED | OUTPATIENT
Start: 2025-05-20

## 2025-05-27 ENCOUNTER — TRANSFERRED RECORDS (OUTPATIENT)
Dept: HEALTH INFORMATION MANAGEMENT | Facility: CLINIC | Age: 75
End: 2025-05-27
Payer: MEDICARE

## 2025-06-02 NOTE — TELEPHONE ENCOUNTER
Routing refill request to provider for review/approval because:  Drug not on the [facility] refill protocol     Last Written Prescription Date:  3/2/22  Last Fill Quantity: 90,  # refills: 0   Last office visit provider:  8/19/22     Requested Prescriptions   Pending Prescriptions Disp Refills     tiZANidine (ZANAFLEX) 4 MG capsule 90 capsule 0     Sig: Take 1 capsule (4 mg) by mouth 3 times daily as needed for muscle spasms       There is no refill protocol information for this order          Carlos Irizarry RN 09/06/22 12:30 PM   No indicators present

## 2025-06-05 ENCOUNTER — E-VISIT (OUTPATIENT)
Dept: INTERNAL MEDICINE | Facility: CLINIC | Age: 75
End: 2025-06-05
Payer: MEDICARE

## 2025-06-05 DIAGNOSIS — B97.89 VIRAL RESPIRATORY INFECTION: Primary | ICD-10-CM

## 2025-06-05 DIAGNOSIS — B34.9 VIRAL SYNDROME: ICD-10-CM

## 2025-06-05 DIAGNOSIS — J98.8 VIRAL RESPIRATORY INFECTION: Primary | ICD-10-CM

## 2025-06-05 RX ORDER — PREDNISONE 20 MG/1
20 TABLET ORAL EVERY MORNING
Qty: 20 TABLET | Refills: 1 | Status: SHIPPED | OUTPATIENT
Start: 2025-06-05 | End: 2025-06-09

## 2025-06-11 DIAGNOSIS — I47.9 PAROXYSMAL TACHYCARDIA (H): ICD-10-CM

## 2025-06-11 DIAGNOSIS — J45.20 MILD INTERMITTENT ASTHMA WITHOUT COMPLICATION: ICD-10-CM

## 2025-06-11 DIAGNOSIS — R00.0 TACHYCARDIA: Primary | ICD-10-CM

## 2025-06-12 ENCOUNTER — TRANSCRIBE ORDERS (OUTPATIENT)
Dept: INTERNAL MEDICINE | Facility: CLINIC | Age: 75
End: 2025-06-12
Payer: MEDICARE

## 2025-06-12 DIAGNOSIS — R00.0 TACHYCARDIA: Primary | ICD-10-CM

## 2025-06-13 ENCOUNTER — RESULTS FOLLOW-UP (OUTPATIENT)
Dept: INTERNAL MEDICINE | Facility: CLINIC | Age: 75
End: 2025-06-13

## 2025-06-16 DIAGNOSIS — I47.10 SVT (SUPRAVENTRICULAR TACHYCARDIA): Primary | ICD-10-CM

## 2025-06-17 ENCOUNTER — ORDERS ONLY (AUTO-RELEASED) (OUTPATIENT)
Dept: CARDIOLOGY | Facility: CLINIC | Age: 75
End: 2025-06-17
Payer: MEDICARE

## 2025-06-17 DIAGNOSIS — I47.10 SVT (SUPRAVENTRICULAR TACHYCARDIA): ICD-10-CM

## 2025-07-22 ENCOUNTER — TELEPHONE (OUTPATIENT)
Dept: CARDIOLOGY | Facility: CLINIC | Age: 75
End: 2025-07-22
Payer: MEDICARE

## 2025-07-22 ENCOUNTER — RESULTS FOLLOW-UP (OUTPATIENT)
Dept: CARDIOLOGY | Facility: CLINIC | Age: 75
End: 2025-07-22
Payer: MEDICARE

## 2025-07-22 NOTE — TELEPHONE ENCOUNTER
Pt called inquiring about feedback and next steps from [employer] monitor. Confirmed this has been sent to Marcie Pena for review and let pt know once she sends feedback we will call back.     Samira To RN

## 2025-07-23 ENCOUNTER — DOCUMENTATION ONLY (OUTPATIENT)
Dept: CARDIOLOGY | Facility: CLINIC | Age: 75
End: 2025-07-23
Payer: MEDICARE

## 2025-07-23 DIAGNOSIS — I47.9 PAROXYSMAL TACHYCARDIA (H): ICD-10-CM

## 2025-07-23 DIAGNOSIS — I47.10 SVT (SUPRAVENTRICULAR TACHYCARDIA): Primary | ICD-10-CM

## 2025-07-23 DIAGNOSIS — R00.0 TACHYCARDIA: ICD-10-CM

## 2025-07-23 NOTE — TELEPHONE ENCOUNTER
Pt called and reported she would like to proceed with ILR. Will forward to JOSE HOYOS for approval.     Samira To RN

## 2025-07-23 NOTE — PROGRESS NOTES
AC: None- NA No med hold for ILR     Meggan Everett 1950 [id number]  Home:[phone] (home) Cell:[phone] (mobile)  Emergency Contact: Prince Karlos   PCP: Nate Barcenas, [phone]      Important patient information for CSC/Cath Lab staff : None    [facility] EP Cath Lab Procedure Order     Device Implant/Revision:  Procedure: ILR Implant  Current Device/Device Co Needed for Procedure: Medtronic   Ordering Provider: Dr Mc (Can be scheduled with any provider)  Date Ordered and Prepped: 7/23/2025 Mone Claudio RN  Diagnosis:  Palpitations, post SVT ablation, SVT  Scheduling Timeframe:  For implants onlyt, schedule out at least 3 weeks for insurance approval for implant  Scheduling Restrictions: None  Scheduling Contact: Please contact pt to schedule, if you are unable to schedule date within the next 24 hours please contact pt to update on scheduling process  Cardiology Follow Up Apt s/p:  Standard Device follow up General Card @ 6mo (does not include New CRT/CSP/HIS)  Pre-Procedural Testing needed: None  Anesthesia:  None, No sedation only local anesthetic.    [facility] EP Cath Lab Prep   H&P:  H&P is not needed for this procedure  NPO: Pt does NOT need to be NPO prior to procedure, pt can eat and drink up to procedure.  Pre-Procedure Labs/T&S: No labs required prior to procedure or AM of procedure.  Medical Records Pertinent for Procedure: None  Iodinated Contrast Dye Allergies (Does not include Shellfish, Egg, and/or Iodine Allergy)- excludes ILR/SICD: NA for procedure, pt does not receive dye for procedure.  Lidocaine and/or Chlorhexidine Allergies (scheduling to include alert on snapboard): None  Renal Protocol (GFR < 40ml/min, IV contrast past 2 days, EF < or = 25%)- excludes ILR/SICD: NA for procedure.  Medications: NO medication holds prior to procedure, pt to take all AM medications (this is including all diabetic medication, multivitamins/supplements diuretics, and GLP-1  medications)    Allergies   Allergen Reactions    Codeine Sulfate Nausea    Metformin      Stomach pain     Oxycodone Nausea and Vomiting    Tape [Adhesive Tape] Blisters    Tetanus Toxoid        Current Outpatient Medications:     albuterol (PROAIR HFA/PROVENTIL HFA/VENTOLIN HFA) 108 (90 Base) MCG/ACT inhaler, Inhale 2 puffs into the lungs every 6 hours as needed for shortness of breath or wheezing, Disp: 18 g, Rfl: 3    albuterol (PROVENTIL) (2.5 MG/3ML) 0.083% neb solution, Take 1 vial (2.5 mg) by nebulization every 6 hours as needed for shortness of breath or wheezing., Disp: 75 mL, Rfl: 3    alcohol swab prep pads, Use to swab area of injection/vicki as directed., Disp: 100 each, Rfl: 3    allopurinol (ZYLOPRIM) 100 MG tablet, Take 2 tablets (200 mg) by mouth daily., Disp: 180 tablet, Rfl: 3    aspirin (ASA) 81 MG chewable tablet, Take 81 mg by mouth At Bedtime, Disp: , Rfl:     azithromycin (ZITHROMAX) 500 MG tablet, Take 1 tablet (500 mg) by mouth daily., Disp: 7 tablet, Rfl: 0    benzonatate (TESSALON) 200 MG capsule, Take 1 capsule (200 mg) by mouth 3 times daily as needed for cough., Disp: 20 capsule, Rfl: 1    blood glucose monitoring (ACCU-CHEK FASTCLIX) lancets, Use to test blood sugar 4 times daily., Disp: 204 each, Rfl: 6    blood glucose monitoring (NO BRAND SPECIFIED) meter device kit, Use to test blood sugar 4 times daily or as directed. Preferred blood glucose meter OR supplies to accompany: Blood Glucose Monitor Brands: per insurance., Disp: 1 kit, Rfl: 0    busPIRone (BUSPAR) 10 MG tablet, Take 1 tablet (10 mg) by mouth 3 times daily as needed (anxiety)., Disp: 40 tablet, Rfl: 2    celecoxib (CELEBREX) 200 MG capsule, Take 1 capsule (200 mg) by mouth daily., Disp: 90 capsule, Rfl: 3    colchicine (COLCYRS) 0.6 MG tablet, Take 1 tablet (0.6 mg) by mouth daily as needed for gout pain, Disp: 90 tablet, Rfl: 3    Continuous Blood Gluc  (FREESTYLE DIEGO 2 READER) [place], 1 each continuous Use  to read blood sugars per 's instructions., Disp: 1 each, Rfl: 0    Continuous Glucose Sensor (FREESTYLE DIEGO 2 PLUS SENSOR) MISC, 1 each See Admin Instructions. Change every 15 days, Disp: 2 each, Rfl: 5    empagliflozin (JARDIANCE) 25 MG TABS tablet, Take 1 tablet (25 mg) by mouth daily., Disp: 90 tablet, Rfl: 3    guaiFENesin (MUCINEX) 600 MG 12 hr tablet, Take 1 tablet (600 mg) by mouth 2 times daily., Disp: 60 tablet, Rfl: 2    guaiFENesin-codeine (ROBITUSSIN AC) 100-10 MG/5ML solution, Take 5-10 mLs by mouth every 6 hours as needed for cough., Disp: 240 mL, Rfl: 0    hydrOXYzine HCl (ATARAX) 25 MG tablet, Take 1 tablet (25 mg) by mouth 3 times daily as needed for itching., Disp: 40 tablet, Rfl: 2    levothyroxine (SYNTHROID) 125 MCG tablet, Take 1 tablet (125 mcg) by mouth daily., Disp: 90 tablet, Rfl: 3    lidocaine, viscous, (XYLOCAINE) 2 % solution, Swish and swallow 15 mLs in mouth every 3 hours as needed for moderate pain. Max 8 doses/24 hour period., Disp: 100 mL, Rfl: 3    LORazepam (ATIVAN) 0.5 MG tablet, Take 1 tablet (0.5 mg) by mouth at bedtime., Disp: 90 tablet, Rfl: 1    metoprolol succinate ER (TOPROL XL) 25 MG 24 hr tablet, Take 1 tablet (25 mg) by mouth daily., Disp: 90 tablet, Rfl: 3    Probiotic Product (PROBIOTIC-10 PO), Take 1 capsule by mouth daily Florastor , Disp: , Rfl:     propranolol (INDERAL) 10 MG tablet, Take 1 tablet (10 mg) by mouth every 8 hours as needed (anxiety or palpitations)., Disp: 20 tablet, Rfl: 3    rosuvastatin (CRESTOR) 10 MG tablet, Take 0.5 tablets (5 mg) by mouth three times a week., Disp: 90 tablet, Rfl: 3    saccharomyces boulardii (FLORASTOR) 250 MG capsule, Take by mouth., Disp: , Rfl:     thin (NO BRAND SPECIFIED) lancets, Use with lanceting device. To accompany: Blood Glucose Monitor Brands: per insurance., Disp: 200 each, Rfl: 6    tirzepatide (MOUNJARO) 12.5 MG/0.5ML SOAJ auto-injector pen, Inject 0.5 mLs (12.5 mg) subcutaneously every 7 days.,  Disp: 2 mL, Rfl: 11    tiZANidine (ZANAFLEX) 4 MG capsule, Take 1 capsule (4 mg) by mouth at bedtime., Disp: 90 capsule, Rfl: 3    traZODone (DESYREL) 50 MG tablet, Take 1 tablet (50 mg) by mouth at bedtime., Disp: 90 tablet, Rfl: 3    triamcinolone (KENALOG) 0.1 % external cream, Apply topically 2 times daily (Patient taking differently: Apply topically as needed.), Disp: 30 g, Rfl: 3    Documentation Date:7/23/2025 1:52 PM  Mone Claudio RN

## 2025-07-28 ENCOUNTER — PREP FOR PROCEDURE (OUTPATIENT)
Dept: CARDIOLOGY | Facility: CLINIC | Age: 75
End: 2025-07-28
Payer: MEDICARE

## 2025-07-28 DIAGNOSIS — I47.10 SVT (SUPRAVENTRICULAR TACHYCARDIA): Primary | ICD-10-CM

## 2025-07-28 DIAGNOSIS — I47.9 PAROXYSMAL TACHYCARDIA (H): ICD-10-CM

## 2025-07-28 DIAGNOSIS — R00.0 TACHYCARDIA: ICD-10-CM

## 2025-07-28 RX ORDER — LIDOCAINE 40 MG/G
CREAM TOPICAL
OUTPATIENT
Start: 2025-07-28

## 2025-07-29 ENCOUNTER — HOSPITAL ENCOUNTER (OUTPATIENT)
Facility: HOSPITAL | Age: 75
End: 2025-07-29
Attending: NURSE PRACTITIONER
Payer: MEDICARE

## 2025-07-29 DIAGNOSIS — I47.10 SVT (SUPRAVENTRICULAR TACHYCARDIA): ICD-10-CM

## 2025-07-29 DIAGNOSIS — R00.0 TACHYCARDIA: ICD-10-CM

## 2025-07-29 DIAGNOSIS — I47.9 PAROXYSMAL TACHYCARDIA (H): ICD-10-CM

## 2025-08-06 DIAGNOSIS — N18.32 TYPE 2 DIABETES MELLITUS WITH STAGE 3B CHRONIC KIDNEY DISEASE, WITH LONG-TERM CURRENT USE OF INSULIN (H): ICD-10-CM

## 2025-08-06 DIAGNOSIS — E66.01 CLASS 2 SEVERE OBESITY DUE TO EXCESS CALORIES WITH SERIOUS COMORBIDITY AND BODY MASS INDEX (BMI) OF 38.0 TO 38.9 IN ADULT (H): ICD-10-CM

## 2025-08-06 DIAGNOSIS — E66.812 CLASS 2 SEVERE OBESITY DUE TO EXCESS CALORIES WITH SERIOUS COMORBIDITY AND BODY MASS INDEX (BMI) OF 38.0 TO 38.9 IN ADULT (H): ICD-10-CM

## 2025-08-06 DIAGNOSIS — Z79.4 TYPE 2 DIABETES MELLITUS WITH STAGE 3B CHRONIC KIDNEY DISEASE, WITH LONG-TERM CURRENT USE OF INSULIN (H): ICD-10-CM

## 2025-08-06 DIAGNOSIS — E11.22 TYPE 2 DIABETES MELLITUS WITH STAGE 3B CHRONIC KIDNEY DISEASE, WITH LONG-TERM CURRENT USE OF INSULIN (H): ICD-10-CM

## 2025-08-06 RX ORDER — BLOOD-GLUCOSE SENSOR
1 EACH MISCELLANEOUS SEE ADMIN INSTRUCTIONS
Qty: 2 EACH | Refills: 5 | Status: SHIPPED | OUTPATIENT
Start: 2025-08-06

## 2025-08-11 ENCOUNTER — TELEPHONE (OUTPATIENT)
Dept: CARDIOLOGY | Facility: CLINIC | Age: 75
End: 2025-08-11
Payer: OTHER GOVERNMENT

## 2025-08-12 ENCOUNTER — MYC MEDICAL ADVICE (OUTPATIENT)
Dept: CARDIOLOGY | Facility: CLINIC | Age: 75
End: 2025-08-12
Payer: OTHER GOVERNMENT

## 2025-08-12 DIAGNOSIS — I47.10 SVT (SUPRAVENTRICULAR TACHYCARDIA): Primary | ICD-10-CM

## 2025-08-20 ENCOUNTER — TELEPHONE (OUTPATIENT)
Dept: INTERNAL MEDICINE | Facility: CLINIC | Age: 75
End: 2025-08-20
Payer: OTHER GOVERNMENT

## (undated) DEVICE — INTRO SHEATH 7FRX10CM PINNACLE RSS702

## (undated) DEVICE — CATH, QUADRAPOLAR DEFLECTABLE EP 5MM SPACING REPROCESSED

## (undated) DEVICE — CATH EP DECAPOLAR CS 7FR BI-DIRECTL FJ

## (undated) DEVICE — INTRO SHEATH 8FRX10CM PINNACLE RSS802

## (undated) DEVICE — CUSTOM PACK EP

## (undated) DEVICE — 8F SOUNDSTAR ECO ULTRASOUND CATHETER

## (undated) DEVICE — INTRO SHEATH 9FRX10CM PINNACLE RSS902

## (undated) DEVICE — CATH THERMOCOOL SMARTTOUCH SF FJ CURVE

## (undated) DEVICE — ELECTRODE DEFIB CADENCE 22550R

## (undated) DEVICE — TUBE SET SMARKABLATE IRRIGATION

## (undated) DEVICE — PATCH CARTO 3 EXTERNAL REFERENCE 3D MAPPING CREFP6

## (undated) RX ORDER — NITROGLYCERIN 5 MG/ML
VIAL (ML) INTRAVENOUS
Status: DISPENSED
Start: 2023-10-05

## (undated) RX ORDER — FENTANYL CITRATE 50 UG/ML
INJECTION, SOLUTION INTRAMUSCULAR; INTRAVENOUS
Status: DISPENSED
Start: 2023-10-05

## (undated) RX ORDER — TRAMADOL HYDROCHLORIDE 50 MG/1
TABLET ORAL
Status: DISPENSED
Start: 2023-10-05

## (undated) RX ORDER — ISOPROTERENOL HYDROCHLORIDE 0.2 MG/ML
INJECTION, SOLUTION INTRAVENOUS
Status: DISPENSED
Start: 2023-10-05

## (undated) RX ORDER — NICARDIPINE HCL-0.9% SOD CHLOR 1 MG/10 ML
SYRINGE (ML) INTRAVENOUS
Status: DISPENSED
Start: 2023-10-05

## (undated) RX ORDER — LIDOCAINE HYDROCHLORIDE 10 MG/ML
INJECTION, SOLUTION EPIDURAL; INFILTRATION; INTRACAUDAL; PERINEURAL
Status: DISPENSED
Start: 2023-10-05